# Patient Record
Sex: MALE | Race: WHITE | Employment: FULL TIME | URBAN - METROPOLITAN AREA
[De-identification: names, ages, dates, MRNs, and addresses within clinical notes are randomized per-mention and may not be internally consistent; named-entity substitution may affect disease eponyms.]

---

## 2021-12-20 ENCOUNTER — TELEPHONE (OUTPATIENT)
Dept: SURGERY | Age: 36
End: 2021-12-20

## 2021-12-20 NOTE — TELEPHONE ENCOUNTER
Patient was scheduled with Dr. Jorge Schilder 12/21/2021 at 10:45; however, the patient needs to be seen by Dr. Thea Caruso. I attempted to contact the patient to get them on Dr. Rene Calix schedule, but the patient did not answer so I left a voicemail explaining their appt was canceled for tomorrow and the pt needs to call the office to schedule an appointment with Dr. Thea Caruso.

## 2022-01-06 ENCOUNTER — OFFICE VISIT (OUTPATIENT)
Dept: SURGERY | Age: 37
End: 2022-01-06
Payer: COMMERCIAL

## 2022-01-06 VITALS
TEMPERATURE: 98.7 F | BODY MASS INDEX: 30.04 KG/M2 | HEIGHT: 71 IN | WEIGHT: 214.6 LBS | HEART RATE: 97 BPM | RESPIRATION RATE: 18 BRPM | SYSTOLIC BLOOD PRESSURE: 143 MMHG | OXYGEN SATURATION: 95 % | DIASTOLIC BLOOD PRESSURE: 87 MMHG

## 2022-01-06 DIAGNOSIS — R16.1 SPLENIC MASS: Primary | ICD-10-CM

## 2022-01-06 PROCEDURE — 99205 OFFICE O/P NEW HI 60 MIN: CPT | Performed by: SURGERY

## 2022-01-06 RX ORDER — ADALIMUMAB 40MG/0.4ML
KIT SUBCUTANEOUS
COMMUNITY
Start: 2021-12-16 | End: 2022-07-29

## 2022-01-06 RX ORDER — OMEPRAZOLE 20 MG/1
TABLET, DELAYED RELEASE ORAL
COMMUNITY
Start: 2021-10-20

## 2022-01-06 RX ORDER — BISMUTH SUBSALICYLATE 262 MG
1 TABLET,CHEWABLE ORAL DAILY
COMMUNITY

## 2022-01-06 RX ORDER — LORATADINE AND PSEUDOEPHEDRINE SULFATE 10; 240 MG/1; MG/1
TABLET, EXTENDED RELEASE ORAL
COMMUNITY
Start: 2005-12-20

## 2022-01-06 NOTE — PROGRESS NOTES
1. Have you been to the ER, urgent care clinic since your last visit? Hospitalized since your last visit? No    2. Have you seen or consulted any other health care providers outside of the [address] [place] [address] since your last visit? Include any pap smears or colon screening. No    Patient aware of BP.

## 2022-01-10 PROBLEM — R16.1 SPLENIC MASS: Status: ACTIVE | Noted: 2022-01-10

## 2022-01-11 NOTE — PROGRESS NOTES
[facility] History and Physical    Chief Complaint: Splenic mass    History of Present Illness:      Leodan Briones is a 40 y.o. male who was, referred by Dr. Jamin Diggs for evaluation of a splenic mass. The patient has a history of ulcerative proctitis for which she is followed by Dr. Whit Beatty. This is well controlled. The patient recently moved to [place] from [facility] and then establishing GI care had a CT scan and colonoscopy because of his history of ulcerative proctitis. The CT scan showed a splenic mass which was further evaluated with MRI. The MRI showed a 12.2 x 8.0 cm spleen with 8 x 6.6 cm splenic mass. 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 The patient is subsequently referred for further evaluation. The patient does have a history of recent diagnosis of testicular cancer. He had an orchiectomy in December and is being evaluated for consideration of retroperitoneal lymphadenectomy versus radiation therapy. He denies any abdominal pain complaints. No weight loss. Past Medical History:   Diagnosis Date    Acid indigestion     Autoimmune disease ([address]     Cancer (HCC)     Testicular     Stool color black     ulceratie proctitis        Past Surgical History:   Procedure Laterality Date    HX UROLOGICAL         Social History     Socioeconomic History    Marital status:      Spouse name: Not on file    Number of children: Not on file    Years of education: Not on file    Highest education level: Not on file   Occupational History    Not on file   Tobacco Use    Smoking status: Former Smoker    Smokeless tobacco: Never Used   Substance and Sexual Activity    Alcohol use:  Yes    Drug use: Never    Sexual activity: Not on file   Other Topics Concern    Not on file   Social History Narrative    Not on file     Social Determinants of Health     Financial Resource Strain:     Difficulty of Paying Living Expenses: Not on file   Food Insecurity:     Worried About Running Out of Food in the Last Year: Not on file    Paulette of Food in the Last Year: Not on file   Transportation Needs:     Lack of Transportation (Medical): Not on file    Lack of Transportation (Non-Medical): Not on file   Physical Activity:     Days of Exercise per Week: Not on file    Minutes of Exercise per Session: Not on file   Stress:     Feeling of Stress : Not on file   Social Connections:     Frequency of Communication with Friends and Family: Not on file    Frequency of Social Gatherings with Friends and Family: Not on file    Attends Orthodox Services: Not on file    Active Member of [address] [place] [address] [employer] or Organizations: Not on file    Attends Club or Organization Meetings: Not on file    Marital Status: Not on file   Intimate Partner Violence:     Fear of Current or Ex-Partner: Not on file    Emotionally Abused: Not on file    Physically Abused: Not on file    Sexually Abused: Not on file   Housing Stability:     Unable to Pay for Housing in the Last Year: Not on file    Number of [place] in the Last Year: Not on file    Unstable Housing in the Last Year: Not on file       Family History   Problem Relation Age of Onset    Hypertension Mother     Cancer Maternal Grandmother         Breast    Stroke Maternal Grandfather     Cancer Paternal Grandfather          Current Outpatient Medications:     Humira,CF, Pen 40 mg/0.4 mL injection pen, , Disp: , Rfl:     loratadine-pseudoephedrine (AllerClear D-24hr)  mg per tablet, , Disp: , Rfl:     omeprazole (PriLOSEC OTC) 20 mg tablet, , Disp: , Rfl:     multivitamin (ONE A DAY) tablet, Take 1 Tablet by mouth daily.  Indications: One a Day Mens, Disp: , Rfl:     Allergies   Allergen Reactions    Flynn Rash and Swelling       ROS   Constitutional: Negative  Ears, Nose, Mouth, Throat, and Face: Negative  Respiratory: Negative  Cardiovascular: Negative  Gastrointestinal: History of ulcerative proctitis  Genitourinary: Recent diagnosis of testicular cancer. Integument/Breast: Negative  Hematologic/Lymphatic: Negative  Behavioral/Psychiatric: Negative  Allergic/Immunologic: Negative      Physical Exam:     Visit Vitals  BP (!) 143/87 (BP 1 Location: Right arm)   Pulse 97   Temp 98.7 °F (37.1 °C)   Resp 18   Ht 5' 11\" (1.803 m)   Wt 214 lb 9.6 oz (97.3 kg)   SpO2 95%   BMI 29.93 kg/m²       General - alert and oriented, no apparent distress  HEENT - NC/AT. No scleral icterus  Pulm - CTAB, normal inspiratory effort  CV - RRR, no M/R/G  Abd - soft, NT, ND. No palpable masses or organomegaly. Ext - warm, well perfused, no edema  Lymphatics - no cervical, supraclavicular or inguinal adenopathy noted. Skin - supple, no rashes  Psychiatric - normal affect, good mood    Labs  None    Imaging  Reports from outside imaging reviewed. Images not available for my review at this time. Assessment:     Myrna Blevins is a 40 y.o. male with a splenic mass in the setting of recent diagnosis of testicular cancer and ulcerative proctitis. Recommendations:     1. I would like to get a copy of the patient's MRI and CT to review with our radiologist.  I will plan to review this case at our multidisciplinary tumor board once this is obtained. I discussed with the patient that his splenic lesion is most likely benign however malignancy was not felt possible to rule out based upon outside review. He may benefit from a PET scan prior to his urologic surgery. I discussed that if he does have an open retroperitoneal lymphadenectomy that the splenectomy could be performed at that time if deemed necessary. I will plan to call the patient after our multidisciplinary tumor board review.       60 mins of time was spent with the patient of which > 50% of the time involved face-to-face counseling of the patient regarding the proposed treatment plan.       Renay Aase, MD  1/6/2022    CC: Dr. Beltre Roscoe  Dr. Guicho Lew

## 2022-01-18 ENCOUNTER — TELEPHONE (OUTPATIENT)
Dept: SURGERY | Age: 37
End: 2022-01-18

## 2022-01-18 NOTE — TELEPHONE ENCOUNTER
Patient called stating he was checking on the status if Dr. Tobi Cesar received his medical records from [facility] and [facility].

## 2022-01-19 ENCOUNTER — TELEPHONE (OUTPATIENT)
Dept: SURGERY | Age: 37
End: 2022-01-19

## 2022-01-19 NOTE — TELEPHONE ENCOUNTER
Returned call to patient verified using 2 patient identifiers. Informed will follow up with medical records to see if records have been received and will follow up with Dr. Carson Curry nurse.   Pt voiced understandings

## 2022-01-19 NOTE — TELEPHONE ENCOUNTER
Cld pt to verify where to send Medical Record request. At this time it just says \"[facility]\". We need a dept and phone number or a fax number. Please send his response to me in a telephone encounter so I may complete processing.

## 2022-01-19 NOTE — TELEPHONE ENCOUNTER
Returned call to patient. Two patient identifiers used. Patient given update regarding records request.  Will give patient a call once records arrive.

## 2022-02-09 ENCOUNTER — TELEPHONE (OUTPATIENT)
Dept: SURGERY | Age: 37
End: 2022-02-09

## 2022-02-09 NOTE — TELEPHONE ENCOUNTER
Pt will come by to fill out Release of Medical records for [facility] and [facility].     CHECK THE  DRAWER PLEASE

## 2022-02-09 NOTE — TELEPHONE ENCOUNTER
Patient stated that he has not heard back since his records were requested from [facility] and [address] [place] [address] urology. Patient would like to know what his next steps are.

## 2022-02-09 NOTE — TELEPHONE ENCOUNTER
Returned call to patient. IVÁN cortes. Explained to patient that we haven't gotten records from [facility] or [facility], however I would check on the status.

## 2022-02-14 ENCOUNTER — DOCUMENTATION ONLY (OUTPATIENT)
Dept: SURGERY | Age: 37
End: 2022-02-14

## 2022-02-14 NOTE — PROGRESS NOTES
Disk received from [place] CT abd pelvis with contrast and Chest Xray 12/16/21 along with  [facility] CT abd/pelvis with contrast 7/1/21. Placed in provider inbox.

## 2022-02-24 ENCOUNTER — TELEPHONE (OUTPATIENT)
Dept: SURGERY | Age: 37
End: 2022-02-24

## 2022-02-24 NOTE — TELEPHONE ENCOUNTER
Pt received a voice mail from Dr. Marino Hallman to schedule a follow up appt to review imaging. Pt is scheduled for 3/10 at 1:00pm. He was concerned this may be too far out and wanted me to send a message to have a nurse confirm and call him back please.

## 2022-03-08 ENCOUNTER — TELEPHONE (OUTPATIENT)
Dept: SURGERY | Age: 37
End: 2022-03-08

## 2022-03-08 NOTE — TELEPHONE ENCOUNTER
Attempted to contact the patient to confirm their upcoming appointment set for Thursday, March 10th. LVM instructing the patient to call the office to confirm.

## 2022-03-10 ENCOUNTER — OFFICE VISIT (OUTPATIENT)
Dept: SURGERY | Age: 37
End: 2022-03-10
Payer: COMMERCIAL

## 2022-03-10 VITALS
SYSTOLIC BLOOD PRESSURE: 130 MMHG | HEART RATE: 106 BPM | OXYGEN SATURATION: 96 % | BODY MASS INDEX: 30.52 KG/M2 | HEIGHT: 71 IN | RESPIRATION RATE: 18 BRPM | WEIGHT: 218 LBS | TEMPERATURE: 98.6 F | DIASTOLIC BLOOD PRESSURE: 87 MMHG

## 2022-03-10 DIAGNOSIS — R16.1 SPLENIC MASS: Primary | ICD-10-CM

## 2022-03-10 PROBLEM — K51.90 ULCERATIVE COLITIS (HCC): Status: ACTIVE | Noted: 2022-03-10

## 2022-03-10 PROCEDURE — 99213 OFFICE O/P EST LOW 20 MIN: CPT | Performed by: SURGERY

## 2022-03-10 NOTE — PROGRESS NOTES
1. Have you been to the ER, urgent care clinic since your last visit? Hospitalized since your last visit? No    2. Have you seen or consulted any other health care providers outside of the [address] [place] [address] since your last visit? Include any pap smears or colon screening.  No

## 2022-03-10 NOTE — PROGRESS NOTES
[facility]      Clinic Note - Follow up    Osmar returns for scheduled follow up today. He is known to me for a splenic mass in the setting of recent diagnosis of testicular cancer and ulcerative proctitis. The patient returns today to discuss review of his imaging. He states that his Urology team decided to follow his lymph nodes rather than treat with surgery or radiation. He has follow-up lab work in 2 months and plans for follow-up CT imaging in 4 months. His ulcerative colitis remains well controlled. He has no new complaints today. No abdominal pain. The patient denies any changes to the [facility], PSHx, SHx or FHx since their last visit except as mentioned above. ROS is negative except as mentioned in the HPI. Objective     Visit Vitals  /87 (BP 1 Location: Right arm, BP Patient Position: Sitting, BP Cuff Size: Adult long)   Pulse (!) 106   Temp 98.6 °F (37 °C) (Oral)   Resp 18   Ht 5' 11\" (1.803 m)   Wt 218 lb (98.9 kg)   SpO2 96%   BMI 30.40 kg/m²         PE  GEN - Awake, alert, communicating appropriately. NAD  Remainder of exam not performed during this encounter. Labs  None      Imaging  None      Assessment     Tim Webb is a 40 y. o.yr old male with a splenic mass in the setting of recent diagnosis of testicular cancer and ulcerative proctitis. The splenic mass appears most consistent with an atypical hemangioma per review with our radiologist.      Plan     I reviewed the imaging extensively with radiology. 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 It has not changed in 6 months which is also reassuring. I recommended that we follow this with his already planned surveillance imaging for his testicular cancer. If a significant change occurs we could proceed with a biopsy or splenectomy at that time. I will plan to see him back after his next CT scan in 4 to 5 months.     20 mins of time was spent with the patient of which > 50% of the time involved face-to-face counseling of the patient regarding the proposed treatment plan.       Davina Farrell MD  3/10/2022    CC: Dr. Felisa Cooks Dr. Billee Kraft

## 2022-03-19 PROBLEM — K51.90 ULCERATIVE COLITIS (HCC): Status: ACTIVE | Noted: 2022-03-10

## 2022-03-20 PROBLEM — R16.1 SPLENIC MASS: Status: ACTIVE | Noted: 2022-01-10

## 2022-05-19 ENCOUNTER — TELEPHONE (OUTPATIENT)
Dept: SURGERY | Age: 37
End: 2022-05-19

## 2022-05-19 ENCOUNTER — VIRTUAL VISIT (OUTPATIENT)
Dept: SURGERY | Age: 37
End: 2022-05-19
Payer: COMMERCIAL

## 2022-05-19 DIAGNOSIS — R16.1 SPLENIC MASS: Primary | ICD-10-CM

## 2022-05-19 PROBLEM — C62.90 TESTICULAR CANCER (HCC): Status: ACTIVE | Noted: 2022-05-19

## 2022-05-19 PROCEDURE — 99213 OFFICE O/P EST LOW 20 MIN: CPT | Performed by: SURGERY

## 2022-05-19 RX ORDER — DEXTROAMPHETAMINE SULFATE, DEXTROAMPHETAMINE SACCHARATE, AMPHETAMINE SULFATE AND AMPHETAMINE ASPARTATE 6.25; 6.25; 6.25; 6.25 MG/1; MG/1; MG/1; MG/1
CAPSULE, EXTENDED RELEASE ORAL
COMMUNITY
Start: 2022-05-17

## 2022-05-19 NOTE — PROGRESS NOTES
I was in the office while conducting this encounter. Consent:  He and/or his healthcare decision maker is aware that this patient-initiated Telehealth encounter is a billable service, with coverage as determined by his insurance carrier. He is aware that he may receive a bill and has provided verbal consent to proceed: Yes    This virtual visit was conducted via D[employer]. me. Pursuant to the emergency declaration under the [facility]5 waiver authority and the [employer] Act, this Virtual  Visit was conducted to reduce the patient's risk of exposure to COVID-19 and provide continuity of care for an established patient. Services were provided through a video synchronous discussion virtually to substitute for in-person clinic visit. Due to this being a TeleHealth evaluation, many elements of the physical examination are unable to be assessed. Total Time: minutes: 11-20 minutes. [facility]      Clinic Note - Follow up    Osmar returns for scheduled follow up today. He is known to me for a splenic mass that is concerning for an atypical hemangioma or PAOLA. He also has testicular cancer and ulcerative colitis. The patient states that he is feeling well since his last appointment. His tumor marker (HCG) was up some per the patient and his urologist is rechecking this to consider whether he needs chemotherapy or not. He also recently had a CT scan which she states showed no signs of recurrent disease. The patient denies any abdominal pain, thrombocytopenia or symptoms concerning for splenomegaly. The patient denies any changes to the [facility], PSHx, SHx or FHx since their last visit except as mentioned above. ROS is negative except as mentioned in the HPI. Objective     There were no vitals taken for this visit.       PE  GEN - Awake, alert, communicating appropriately. NAD  Remainder of exam not performed for this virtual encounter. Labs  None available for me to review    Imaging  5/2/22 CT A/P w/ contrast: [facility]) 1. Unchanged splenic lesion. 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 Recommend follow-up CT in 1 year. 2.  No lymphadenopathy or other evidence of metastasis. 3.  Significant hepatic steatosis. Assessment     Luna Mercado is a 40 y. o.yr old male known to me for a splenic mass that is concerning for an atypical hemangioma or PAOLA. He also has testicular cancer and ulcerative colitis. Plan     The patient's splenic lesion appears stable suggesting further that this is a benign lesion. He will have ongoing imaging surveillance for his testicular cancer and this can be followed further with that imaging. I will plan to see him back in 1 year. Lisa Kaur MD  5/19/2022    CC:  MD Dr. Corbin Mantilla Dr.

## 2022-05-19 NOTE — PROGRESS NOTES
1. Have you been to the ER, urgent care clinic since your last visit? Hospitalized since your last visit? No    2. Have you seen or consulted any other health care providers outside of the [address] [place] [address] since your last visit? Include any pap smears or colon screening.  No

## 2022-05-19 NOTE — TELEPHONE ENCOUNTER
2nd call to [facility] to request CT report for patient. Per Aure Oglesby she will fax right over.

## 2022-05-24 ENCOUNTER — TELEPHONE (OUTPATIENT)
Dept: ONCOLOGY | Age: 37
End: 2022-05-24

## 2022-05-24 NOTE — TELEPHONE ENCOUNTER
Called the pt. And left a vm to move his appointment from 6/9 to 6/1 per Dr. Jimmy Chauhan. I have moved the appointment if the pt is okay with 6/1/22 at 2pm it is scheduled. Once verified please cancel the appointment from 6/9 with Dr. Jimmy Chauhan.

## 2022-05-25 NOTE — PROGRESS NOTES
[facility] at [address], [address] [place] [address] [address]  Chanel Mcguire: [phone]  F: [phone]      Reason for Visit:   Rossy Gonzales is a 40 y.o. male who is seen in consultation at the request of Dr. Talia Skinner for evaluation of testicular cancer. Hematology/Oncology Treatment History:   · Scrotal US 11/30/2021: 3.9cm solid and cystic mass replacing left testicle. Slightly atrophic right testicle with no mass. Incidental 8mm left epididymal head cyst/spermatocele  · Pre-op tumor markers 12/2/2021: .0, beta hCG 250,   · Left radical inguinal orchiectomy by Dr. 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 Negative LVI, negative margins. · CT A/P 12/16/2021: indeterminate splenic lesion. 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 No lymphadenopathy or other evidence of metastasis. Significant hepatic steatosis. · Tumor markers 5/17/2022: AFP 5.3, beta hCG 14  · Stage IS (pT1b cN0 M0 S1) Non-seminoma, LEFT testis    History of Present Illness:   Rossy Gonzales is a pleasant 40 y.o. male who was seen for evaluation of testicular cancer. In 11/2021 he noticed his left testicle was enlarged. He saw his PCP who referred him to urology. An US was concerning for malignancy. He had an orchiectomy 12/8/2021, and subsequent tumor markers normalized. He has been followed by [facility] with surveillance. On his last in early May, his hCG was rising from 0.1 to 9.   This was repeated two weeks later and was higher at 14, prompting his referral to see me today. He has a history of ulcerative proctitis. He also has a splenic mass for which he follows with Dr. [facility], surgical oncology, with imaging concerning for an atypical hemangioma or PAOLA. He reports feeling well. No pain. Proctitis issues did flare recently after missing his medication for a few days. He denies kidney disease, hearing loss, tinnitus, or respiratory disease. No asthma. Quit smoking in 2018, previously smoked about 1-1.5ppd for about 10 years. Denies marijuana. He is accompanied by his wife today. He lives in [place] with his wife and two young children. Past Medical History:   Diagnosis Date    Acid indigestion     Autoimmune disease ([facility] [address]     Cancer (HCC)     Testicular     Stool color black     ulceratie proctitis        Past Surgical History:   Procedure Laterality Date    HX UROLOGICAL         Social History     Socioeconomic History    Marital status:      Spouse name: Not on file    Number of children: Not on file    Years of education: Not on file    Highest education level: Not on file   Occupational History    Not on file   Tobacco Use    Smoking status: Former Smoker    Smokeless tobacco: Never Used   Substance and Sexual Activity    Alcohol use: Yes    Drug use: Never    Sexual activity: Not on file   Other Topics Concern    Not on file   Social History Narrative    Not on file     Social Determinants of Health     Financial Resource Strain:     Difficulty of Paying Living Expenses: Not on file   Food Insecurity:     Worried About Running Out of Food in the Last Year: Not on file    Paulette of Food in the Last Year: Not on file   Transportation Needs:     Lack of Transportation (Medical): Not on file    Lack of Transportation (Non-Medical):  Not on file   Physical Activity:     Days of Exercise per Week: Not on file    Minutes of Exercise per Session: Not on file   Stress:     Feeling of Stress : Not on file Social Connections:     Frequency of Communication with Friends and Family: Not on file    Frequency of Social Gatherings with Friends and Family: Not on file    Attends Tenri[employer] Services: Not on file    Active Member of Clubs or Organizations: Not on file    Attends Club or Organization Meetings: Not on file    Marital Status: Not on file   Intimate Partner Violence:     Fear of Current or Ex-Partner: Not on file    Emotionally Abused: Not on file    Physically Abused: Not on file    Sexually Abused: Not on file   Housing Stability:     Unable to Pay for Housing in the Last Year: Not on file    Number of [place] in the Last Year: Not on file    Unstable Housing in the Last Year: Not on file       Family History   Problem Relation Age of Onset    Hypertension Mother     Cancer Maternal Grandmother         Breast    Stroke Maternal Grandfather     Cancer Paternal Grandfather        Current Outpatient Medications   Medication Sig    Adderall XR 25 mg XR capsule     Humira,CF, Pen 40 mg/0.4 mL injection pen     loratadine-pseudoephedrine (AllerClear D-24hr)  mg per tablet     omeprazole (PriLOSEC OTC) 20 mg tablet     multivitamin (ONE A DAY) tablet Take 1 Tablet by mouth daily. Indications: One a Day Mens     No current facility-administered medications for this visit. Allergies   Allergen Reactions    Flynn Rash and Swelling          Review of Systems: A complete review of systems was obtained, reviewed. Pertinent findings reviewed above.     Physical Exam:     Visit Vitals  BP (!) 137/95 (BP 1 Location: Right arm, BP Patient Position: Sitting, BP Cuff Size: Large adult)   Pulse 100   Temp 98.1 °F (36.7 °C) (Temporal)   Resp 20   Ht 5' 11\" (1.803 m)   Wt 212 lb (96.2 kg)   SpO2 98%   BMI 29.57 kg/m²     ECOG PS: 0  General: no distress  Eyes: PERRLA, anicteric sclerae  HENT: atraumatic, oropharynx clear  Neck: supple  Lymphatic: no cervical, supraclavicular, or inguinal adenopathy  Respiratory: CTAB, normal respiratory effort  CV: normal rate, regular rhythm, no murmurs, no peripheral edema  GI: soft, nontender, nondistended, no masses, no hepatomegaly, no splenomegaly  MS: digits without clubbing or cyanosis. Skin: no rashes, no ecchymoses, no petechia. normal temperature, turgor, and texture. Psych: alert, oriented, appropriate affect, normal judgment/insight    Results:     2021  AFP: 168.0 (H)  beta hC (H)  LDH: 190    2022  AFP: 6.2  beta hCG: <1    2022  AFP:  4.6  beta hCG: <1    2022:  AFP:  5.6  beta hC    2022:  AFP:  5.3  beta hC    Records from [facility] reviewed and summarized above. Test results above have been reviewed. Assessment:   1) Non-seminoma, LEFT testis  Stage IS  He is s/p orchectomy 2021. His pre-op hCG was elevated at 250, but became undetectable after surgery. More recently it has been rising, up to 14 on most recent check, concerning for recurrence. CXR and CT A/P were negative. His beta hCG is still only minimally elevated, and we can see false positives in certain situations. He denies marijuana use. I will check his testosterone level, and repeat his hCG now that it has been another 2 weeks. If the level continues to rise, then he has recurrent disease and current guidelines recommend treatment with systemic therapy. Specifically, for good risk disease we would give BEP x3 cycles. He will need PFTs before treatment. If DLCO is not adequate for bleomycin, then we will consider treatment with EP x4 cycles. I recommend a CT Chest prior to initiating chemotherapy, to complete his staging evaluation. He will benefit from port placement for therapy. 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 Collette Cardona (surgical oncology). 3) Ulcerative proctitis  Following with Dr. Sergio Pruett.   He will need to hold his Humira while on chemotherapy. 4) Risk of infertility  He was informed that chemotherapy can potentially cause infertility. He has already had a vasectomy and is not interested in having more children.       Plan:     · Labs today: CBC, CMP, Total testosterone, AFP, LDH, beta-hCG  · CT Chest  · If beta-hCG continues to rise, we will obtain PFTs, port placement, and have him return for chemotherapy teaching  ·  to meet with patient today, provide resources and counseling    Signed By: Ju Uribe MD

## 2022-05-26 PROBLEM — C62.92 NON-SEMINOMATOUS CANCER OF LEFT TESTIS (HCC): Status: ACTIVE | Noted: 2022-05-26

## 2022-06-01 ENCOUNTER — DOCUMENTATION ONLY (OUTPATIENT)
Dept: ONCOLOGY | Age: 37
End: 2022-06-01

## 2022-06-01 ENCOUNTER — OFFICE VISIT (OUTPATIENT)
Dept: ONCOLOGY | Age: 37
End: 2022-06-01
Payer: COMMERCIAL

## 2022-06-01 VITALS
OXYGEN SATURATION: 98 % | BODY MASS INDEX: 29.68 KG/M2 | TEMPERATURE: 98.1 F | HEIGHT: 71 IN | HEART RATE: 100 BPM | SYSTOLIC BLOOD PRESSURE: 137 MMHG | DIASTOLIC BLOOD PRESSURE: 95 MMHG | RESPIRATION RATE: 20 BRPM | WEIGHT: 212 LBS

## 2022-06-01 DIAGNOSIS — C62.92 NON-SEMINOMATOUS CANCER OF LEFT TESTIS (HCC): Primary | ICD-10-CM

## 2022-06-01 PROCEDURE — 99205 OFFICE O/P NEW HI 60 MIN: CPT | Performed by: INTERNAL MEDICINE

## 2022-06-02 NOTE — PROGRESS NOTES
Oncology Social Work  Psychosocial Assessment    Reason for Assessment:      [x] Social Work Referral [x] Initial Assessment [x] New Diagnosis [] Other    Advance Care Planning:  No flowsheet data found. Sources of Information:    [x]Patient  [x]Family  [x]Staff  [x]Medical Record    Mental Status:    [x]Alert  []Lethargic  []Unresponsive   [] Unable to assess   Oriented to:  [x]Person  [x]Place  [x]Time  [x]Situation      Relationship Status:  []Single  [x]  []Significant Other/Life Partner  []  []  []    Living Circumstances:  []Lives Alone  [x]Family/Significant Other in Household  []Roommates  [x]Children in the Home  []Paid Caregivers  []Assisted Living Facility/Group Home  []Skilled [address]  []Homeless  []Incarcerated  []Environmental/Care Concerns  []Other:    Employment Status:  [x]Employed Full-time []Employed Part-time []Homemaker  [] Retired [] Short-Term Disability [] [facility]  [] Unemployed   [] SSI   [] SSDI  [] Self-Employment    Barriers to Learning:    []Language  []Developmental  []Cognitive  []Altered Mental Status  []Visual/Hearing Impairment  []Unable to Read/Write  []Motivational  [] Challenges Understanding Medical Jargon [x]No Barriers Identified      Financial/Legal Concerns:    []Uninsured  []Limited Income/Resources  []Non-Citizen  []Food Insecurity [x]No Concerns Identified   []Other:    Scientologist/Spiritual/Existential:  Does patient have any spiritual or Mandaen beliefs? [] Yes [] No  Is the patient involved in a spiritual, rishi or Mandaen community?  [] Yes [] No  Patient expressing spiritual/existential angst? [] Yes [x] No  Notes:    Support System:    Identified Support Person/Group: Pina (wife)  []Strong  [x]Fair  []Limited    Coping with Illness:   [x]  Coping Well  [] Challenges Coping with Serious Illness [] Situational Depression [x] Situational Anxiety [] Anticipatory Grief  [] Recent Loss [] Caregiver [place] Narrative: Patient here for consultation with Dr. Natalia Leal for the management of testicular cancer. Met with patient and his wife, Phil Carney, to introduce oncology social work role and support. Patient and wife live together in their private residence with their two children ages 3 and 3. 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 He has medical insurance. They recently moved to the [facility] and have no local family but tells me they have several neighbor who have become friends and have offered to help when needed. Family lives in [place]. Patient hopes to be able to work during treatment but has the ability to take leave as needed. He prefers treatment at [facility] as that is closer to his home and easier for his wife to drive him to/from treatment with their children. Patient reports ADD diagnosis managed with adderall 25 mg. She reports some feelings of worry and depressed mood since cancer diagnosis. Explored coping tools and patient tells me staying busy with work and commute to work where he can listen to music and pod casts are helpful. Explored ways to incorporate new coping tools should he go out on leave from work. Patient declined information on fertility preservation. He's has a vasectomy and does not plan on have any more children. Provided patient will new patient resource folder  including information on support their children. Encouraged patient/wife to contact me as needed for ongoing psychosocial support. Plan:  1. Explored coping tools to manage depression and anxiety. 2. Provided new patient resource folder and resources for children. 3. Ongoing psychosocial support as desired by patient.      Referral/Handouts:   Complementary therapies referral  Caregiver Support referral  Support Groups referral  Dependent Care (adult or child) referral      Thank you,  Marcelle Graves LCSW

## 2022-06-03 ENCOUNTER — TELEPHONE (OUTPATIENT)
Dept: ONCOLOGY | Age: 37
End: 2022-06-03

## 2022-06-03 DIAGNOSIS — C62.92 NON-SEMINOMATOUS CANCER OF LEFT TESTIS (HCC): Primary | ICD-10-CM

## 2022-06-03 NOTE — TELEPHONE ENCOUNTER
[address] at [facility]  [phone]    Labs reviewed. Beta hCG continues to rise, up to 19 now. Testosterone level is normal.    I recommend we move forward with chemotherapy. CT Chest is scheduled for 6/9. He will additionally need PFTs and port placement and follow up with me for chemotherapy teaching. We can hopefully get these done next week and start him on BEP the following week. I called the patient to discuss, no answer, left a message for him to call me back.

## 2022-06-06 ENCOUNTER — TELEPHONE (OUTPATIENT)
Dept: ONCOLOGY | Age: 37
End: 2022-06-06

## 2022-06-06 DIAGNOSIS — C62.92 NON-SEMINOMATOUS CANCER OF LEFT TESTIS (HCC): Primary | ICD-10-CM

## 2022-06-06 PROBLEM — Z51.11 ENCOUNTER FOR ANTINEOPLASTIC CHEMOTHERAPY: Status: ACTIVE | Noted: 2022-06-06

## 2022-06-06 RX ORDER — ONDANSETRON 2 MG/ML
8 INJECTION INTRAMUSCULAR; INTRAVENOUS AS NEEDED
Status: CANCELLED | OUTPATIENT
Start: 2022-06-24

## 2022-06-06 RX ORDER — ACETAMINOPHEN 325 MG/1
650 TABLET ORAL ONCE
Status: CANCELLED
Start: 2022-07-05 | End: 2022-07-05

## 2022-06-06 RX ORDER — ACETAMINOPHEN 325 MG/1
650 TABLET ORAL AS NEEDED
Status: CANCELLED
Start: 2022-06-23

## 2022-06-06 RX ORDER — ALBUTEROL SULFATE 0.83 MG/ML
2.5 SOLUTION RESPIRATORY (INHALATION) AS NEEDED
Status: CANCELLED
Start: 2022-06-21

## 2022-06-06 RX ORDER — DIPHENHYDRAMINE HYDROCHLORIDE 50 MG/ML
25 INJECTION, SOLUTION INTRAMUSCULAR; INTRAVENOUS AS NEEDED
Status: CANCELLED
Start: 2022-06-23

## 2022-06-06 RX ORDER — ONDANSETRON 2 MG/ML
8 INJECTION INTRAMUSCULAR; INTRAVENOUS AS NEEDED
Status: CANCELLED | OUTPATIENT
Start: 2022-06-27

## 2022-06-06 RX ORDER — HYDROCORTISONE SODIUM SUCCINATE 100 MG/2ML
100 INJECTION, POWDER, FOR SOLUTION INTRAMUSCULAR; INTRAVENOUS AS NEEDED
Status: CANCELLED | OUTPATIENT
Start: 2022-06-24

## 2022-06-06 RX ORDER — EPINEPHRINE 1 MG/ML
0.3 INJECTION, SOLUTION, CONCENTRATE INTRAVENOUS AS NEEDED
Status: CANCELLED | OUTPATIENT
Start: 2022-06-24

## 2022-06-06 RX ORDER — DIPHENHYDRAMINE HYDROCHLORIDE 50 MG/ML
25 INJECTION, SOLUTION INTRAMUSCULAR; INTRAVENOUS ONCE
Status: CANCELLED
Start: 2022-07-05 | End: 2022-07-05

## 2022-06-06 RX ORDER — DIPHENHYDRAMINE HYDROCHLORIDE 50 MG/ML
50 INJECTION, SOLUTION INTRAMUSCULAR; INTRAVENOUS AS NEEDED
Status: CANCELLED
Start: 2022-06-27

## 2022-06-06 RX ORDER — ACETAMINOPHEN 325 MG/1
650 TABLET ORAL ONCE
Status: CANCELLED
Start: 2022-06-27 | End: 2022-06-27

## 2022-06-06 RX ORDER — ACETAMINOPHEN 325 MG/1
650 TABLET ORAL AS NEEDED
Status: CANCELLED
Start: 2022-06-24

## 2022-06-06 RX ORDER — SODIUM CHLORIDE 9 MG/ML
10 INJECTION INTRAMUSCULAR; INTRAVENOUS; SUBCUTANEOUS AS NEEDED
Status: CANCELLED | OUTPATIENT
Start: 2022-06-21

## 2022-06-06 RX ORDER — DIPHENHYDRAMINE HYDROCHLORIDE 50 MG/ML
25 INJECTION, SOLUTION INTRAMUSCULAR; INTRAVENOUS AS NEEDED
Status: CANCELLED
Start: 2022-06-22

## 2022-06-06 RX ORDER — DIPHENHYDRAMINE HYDROCHLORIDE 50 MG/ML
25 INJECTION, SOLUTION INTRAMUSCULAR; INTRAVENOUS AS NEEDED
Status: CANCELLED
Start: 2022-07-05

## 2022-06-06 RX ORDER — SODIUM CHLORIDE 9 MG/ML
25 INJECTION, SOLUTION INTRAVENOUS CONTINUOUS
Status: CANCELLED
Start: 2022-06-27

## 2022-06-06 RX ORDER — EPINEPHRINE 1 MG/ML
0.3 INJECTION, SOLUTION, CONCENTRATE INTRAVENOUS AS NEEDED
Status: CANCELLED | OUTPATIENT
Start: 2022-06-22

## 2022-06-06 RX ORDER — ALBUTEROL SULFATE 0.83 MG/ML
2.5 SOLUTION RESPIRATORY (INHALATION) AS NEEDED
Status: CANCELLED
Start: 2022-06-27

## 2022-06-06 RX ORDER — SODIUM CHLORIDE 0.9 % (FLUSH) 0.9 %
10 SYRINGE (ML) INJECTION AS NEEDED
Status: CANCELLED | OUTPATIENT
Start: 2022-06-23

## 2022-06-06 RX ORDER — HYDROCORTISONE SODIUM SUCCINATE 100 MG/2ML
100 INJECTION, POWDER, FOR SOLUTION INTRAMUSCULAR; INTRAVENOUS AS NEEDED
Status: CANCELLED | OUTPATIENT
Start: 2022-06-23

## 2022-06-06 RX ORDER — SODIUM CHLORIDE 9 MG/ML
10 INJECTION INTRAMUSCULAR; INTRAVENOUS; SUBCUTANEOUS AS NEEDED
Status: CANCELLED | OUTPATIENT
Start: 2022-06-23

## 2022-06-06 RX ORDER — SODIUM CHLORIDE 0.9 % (FLUSH) 0.9 %
10 SYRINGE (ML) INJECTION AS NEEDED
Status: CANCELLED | OUTPATIENT
Start: 2022-06-20

## 2022-06-06 RX ORDER — SODIUM CHLORIDE 9 MG/ML
10 INJECTION INTRAMUSCULAR; INTRAVENOUS; SUBCUTANEOUS AS NEEDED
Status: CANCELLED | OUTPATIENT
Start: 2022-06-27

## 2022-06-06 RX ORDER — DIPHENHYDRAMINE HYDROCHLORIDE 50 MG/ML
25 INJECTION, SOLUTION INTRAMUSCULAR; INTRAVENOUS ONCE
Status: CANCELLED
Start: 2022-06-27 | End: 2022-06-27

## 2022-06-06 RX ORDER — EPINEPHRINE 1 MG/ML
0.3 INJECTION, SOLUTION, CONCENTRATE INTRAVENOUS AS NEEDED
Status: CANCELLED | OUTPATIENT
Start: 2022-06-21

## 2022-06-06 RX ORDER — DIPHENHYDRAMINE HYDROCHLORIDE 50 MG/ML
50 INJECTION, SOLUTION INTRAMUSCULAR; INTRAVENOUS AS NEEDED
Status: CANCELLED
Start: 2022-06-22

## 2022-06-06 RX ORDER — SODIUM CHLORIDE 9 MG/ML
25 INJECTION, SOLUTION INTRAVENOUS CONTINUOUS
Status: CANCELLED | OUTPATIENT
Start: 2022-06-23

## 2022-06-06 RX ORDER — SODIUM CHLORIDE 0.9 % (FLUSH) 0.9 %
10 SYRINGE (ML) INJECTION AS NEEDED
Status: CANCELLED | OUTPATIENT
Start: 2022-06-22

## 2022-06-06 RX ORDER — ONDANSETRON 2 MG/ML
8 INJECTION INTRAMUSCULAR; INTRAVENOUS ONCE
Status: CANCELLED | OUTPATIENT
Start: 2022-06-21 | End: 2022-06-21

## 2022-06-06 RX ORDER — SODIUM CHLORIDE 9 MG/ML
10 INJECTION INTRAMUSCULAR; INTRAVENOUS; SUBCUTANEOUS AS NEEDED
Status: CANCELLED | OUTPATIENT
Start: 2022-06-20

## 2022-06-06 RX ORDER — EPINEPHRINE 1 MG/ML
0.3 INJECTION, SOLUTION, CONCENTRATE INTRAVENOUS AS NEEDED
Status: CANCELLED | OUTPATIENT
Start: 2022-06-27

## 2022-06-06 RX ORDER — SODIUM CHLORIDE 9 MG/ML
25 INJECTION, SOLUTION INTRAVENOUS CONTINUOUS
Status: CANCELLED | OUTPATIENT
Start: 2022-06-21

## 2022-06-06 RX ORDER — ONDANSETRON 2 MG/ML
8 INJECTION INTRAMUSCULAR; INTRAVENOUS AS NEEDED
Status: CANCELLED | OUTPATIENT
Start: 2022-06-21

## 2022-06-06 RX ORDER — HEPARIN 100 UNIT/ML
300-500 SYRINGE INTRAVENOUS AS NEEDED
Status: CANCELLED
Start: 2022-06-24

## 2022-06-06 RX ORDER — ONDANSETRON 2 MG/ML
8 INJECTION INTRAMUSCULAR; INTRAVENOUS ONCE
Status: CANCELLED | OUTPATIENT
Start: 2022-06-24 | End: 2022-06-24

## 2022-06-06 RX ORDER — HEPARIN 100 UNIT/ML
300-500 SYRINGE INTRAVENOUS AS NEEDED
Status: CANCELLED
Start: 2022-06-27

## 2022-06-06 RX ORDER — DIPHENHYDRAMINE HYDROCHLORIDE 50 MG/ML
25 INJECTION, SOLUTION INTRAMUSCULAR; INTRAVENOUS ONCE
Status: CANCELLED
Start: 2022-06-20 | End: 2022-06-20

## 2022-06-06 RX ORDER — DIPHENHYDRAMINE HYDROCHLORIDE 50 MG/ML
50 INJECTION, SOLUTION INTRAMUSCULAR; INTRAVENOUS AS NEEDED
Status: CANCELLED
Start: 2022-06-23

## 2022-06-06 RX ORDER — ONDANSETRON 2 MG/ML
8 INJECTION INTRAMUSCULAR; INTRAVENOUS AS NEEDED
Status: CANCELLED | OUTPATIENT
Start: 2022-06-22

## 2022-06-06 RX ORDER — DIPHENHYDRAMINE HYDROCHLORIDE 50 MG/ML
25 INJECTION, SOLUTION INTRAMUSCULAR; INTRAVENOUS AS NEEDED
Status: CANCELLED
Start: 2022-06-24

## 2022-06-06 RX ORDER — ALBUTEROL SULFATE 0.83 MG/ML
2.5 SOLUTION RESPIRATORY (INHALATION) AS NEEDED
Status: CANCELLED
Start: 2022-06-22

## 2022-06-06 RX ORDER — ONDANSETRON 2 MG/ML
8 INJECTION INTRAMUSCULAR; INTRAVENOUS AS NEEDED
Status: CANCELLED | OUTPATIENT
Start: 2022-07-05

## 2022-06-06 RX ORDER — HEPARIN 100 UNIT/ML
300-500 SYRINGE INTRAVENOUS AS NEEDED
Status: CANCELLED
Start: 2022-06-21

## 2022-06-06 RX ORDER — SODIUM CHLORIDE 0.9 % (FLUSH) 0.9 %
10 SYRINGE (ML) INJECTION AS NEEDED
Status: CANCELLED | OUTPATIENT
Start: 2022-07-05

## 2022-06-06 RX ORDER — PALONOSETRON 0.05 MG/ML
0.25 INJECTION, SOLUTION INTRAVENOUS ONCE
Status: CANCELLED | OUTPATIENT
Start: 2022-06-20 | End: 2022-06-20

## 2022-06-06 RX ORDER — HEPARIN 100 UNIT/ML
300-500 SYRINGE INTRAVENOUS AS NEEDED
Status: CANCELLED
Start: 2022-06-23

## 2022-06-06 RX ORDER — ACETAMINOPHEN 325 MG/1
650 TABLET ORAL AS NEEDED
Status: CANCELLED
Start: 2022-07-05

## 2022-06-06 RX ORDER — ONDANSETRON 2 MG/ML
8 INJECTION INTRAMUSCULAR; INTRAVENOUS AS NEEDED
Status: CANCELLED | OUTPATIENT
Start: 2022-06-23

## 2022-06-06 RX ORDER — SODIUM CHLORIDE 9 MG/ML
25 INJECTION, SOLUTION INTRAVENOUS CONTINUOUS
Status: CANCELLED | OUTPATIENT
Start: 2022-06-22

## 2022-06-06 RX ORDER — SODIUM CHLORIDE 0.9 % (FLUSH) 0.9 %
10 SYRINGE (ML) INJECTION AS NEEDED
Status: CANCELLED | OUTPATIENT
Start: 2022-06-24

## 2022-06-06 RX ORDER — HYDROCORTISONE SODIUM SUCCINATE 100 MG/2ML
100 INJECTION, POWDER, FOR SOLUTION INTRAMUSCULAR; INTRAVENOUS AS NEEDED
Status: CANCELLED | OUTPATIENT
Start: 2022-06-22

## 2022-06-06 RX ORDER — SODIUM CHLORIDE 9 MG/ML
10 INJECTION INTRAMUSCULAR; INTRAVENOUS; SUBCUTANEOUS AS NEEDED
Status: CANCELLED | OUTPATIENT
Start: 2022-06-22

## 2022-06-06 RX ORDER — ALBUTEROL SULFATE 0.83 MG/ML
2.5 SOLUTION RESPIRATORY (INHALATION) AS NEEDED
Status: CANCELLED
Start: 2022-06-24

## 2022-06-06 RX ORDER — SODIUM CHLORIDE 0.9 % (FLUSH) 0.9 %
10 SYRINGE (ML) INJECTION AS NEEDED
Status: CANCELLED | OUTPATIENT
Start: 2022-06-27

## 2022-06-06 RX ORDER — HYDROCORTISONE SODIUM SUCCINATE 100 MG/2ML
100 INJECTION, POWDER, FOR SOLUTION INTRAMUSCULAR; INTRAVENOUS AS NEEDED
Status: CANCELLED | OUTPATIENT
Start: 2022-07-05

## 2022-06-06 RX ORDER — SODIUM CHLORIDE 9 MG/ML
10 INJECTION INTRAMUSCULAR; INTRAVENOUS; SUBCUTANEOUS AS NEEDED
Status: CANCELLED | OUTPATIENT
Start: 2022-07-05

## 2022-06-06 RX ORDER — ACETAMINOPHEN 325 MG/1
650 TABLET ORAL AS NEEDED
Status: CANCELLED
Start: 2022-06-21

## 2022-06-06 RX ORDER — DIPHENHYDRAMINE HYDROCHLORIDE 50 MG/ML
25 INJECTION, SOLUTION INTRAMUSCULAR; INTRAVENOUS AS NEEDED
Status: CANCELLED
Start: 2022-06-21

## 2022-06-06 RX ORDER — HEPARIN 100 UNIT/ML
300-500 SYRINGE INTRAVENOUS AS NEEDED
Status: CANCELLED
Start: 2022-07-05

## 2022-06-06 RX ORDER — ALBUTEROL SULFATE 0.83 MG/ML
2.5 SOLUTION RESPIRATORY (INHALATION) AS NEEDED
Status: CANCELLED
Start: 2022-07-05

## 2022-06-06 RX ORDER — ACETAMINOPHEN 325 MG/1
650 TABLET ORAL ONCE
Status: CANCELLED
Start: 2022-06-20 | End: 2022-06-20

## 2022-06-06 RX ORDER — ACETAMINOPHEN 325 MG/1
650 TABLET ORAL AS NEEDED
Status: CANCELLED
Start: 2022-06-22

## 2022-06-06 RX ORDER — HEPARIN 100 UNIT/ML
300-500 SYRINGE INTRAVENOUS AS NEEDED
Status: CANCELLED
Start: 2022-06-22

## 2022-06-06 RX ORDER — SODIUM CHLORIDE 0.9 % (FLUSH) 0.9 %
10 SYRINGE (ML) INJECTION AS NEEDED
Status: CANCELLED | OUTPATIENT
Start: 2022-06-21

## 2022-06-06 RX ORDER — ACETAMINOPHEN 325 MG/1
650 TABLET ORAL AS NEEDED
Status: CANCELLED
Start: 2022-06-27

## 2022-06-06 RX ORDER — HYDROCORTISONE SODIUM SUCCINATE 100 MG/2ML
100 INJECTION, POWDER, FOR SOLUTION INTRAMUSCULAR; INTRAVENOUS AS NEEDED
Status: CANCELLED | OUTPATIENT
Start: 2022-06-27

## 2022-06-06 RX ORDER — ONDANSETRON 2 MG/ML
8 INJECTION INTRAMUSCULAR; INTRAVENOUS ONCE
Status: CANCELLED | OUTPATIENT
Start: 2022-06-23 | End: 2022-06-23

## 2022-06-06 RX ORDER — ALBUTEROL SULFATE 0.83 MG/ML
2.5 SOLUTION RESPIRATORY (INHALATION) AS NEEDED
Status: CANCELLED
Start: 2022-06-20

## 2022-06-06 RX ORDER — EPINEPHRINE 1 MG/ML
0.3 INJECTION, SOLUTION, CONCENTRATE INTRAVENOUS AS NEEDED
Status: CANCELLED | OUTPATIENT
Start: 2022-06-20

## 2022-06-06 RX ORDER — ALBUTEROL SULFATE 0.83 MG/ML
2.5 SOLUTION RESPIRATORY (INHALATION) AS NEEDED
Status: CANCELLED
Start: 2022-06-23

## 2022-06-06 RX ORDER — DIPHENHYDRAMINE HYDROCHLORIDE 50 MG/ML
50 INJECTION, SOLUTION INTRAMUSCULAR; INTRAVENOUS AS NEEDED
Status: CANCELLED
Start: 2022-06-21

## 2022-06-06 RX ORDER — ONDANSETRON 2 MG/ML
8 INJECTION INTRAMUSCULAR; INTRAVENOUS AS NEEDED
Status: CANCELLED | OUTPATIENT
Start: 2022-06-20

## 2022-06-06 RX ORDER — DIPHENHYDRAMINE HYDROCHLORIDE 50 MG/ML
50 INJECTION, SOLUTION INTRAMUSCULAR; INTRAVENOUS AS NEEDED
Status: CANCELLED
Start: 2022-07-05

## 2022-06-06 RX ORDER — DIPHENHYDRAMINE HYDROCHLORIDE 50 MG/ML
25 INJECTION, SOLUTION INTRAMUSCULAR; INTRAVENOUS AS NEEDED
Status: CANCELLED
Start: 2022-06-27

## 2022-06-06 RX ORDER — SODIUM CHLORIDE 9 MG/ML
25 INJECTION, SOLUTION INTRAVENOUS CONTINUOUS
Status: CANCELLED | OUTPATIENT
Start: 2022-06-20

## 2022-06-06 RX ORDER — DIPHENHYDRAMINE HYDROCHLORIDE 50 MG/ML
50 INJECTION, SOLUTION INTRAMUSCULAR; INTRAVENOUS AS NEEDED
Status: CANCELLED
Start: 2022-06-20

## 2022-06-06 RX ORDER — EPINEPHRINE 1 MG/ML
0.3 INJECTION, SOLUTION, CONCENTRATE INTRAVENOUS AS NEEDED
Status: CANCELLED | OUTPATIENT
Start: 2022-06-23

## 2022-06-06 RX ORDER — SODIUM CHLORIDE 9 MG/ML
10 INJECTION INTRAMUSCULAR; INTRAVENOUS; SUBCUTANEOUS AS NEEDED
Status: CANCELLED | OUTPATIENT
Start: 2022-06-24

## 2022-06-06 RX ORDER — DIPHENHYDRAMINE HYDROCHLORIDE 50 MG/ML
25 INJECTION, SOLUTION INTRAMUSCULAR; INTRAVENOUS AS NEEDED
Status: CANCELLED
Start: 2022-06-20

## 2022-06-06 RX ORDER — HYDROCORTISONE SODIUM SUCCINATE 100 MG/2ML
100 INJECTION, POWDER, FOR SOLUTION INTRAMUSCULAR; INTRAVENOUS AS NEEDED
Status: CANCELLED | OUTPATIENT
Start: 2022-06-20

## 2022-06-06 RX ORDER — SODIUM CHLORIDE 9 MG/ML
25 INJECTION, SOLUTION INTRAVENOUS CONTINUOUS
Status: CANCELLED
Start: 2022-07-05

## 2022-06-06 RX ORDER — DIPHENHYDRAMINE HYDROCHLORIDE 50 MG/ML
50 INJECTION, SOLUTION INTRAMUSCULAR; INTRAVENOUS AS NEEDED
Status: CANCELLED
Start: 2022-06-24

## 2022-06-06 RX ORDER — ACETAMINOPHEN 325 MG/1
650 TABLET ORAL AS NEEDED
Status: CANCELLED
Start: 2022-06-20

## 2022-06-06 RX ORDER — HEPARIN 100 UNIT/ML
300-500 SYRINGE INTRAVENOUS AS NEEDED
Status: CANCELLED
Start: 2022-06-20

## 2022-06-06 RX ORDER — HYDROCORTISONE SODIUM SUCCINATE 100 MG/2ML
100 INJECTION, POWDER, FOR SOLUTION INTRAMUSCULAR; INTRAVENOUS AS NEEDED
Status: CANCELLED | OUTPATIENT
Start: 2022-06-21

## 2022-06-06 RX ORDER — ONDANSETRON 2 MG/ML
8 INJECTION INTRAMUSCULAR; INTRAVENOUS ONCE
Status: CANCELLED | OUTPATIENT
Start: 2022-06-22 | End: 2022-06-22

## 2022-06-06 RX ORDER — SODIUM CHLORIDE 9 MG/ML
25 INJECTION, SOLUTION INTRAVENOUS CONTINUOUS
Status: CANCELLED | OUTPATIENT
Start: 2022-06-24

## 2022-06-06 RX ORDER — EPINEPHRINE 1 MG/ML
0.3 INJECTION, SOLUTION, CONCENTRATE INTRAVENOUS AS NEEDED
Status: CANCELLED | OUTPATIENT
Start: 2022-07-05

## 2022-06-06 NOTE — TELEPHONE ENCOUNTER
Patient called and stated he needed to schedule his pulmonary function test as well as his port placement. Please advise and call him back with who he needs to contact for those appointments.     MAGO # [phone] work number    # [phone] cell number

## 2022-06-09 ENCOUNTER — HOSPITAL ENCOUNTER (OUTPATIENT)
Dept: CT IMAGING | Age: 37
Discharge: HOME | End: 2022-06-09
Attending: INTERNAL MEDICINE
Payer: COMMERCIAL

## 2022-06-09 ENCOUNTER — OFFICE VISIT (OUTPATIENT)
Dept: ONCOLOGY | Age: 37
End: 2022-06-09
Payer: COMMERCIAL

## 2022-06-09 ENCOUNTER — HOSPITAL ENCOUNTER (OUTPATIENT)
Dept: PULMONOLOGY | Age: 37
Discharge: HOME | End: 2022-06-09
Attending: INTERNAL MEDICINE
Payer: COMMERCIAL

## 2022-06-09 VITALS
TEMPERATURE: 98 F | WEIGHT: 208 LBS | DIASTOLIC BLOOD PRESSURE: 84 MMHG | HEART RATE: 89 BPM | SYSTOLIC BLOOD PRESSURE: 131 MMHG | HEIGHT: 71 IN | BODY MASS INDEX: 29.12 KG/M2 | RESPIRATION RATE: 20 BRPM | OXYGEN SATURATION: 94 %

## 2022-06-09 VITALS — OXYGEN SATURATION: 97 %

## 2022-06-09 DIAGNOSIS — C62.92 NON-SEMINOMATOUS CANCER OF LEFT TESTIS (HCC): ICD-10-CM

## 2022-06-09 DIAGNOSIS — C62.92 NON-SEMINOMATOUS CANCER OF LEFT TESTIS (HCC): Primary | ICD-10-CM

## 2022-06-09 PROCEDURE — 94375 RESPIRATORY FLOW VOLUME LOOP: CPT

## 2022-06-09 PROCEDURE — 94729 DIFFUSING CAPACITY: CPT

## 2022-06-09 PROCEDURE — 94760 N-INVAS EAR/PLS OXIMETRY 1: CPT

## 2022-06-09 PROCEDURE — 74011000636 HC RX REV CODE- 636: Performed by: RADIOLOGY

## 2022-06-09 PROCEDURE — 99215 OFFICE O/P EST HI 40 MIN: CPT | Performed by: INTERNAL MEDICINE

## 2022-06-09 PROCEDURE — 71260 CT THORAX DX C+: CPT

## 2022-06-09 RX ORDER — DIPHENHYDRAMINE HYDROCHLORIDE 50 MG/ML
25 INJECTION, SOLUTION INTRAMUSCULAR; INTRAVENOUS AS NEEDED
Status: CANCELLED
Start: 2022-07-11

## 2022-06-09 RX ORDER — ALBUTEROL SULFATE 0.83 MG/ML
2.5 SOLUTION RESPIRATORY (INHALATION) AS NEEDED
Status: CANCELLED
Start: 2022-07-18

## 2022-06-09 RX ORDER — ALBUTEROL SULFATE 0.83 MG/ML
2.5 SOLUTION RESPIRATORY (INHALATION) AS NEEDED
Status: CANCELLED
Start: 2022-07-15

## 2022-06-09 RX ORDER — ONDANSETRON 2 MG/ML
8 INJECTION INTRAMUSCULAR; INTRAVENOUS AS NEEDED
Status: CANCELLED | OUTPATIENT
Start: 2022-08-15

## 2022-06-09 RX ORDER — SODIUM CHLORIDE 0.9 % (FLUSH) 0.9 %
10 SYRINGE (ML) INJECTION AS NEEDED
Status: CANCELLED | OUTPATIENT
Start: 2022-07-18

## 2022-06-09 RX ORDER — ONDANSETRON HYDROCHLORIDE 8 MG/1
8 TABLET, FILM COATED ORAL
Qty: 45 TABLET | Refills: 5 | Status: SHIPPED | OUTPATIENT
Start: 2022-06-09

## 2022-06-09 RX ORDER — HEPARIN 100 UNIT/ML
300-500 SYRINGE INTRAVENOUS AS NEEDED
Status: CANCELLED
Start: 2022-08-15

## 2022-06-09 RX ORDER — ACETAMINOPHEN 325 MG/1
650 TABLET ORAL AS NEEDED
Status: CANCELLED
Start: 2022-07-11

## 2022-06-09 RX ORDER — ALBUTEROL SULFATE 0.83 MG/ML
2.5 SOLUTION RESPIRATORY (INHALATION) AS NEEDED
Status: CANCELLED
Start: 2022-07-14

## 2022-06-09 RX ORDER — DIPHENHYDRAMINE HYDROCHLORIDE 50 MG/ML
50 INJECTION, SOLUTION INTRAMUSCULAR; INTRAVENOUS AS NEEDED
Status: CANCELLED
Start: 2022-07-15

## 2022-06-09 RX ORDER — SODIUM CHLORIDE 9 MG/ML
25 INJECTION, SOLUTION INTRAVENOUS CONTINUOUS
Status: CANCELLED | OUTPATIENT
Start: 2022-08-02

## 2022-06-09 RX ORDER — DIPHENHYDRAMINE HYDROCHLORIDE 50 MG/ML
50 INJECTION, SOLUTION INTRAMUSCULAR; INTRAVENOUS AS NEEDED
Status: CANCELLED
Start: 2022-08-03

## 2022-06-09 RX ORDER — ACETAMINOPHEN 325 MG/1
650 TABLET ORAL AS NEEDED
Status: CANCELLED
Start: 2022-07-13

## 2022-06-09 RX ORDER — SODIUM CHLORIDE 0.9 % (FLUSH) 0.9 %
10 SYRINGE (ML) INJECTION AS NEEDED
Status: CANCELLED | OUTPATIENT
Start: 2022-08-15

## 2022-06-09 RX ORDER — EPINEPHRINE 1 MG/ML
0.3 INJECTION, SOLUTION, CONCENTRATE INTRAVENOUS AS NEEDED
Status: CANCELLED | OUTPATIENT
Start: 2022-08-04

## 2022-06-09 RX ORDER — HYDROCORTISONE SODIUM SUCCINATE 100 MG/2ML
100 INJECTION, POWDER, FOR SOLUTION INTRAMUSCULAR; INTRAVENOUS AS NEEDED
Status: CANCELLED | OUTPATIENT
Start: 2022-07-18

## 2022-06-09 RX ORDER — SODIUM CHLORIDE 0.9 % (FLUSH) 0.9 %
10 SYRINGE (ML) INJECTION AS NEEDED
Status: CANCELLED | OUTPATIENT
Start: 2022-07-15

## 2022-06-09 RX ORDER — ONDANSETRON 2 MG/ML
8 INJECTION INTRAMUSCULAR; INTRAVENOUS ONCE
Status: CANCELLED | OUTPATIENT
Start: 2022-08-03 | End: 2022-08-03

## 2022-06-09 RX ORDER — ACETAMINOPHEN 325 MG/1
650 TABLET ORAL AS NEEDED
Status: CANCELLED
Start: 2022-08-04

## 2022-06-09 RX ORDER — ACETAMINOPHEN 325 MG/1
650 TABLET ORAL ONCE
Status: CANCELLED
Start: 2022-07-25 | End: 2022-07-25

## 2022-06-09 RX ORDER — ONDANSETRON 2 MG/ML
8 INJECTION INTRAMUSCULAR; INTRAVENOUS AS NEEDED
Status: CANCELLED | OUTPATIENT
Start: 2022-08-03

## 2022-06-09 RX ORDER — SODIUM CHLORIDE 9 MG/ML
10 INJECTION INTRAMUSCULAR; INTRAVENOUS; SUBCUTANEOUS AS NEEDED
Status: CANCELLED | OUTPATIENT
Start: 2022-07-13

## 2022-06-09 RX ORDER — PROCHLORPERAZINE MALEATE 10 MG
10 TABLET ORAL
Qty: 50 TABLET | Refills: 5 | Status: SHIPPED | OUTPATIENT
Start: 2022-06-09

## 2022-06-09 RX ORDER — SODIUM CHLORIDE 0.9 % (FLUSH) 0.9 %
10 SYRINGE (ML) INJECTION AS NEEDED
Status: CANCELLED | OUTPATIENT
Start: 2022-07-13

## 2022-06-09 RX ORDER — LIDOCAINE AND PRILOCAINE 25; 25 MG/G; MG/G
CREAM TOPICAL AS NEEDED
Qty: 30 G | Refills: 0 | Status: SHIPPED | OUTPATIENT
Start: 2022-06-09

## 2022-06-09 RX ORDER — HEPARIN 100 UNIT/ML
300-500 SYRINGE INTRAVENOUS AS NEEDED
Status: CANCELLED
Start: 2022-08-01

## 2022-06-09 RX ORDER — SODIUM CHLORIDE 9 MG/ML
10 INJECTION INTRAMUSCULAR; INTRAVENOUS; SUBCUTANEOUS AS NEEDED
Status: CANCELLED | OUTPATIENT
Start: 2022-07-12

## 2022-06-09 RX ORDER — EPINEPHRINE 1 MG/ML
0.3 INJECTION, SOLUTION, CONCENTRATE INTRAVENOUS AS NEEDED
Status: CANCELLED | OUTPATIENT
Start: 2022-08-08

## 2022-06-09 RX ORDER — ALBUTEROL SULFATE 0.83 MG/ML
2.5 SOLUTION RESPIRATORY (INHALATION) AS NEEDED
Status: CANCELLED
Start: 2022-08-01

## 2022-06-09 RX ORDER — DIPHENHYDRAMINE HYDROCHLORIDE 50 MG/ML
25 INJECTION, SOLUTION INTRAMUSCULAR; INTRAVENOUS AS NEEDED
Status: CANCELLED
Start: 2022-08-03

## 2022-06-09 RX ORDER — DIPHENHYDRAMINE HYDROCHLORIDE 50 MG/ML
25 INJECTION, SOLUTION INTRAMUSCULAR; INTRAVENOUS ONCE
Status: CANCELLED
Start: 2022-07-11 | End: 2022-07-11

## 2022-06-09 RX ORDER — ACETAMINOPHEN 325 MG/1
650 TABLET ORAL ONCE
Status: CANCELLED
Start: 2022-08-01 | End: 2022-08-01

## 2022-06-09 RX ORDER — SODIUM CHLORIDE 0.9 % (FLUSH) 0.9 %
10 SYRINGE (ML) INJECTION AS NEEDED
Status: CANCELLED | OUTPATIENT
Start: 2022-07-25

## 2022-06-09 RX ORDER — ONDANSETRON 2 MG/ML
8 INJECTION INTRAMUSCULAR; INTRAVENOUS AS NEEDED
Status: CANCELLED | OUTPATIENT
Start: 2022-07-12

## 2022-06-09 RX ORDER — SODIUM CHLORIDE 9 MG/ML
25 INJECTION, SOLUTION INTRAVENOUS CONTINUOUS
Status: CANCELLED | OUTPATIENT
Start: 2022-07-13

## 2022-06-09 RX ORDER — DIPHENHYDRAMINE HYDROCHLORIDE 50 MG/ML
50 INJECTION, SOLUTION INTRAMUSCULAR; INTRAVENOUS AS NEEDED
Status: CANCELLED
Start: 2022-08-08

## 2022-06-09 RX ORDER — ALBUTEROL SULFATE 0.83 MG/ML
2.5 SOLUTION RESPIRATORY (INHALATION) AS NEEDED
Status: CANCELLED
Start: 2022-07-13

## 2022-06-09 RX ORDER — ONDANSETRON 2 MG/ML
8 INJECTION INTRAMUSCULAR; INTRAVENOUS AS NEEDED
Status: CANCELLED | OUTPATIENT
Start: 2022-07-15

## 2022-06-09 RX ORDER — EPINEPHRINE 1 MG/ML
0.3 INJECTION, SOLUTION, CONCENTRATE INTRAVENOUS AS NEEDED
Status: CANCELLED | OUTPATIENT
Start: 2022-07-12

## 2022-06-09 RX ORDER — SODIUM CHLORIDE 9 MG/ML
25 INJECTION, SOLUTION INTRAVENOUS CONTINUOUS
Status: CANCELLED | OUTPATIENT
Start: 2022-08-04

## 2022-06-09 RX ORDER — ACETAMINOPHEN 325 MG/1
650 TABLET ORAL AS NEEDED
Status: CANCELLED
Start: 2022-08-03

## 2022-06-09 RX ORDER — ACETAMINOPHEN 325 MG/1
650 TABLET ORAL AS NEEDED
Status: CANCELLED
Start: 2022-07-12

## 2022-06-09 RX ORDER — HEPARIN 100 UNIT/ML
300-500 SYRINGE INTRAVENOUS AS NEEDED
Status: CANCELLED
Start: 2022-08-03

## 2022-06-09 RX ORDER — SODIUM CHLORIDE 9 MG/ML
25 INJECTION, SOLUTION INTRAVENOUS CONTINUOUS
Status: CANCELLED
Start: 2022-07-18

## 2022-06-09 RX ORDER — ACETAMINOPHEN 325 MG/1
650 TABLET ORAL AS NEEDED
Status: CANCELLED
Start: 2022-08-05

## 2022-06-09 RX ORDER — DIPHENHYDRAMINE HYDROCHLORIDE 50 MG/ML
25 INJECTION, SOLUTION INTRAMUSCULAR; INTRAVENOUS AS NEEDED
Status: CANCELLED
Start: 2022-08-04

## 2022-06-09 RX ORDER — SODIUM CHLORIDE 9 MG/ML
25 INJECTION, SOLUTION INTRAVENOUS CONTINUOUS
Status: CANCELLED | OUTPATIENT
Start: 2022-08-01

## 2022-06-09 RX ORDER — DIPHENHYDRAMINE HYDROCHLORIDE 50 MG/ML
50 INJECTION, SOLUTION INTRAMUSCULAR; INTRAVENOUS AS NEEDED
Status: CANCELLED
Start: 2022-08-02

## 2022-06-09 RX ORDER — SODIUM CHLORIDE 9 MG/ML
10 INJECTION INTRAMUSCULAR; INTRAVENOUS; SUBCUTANEOUS AS NEEDED
Status: CANCELLED | OUTPATIENT
Start: 2022-08-05

## 2022-06-09 RX ORDER — ONDANSETRON 2 MG/ML
8 INJECTION INTRAMUSCULAR; INTRAVENOUS AS NEEDED
Status: CANCELLED | OUTPATIENT
Start: 2022-08-02

## 2022-06-09 RX ORDER — HYDROCORTISONE SODIUM SUCCINATE 100 MG/2ML
100 INJECTION, POWDER, FOR SOLUTION INTRAMUSCULAR; INTRAVENOUS AS NEEDED
Status: CANCELLED | OUTPATIENT
Start: 2022-08-05

## 2022-06-09 RX ORDER — ACETAMINOPHEN 325 MG/1
650 TABLET ORAL AS NEEDED
Status: CANCELLED
Start: 2022-08-08

## 2022-06-09 RX ORDER — SODIUM CHLORIDE 9 MG/ML
25 INJECTION, SOLUTION INTRAVENOUS CONTINUOUS
Status: CANCELLED | OUTPATIENT
Start: 2022-07-14

## 2022-06-09 RX ORDER — SODIUM CHLORIDE 9 MG/ML
10 INJECTION INTRAMUSCULAR; INTRAVENOUS; SUBCUTANEOUS AS NEEDED
Status: CANCELLED | OUTPATIENT
Start: 2022-07-11

## 2022-06-09 RX ORDER — PALONOSETRON 0.05 MG/ML
0.25 INJECTION, SOLUTION INTRAVENOUS ONCE
Status: CANCELLED | OUTPATIENT
Start: 2022-08-01 | End: 2022-08-01

## 2022-06-09 RX ORDER — ONDANSETRON 2 MG/ML
8 INJECTION INTRAMUSCULAR; INTRAVENOUS AS NEEDED
Status: CANCELLED | OUTPATIENT
Start: 2022-08-08

## 2022-06-09 RX ORDER — ONDANSETRON 2 MG/ML
8 INJECTION INTRAMUSCULAR; INTRAVENOUS AS NEEDED
Status: CANCELLED | OUTPATIENT
Start: 2022-07-11

## 2022-06-09 RX ORDER — ALBUTEROL SULFATE 0.83 MG/ML
2.5 SOLUTION RESPIRATORY (INHALATION) AS NEEDED
Status: CANCELLED
Start: 2022-08-03

## 2022-06-09 RX ORDER — HYDROCORTISONE SODIUM SUCCINATE 100 MG/2ML
100 INJECTION, POWDER, FOR SOLUTION INTRAMUSCULAR; INTRAVENOUS AS NEEDED
Status: CANCELLED | OUTPATIENT
Start: 2022-08-15

## 2022-06-09 RX ORDER — ONDANSETRON 2 MG/ML
8 INJECTION INTRAMUSCULAR; INTRAVENOUS AS NEEDED
Status: CANCELLED | OUTPATIENT
Start: 2022-07-13

## 2022-06-09 RX ORDER — EPINEPHRINE 1 MG/ML
0.3 INJECTION, SOLUTION, CONCENTRATE INTRAVENOUS AS NEEDED
Status: CANCELLED | OUTPATIENT
Start: 2022-08-01

## 2022-06-09 RX ORDER — HYDROCORTISONE SODIUM SUCCINATE 100 MG/2ML
100 INJECTION, POWDER, FOR SOLUTION INTRAMUSCULAR; INTRAVENOUS AS NEEDED
Status: CANCELLED | OUTPATIENT
Start: 2022-08-08

## 2022-06-09 RX ORDER — DIPHENHYDRAMINE HYDROCHLORIDE 50 MG/ML
50 INJECTION, SOLUTION INTRAMUSCULAR; INTRAVENOUS AS NEEDED
Status: CANCELLED
Start: 2022-07-25

## 2022-06-09 RX ORDER — SODIUM CHLORIDE 9 MG/ML
25 INJECTION, SOLUTION INTRAVENOUS CONTINUOUS
Status: CANCELLED | OUTPATIENT
Start: 2022-07-11

## 2022-06-09 RX ORDER — SODIUM CHLORIDE 0.9 % (FLUSH) 0.9 %
10 SYRINGE (ML) INJECTION AS NEEDED
Status: CANCELLED | OUTPATIENT
Start: 2022-08-03

## 2022-06-09 RX ORDER — DIPHENHYDRAMINE HYDROCHLORIDE 50 MG/ML
50 INJECTION, SOLUTION INTRAMUSCULAR; INTRAVENOUS AS NEEDED
Status: CANCELLED
Start: 2022-07-13

## 2022-06-09 RX ORDER — SODIUM CHLORIDE 9 MG/ML
25 INJECTION, SOLUTION INTRAVENOUS CONTINUOUS
Status: CANCELLED | OUTPATIENT
Start: 2022-08-05

## 2022-06-09 RX ORDER — ONDANSETRON 2 MG/ML
8 INJECTION INTRAMUSCULAR; INTRAVENOUS AS NEEDED
Status: CANCELLED | OUTPATIENT
Start: 2022-07-18

## 2022-06-09 RX ORDER — ALBUTEROL SULFATE 0.83 MG/ML
2.5 SOLUTION RESPIRATORY (INHALATION) AS NEEDED
Status: CANCELLED
Start: 2022-08-04

## 2022-06-09 RX ORDER — ACETAMINOPHEN 325 MG/1
650 TABLET ORAL ONCE
Status: CANCELLED
Start: 2022-08-15 | End: 2022-08-15

## 2022-06-09 RX ORDER — DIPHENHYDRAMINE HYDROCHLORIDE 50 MG/ML
25 INJECTION, SOLUTION INTRAMUSCULAR; INTRAVENOUS AS NEEDED
Status: CANCELLED
Start: 2022-08-08

## 2022-06-09 RX ORDER — SODIUM CHLORIDE 9 MG/ML
10 INJECTION INTRAMUSCULAR; INTRAVENOUS; SUBCUTANEOUS AS NEEDED
Status: CANCELLED | OUTPATIENT
Start: 2022-08-08

## 2022-06-09 RX ORDER — EPINEPHRINE 1 MG/ML
0.3 INJECTION, SOLUTION, CONCENTRATE INTRAVENOUS AS NEEDED
Status: CANCELLED | OUTPATIENT
Start: 2022-07-25

## 2022-06-09 RX ORDER — HEPARIN 100 UNIT/ML
300-500 SYRINGE INTRAVENOUS AS NEEDED
Status: CANCELLED
Start: 2022-07-13

## 2022-06-09 RX ORDER — EPINEPHRINE 1 MG/ML
0.3 INJECTION, SOLUTION, CONCENTRATE INTRAVENOUS AS NEEDED
Status: CANCELLED | OUTPATIENT
Start: 2022-08-05

## 2022-06-09 RX ORDER — SODIUM CHLORIDE 9 MG/ML
25 INJECTION, SOLUTION INTRAVENOUS CONTINUOUS
Status: CANCELLED | OUTPATIENT
Start: 2022-08-03

## 2022-06-09 RX ORDER — DIPHENHYDRAMINE HYDROCHLORIDE 50 MG/ML
50 INJECTION, SOLUTION INTRAMUSCULAR; INTRAVENOUS AS NEEDED
Status: CANCELLED
Start: 2022-08-04

## 2022-06-09 RX ORDER — DIPHENHYDRAMINE HYDROCHLORIDE 50 MG/ML
25 INJECTION, SOLUTION INTRAMUSCULAR; INTRAVENOUS AS NEEDED
Status: CANCELLED
Start: 2022-08-05

## 2022-06-09 RX ORDER — ALBUTEROL SULFATE 0.83 MG/ML
2.5 SOLUTION RESPIRATORY (INHALATION) AS NEEDED
Status: CANCELLED
Start: 2022-08-08

## 2022-06-09 RX ORDER — DIPHENHYDRAMINE HYDROCHLORIDE 50 MG/ML
50 INJECTION, SOLUTION INTRAMUSCULAR; INTRAVENOUS AS NEEDED
Status: CANCELLED
Start: 2022-07-11

## 2022-06-09 RX ORDER — ALBUTEROL SULFATE 0.83 MG/ML
2.5 SOLUTION RESPIRATORY (INHALATION) AS NEEDED
Status: CANCELLED
Start: 2022-07-12

## 2022-06-09 RX ORDER — SODIUM CHLORIDE 0.9 % (FLUSH) 0.9 %
10 SYRINGE (ML) INJECTION AS NEEDED
Status: CANCELLED | OUTPATIENT
Start: 2022-07-12

## 2022-06-09 RX ORDER — HYDROCORTISONE SODIUM SUCCINATE 100 MG/2ML
100 INJECTION, POWDER, FOR SOLUTION INTRAMUSCULAR; INTRAVENOUS AS NEEDED
Status: CANCELLED | OUTPATIENT
Start: 2022-08-02

## 2022-06-09 RX ORDER — HYDROCORTISONE SODIUM SUCCINATE 100 MG/2ML
100 INJECTION, POWDER, FOR SOLUTION INTRAMUSCULAR; INTRAVENOUS AS NEEDED
Status: CANCELLED | OUTPATIENT
Start: 2022-07-14

## 2022-06-09 RX ORDER — HEPARIN 100 UNIT/ML
300-500 SYRINGE INTRAVENOUS AS NEEDED
Status: CANCELLED
Start: 2022-08-05

## 2022-06-09 RX ORDER — ONDANSETRON 2 MG/ML
8 INJECTION INTRAMUSCULAR; INTRAVENOUS ONCE
Status: CANCELLED | OUTPATIENT
Start: 2022-07-12 | End: 2022-07-12

## 2022-06-09 RX ORDER — HYDROCORTISONE SODIUM SUCCINATE 100 MG/2ML
100 INJECTION, POWDER, FOR SOLUTION INTRAMUSCULAR; INTRAVENOUS AS NEEDED
Status: CANCELLED | OUTPATIENT
Start: 2022-07-13

## 2022-06-09 RX ORDER — DIPHENHYDRAMINE HYDROCHLORIDE 50 MG/ML
25 INJECTION, SOLUTION INTRAMUSCULAR; INTRAVENOUS AS NEEDED
Status: CANCELLED
Start: 2022-07-14

## 2022-06-09 RX ORDER — SODIUM CHLORIDE 0.9 % (FLUSH) 0.9 %
10 SYRINGE (ML) INJECTION AS NEEDED
Status: CANCELLED | OUTPATIENT
Start: 2022-08-08

## 2022-06-09 RX ORDER — DIPHENHYDRAMINE HYDROCHLORIDE 50 MG/ML
25 INJECTION, SOLUTION INTRAMUSCULAR; INTRAVENOUS AS NEEDED
Status: CANCELLED
Start: 2022-07-13

## 2022-06-09 RX ORDER — SODIUM CHLORIDE 9 MG/ML
10 INJECTION INTRAMUSCULAR; INTRAVENOUS; SUBCUTANEOUS AS NEEDED
Status: CANCELLED | OUTPATIENT
Start: 2022-07-14

## 2022-06-09 RX ORDER — DIPHENHYDRAMINE HYDROCHLORIDE 50 MG/ML
25 INJECTION, SOLUTION INTRAMUSCULAR; INTRAVENOUS AS NEEDED
Status: CANCELLED
Start: 2022-07-12

## 2022-06-09 RX ORDER — DIPHENHYDRAMINE HYDROCHLORIDE 50 MG/ML
50 INJECTION, SOLUTION INTRAMUSCULAR; INTRAVENOUS AS NEEDED
Status: CANCELLED
Start: 2022-07-12

## 2022-06-09 RX ORDER — EPINEPHRINE 1 MG/ML
0.3 INJECTION, SOLUTION, CONCENTRATE INTRAVENOUS AS NEEDED
Status: CANCELLED | OUTPATIENT
Start: 2022-07-13

## 2022-06-09 RX ORDER — SODIUM CHLORIDE 9 MG/ML
10 INJECTION INTRAMUSCULAR; INTRAVENOUS; SUBCUTANEOUS AS NEEDED
Status: CANCELLED | OUTPATIENT
Start: 2022-08-04

## 2022-06-09 RX ORDER — ACETAMINOPHEN 325 MG/1
650 TABLET ORAL ONCE
Status: CANCELLED
Start: 2022-07-18 | End: 2022-07-18

## 2022-06-09 RX ORDER — DIPHENHYDRAMINE HYDROCHLORIDE 50 MG/ML
50 INJECTION, SOLUTION INTRAMUSCULAR; INTRAVENOUS AS NEEDED
Status: CANCELLED
Start: 2022-08-15

## 2022-06-09 RX ORDER — DIPHENHYDRAMINE HYDROCHLORIDE 50 MG/ML
25 INJECTION, SOLUTION INTRAMUSCULAR; INTRAVENOUS ONCE
Status: CANCELLED
Start: 2022-08-08 | End: 2022-08-08

## 2022-06-09 RX ORDER — HEPARIN 100 UNIT/ML
300-500 SYRINGE INTRAVENOUS AS NEEDED
Status: CANCELLED
Start: 2022-08-08

## 2022-06-09 RX ORDER — ONDANSETRON 2 MG/ML
8 INJECTION INTRAMUSCULAR; INTRAVENOUS ONCE
Status: CANCELLED | OUTPATIENT
Start: 2022-08-05 | End: 2022-08-05

## 2022-06-09 RX ORDER — ONDANSETRON 2 MG/ML
8 INJECTION INTRAMUSCULAR; INTRAVENOUS AS NEEDED
Status: CANCELLED | OUTPATIENT
Start: 2022-08-01

## 2022-06-09 RX ORDER — DIPHENHYDRAMINE HYDROCHLORIDE 50 MG/ML
50 INJECTION, SOLUTION INTRAMUSCULAR; INTRAVENOUS AS NEEDED
Status: CANCELLED
Start: 2022-08-05

## 2022-06-09 RX ORDER — DIPHENHYDRAMINE HYDROCHLORIDE 50 MG/ML
50 INJECTION, SOLUTION INTRAMUSCULAR; INTRAVENOUS AS NEEDED
Status: CANCELLED
Start: 2022-08-01

## 2022-06-09 RX ORDER — HYDROCORTISONE SODIUM SUCCINATE 100 MG/2ML
100 INJECTION, POWDER, FOR SOLUTION INTRAMUSCULAR; INTRAVENOUS AS NEEDED
Status: CANCELLED | OUTPATIENT
Start: 2022-08-03

## 2022-06-09 RX ORDER — ONDANSETRON 2 MG/ML
8 INJECTION INTRAMUSCULAR; INTRAVENOUS ONCE
Status: CANCELLED | OUTPATIENT
Start: 2022-07-13 | End: 2022-07-13

## 2022-06-09 RX ORDER — ACETAMINOPHEN 325 MG/1
650 TABLET ORAL AS NEEDED
Status: CANCELLED
Start: 2022-08-01

## 2022-06-09 RX ORDER — HEPARIN 100 UNIT/ML
300-500 SYRINGE INTRAVENOUS AS NEEDED
Status: CANCELLED
Start: 2022-08-02

## 2022-06-09 RX ORDER — ONDANSETRON 2 MG/ML
8 INJECTION INTRAMUSCULAR; INTRAVENOUS ONCE
Status: CANCELLED | OUTPATIENT
Start: 2022-07-15 | End: 2022-07-15

## 2022-06-09 RX ORDER — HYDROCORTISONE SODIUM SUCCINATE 100 MG/2ML
100 INJECTION, POWDER, FOR SOLUTION INTRAMUSCULAR; INTRAVENOUS AS NEEDED
Status: CANCELLED | OUTPATIENT
Start: 2022-08-01

## 2022-06-09 RX ORDER — SODIUM CHLORIDE 9 MG/ML
10 INJECTION INTRAMUSCULAR; INTRAVENOUS; SUBCUTANEOUS AS NEEDED
Status: CANCELLED | OUTPATIENT
Start: 2022-08-15

## 2022-06-09 RX ORDER — EPINEPHRINE 1 MG/ML
0.3 INJECTION, SOLUTION, CONCENTRATE INTRAVENOUS AS NEEDED
Status: CANCELLED | OUTPATIENT
Start: 2022-07-14

## 2022-06-09 RX ORDER — SODIUM CHLORIDE 0.9 % (FLUSH) 0.9 %
10 SYRINGE (ML) INJECTION AS NEEDED
Status: CANCELLED | OUTPATIENT
Start: 2022-07-11

## 2022-06-09 RX ORDER — SODIUM CHLORIDE 9 MG/ML
25 INJECTION, SOLUTION INTRAVENOUS CONTINUOUS
Status: CANCELLED
Start: 2022-08-15

## 2022-06-09 RX ORDER — SODIUM CHLORIDE 9 MG/ML
25 INJECTION, SOLUTION INTRAVENOUS CONTINUOUS
Status: CANCELLED | OUTPATIENT
Start: 2022-07-15

## 2022-06-09 RX ORDER — SODIUM CHLORIDE 9 MG/ML
25 INJECTION, SOLUTION INTRAVENOUS CONTINUOUS
Status: CANCELLED
Start: 2022-07-25

## 2022-06-09 RX ORDER — HYDROCORTISONE SODIUM SUCCINATE 100 MG/2ML
100 INJECTION, POWDER, FOR SOLUTION INTRAMUSCULAR; INTRAVENOUS AS NEEDED
Status: CANCELLED | OUTPATIENT
Start: 2022-07-25

## 2022-06-09 RX ORDER — DIPHENHYDRAMINE HYDROCHLORIDE 50 MG/ML
25 INJECTION, SOLUTION INTRAMUSCULAR; INTRAVENOUS AS NEEDED
Status: CANCELLED
Start: 2022-07-25

## 2022-06-09 RX ORDER — ALBUTEROL SULFATE 0.83 MG/ML
2.5 SOLUTION RESPIRATORY (INHALATION) AS NEEDED
Status: CANCELLED
Start: 2022-08-02

## 2022-06-09 RX ORDER — HEPARIN 100 UNIT/ML
300-500 SYRINGE INTRAVENOUS AS NEEDED
Status: CANCELLED
Start: 2022-07-25

## 2022-06-09 RX ORDER — EPINEPHRINE 1 MG/ML
0.3 INJECTION, SOLUTION, CONCENTRATE INTRAVENOUS AS NEEDED
Status: CANCELLED | OUTPATIENT
Start: 2022-07-15

## 2022-06-09 RX ORDER — SODIUM CHLORIDE 0.9 % (FLUSH) 0.9 %
10 SYRINGE (ML) INJECTION AS NEEDED
Status: CANCELLED | OUTPATIENT
Start: 2022-08-02

## 2022-06-09 RX ORDER — SODIUM CHLORIDE 9 MG/ML
10 INJECTION INTRAMUSCULAR; INTRAVENOUS; SUBCUTANEOUS AS NEEDED
Status: CANCELLED | OUTPATIENT
Start: 2022-08-03

## 2022-06-09 RX ORDER — ACETAMINOPHEN 325 MG/1
650 TABLET ORAL AS NEEDED
Status: CANCELLED
Start: 2022-07-14

## 2022-06-09 RX ORDER — HYDROCORTISONE SODIUM SUCCINATE 100 MG/2ML
100 INJECTION, POWDER, FOR SOLUTION INTRAMUSCULAR; INTRAVENOUS AS NEEDED
Status: CANCELLED | OUTPATIENT
Start: 2022-07-15

## 2022-06-09 RX ORDER — DIPHENHYDRAMINE HYDROCHLORIDE 50 MG/ML
25 INJECTION, SOLUTION INTRAMUSCULAR; INTRAVENOUS ONCE
Status: CANCELLED
Start: 2022-07-18 | End: 2022-07-18

## 2022-06-09 RX ORDER — ACETAMINOPHEN 325 MG/1
650 TABLET ORAL ONCE
Status: CANCELLED
Start: 2022-08-08 | End: 2022-08-08

## 2022-06-09 RX ORDER — ALBUTEROL SULFATE 0.83 MG/ML
2.5 SOLUTION RESPIRATORY (INHALATION) AS NEEDED
Status: CANCELLED
Start: 2022-08-15

## 2022-06-09 RX ORDER — HEPARIN 100 UNIT/ML
300-500 SYRINGE INTRAVENOUS AS NEEDED
Status: CANCELLED
Start: 2022-07-18

## 2022-06-09 RX ORDER — ONDANSETRON 2 MG/ML
8 INJECTION INTRAMUSCULAR; INTRAVENOUS AS NEEDED
Status: CANCELLED | OUTPATIENT
Start: 2022-07-14

## 2022-06-09 RX ORDER — HEPARIN 100 UNIT/ML
300-500 SYRINGE INTRAVENOUS AS NEEDED
Status: CANCELLED
Start: 2022-08-04

## 2022-06-09 RX ORDER — ACETAMINOPHEN 325 MG/1
650 TABLET ORAL ONCE
Status: CANCELLED
Start: 2022-07-11 | End: 2022-07-11

## 2022-06-09 RX ORDER — EPINEPHRINE 1 MG/ML
0.3 INJECTION, SOLUTION, CONCENTRATE INTRAVENOUS AS NEEDED
Status: CANCELLED | OUTPATIENT
Start: 2022-08-02

## 2022-06-09 RX ORDER — DIPHENHYDRAMINE HYDROCHLORIDE 50 MG/ML
50 INJECTION, SOLUTION INTRAMUSCULAR; INTRAVENOUS AS NEEDED
Status: CANCELLED
Start: 2022-07-14

## 2022-06-09 RX ORDER — EPINEPHRINE 1 MG/ML
0.3 INJECTION, SOLUTION, CONCENTRATE INTRAVENOUS AS NEEDED
Status: CANCELLED | OUTPATIENT
Start: 2022-07-18

## 2022-06-09 RX ORDER — DIPHENHYDRAMINE HYDROCHLORIDE 50 MG/ML
25 INJECTION, SOLUTION INTRAMUSCULAR; INTRAVENOUS ONCE
Status: CANCELLED
Start: 2022-07-25 | End: 2022-07-25

## 2022-06-09 RX ORDER — SODIUM CHLORIDE 9 MG/ML
10 INJECTION INTRAMUSCULAR; INTRAVENOUS; SUBCUTANEOUS AS NEEDED
Status: CANCELLED | OUTPATIENT
Start: 2022-08-01

## 2022-06-09 RX ORDER — ALBUTEROL SULFATE 0.83 MG/ML
2.5 SOLUTION RESPIRATORY (INHALATION) AS NEEDED
Status: CANCELLED
Start: 2022-07-25

## 2022-06-09 RX ORDER — SODIUM CHLORIDE 9 MG/ML
25 INJECTION, SOLUTION INTRAVENOUS CONTINUOUS
Status: CANCELLED | OUTPATIENT
Start: 2022-07-12

## 2022-06-09 RX ORDER — ONDANSETRON 2 MG/ML
8 INJECTION INTRAMUSCULAR; INTRAVENOUS ONCE
Status: CANCELLED | OUTPATIENT
Start: 2022-08-02 | End: 2022-08-02

## 2022-06-09 RX ORDER — SODIUM CHLORIDE 9 MG/ML
10 INJECTION INTRAMUSCULAR; INTRAVENOUS; SUBCUTANEOUS AS NEEDED
Status: CANCELLED | OUTPATIENT
Start: 2022-07-25

## 2022-06-09 RX ORDER — DIPHENHYDRAMINE HYDROCHLORIDE 50 MG/ML
25 INJECTION, SOLUTION INTRAMUSCULAR; INTRAVENOUS AS NEEDED
Status: CANCELLED
Start: 2022-08-01

## 2022-06-09 RX ORDER — DIPHENHYDRAMINE HYDROCHLORIDE 50 MG/ML
25 INJECTION, SOLUTION INTRAMUSCULAR; INTRAVENOUS AS NEEDED
Status: CANCELLED
Start: 2022-07-15

## 2022-06-09 RX ORDER — ALBUTEROL SULFATE 0.83 MG/ML
2.5 SOLUTION RESPIRATORY (INHALATION) AS NEEDED
Status: CANCELLED
Start: 2022-08-05

## 2022-06-09 RX ORDER — EPINEPHRINE 1 MG/ML
0.3 INJECTION, SOLUTION, CONCENTRATE INTRAVENOUS AS NEEDED
Status: CANCELLED | OUTPATIENT
Start: 2022-08-03

## 2022-06-09 RX ORDER — ACETAMINOPHEN 325 MG/1
650 TABLET ORAL AS NEEDED
Status: CANCELLED
Start: 2022-08-02

## 2022-06-09 RX ORDER — EPINEPHRINE 1 MG/ML
0.3 INJECTION, SOLUTION, CONCENTRATE INTRAVENOUS AS NEEDED
Status: CANCELLED | OUTPATIENT
Start: 2022-08-15

## 2022-06-09 RX ORDER — SODIUM CHLORIDE 9 MG/ML
25 INJECTION, SOLUTION INTRAVENOUS CONTINUOUS
Status: CANCELLED
Start: 2022-08-08

## 2022-06-09 RX ORDER — HYDROCORTISONE SODIUM SUCCINATE 100 MG/2ML
100 INJECTION, POWDER, FOR SOLUTION INTRAMUSCULAR; INTRAVENOUS AS NEEDED
Status: CANCELLED | OUTPATIENT
Start: 2022-07-11

## 2022-06-09 RX ORDER — HEPARIN 100 UNIT/ML
300-500 SYRINGE INTRAVENOUS AS NEEDED
Status: CANCELLED
Start: 2022-07-14

## 2022-06-09 RX ORDER — ONDANSETRON 2 MG/ML
8 INJECTION INTRAMUSCULAR; INTRAVENOUS ONCE
Status: CANCELLED | OUTPATIENT
Start: 2022-08-04 | End: 2022-08-04

## 2022-06-09 RX ORDER — DIPHENHYDRAMINE HYDROCHLORIDE 50 MG/ML
25 INJECTION, SOLUTION INTRAMUSCULAR; INTRAVENOUS ONCE
Status: CANCELLED
Start: 2022-08-15 | End: 2022-08-15

## 2022-06-09 RX ORDER — HYDROCORTISONE SODIUM SUCCINATE 100 MG/2ML
100 INJECTION, POWDER, FOR SOLUTION INTRAMUSCULAR; INTRAVENOUS AS NEEDED
Status: CANCELLED | OUTPATIENT
Start: 2022-07-12

## 2022-06-09 RX ORDER — HYDROCORTISONE SODIUM SUCCINATE 100 MG/2ML
100 INJECTION, POWDER, FOR SOLUTION INTRAMUSCULAR; INTRAVENOUS AS NEEDED
Status: CANCELLED | OUTPATIENT
Start: 2022-08-04

## 2022-06-09 RX ORDER — HEPARIN 100 UNIT/ML
300-500 SYRINGE INTRAVENOUS AS NEEDED
Status: CANCELLED
Start: 2022-07-12

## 2022-06-09 RX ORDER — ONDANSETRON 2 MG/ML
8 INJECTION INTRAMUSCULAR; INTRAVENOUS ONCE
Status: CANCELLED | OUTPATIENT
Start: 2022-07-14 | End: 2022-07-14

## 2022-06-09 RX ORDER — SODIUM CHLORIDE 0.9 % (FLUSH) 0.9 %
10 SYRINGE (ML) INJECTION AS NEEDED
Status: CANCELLED | OUTPATIENT
Start: 2022-08-04

## 2022-06-09 RX ORDER — SODIUM CHLORIDE 9 MG/ML
10 INJECTION INTRAMUSCULAR; INTRAVENOUS; SUBCUTANEOUS AS NEEDED
Status: CANCELLED | OUTPATIENT
Start: 2022-07-18

## 2022-06-09 RX ORDER — ALBUTEROL SULFATE 0.83 MG/ML
2.5 SOLUTION RESPIRATORY (INHALATION) AS NEEDED
Status: CANCELLED
Start: 2022-07-11

## 2022-06-09 RX ORDER — DIPHENHYDRAMINE HYDROCHLORIDE 50 MG/ML
25 INJECTION, SOLUTION INTRAMUSCULAR; INTRAVENOUS AS NEEDED
Status: CANCELLED
Start: 2022-08-15

## 2022-06-09 RX ORDER — DIPHENHYDRAMINE HYDROCHLORIDE 50 MG/ML
25 INJECTION, SOLUTION INTRAMUSCULAR; INTRAVENOUS AS NEEDED
Status: CANCELLED
Start: 2022-07-18

## 2022-06-09 RX ORDER — PALONOSETRON 0.05 MG/ML
0.25 INJECTION, SOLUTION INTRAVENOUS ONCE
Status: CANCELLED | OUTPATIENT
Start: 2022-07-11 | End: 2022-07-11

## 2022-06-09 RX ORDER — ACETAMINOPHEN 325 MG/1
650 TABLET ORAL AS NEEDED
Status: CANCELLED
Start: 2022-07-18

## 2022-06-09 RX ORDER — ACETAMINOPHEN 325 MG/1
650 TABLET ORAL AS NEEDED
Status: CANCELLED
Start: 2022-07-25

## 2022-06-09 RX ORDER — ONDANSETRON 2 MG/ML
8 INJECTION INTRAMUSCULAR; INTRAVENOUS AS NEEDED
Status: CANCELLED | OUTPATIENT
Start: 2022-08-05

## 2022-06-09 RX ORDER — SODIUM CHLORIDE 0.9 % (FLUSH) 0.9 %
10 SYRINGE (ML) INJECTION AS NEEDED
Status: CANCELLED | OUTPATIENT
Start: 2022-08-05

## 2022-06-09 RX ORDER — SODIUM CHLORIDE 0.9 % (FLUSH) 0.9 %
10 SYRINGE (ML) INJECTION AS NEEDED
Status: CANCELLED | OUTPATIENT
Start: 2022-08-01

## 2022-06-09 RX ORDER — SODIUM CHLORIDE 9 MG/ML
10 INJECTION INTRAMUSCULAR; INTRAVENOUS; SUBCUTANEOUS AS NEEDED
Status: CANCELLED | OUTPATIENT
Start: 2022-08-02

## 2022-06-09 RX ORDER — HEPARIN 100 UNIT/ML
300-500 SYRINGE INTRAVENOUS AS NEEDED
Status: CANCELLED
Start: 2022-07-15

## 2022-06-09 RX ORDER — ONDANSETRON 2 MG/ML
8 INJECTION INTRAMUSCULAR; INTRAVENOUS AS NEEDED
Status: CANCELLED | OUTPATIENT
Start: 2022-07-25

## 2022-06-09 RX ORDER — DIPHENHYDRAMINE HYDROCHLORIDE 50 MG/ML
50 INJECTION, SOLUTION INTRAMUSCULAR; INTRAVENOUS AS NEEDED
Status: CANCELLED
Start: 2022-07-18

## 2022-06-09 RX ORDER — SODIUM CHLORIDE 9 MG/ML
10 INJECTION INTRAMUSCULAR; INTRAVENOUS; SUBCUTANEOUS AS NEEDED
Status: CANCELLED | OUTPATIENT
Start: 2022-07-15

## 2022-06-09 RX ORDER — ONDANSETRON 2 MG/ML
8 INJECTION INTRAMUSCULAR; INTRAVENOUS AS NEEDED
Status: CANCELLED | OUTPATIENT
Start: 2022-08-04

## 2022-06-09 RX ORDER — DIPHENHYDRAMINE HYDROCHLORIDE 50 MG/ML
25 INJECTION, SOLUTION INTRAMUSCULAR; INTRAVENOUS AS NEEDED
Status: CANCELLED
Start: 2022-08-02

## 2022-06-09 RX ORDER — ACETAMINOPHEN 325 MG/1
650 TABLET ORAL AS NEEDED
Status: CANCELLED
Start: 2022-07-15

## 2022-06-09 RX ORDER — HEPARIN 100 UNIT/ML
300-500 SYRINGE INTRAVENOUS AS NEEDED
Status: CANCELLED
Start: 2022-07-11

## 2022-06-09 RX ORDER — SODIUM CHLORIDE 0.9 % (FLUSH) 0.9 %
10 SYRINGE (ML) INJECTION AS NEEDED
Status: CANCELLED | OUTPATIENT
Start: 2022-07-14

## 2022-06-09 RX ORDER — DIPHENHYDRAMINE HYDROCHLORIDE 50 MG/ML
25 INJECTION, SOLUTION INTRAMUSCULAR; INTRAVENOUS ONCE
Status: CANCELLED
Start: 2022-08-01 | End: 2022-08-01

## 2022-06-09 RX ORDER — EPINEPHRINE 1 MG/ML
0.3 INJECTION, SOLUTION, CONCENTRATE INTRAVENOUS AS NEEDED
Status: CANCELLED | OUTPATIENT
Start: 2022-07-11

## 2022-06-09 RX ORDER — ACETAMINOPHEN 325 MG/1
650 TABLET ORAL AS NEEDED
Status: CANCELLED
Start: 2022-08-15

## 2022-06-09 RX ADMIN — IOPAMIDOL 80 ML: 755 INJECTION, SOLUTION INTRAVENOUS at 08:10

## 2022-06-09 NOTE — PROGRESS NOTES
[facility] [place] [facility] [place] [address] [address] [facility] [address] [facility]  Eran Cowan: [phone]  F: [phone]      Reason for Visit:   Guanako Macias is a 40 y.o. male who is seen in follow up for evaluation of testicular cancer. Hematology/Oncology Treatment History:   · Scrotal US 11/30/2021: 3.9cm solid and cystic mass replacing left testicle. Slightly atrophic right testicle with no mass. Incidental 8mm left epididymal head cyst/spermatocele  · Pre-op tumor markers 12/2/2021: .0, beta hCG 250,   · Left radical inguinal orchiectomy by Dr. 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 Negative LVI, negative margins. · CT A/P 12/16/2021: indeterminate splenic lesion. 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 No lymphadenopathy or other evidence of metastasis. Significant hepatic steatosis. · Tumor markers 5/17/2022: AFP 5.3, beta hCG 14  · Stage IS (pT1b cN0 M0 S1) Non-seminoma, LEFT testis    History of Present Illness:   Presents for follow up. Accompanied by wife today. Had CT chest done at [facility] today. Planning pulmonary function tests this afternoon. No current questions or concerns. He has a history of ulcerative proctitis and currently on therapy with Humira. He took last dose this week and plans to hold for the upcoming few months on therapy. States he will likely transition to mesalamine. He reports feeling well. No pain. Quit smoking in 2018, previously smoked about 1-1.5ppd for about 10 years. Denies marijuana. He is accompanied by his wife today. He lives in [place] with his wife and two young children (ages 3 and 3). Review of Systems: A complete review of systems was obtained, reviewed. Pertinent findings reviewed above. Physical Exam:     Visit Vitals  /84 (BP 1 Location: Right arm, BP Patient Position: Sitting, BP Cuff Size: Large adult)   Pulse 89   Temp 98 °F (36.7 °C) (Temporal)   Resp 20   Ht 5' 11\" (1.803 m)   Wt 208 lb (94.3 kg)   SpO2 94%   BMI 29.01 kg/m²     ECOG PS: 0  General: no distress  Eyes: PERRLA, anicteric sclerae  HENT: atraumatic, oropharynx clear  Neck: supple  Lymphatic: no cervical, supraclavicular, or inguinal adenopathy  Respiratory: CTAB, normal respiratory effort  CV: normal rate, regular rhythm, no murmurs, no peripheral edema  GI: soft, nontender, nondistended, no masses, no hepatomegaly, no splenomegaly  MS: digits without clubbing or cyanosis. Skin: no rashes, no ecchymoses, no petechia. normal temperature, turgor, and texture. Psych: alert, oriented, appropriate affect, normal judgment/insight    Results:     Lab Results   Component Value Date/Time    WBC 7.6 06/01/2022 03:43 PM    HGB 14.7 06/01/2022 03:43 PM    HCT 44.6 06/01/2022 03:43 PM    PLATELET 544 59/60/0125 03:43 PM    MCV 89.9 06/01/2022 03:43 PM     Lab Results   Component Value Date/Time    Sodium 140 06/01/2022 03:43 PM    Potassium 4.0 06/01/2022 03:43 PM    Chloride 104 06/01/2022 03:43 PM    CO2 29 06/01/2022 03:43 PM    Anion gap 7 06/01/2022 03:43 PM    Glucose 105 (H) 06/01/2022 03:43 PM    BUN 10 06/01/2022 03:43 PM    Creatinine 1.24 06/01/2022 03:43 PM    BUN/Creatinine ratio 8 (L) 06/01/2022 03:43 PM    GFR est AA >60 06/01/2022 03:43 PM    GFR est non-AA >60 06/01/2022 03:43 PM    Calcium 9.7 06/01/2022 03:43 PM    Bilirubin, total 0.4 06/01/2022 03:43 PM    Alk.  phosphatase 67 06/01/2022 03:43 PM    Protein, total 8.5 (H) 06/01/2022 03:43 PM    Albumin 4.1 06/01/2022 03:43 PM    Globulin 4.4 (H) 2022 03:43 PM    A-G Ratio 0.9 (L) 2022 03:43 PM    ALT (SGPT) 80 (H) 2022 03:43 PM    AST (SGOT) 37 2022 03:43 PM     LDH:  Recent Labs     22  1543        Beta-HCG:  Recent Labs     22  1543   HCGTLT 19*     AFP:  Recent Labs     22  1543   AFP 5.2     2021  AFP: 168.0 (H)  beta hC (H)  LDH: 190    2022  AFP: 6.2  beta hCG: <1    2022  AFP:  4.6  beta hCG: <1    2022:  AFP:  5.6  beta hC    2022:  AFP:  5.3  beta hC    Records from [facility] reviewed and summarized above. Test results above have been reviewed. CT Chest 2022:  No evidence of intrathoracic malignancy. Assessment:   1) Non-seminoma, LEFT testis  Stage IS  He is s/p orchectomy 2021. His pre-op hCG was elevated at 250, but became undetectable after surgery. More recently it has been rising, now up to 23 concerning for recurrent disease. CXR and CT A/P were negative. CT chest obtained today and negative/normal.    Current guidelines recommend treatment with systemic therapy. Specifically, for good risk disease my recommendation is for BEP x3 cycles. PFTs scheduled later today. If DLCO is not adequate for bleomycin, then we will consider treatment with EP x4 cycles. He will benefit from port placement for therapy, scheduled 2022. We discussed the risks and benefits of BEP chemotherapy. Potential side effects include, but are not limited to: nausea, vomiting, diarrhea, taste changes, myelosuppression, infection, fatigue, alopecia, neuropathy, hearing loss, renal failure, pulmonary toxicity, cardiac toxicity, neuropathy, allergic reactions, infertility, and rarely, death. The patient has consented to beginning chemotherapy. The patient will be seen prior to each cycle of therapy, and labs will be monitored to assess for toxicity from therapy.     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 Ashely Morley (surgical oncology). 3) Ulcerative proctitis  Following with Dr. Familia Carr. Plans to hold Humira while on chemotherapy. Will initiate therapy with mesalamine    4) Risk of infertility  He was informed that chemotherapy can potentially cause infertility. He has already had a vasectomy and is not interested in having more children. Plan:     Proceed 6/20/2022 with Cycle #1 of BEP (Bleomycin 30 units Days 2, 9, 16; Etoposide 100mg/m2 Days 1-5, Cisplatin 20mg/m2 Days 1-5) given every 21 days x 3 cycles. Labs with each cycle: CBC, CMP, AFP, Beta hCG, LDH  Prophylactic antiemetics: Ondansetron and Dexamethasone IV days 1-5  PRN antiemetics: Ondansetron and Prochlorperazine at home  PFTs today  Return to see me prior to C2 of therapy      He requests to receive his chemotherapy at the [facility], which is closer to his home.     Signed By: Lamonte Wray MD

## 2022-06-09 NOTE — PROGRESS NOTES
[facility] at [facility]  [phone]      Chemotherapy education folder given to patient. Consent form reviewed and signed by patient and provider.

## 2022-06-10 NOTE — PROCEDURES
[facility]  PULMONARY FUNCTION TEST    Name:  Fredy Ricardo  MR#:  [id number]  :  1985  ACCOUNT #:  [de-identified]  DATE OF SERVICE:  2022      Spirometry revealed normal FEV1 to FVC ratio of 74% predicted and is not consistent with an obstructive defect. Both FVC and FEV1 are within normal limits with an FVC of 5.21 liters and 97% predicted and FEV1 of 3.87 liters and 94% predicted. Lung volumes were not obtained. Diffusion capacity is normal at 91% predicted.       Brenda Haile MD      [id number]/[email or web address]/  D:  06/10/2022 9:04  T:  06/10/2022 12:19  JOB #:  [id number]

## 2022-06-17 ENCOUNTER — HOSPITAL ENCOUNTER (OUTPATIENT)
Dept: INTERVENTIONAL RADIOLOGY/VASCULAR | Age: 37
Discharge: HOME | End: 2022-06-17
Attending: INTERNAL MEDICINE | Admitting: INTERNAL MEDICINE
Payer: COMMERCIAL

## 2022-06-17 VITALS
HEIGHT: 71 IN | OXYGEN SATURATION: 95 % | RESPIRATION RATE: 19 BRPM | TEMPERATURE: 97.1 F | WEIGHT: 208 LBS | HEART RATE: 101 BPM | DIASTOLIC BLOOD PRESSURE: 98 MMHG | SYSTOLIC BLOOD PRESSURE: 129 MMHG | BODY MASS INDEX: 29.12 KG/M2

## 2022-06-17 DIAGNOSIS — C62.92 NON-SEMINOMATOUS CANCER OF LEFT TESTIS (HCC): ICD-10-CM

## 2022-06-17 PROCEDURE — 99152 MOD SED SAME PHYS/QHP 5/>YRS: CPT

## 2022-06-17 PROCEDURE — 36561 INSERT TUNNELED CV CATH: CPT

## 2022-06-17 PROCEDURE — 99153 MOD SED SAME PHYS/QHP EA: CPT

## 2022-06-17 PROCEDURE — C1788 PORT, INDWELLING, IMP: HCPCS

## 2022-06-17 PROCEDURE — C1894 INTRO/SHEATH, NON-LASER: HCPCS

## 2022-06-17 PROCEDURE — 77030010507 HC ADH SKN DERMBND J&J -B

## 2022-06-17 PROCEDURE — 76937 US GUIDE VASCULAR ACCESS: CPT

## 2022-06-17 PROCEDURE — 74011000250 HC RX REV CODE- 250: Performed by: STUDENT IN AN ORGANIZED HEALTH CARE EDUCATION/TRAINING PROGRAM

## 2022-06-17 PROCEDURE — 77030031139 HC SUT VCRL2 J&J -A

## 2022-06-17 PROCEDURE — 74011250636 HC RX REV CODE- 250/636: Performed by: STUDENT IN AN ORGANIZED HEALTH CARE EDUCATION/TRAINING PROGRAM

## 2022-06-17 PROCEDURE — 2709999900 HC NON-CHARGEABLE SUPPLY

## 2022-06-17 PROCEDURE — 77001 FLUOROGUIDE FOR VEIN DEVICE: CPT

## 2022-06-17 RX ORDER — LIDOCAINE HYDROCHLORIDE AND EPINEPHRINE 10; 10 MG/ML; UG/ML
1.5 INJECTION, SOLUTION INFILTRATION; PERINEURAL ONCE
Status: COMPLETED | OUTPATIENT
Start: 2022-06-17 | End: 2022-06-17

## 2022-06-17 RX ORDER — MIDAZOLAM HYDROCHLORIDE 1 MG/ML
.5-1 INJECTION, SOLUTION INTRAMUSCULAR; INTRAVENOUS
Status: DISCONTINUED | OUTPATIENT
Start: 2022-06-17 | End: 2022-06-17 | Stop reason: HOSPADM

## 2022-06-17 RX ORDER — HEPARIN 100 UNIT/ML
300-500 SYRINGE INTRAVENOUS ONCE
Status: COMPLETED | OUTPATIENT
Start: 2022-06-17 | End: 2022-06-17

## 2022-06-17 RX ORDER — FENTANYL CITRATE 50 UG/ML
25-200 INJECTION, SOLUTION INTRAMUSCULAR; INTRAVENOUS
Status: DISCONTINUED | OUTPATIENT
Start: 2022-06-17 | End: 2022-06-17 | Stop reason: HOSPADM

## 2022-06-17 RX ORDER — LIDOCAINE HYDROCHLORIDE 10 MG/ML
20 INJECTION INFILTRATION; PERINEURAL
Status: DISCONTINUED | OUTPATIENT
Start: 2022-06-17 | End: 2022-06-17 | Stop reason: HOSPADM

## 2022-06-17 RX ADMIN — CEFAZOLIN 2 G: 1 INJECTION, POWDER, FOR SOLUTION INTRAMUSCULAR; INTRAVENOUS at 11:19

## 2022-06-17 RX ADMIN — FENTANYL CITRATE 50 MCG: 50 INJECTION, SOLUTION INTRAMUSCULAR; INTRAVENOUS at 11:35

## 2022-06-17 RX ADMIN — MIDAZOLAM 1 MG: 1 INJECTION INTRAMUSCULAR; INTRAVENOUS at 11:41

## 2022-06-17 RX ADMIN — FENTANYL CITRATE 50 MCG: 50 INJECTION, SOLUTION INTRAMUSCULAR; INTRAVENOUS at 11:30

## 2022-06-17 RX ADMIN — MIDAZOLAM 1 MG: 1 INJECTION INTRAMUSCULAR; INTRAVENOUS at 11:39

## 2022-06-17 RX ADMIN — LIDOCAINE HYDROCHLORIDE,EPINEPHRINE BITARTRATE 15 MG: 10; .01 INJECTION, SOLUTION INFILTRATION; PERINEURAL at 11:42

## 2022-06-17 RX ADMIN — LIDOCAINE HYDROCHLORIDE 20 ML: 10 INJECTION, SOLUTION INFILTRATION; PERINEURAL at 11:36

## 2022-06-17 RX ADMIN — HEPARIN 500 UNITS: 100 SYRINGE at 11:53

## 2022-06-17 RX ADMIN — MIDAZOLAM 1 MG: 1 INJECTION INTRAMUSCULAR; INTRAVENOUS at 11:35

## 2022-06-17 RX ADMIN — MIDAZOLAM 1 MG: 1 INJECTION INTRAMUSCULAR; INTRAVENOUS at 11:49

## 2022-06-17 NOTE — PROGRESS NOTES
TRANSFER - OUT REPORT:    Verbal report given to ken(name) on [facility] being transferred to [facility](unit) for routine post - op       Report consisted of patient's Situation, Background, Assessment and   Recommendations(SBAR). Information from the following report(s) SBAR was reviewed with the receiving nurse. Opportunity for questions and clarification was provided.       Patient transported with:   Registered Nurse

## 2022-06-17 NOTE — H&P
INTERVENTIONAL RADIOLOGY  Preoperative History and Physical      Patient:  Srinath Griffith  :  1985  Age:  40 y.o. MRN:  [id number]  Today's Date:  2022      CC / HPI   Srinath Griffith is a 40 y.o. male with a history of non-seminomatous cancer of left testis who presents for US guided portacath placement. PAST MEDICAL HISTORY  Past Medical History:   Diagnosis Date    Acid indigestion     [facility]     Cancer ([facility]     Testicular     Stool color black     ulceratie proctitis        PAST SURGICAL HISTORY  Past Surgical History:   Procedure Laterality Date    HX UROLOGICAL         SOCIAL HISTORY  Social History     Socioeconomic History    Marital status:      Spouse name: Not on file    Number of children: Not on file    Years of education: Not on file    Highest education level: Not on file   Occupational History    Not on file   Tobacco Use    Smoking status: Former Smoker    Smokeless tobacco: Never Used   Substance and Sexual Activity    Alcohol use: Yes    Drug use: Never    Sexual activity: Not on file   Other Topics Concern    Not on file   Social History Narrative    Not on file     Social Determinants of Health     Financial Resource Strain:     Difficulty of Paying Living Expenses: Not on file   Food Insecurity:     Worried About Running Out of Food in the Last Year: Not on file    Paulette of Food in the Last Year: Not on file   Transportation Needs:     Lack of Transportation (Medical): Not on file    Lack of Transportation (Non-Medical):  Not on file   Physical Activity:     Days of Exercise per Week: Not on file    Minutes of Exercise per Session: Not on file   Stress:     Feeling of Stress : Not on file   Social Connections:     Frequency of Communication with Friends and Family: Not on file    Frequency of Social Gatherings with Friends and Family: Not on file    Attends Shinto Services: Not on file    Active Member of Clubs or Organizations: Not on file    Attends Club or Organization Meetings: Not on file    Marital Status: Not on file   Intimate Partner Violence:     Fear of Current or Ex-Partner: Not on file    Emotionally Abused: Not on file    Physically Abused: Not on file    Sexually Abused: Not on file   Housing Stability:     Unable to Pay for Housing in the Last Year: Not on file    Number of [place] in the Last Year: Not on file    Unstable Housing in the Last Year: Not on file       FAMILY HISTORY  Family History   Problem Relation Age of Onset    Hypertension Mother     Cancer Maternal Grandmother         Breast    Stroke Maternal Grandfather     Cancer Paternal Grandfather        CURRENT MEDICATIONS  Current Facility-Administered Medications   Medication Dose Route Frequency Provider Last Rate Last Admin    fentaNYL citrate (PF) injection  mcg   mcg IntraVENous Multiple Sierra Lopez MD        midazolam (VERSED) injection 0.5-10 mg  0.5-10 mg IntraVENous Multiple Sierra Lopez MD        lidocaine (XYLOCAINE) 10 mg/mL (1 %) injection 20 mL  20 mL SubCUTAneous Ariadne Lopez MD        lidocaine-EPINEPHrine (XYLOCAINE) 1 %-1:100,000 injection 15 mg  1.5 mL IntraDERMal ONCE Divina Natarajan MD        ceFAZolin (ANCEF) 2 g in sterile water (preservative free) 20 mL IV syringe  2 g IntraVENous ONCE Divina Natarajan MD        heparin (porcine) pf 300-500 Units  300-500 Units InterCATHeter ONCE Sierra Lopez MD           ALLERGIES  Allergies   Allergen Reactions    Flynn Rash and Swelling       DIAGNOSTIC STUDIES   IMAGING STUDIES  N/A    LABS  Lab Results   Component Value Date/Time    WBC 7.6 06/01/2022 03:43 PM    HGB 14.7 06/01/2022 03:43 PM    HCT 44.6 06/01/2022 03:43 PM    PLATELET 108 52/90/9594 03:43 PM    MCV 89.9 06/01/2022 03:43 PM     Lab Results   Component Value Date/Time    Sodium 140 06/01/2022 03:43 PM    Potassium 4.0 06/01/2022 03:43 PM    Chloride 104 06/01/2022 03:43 PM    CO2 29 06/01/2022 03:43 PM    Anion gap 7 06/01/2022 03:43 PM    Glucose 105 (H) 06/01/2022 03:43 PM    BUN 10 06/01/2022 03:43 PM    Creatinine 1.24 06/01/2022 03:43 PM    BUN/Creatinine ratio 8 (L) 06/01/2022 03:43 PM    GFR est AA >60 06/01/2022 03:43 PM    GFR est non-AA >60 06/01/2022 03:43 PM    Calcium 9.7 06/01/2022 03:43 PM     No results found for: INR, PTMR, PTP, PT1, PT2, INREXT    PHYSICAL EXAM   /89 (BP 1 Location: Left upper arm, BP Patient Position: At rest)   Pulse 96   Temp 97.1 °F (36.2 °C)   Resp 12   Ht 5' 11\" (1.803 m)   Wt 94.3 kg (208 lb)   SpO2 98%   BMI 29.01 kg/m²   General:  NAD  Heart:  RRR  Lungs:  NWOB  Neurological:  AAOX3    PLAN   Procedure to be performed:  US guided portacath placement  Plan for sedation:  moderate  Post procedure plan:  observation per protocol  Informed consent:  risks, benefits, and alternatives reviewed with the patient / family who agree to proceed  Code status:  No Order      Alexander Genao, [address] [facility]

## 2022-06-17 NOTE — PROGRESS NOTES
12:08 PM  TRANSFER - IN REPORT:    Verbal report received from Celina(name) on [facility]  being received from [facility](unit) for routine post - op      Report consisted of patients Situation, Background, Assessment and   Recommendations(SBAR). Information from the following report(s) SBAR, Kardex and Procedure Summary was reviewed with the receiving nurse. Opportunity for questions and clarification was provided. Assessment completed upon patients arrival to unit and care assumed.

## 2022-06-17 NOTE — ROUTINE PROCESS
10:00 AM  Patient arrived. ID and allergies verified verbally with patient. Pt voices understanding of procedure to be performed. Consent obtained. Pt prepped for procedure. 11:12 AM  TRANSFER - OUT REPORT:    Verbal report given to SAEID Patrick(name) on [facility]  being transferred to [facility](unit) for ordered procedure       Report consisted of patients Situation, Background, Assessment and   Recommendations(SBAR). Information from the following report(s) SBAR was reviewed with the receiving nurse. Lines:   Peripheral IV 06/17/22 Right Antecubital (Active)   Site Assessment Clean, dry, & intact 06/17/22 1018   Phlebitis Assessment 0 06/17/22 1018   Dressing Status Clean, dry, & intact 06/17/22 1018   Dressing Type Tape;Transparent 06/17/22 1018   Hub Color/Line Status Pink 06/17/22 1018        Opportunity for questions and clarification was provided. Patient transported with:   Registered Nurse    12:40 PM  Discharge instructions reviewed with patient and family. Voiced understanding. Patient given copy of discharge instructions to take home. 12:55 PM  Pt discharged via wheelchair with wife. Personal belongings with patient upon discharge.

## 2022-06-17 NOTE — DISCHARGE INSTRUCTIONS
Patient Education        Implanted Port: What to Expect at [facility]. had a procedure to implant a port. A port is a device placed, in most cases, under the skin of your chest below your collarbone. It is made of plastic, stainless steel, or titanium. It's about the size of a quarter, but thicker. It looks like a small bump under your skin. A thin, flexible tube called a catheter runs under the skin from the port into a large vein. With the port, you will be able to get medicines (such as chemotherapy) with more comfort. You also can get blood, nutrients, or other fluids. Blood can be taken through the port for tests. You will probably have some discomfort and bruising at the port site. This will go away in a few days. The port can be used right away. You may have the port for weeks, months, or longer. Your port will need to be flushed out regularly to keep it open. A nurse or other health professional will do this for you. This care sheet gives you a general idea about how long it will take for you to recover. But each person recovers at a different pace. Follow the steps below to feel better as quickly as possible. How can you care for yourself at home? Activity    · Avoid arm and upper body movements that may pull on the catheter for the first few days. These movements include heavy weight lifting and vigorous use of your arms.     · You will probably need to take 1 day off from work and will be able to return to normal activities shortly after. This depends on the type of work you do, why you have the catheter, and how you feel.     · You probably will be able to take baths and swim. But you may need to avoid some activities. Talk to your doctor about any limits on your activity.     · Ask your doctor when you can drive again. Be careful when you pull your seat belt across your chest so it doesn't pull out the catheter. It's okay if the seat belt lays over the catheter.    Medicines    · Your doctor will tell you if and when you can restart your medicines. He or she will also give you instructions about taking any new medicines.     · If you take aspirin or some other blood thinner, ask your doctor if and when to start taking it again. Make sure that you understand exactly what your doctor wants you to do.     · Be safe with medicines. Read and follow all instructions on the label. ? If the doctor gave you a prescription medicine for pain, take it as prescribed. ? If you are not taking a prescription pain medicine, ask your doctor if you can take an over-the-counter medicine. Incision care    · If you have a bandage, your doctor will tell you when you can remove it. After you remove the bandage, you may shower. Wash the area with soap and water and pat it dry. Don't use hydrogen peroxide or alcohol, which can slow healing. You may cover the area with a gauze bandage if it weeps or rubs against clothing. Change the bandage every day.     · If you have strips of tape on the cut (incision) the doctor made, leave the tape on for a week or until it falls off. Other instructions    · Always carry the medical alert card that your doctor gives you. It contains information about your port. It will tell health care workers that you have a port in case you need emergency care.     · When you get dressed, be careful not to rub the port. Do not wear a bra or suspenders that irritate your skin near the port. Follow-up care is a key part of your treatment and safety. Be sure to make and go to all appointments, and call your doctor if you are having problems. It's also a good idea to know your test results and keep a list of the medicines you take. When should you call for help? Call your doctor now or seek immediate medical care if:    · You have signs of infection, such as:  ? Increased pain, swelling, warmth, or redness. ? Red streaks leading from the area. ? Pus draining from the area. ?  A fever.     · You have pain or swelling in your neck or arm.     · You have trouble breathing. Watch closely for changes in your health, and be sure to contact your doctor if:    · You have any problems with your line or port. Where can you learn more? Go to http://www.gray.com/  Enter M256 in the search box to learn more about \"Implanted Port: What to Expect at Home. \"  Current as of: July 1, 2021               Content Version: 13.2  © 2006-2022 Healthwise, [employer]. Care instructions adapted under license by [employer] (which disclaims liability or warranty for this information). If you have questions about a medical condition or this instruction, always ask your healthcare professional. [address] any warranty or liability for your use of this information.

## 2022-06-20 ENCOUNTER — HOSPITAL ENCOUNTER (OUTPATIENT)
Dept: INFUSION THERAPY | Age: 37
Discharge: HOME | End: 2022-06-20
Payer: COMMERCIAL

## 2022-06-20 ENCOUNTER — DOCUMENTATION ONLY (OUTPATIENT)
Dept: ONCOLOGY | Age: 37
End: 2022-06-20

## 2022-06-20 VITALS
RESPIRATION RATE: 18 BRPM | OXYGEN SATURATION: 96 % | HEART RATE: 92 BPM | WEIGHT: 211.4 LBS | TEMPERATURE: 98 F | BODY MASS INDEX: 29.6 KG/M2 | SYSTOLIC BLOOD PRESSURE: 129 MMHG | DIASTOLIC BLOOD PRESSURE: 83 MMHG | HEIGHT: 71 IN

## 2022-06-20 DIAGNOSIS — C62.92 NON-SEMINOMATOUS CANCER OF LEFT TESTIS (HCC): ICD-10-CM

## 2022-06-20 DIAGNOSIS — Z51.11 ENCOUNTER FOR ANTINEOPLASTIC CHEMOTHERAPY: Primary | ICD-10-CM

## 2022-06-20 LAB
ALBUMIN SERPL-MCNC: 3.7 G/DL (ref 3.5–5)
ALBUMIN/GLOB SERPL: 0.8 {RATIO} (ref 1.1–2.2)
ALP SERPL-CCNC: 76 U/L (ref 45–117)
ALT SERPL-CCNC: 58 U/L (ref 12–78)
ANION GAP SERPL CALC-SCNC: 3 MMOL/L (ref 5–15)
AST SERPL-CCNC: 30 U/L (ref 15–37)
BASOPHILS # BLD: 0.1 K/UL (ref 0–0.1)
BASOPHILS NFR BLD: 2 % (ref 0–1)
BILIRUB SERPL-MCNC: 0.4 MG/DL (ref 0.2–1)
BUN SERPL-MCNC: 15 MG/DL (ref 6–20)
BUN/CREAT SERPL: 12 (ref 12–20)
CALCIUM SERPL-MCNC: 9.4 MG/DL (ref 8.5–10.1)
CHLORIDE SERPL-SCNC: 108 MMOL/L (ref 97–108)
CO2 SERPL-SCNC: 27 MMOL/L (ref 21–32)
CREAT SERPL-MCNC: 1.21 MG/DL (ref 0.7–1.3)
DIFFERENTIAL METHOD BLD: ABNORMAL
EOSINOPHIL # BLD: 0.4 K/UL (ref 0–0.4)
EOSINOPHIL NFR BLD: 5 % (ref 0–7)
ERYTHROCYTE [DISTWIDTH] IN BLOOD BY AUTOMATED COUNT: 14.7 % (ref 11.5–14.5)
GLOBULIN SER CALC-MCNC: 4.8 G/DL (ref 2–4)
GLUCOSE SERPL-MCNC: 100 MG/DL (ref 65–100)
HBV CORE IGM SER QL: NONREACTIVE
HBV SURFACE AB SER QL: REACTIVE
HBV SURFACE AB SER-ACNC: 101.88 MIU/ML
HCT VFR BLD AUTO: 43.2 % (ref 36.6–50.3)
HGB BLD-MCNC: 14.3 G/DL (ref 12.1–17)
IMM GRANULOCYTES # BLD AUTO: 0 K/UL (ref 0–0.04)
IMM GRANULOCYTES NFR BLD AUTO: 0 % (ref 0–0.5)
LDH SERPL L TO P-CCNC: 200 U/L (ref 85–241)
LYMPHOCYTES # BLD: 3.1 K/UL (ref 0.8–3.5)
LYMPHOCYTES NFR BLD: 41 % (ref 12–49)
MAGNESIUM SERPL-MCNC: 2.1 MG/DL (ref 1.6–2.4)
MCH RBC QN AUTO: 28.8 PG (ref 26–34)
MCHC RBC AUTO-ENTMCNC: 33.1 G/DL (ref 30–36.5)
MCV RBC AUTO: 87.1 FL (ref 80–99)
MONOCYTES # BLD: 0.7 K/UL (ref 0–1)
MONOCYTES NFR BLD: 9 % (ref 5–13)
NEUTS SEG # BLD: 3.4 K/UL (ref 1.8–8)
NEUTS SEG NFR BLD: 43 % (ref 32–75)
NRBC # BLD: 0 K/UL (ref 0–0.01)
NRBC BLD-RTO: 0 PER 100 WBC
PLATELET # BLD AUTO: 269 K/UL (ref 150–400)
PMV BLD AUTO: 10.6 FL (ref 8.9–12.9)
POTASSIUM SERPL-SCNC: 3.8 MMOL/L (ref 3.5–5.1)
PROT SERPL-MCNC: 8.5 G/DL (ref 6.4–8.2)
RBC # BLD AUTO: 4.96 M/UL (ref 4.1–5.7)
SODIUM SERPL-SCNC: 138 MMOL/L (ref 136–145)
WBC # BLD AUTO: 7.7 K/UL (ref 4.1–11.1)

## 2022-06-20 PROCEDURE — 96367 TX/PROPH/DG ADDL SEQ IV INF: CPT

## 2022-06-20 PROCEDURE — 84702 CHORIONIC GONADOTROPIN TEST: CPT

## 2022-06-20 PROCEDURE — 74011250636 HC RX REV CODE- 250/636: Performed by: INTERNAL MEDICINE

## 2022-06-20 PROCEDURE — 86704 HEP B CORE ANTIBODY TOTAL: CPT

## 2022-06-20 PROCEDURE — 74011000258 HC RX REV CODE- 258: Performed by: INTERNAL MEDICINE

## 2022-06-20 PROCEDURE — 83615 LACTATE (LD) (LDH) ENZYME: CPT

## 2022-06-20 PROCEDURE — 82105 ALPHA-FETOPROTEIN SERUM: CPT

## 2022-06-20 PROCEDURE — 96417 CHEMO IV INFUS EACH ADDL SEQ: CPT

## 2022-06-20 PROCEDURE — 74011250637 HC RX REV CODE- 250/637: Performed by: INTERNAL MEDICINE

## 2022-06-20 PROCEDURE — 96361 HYDRATE IV INFUSION ADD-ON: CPT

## 2022-06-20 PROCEDURE — 96413 CHEMO IV INFUSION 1 HR: CPT

## 2022-06-20 PROCEDURE — 74011000250 HC RX REV CODE- 250: Performed by: INTERNAL MEDICINE

## 2022-06-20 PROCEDURE — 96375 TX/PRO/DX INJ NEW DRUG ADDON: CPT

## 2022-06-20 PROCEDURE — 77030012965 HC NDL HUBR BBMI -A

## 2022-06-20 PROCEDURE — 96411 CHEMO IV PUSH ADDL DRUG: CPT

## 2022-06-20 PROCEDURE — 85025 COMPLETE CBC W/AUTO DIFF WBC: CPT

## 2022-06-20 PROCEDURE — 86705 HEP B CORE ANTIBODY IGM: CPT

## 2022-06-20 PROCEDURE — 36415 COLL VENOUS BLD VENIPUNCTURE: CPT

## 2022-06-20 PROCEDURE — 80053 COMPREHEN METABOLIC PANEL: CPT

## 2022-06-20 PROCEDURE — 86706 HEP B SURFACE ANTIBODY: CPT

## 2022-06-20 PROCEDURE — 83735 ASSAY OF MAGNESIUM: CPT

## 2022-06-20 RX ORDER — ACETAMINOPHEN 325 MG/1
650 TABLET ORAL AS NEEDED
Status: ACTIVE | OUTPATIENT
Start: 2022-06-20 | End: 2022-06-20

## 2022-06-20 RX ORDER — PALONOSETRON 0.05 MG/ML
0.25 INJECTION, SOLUTION INTRAVENOUS ONCE
Status: CANCELLED | OUTPATIENT
Start: 2022-08-04 | End: 2022-08-04

## 2022-06-20 RX ORDER — DIPHENHYDRAMINE HYDROCHLORIDE 50 MG/ML
25 INJECTION, SOLUTION INTRAMUSCULAR; INTRAVENOUS AS NEEDED
Status: ACTIVE | OUTPATIENT
Start: 2022-06-20 | End: 2022-06-20

## 2022-06-20 RX ORDER — DIPHENHYDRAMINE HYDROCHLORIDE 50 MG/ML
25 INJECTION, SOLUTION INTRAMUSCULAR; INTRAVENOUS ONCE
Status: COMPLETED | OUTPATIENT
Start: 2022-06-20 | End: 2022-06-20

## 2022-06-20 RX ORDER — PALONOSETRON 0.05 MG/ML
0.25 INJECTION, SOLUTION INTRAVENOUS ONCE
Status: COMPLETED | OUTPATIENT
Start: 2022-06-20 | End: 2022-06-20

## 2022-06-20 RX ORDER — SODIUM CHLORIDE 0.9 % (FLUSH) 0.9 %
10 SYRINGE (ML) INJECTION AS NEEDED
Status: DISPENSED | OUTPATIENT
Start: 2022-06-20 | End: 2022-06-20

## 2022-06-20 RX ORDER — ACETAMINOPHEN 325 MG/1
650 TABLET ORAL ONCE
Status: COMPLETED | OUTPATIENT
Start: 2022-06-20 | End: 2022-06-20

## 2022-06-20 RX ORDER — SODIUM CHLORIDE 9 MG/ML
25 INJECTION, SOLUTION INTRAVENOUS CONTINUOUS
Status: DISPENSED | OUTPATIENT
Start: 2022-06-20 | End: 2022-06-20

## 2022-06-20 RX ORDER — PALONOSETRON 0.05 MG/ML
0.25 INJECTION, SOLUTION INTRAVENOUS ONCE
Status: CANCELLED | OUTPATIENT
Start: 2022-06-23 | End: 2022-06-23

## 2022-06-20 RX ORDER — PALONOSETRON 0.05 MG/ML
0.25 INJECTION, SOLUTION INTRAVENOUS ONCE
Status: CANCELLED | OUTPATIENT
Start: 2022-07-14 | End: 2022-07-14

## 2022-06-20 RX ORDER — HEPARIN 100 UNIT/ML
300-500 SYRINGE INTRAVENOUS AS NEEDED
Status: ACTIVE | OUTPATIENT
Start: 2022-06-20 | End: 2022-06-20

## 2022-06-20 RX ORDER — ONDANSETRON 2 MG/ML
8 INJECTION INTRAMUSCULAR; INTRAVENOUS AS NEEDED
Status: ACTIVE | OUTPATIENT
Start: 2022-06-20 | End: 2022-06-20

## 2022-06-20 RX ADMIN — MAGNESIUM SULFATE HEPTAHYDRATE: 500 INJECTION, SOLUTION INTRAMUSCULAR; INTRAVENOUS at 12:36

## 2022-06-20 RX ADMIN — SODIUM CHLORIDE, PRESERVATIVE FREE 10 ML: 5 INJECTION INTRAVENOUS at 14:51

## 2022-06-20 RX ADMIN — DEXAMETHASONE SODIUM PHOSPHATE 12 MG: 4 INJECTION, SOLUTION INTRAMUSCULAR; INTRAVENOUS at 10:40

## 2022-06-20 RX ADMIN — DIPHENHYDRAMINE HYDROCHLORIDE 25 MG: 50 INJECTION, SOLUTION INTRAMUSCULAR; INTRAVENOUS at 11:04

## 2022-06-20 RX ADMIN — SODIUM CHLORIDE 150 MG: 900 INJECTION, SOLUTION INTRAVENOUS at 11:07

## 2022-06-20 RX ADMIN — PALONOSETRON 0.25 MG: 0.05 INJECTION, SOLUTION INTRAVENOUS at 11:52

## 2022-06-20 RX ADMIN — CISPLATIN 44 MG: 100 INJECTION, SOLUTION INTRAVENOUS at 12:36

## 2022-06-20 RX ADMIN — BLEOMYCIN SULFATE 30 UNITS: 15 INJECTION, POWDER, LYOPHILIZED, FOR SOLUTION INTRAMUSCULAR; INTRAPLEURAL; INTRAVENOUS; SUBCUTANEOUS at 12:15

## 2022-06-20 RX ADMIN — HEPARIN 500 UNITS: 100 SYRINGE at 14:51

## 2022-06-20 RX ADMIN — SODIUM CHLORIDE 25 ML/HR: 9 INJECTION, SOLUTION INTRAVENOUS at 10:38

## 2022-06-20 RX ADMIN — ACETAMINOPHEN 650 MG: 325 TABLET ORAL at 11:04

## 2022-06-20 RX ADMIN — ETOPOSIDE 220 MG: 20 INJECTION, SOLUTION INTRAVENOUS at 13:45

## 2022-06-20 NOTE — PROGRESS NOTES
[facility] - Chemotherapy Progress Note    0900 Pt admit to [facility] for BEP Cycle 1 Day 1 ambulatory in stable condition. Assessment completed. No new concerns voiced. Port accessed by access nurse Liliana Velasquez  with positive blood return. Labs reviewed and meds ordered.  IV attached to NS at [address] 6/20/2022   Cycle C1D1   Date 6/20/2022   Drug / Regimen BEP   Pre Hydration GIVEN   Pre Meds GIVEN   Notes GIVEN       Visit Vitals  /83   Pulse 92   Temp 98 °F (36.7 °C)   Resp 18   Ht 5' 11\" (1.803 m)   Wt 95.9 kg (211 lb 6.4 oz)   SpO2 96%   BMI 29.48 kg/m²       Medications:  Medications Administered     0.9% sodium chloride 1,000 mL with potassium chloride 10 mEq, magnesium sulfate 2 g infusion     Admin Date  06/20/2022 Action  New Bag Dose   Rate  1,000 mL/hr Route  IntraVENous Administered By  Alex Herman RN          0.9% sodium chloride infusion     Admin Date  06/20/2022 Action  New Bag Dose  25 mL/hr Rate  25 mL/hr Route  IntraVENous Administered By  Alex Herman RN          acetaminophen (TYLENOL) tablet 650 mg     Admin Date  06/20/2022 Action  Given Dose  650 mg Route  Oral Administered By  Alex Herman RN          bleomycin (BLEOCIN) 30 Units in 0.9% sodium chloride 50 mL, overfill volume 5 mL chemo infusion     Admin Date  06/20/2022 Action  New Bag Dose  30 Units Rate  390 mL/hr Route  IntraVENous Administered By  Alex Herman RN          CISplatin (PLATINOL) 44 mg in 0.9% sodium chloride 500 mL, overfill volume 50 mL chemo infusion     Admin Date  06/20/2022 Action  New Bag Dose  44 mg Rate  594 mL/hr Route  IntraVENous Administered By  Alex Herman RN          dexamethasone (DECADRON) 12 mg in 0.9% sodium chloride 50 mL, overfill volume 5 mL IVPB     Admin Date  06/20/2022 Action  New Bag Dose  12 mg Rate  232 mL/hr Route  IntraVENous Administered By  Alex Herman RN          diphenhydrAMINE (BENADRYL) injection 25 mg     Admin Date  06/20/2022 Action  Given Dose  25 mg Route  IntraVENous Administered By  Jamir Monsivais RN          etoposide (VEPESID) 220 mg in 0.9% sodium chloride 500 mL, overfill volume 50 mL chemo infusion     Admin Date  06/20/2022 Action  New Bag Dose  220 mg Rate  561 mL/hr Route  IntraVENous Administered By  Jamir Monsivais RN          fosaprepitant (EMEND) 150 mg in 0.9% sodium chloride 150 mL IVPB     Admin Date  06/20/2022 Action  New Bag Dose  150 mg Rate  450 mL/hr Route  IntraVENous Administered By  Jamir Monsivais RN          heparin (porcine) pf 300-500 Units     Admin Date  06/20/2022 Action  Given Dose  500 Units Route  InterCATHeter Administered By  Jamir Monsivais RN          palonosetron HCl (ALOXI) injection 0.25 mg     Admin Date  06/20/2022 Action  Given Dose  0.25 mg Route  IntraVENous Administered By  Jamir Monsivais RN          sodium chloride (NS) flush 10 mL     Admin Date  06/20/2022 Action  Given Dose  10 mL Route  IntraVENous Administered By  Jamir Monsivais RN                  Two nurses verified prior to administering:Drug name Drug dose, Infusion volume or drug volume when prepared in a syringe, Rate of administration, Route of administration, Expiration dates and/or times, Appearance and physical integrity of the drugs, Rate set on infusion pump when used, Sequencing of drug administration         1500 Pt tolerated treatment well. Port maintained positive blood return throughout treatment, flushed with positive blood return at conclusion. Pt requested for needle to be removed. D/c home ambulatory in no distress. Pt aware of next appointment scheduled for 6/21/22.     Recent Results (from the past 8 hour(s))   LD    Collection Time: 06/20/22  9:16 AM   Result Value Ref Range     85 - 241 U/L   CBC WITH AUTOMATED DIFF    Collection Time: 06/20/22  9:16 AM   Result Value Ref Range    WBC 7.7 4.1 - 11.1 K/uL    RBC 4.96 4.10 - 5.70 M/uL    HGB 14.3 12.1 - 17.0 g/dL    HCT 43.2 36.6 - 50.3 %    MCV 87.1 80.0 - 99.0 FL    MCH 28.8 26.0 - 34.0 PG    MCHC 33.1 30.0 - 36.5 g/dL    RDW 14.7 (H) 11.5 - 14.5 %    PLATELET 628 734 - 263 K/uL    MPV 10.6 8.9 - 12.9 FL    NRBC 0.0 0  WBC    ABSOLUTE NRBC 0.00 0.00 - 0.01 K/uL    NEUTROPHILS 43 32 - 75 %    LYMPHOCYTES 41 12 - 49 %    MONOCYTES 9 5 - 13 %    EOSINOPHILS 5 0 - 7 %    BASOPHILS 2 (H) 0 - 1 %    IMMATURE GRANULOCYTES 0 0.0 - 0.5 %    ABS. NEUTROPHILS 3.4 1.8 - 8.0 K/UL    ABS. LYMPHOCYTES 3.1 0.8 - 3.5 K/UL    ABS. MONOCYTES 0.7 0.0 - 1.0 K/UL    ABS. EOSINOPHILS 0.4 0.0 - 0.4 K/UL    ABS. BASOPHILS 0.1 0.0 - 0.1 K/UL    ABS. IMM. GRANS. 0.0 0.00 - 0.04 K/UL    DF AUTOMATED     METABOLIC PANEL, COMPREHENSIVE    Collection Time: 06/20/22  9:16 AM   Result Value Ref Range    Sodium 138 136 - 145 mmol/L    Potassium 3.8 3.5 - 5.1 mmol/L    Chloride 108 97 - 108 mmol/L    CO2 27 21 - 32 mmol/L    Anion gap 3 (L) 5 - 15 mmol/L    Glucose 100 65 - 100 mg/dL    BUN 15 6 - 20 MG/DL    Creatinine 1.21 0.70 - 1.30 MG/DL    BUN/Creatinine ratio 12 12 - 20      GFR est AA >60 >60 ml/min/1.73m2    GFR est non-AA >60 >60 ml/min/1.73m2    Calcium 9.4 8.5 - 10.1 MG/DL    Bilirubin, total 0.4 0.2 - 1.0 MG/DL    ALT (SGPT) 58 12 - 78 U/L    AST (SGOT) 30 15 - 37 U/L    Alk. phosphatase 76 45 - 117 U/L    Protein, total 8.5 (H) 6.4 - 8.2 g/dL    Albumin 3.7 3.5 - 5.0 g/dL    Globulin 4.8 (H) 2.0 - 4.0 g/dL    A-G Ratio 0.8 (L) 1.1 - 2.2     MAGNESIUM    Collection Time: 06/20/22  9:16 AM   Result Value Ref Range    Magnesium 2.1 1.6 - 2.4 mg/dL   HEP B SURFACE AB    Collection Time: 06/20/22  9:16 AM   Result Value Ref Range    Hepatitis B surface Ab 101.88 mIU/mL    Hep B surface Ab Interp.  REACTIVE (A) NR     HEPATITIS B CORE AB, IGM    Collection Time: 06/20/22  9:16 AM   Result Value Ref Range    Hepatitis B core, IgM NONREACTIVE NR

## 2022-06-20 NOTE — PROGRESS NOTES
Pharmacy Note- Chemotherapy Education    Kailee Madrid is a  40 y.o.male  diagnosed with testicular cancer here today for Cycle 1, Day 1 chemotherapy counseling. Mr. Pia Preciado is being treated with BEP. Provided education on bleomycin, etoposide and CISplatin and premedications - fosaprepitant, palonosetron, dexamethasone, diphenhydramine and acetaminophen. Provided handout and reviewed home supportive care regimen. Side effects of chemotherapy reviewed included s/s infection, anemia, appetite changes, thrombocytopenia, fatigue, hair loss/alopecia, bone pain, skin and nail changes, diarrhea/constipation, hearing changes, lung changes and kidney changes. Patient given ways to manage these side effects and when to contact office. Mr. Pia Preciado verbalized understanding of the information presented and all of the patient's questions were answered.     Eulice Ganser, PharmD, BCPS, BCOP    For Pharmacy Admin Tracking Only     CPA in place: No   Recommendation Provided To: Provider: 1 via Note to Provider  and Patient/Caregiver: 1 via In person   Intervention Detail: Discontinued Rx: 1, reason: Duplicate Therapy and New Rx: 1, reason: Needs Additional Therapy      Intervention Accepted By: Provider: 1 and Patient/Caregiver: 1   Time Spent (min): 20

## 2022-06-21 ENCOUNTER — HOSPITAL ENCOUNTER (OUTPATIENT)
Dept: INFUSION THERAPY | Age: 37
Discharge: HOME | End: 2022-06-21
Payer: COMMERCIAL

## 2022-06-21 VITALS
RESPIRATION RATE: 18 BRPM | DIASTOLIC BLOOD PRESSURE: 77 MMHG | HEART RATE: 89 BPM | TEMPERATURE: 97.5 F | SYSTOLIC BLOOD PRESSURE: 131 MMHG

## 2022-06-21 DIAGNOSIS — Z51.11 ENCOUNTER FOR ANTINEOPLASTIC CHEMOTHERAPY: Primary | ICD-10-CM

## 2022-06-21 DIAGNOSIS — C62.92 NON-SEMINOMATOUS CANCER OF LEFT TESTIS (HCC): ICD-10-CM

## 2022-06-21 LAB
AFP-TM SERPL-MCNC: 6.3 NG/ML (ref 0–6.9)
HBV CORE AB SERPL QL IA: NEGATIVE
HBV CORE IGM SERPL QL IA: NEGATIVE
HCG INTACT+B SERPL-ACNC: 24 MIU/ML (ref 0–3)

## 2022-06-21 PROCEDURE — 96368 THER/DIAG CONCURRENT INF: CPT

## 2022-06-21 PROCEDURE — 74011250636 HC RX REV CODE- 250/636: Performed by: INTERNAL MEDICINE

## 2022-06-21 PROCEDURE — 74011000250 HC RX REV CODE- 250: Performed by: INTERNAL MEDICINE

## 2022-06-21 PROCEDURE — 96417 CHEMO IV INFUS EACH ADDL SEQ: CPT

## 2022-06-21 PROCEDURE — 77030012965 HC NDL HUBR BBMI -A

## 2022-06-21 PROCEDURE — 74011000258 HC RX REV CODE- 258: Performed by: INTERNAL MEDICINE

## 2022-06-21 PROCEDURE — 96375 TX/PRO/DX INJ NEW DRUG ADDON: CPT

## 2022-06-21 PROCEDURE — 96413 CHEMO IV INFUSION 1 HR: CPT

## 2022-06-21 RX ORDER — HEPARIN 100 UNIT/ML
300-500 SYRINGE INTRAVENOUS AS NEEDED
Status: ACTIVE | OUTPATIENT
Start: 2022-06-21 | End: 2022-06-21

## 2022-06-21 RX ORDER — SODIUM CHLORIDE 0.9 % (FLUSH) 0.9 %
10 SYRINGE (ML) INJECTION AS NEEDED
Status: DISPENSED | OUTPATIENT
Start: 2022-06-21 | End: 2022-06-21

## 2022-06-21 RX ORDER — SODIUM CHLORIDE 9 MG/ML
25 INJECTION, SOLUTION INTRAVENOUS CONTINUOUS
Status: DISPENSED | OUTPATIENT
Start: 2022-06-21 | End: 2022-06-21

## 2022-06-21 RX ORDER — SODIUM CHLORIDE 9 MG/ML
10 INJECTION INTRAMUSCULAR; INTRAVENOUS; SUBCUTANEOUS AS NEEDED
Status: ACTIVE | OUTPATIENT
Start: 2022-06-21 | End: 2022-06-21

## 2022-06-21 RX ADMIN — SODIUM CHLORIDE 25 ML/HR: 900 INJECTION, SOLUTION INTRAVENOUS at 09:39

## 2022-06-21 RX ADMIN — CISPLATIN 44 MG: 100 INJECTION, SOLUTION INTRAVENOUS at 11:31

## 2022-06-21 RX ADMIN — SODIUM CHLORIDE, PRESERVATIVE FREE 10 ML: 5 INJECTION INTRAVENOUS at 09:00

## 2022-06-21 RX ADMIN — MAGNESIUM SULFATE HEPTAHYDRATE: 500 INJECTION, SOLUTION INTRAMUSCULAR; INTRAVENOUS at 11:15

## 2022-06-21 RX ADMIN — ETOPOSIDE 220 MG: 20 INJECTION, SOLUTION INTRAVENOUS at 10:16

## 2022-06-21 RX ADMIN — DEXAMETHASONE SODIUM PHOSPHATE 12 MG: 4 INJECTION, SOLUTION INTRAMUSCULAR; INTRAVENOUS at 09:45

## 2022-06-21 RX ADMIN — HEPARIN 500 UNITS: 100 SYRINGE at 12:45

## 2022-06-21 NOTE — PROGRESS NOTES
[facility] Chemo Progress Note    Date: 2022    Name: Bhavesh Lombardi    MRN: [id number]         : 1985    0900 Mr. Gilbert Arrived to [facility] for C1D2 Etoposide/Cisplatin ambulatory in stable condition. Assessment was completed, no acute issues at this time, no new complaints voiced. Port accessed with positive blood return. Chemotherapy Flowsheet 2022   Cycle C1D2   Date 2022   Drug / Regimen Etoposide/Cisplatin   Pre Hydration given   Pre Meds given   Notes given              Mr. Rosa Elena Gallardo vitals were reviewed. Patient Vitals for the past 12 hrs:   Temp Pulse Resp BP   22 1345 -- 89 18 131/77   22 0900 97.5 °F (36.4 °C) 94 18 (!) 140/79         Lab results were obtained and reviewed. No results found for this or any previous visit (from the past 12 hour(s)). Pre-medications  were administered as ordered and chemotherapy was initiated.   Medications Administered     0.9% sodium chloride 1,000 mL with potassium chloride 10 mEq, magnesium sulfate 2 g infusion     Admin Date  2022 Action  New Bag Dose   Rate  1,000 mL/hr Route  IntraVENous Administered By  Kathy Grant RN          0.9% sodium chloride infusion     Admin Date  2022 Action  New Bag Dose  25 mL/hr Rate  25 mL/hr Route  IntraVENous Administered By  Kathy Grant RN          0.9% sodium chloride injection 10 mL     Admin Date  2022 Action  Given Dose  10 mL Route  IntraVENous Administered By  Kathy Grant RN          CISplatin (PLATINOL) 44 mg in 0.9% sodium chloride 500 mL, overfill volume 50 mL chemo infusion     Admin Date  2022 Action  New Bag Dose  44 mg Rate  594 mL/hr Route  IntraVENous Administered By  Kathy Grant RN          dexamethasone (DECADRON) 12 mg in 0.9% sodium chloride 50 mL, overfill volume 5 mL IVPB     Admin Date  2022 Action  New Bag Dose  12 mg Rate  232 mL/hr Route  IntraVENous Administered By  Kathy Grant RN          etoposide (VEPESID) 220 mg in 0.9% sodium chloride 500 mL, overfill volume 50 mL chemo infusion     Admin Date  06/21/2022 Action  New Bag Dose  220 mg Rate  561 mL/hr Route  IntraVENous Administered By  Tanya Flores RN          heparin (porcine) pf 300-500 Units     Admin Date  06/21/2022 Action  Given Dose  500 Units Route  InterCATHeter Administered By  Tanya Flores RN          sodium chloride (NS) flush 10 mL     Admin Date  06/21/2022 Action  Given Dose  10 mL Route  IntraVENous Administered By  Tanya Flores RN                Two nurses verified prior to administering:  Drug name, Drug dose, Infusion volume or drug volume when prepared in a syringe, Rate of administration, Route of administration, Expiration dates and/or times, Appearance and physical integrity of the drugs, Rate set on infusion pump, when used, and Sequencing of drug administration. [id number] Patient tolerated treatment well. Port maintained positive blood return throughout treatment. Port flushed, heparinized and de accessed per protocol. Patient was discharged from [facility] in stable condition. Patient aware of next appointment.      Future Appointments   Date Time Provider Avila Terry   6/22/2022 10:00 AM A1 [facility] [address]   6/23/2022  9:00 AM A3 [facility] [address] H   6/24/2022 10:00 AM D4 [facility] [address]   6/27/2022 11:00 AM B3 [facility] [address] H   7/4/2022 11:00 AM B3 [facility] [place] [facility]   7/8/2022  9:45 AM Mamie FINNEY NP [facility] BS AMB   7/11/2022 11:00 AM C1 [facility]  [address] [facility]   7/12/2022 10:00 AM A3 [facility] [address]   7/13/2022 10:00 AM A3 [facility] [address]   7/14/2022 10:00 AM A1 [facility] [address]   7/15/2022 10:00 AM A1 [facility]  [address] [facility]   7/18/2022 11:00 AM B1 [facility] [address]   7/25/2022 11:00 AM B3 [facility] [address]   8/1/2022 11:00 AM C1 [facility]   8/2/2022 10:00 AM A3 [facility] 1370 [facility]   8/3/2022 10:00 AM A3 [facility] 1370 [facility] H   8/4/2022  9:00 AM F4 [facility] H   8/5/2022 10:00 AM A3 [facility] H   8/8/2022 11:00 AM C1 [facility]   8/15/2022 11:00 AM C1 [facility]   5/11/2023  9:00 AM Koffi Joseph MD [facility] BS AMB         Jamey River RN  June 21, 2022

## 2022-06-22 ENCOUNTER — HOSPITAL ENCOUNTER (OUTPATIENT)
Dept: INFUSION THERAPY | Age: 37
Discharge: HOME | End: 2022-06-22
Payer: COMMERCIAL

## 2022-06-22 VITALS
RESPIRATION RATE: 18 BRPM | TEMPERATURE: 97.8 F | SYSTOLIC BLOOD PRESSURE: 131 MMHG | HEART RATE: 84 BPM | DIASTOLIC BLOOD PRESSURE: 80 MMHG

## 2022-06-22 DIAGNOSIS — Z51.11 ENCOUNTER FOR ANTINEOPLASTIC CHEMOTHERAPY: Primary | ICD-10-CM

## 2022-06-22 DIAGNOSIS — C62.92 NON-SEMINOMATOUS CANCER OF LEFT TESTIS (HCC): ICD-10-CM

## 2022-06-22 PROCEDURE — 96417 CHEMO IV INFUS EACH ADDL SEQ: CPT

## 2022-06-22 PROCEDURE — 74011250636 HC RX REV CODE- 250/636: Performed by: INTERNAL MEDICINE

## 2022-06-22 PROCEDURE — 74011000250 HC RX REV CODE- 250: Performed by: INTERNAL MEDICINE

## 2022-06-22 PROCEDURE — 74011000258 HC RX REV CODE- 258: Performed by: INTERNAL MEDICINE

## 2022-06-22 PROCEDURE — 96413 CHEMO IV INFUSION 1 HR: CPT

## 2022-06-22 PROCEDURE — 77030012965 HC NDL HUBR BBMI -A

## 2022-06-22 PROCEDURE — 96375 TX/PRO/DX INJ NEW DRUG ADDON: CPT

## 2022-06-22 PROCEDURE — 96368 THER/DIAG CONCURRENT INF: CPT

## 2022-06-22 RX ORDER — SODIUM CHLORIDE 9 MG/ML
10 INJECTION INTRAMUSCULAR; INTRAVENOUS; SUBCUTANEOUS AS NEEDED
Status: ACTIVE | OUTPATIENT
Start: 2022-06-22 | End: 2022-06-22

## 2022-06-22 RX ORDER — HEPARIN 100 UNIT/ML
300-500 SYRINGE INTRAVENOUS AS NEEDED
Status: ACTIVE | OUTPATIENT
Start: 2022-06-22 | End: 2022-06-22

## 2022-06-22 RX ORDER — SODIUM CHLORIDE 9 MG/ML
25 INJECTION, SOLUTION INTRAVENOUS CONTINUOUS
Status: DISPENSED | OUTPATIENT
Start: 2022-06-22 | End: 2022-06-22

## 2022-06-22 RX ORDER — SODIUM CHLORIDE 0.9 % (FLUSH) 0.9 %
10 SYRINGE (ML) INJECTION AS NEEDED
Status: DISPENSED | OUTPATIENT
Start: 2022-06-22 | End: 2022-06-22

## 2022-06-22 RX ADMIN — CISPLATIN 44 MG: 100 INJECTION, SOLUTION INTRAVENOUS at 12:09

## 2022-06-22 RX ADMIN — ETOPOSIDE 220 MG: 20 INJECTION, SOLUTION INTRAVENOUS at 11:02

## 2022-06-22 RX ADMIN — DEXAMETHASONE SODIUM PHOSPHATE 12 MG: 4 INJECTION, SOLUTION INTRAMUSCULAR; INTRAVENOUS at 10:32

## 2022-06-22 RX ADMIN — SODIUM CHLORIDE 25 ML/HR: 900 INJECTION, SOLUTION INTRAVENOUS at 10:32

## 2022-06-22 RX ADMIN — HEPARIN 500 UNITS: 100 SYRINGE at 14:10

## 2022-06-22 RX ADMIN — POTASSIUM CHLORIDE: 2 INJECTION, SOLUTION, CONCENTRATE INTRAVENOUS at 12:05

## 2022-06-22 RX ADMIN — SODIUM CHLORIDE, PRESERVATIVE FREE 10 ML: 5 INJECTION INTRAVENOUS at 09:55

## 2022-06-22 NOTE — PROGRESS NOTES
[facility] Chemo Progress Note    Date: 2022    Name: Carli Patel    MRN: [id number]         : 1985    0955 Mr. Gilbert Arrived to [address] [place] [address] for C1D3 Etoposide/Cisplatin ambulatory in stable condition. Assessment was completed, no acute issues at this time, no new complaints voiced. Port accessed with positive blood return. Chemotherapy Flowsheet 2022   Cycle C1D3   Date 2022   Drug / Regimen Etoposide/Cisplatin   Pre Hydration given   Pre Meds given   Notes given         Mr. Gilbert's vitals were reviewed. Patient Vitals for the past 12 hrs:   Temp Pulse Resp BP   22 1410 -- 84 18 131/80   22 0958 97.8 °F (36.6 °C) 91 18 (!) 140/88         Lab results were obtained and reviewed. No results found for this or any previous visit (from the past 12 hour(s)). Pre-medications  were administered as ordered and chemotherapy was initiated.   Medications Administered     0.9% sodium chloride 1,000 mL with potassium chloride 10 mEq, magnesium sulfate 2 g infusion     Admin Date  2022 Action  New Bag Dose   Rate  1,000 mL/hr Route  IntraVENous Administered By  Chidi De Paz RN          0.9% sodium chloride infusion     Admin Date  2022 Action  New Bag Dose  25 mL/hr Rate  25 mL/hr Route  IntraVENous Administered By  Chidi De Paz RN          0.9% sodium chloride injection 10 mL     Admin Date  2022 Action  Given Dose  10 mL Route  IntraVENous Administered By  Chidi De Paz RN          CISplatin (PLATINOL) 44 mg in 0.9% sodium chloride 500 mL, overfill volume 50 mL chemo infusion     Admin Date  2022 Action  New Bag Dose  44 mg Rate  594 mL/hr Route  IntraVENous Administered By  Chidi De Paz RN          dexamethasone (DECADRON) 12 mg in 0.9% sodium chloride 50 mL, overfill volume 5 mL IVPB     Admin Date  2022 Action  New Bag Dose  12 mg Rate  232 mL/hr Route  IntraVENous Administered By  Chidi De Paz RN          etoposide (VEPESID) 220 mg in 0.9% sodium chloride 500 mL, overfill volume 50 mL chemo infusion     Admin Date  06/22/2022 Action  New Bag Dose  220 mg Rate  561 mL/hr Route  IntraVENous Administered By  Steven Franklin RN          heparin (porcine) pf 300-500 Units     Admin Date  06/22/2022 Action  Given Dose  500 Units Route  InterCATHeter Administered By  Steven Franklin RN          sodium chloride (NS) flush 10 mL     Admin Date  06/22/2022 Action  Given Dose  10 mL Route  IntraVENous Administered By  Steven Franklin RN                Two nurses verified prior to administering:  Drug name, Drug dose, Infusion volume or drug volume when prepared in a syringe, Rate of administration, Route of administration, Expiration dates and/or times, Appearance and physical integrity of the drugs, Rate set on infusion pump, when used, and Sequencing of drug administration. 1410 Patient tolerated treatment well. Port maintained positive blood return throughout treatment. Port flushed, heparinized and de accessed per protocol. Patient was [facility] from [facility] in stable condition. Patient aware of next appointment.      Future Appointments   Date Time Provider Avila Terry   6/23/2022  9:00  [facility]   6/24/2022 10:00 AM D4 [facility] [address] [place] [address]   6/27/2022 11:00 AM B3 [facility] [address] [place] [address]   7/4/2022 11:00 AM B3 [facility]   7/8/2022  9:45 AM Adore FINNEY NP [facility] BS AMB   7/11/2022 11:00 AM C1 [facility]  [address] [facility]   7/12/2022 10:00 AM A3 [facility] [address] [facility]   7/13/2022 10:00 AM A3 [facility] [address] [facility]   7/14/2022 10:00 AM A1 [facility] [address] [facility]   7/15/2022 10:00 AM A1 [facility]  [address] [facility]   7/18/2022 11:00 AM B1 [facility] [address] [facility]   7/25/2022 11:00 AM B3 [facility] [address] [facility]   8/1/2022 11:00 AM C1 [facility] [address] [facility]   8/2/2022 10:00 AM A3 [facility] [address] [facility]   8/3/2022 10:00 AM A3 [facility] [address] [place] [address]   8/4/2022  9:00 AM F4 [facility] [address] [place] [address]   8/5/2022 10:00 AM A3 [facility] [address] [place] [address]   8/8/2022 11:00 AM C1 [facility] [address] [place] [address]   8/15/2022 11:00 AM C1 [facility] [address] [place] [address]   5/11/2023  9:00 AM Sowmya Valdovinos MD [facility] BS AMB         Jose Parra RN  June 22, 2022

## 2022-06-23 ENCOUNTER — HOSPITAL ENCOUNTER (OUTPATIENT)
Dept: INFUSION THERAPY | Age: 37
Discharge: HOME | End: 2022-06-23
Payer: COMMERCIAL

## 2022-06-23 VITALS
TEMPERATURE: 98.2 F | SYSTOLIC BLOOD PRESSURE: 129 MMHG | RESPIRATION RATE: 18 BRPM | HEART RATE: 91 BPM | DIASTOLIC BLOOD PRESSURE: 69 MMHG

## 2022-06-23 DIAGNOSIS — C62.92 NON-SEMINOMATOUS CANCER OF LEFT TESTIS (HCC): ICD-10-CM

## 2022-06-23 DIAGNOSIS — Z51.11 ENCOUNTER FOR ANTINEOPLASTIC CHEMOTHERAPY: Primary | ICD-10-CM

## 2022-06-23 PROCEDURE — 74011250636 HC RX REV CODE- 250/636: Performed by: NURSE PRACTITIONER

## 2022-06-23 PROCEDURE — 74011250636 HC RX REV CODE- 250/636: Performed by: INTERNAL MEDICINE

## 2022-06-23 PROCEDURE — 74011000250 HC RX REV CODE- 250: Performed by: INTERNAL MEDICINE

## 2022-06-23 PROCEDURE — 96413 CHEMO IV INFUSION 1 HR: CPT

## 2022-06-23 PROCEDURE — 74011000258 HC RX REV CODE- 258: Performed by: INTERNAL MEDICINE

## 2022-06-23 PROCEDURE — 77030012965 HC NDL HUBR BBMI -A

## 2022-06-23 PROCEDURE — 96417 CHEMO IV INFUS EACH ADDL SEQ: CPT

## 2022-06-23 PROCEDURE — 96375 TX/PRO/DX INJ NEW DRUG ADDON: CPT

## 2022-06-23 PROCEDURE — 96361 HYDRATE IV INFUSION ADD-ON: CPT

## 2022-06-23 RX ORDER — ALBUTEROL SULFATE 90 UG/1
1-2 AEROSOL, METERED RESPIRATORY (INHALATION) AS NEEDED
Status: ACTIVE | OUTPATIENT
Start: 2022-06-23 | End: 2022-06-23

## 2022-06-23 RX ORDER — HYDROCORTISONE SODIUM SUCCINATE 100 MG/2ML
100 INJECTION, POWDER, FOR SOLUTION INTRAMUSCULAR; INTRAVENOUS AS NEEDED
Status: ACTIVE | OUTPATIENT
Start: 2022-06-23 | End: 2022-06-23

## 2022-06-23 RX ORDER — ACETAMINOPHEN 325 MG/1
650 TABLET ORAL AS NEEDED
Status: ACTIVE | OUTPATIENT
Start: 2022-06-23 | End: 2022-06-23

## 2022-06-23 RX ORDER — DIPHENHYDRAMINE HYDROCHLORIDE 50 MG/ML
50 INJECTION, SOLUTION INTRAMUSCULAR; INTRAVENOUS AS NEEDED
Status: ACTIVE | OUTPATIENT
Start: 2022-06-23 | End: 2022-06-23

## 2022-06-23 RX ORDER — HEPARIN 100 UNIT/ML
300-500 SYRINGE INTRAVENOUS AS NEEDED
Status: ACTIVE | OUTPATIENT
Start: 2022-06-23 | End: 2022-06-23

## 2022-06-23 RX ORDER — PALONOSETRON 0.05 MG/ML
0.25 INJECTION, SOLUTION INTRAVENOUS ONCE
Status: COMPLETED | OUTPATIENT
Start: 2022-06-23 | End: 2022-06-23

## 2022-06-23 RX ORDER — EPINEPHRINE 1 MG/ML
0.3 INJECTION, SOLUTION, CONCENTRATE INTRAVENOUS AS NEEDED
Status: ACTIVE | OUTPATIENT
Start: 2022-06-23 | End: 2022-06-23

## 2022-06-23 RX ORDER — SODIUM CHLORIDE 9 MG/ML
25 INJECTION, SOLUTION INTRAVENOUS CONTINUOUS
Status: DISPENSED | OUTPATIENT
Start: 2022-06-23 | End: 2022-06-23

## 2022-06-23 RX ORDER — SODIUM CHLORIDE 0.9 % (FLUSH) 0.9 %
10 SYRINGE (ML) INJECTION AS NEEDED
Status: DISPENSED | OUTPATIENT
Start: 2022-06-23 | End: 2022-06-23

## 2022-06-23 RX ORDER — SODIUM CHLORIDE 9 MG/ML
10 INJECTION INTRAMUSCULAR; INTRAVENOUS; SUBCUTANEOUS AS NEEDED
Status: ACTIVE | OUTPATIENT
Start: 2022-06-23 | End: 2022-06-23

## 2022-06-23 RX ORDER — ONDANSETRON 2 MG/ML
8 INJECTION INTRAMUSCULAR; INTRAVENOUS AS NEEDED
Status: ACTIVE | OUTPATIENT
Start: 2022-06-23 | End: 2022-06-23

## 2022-06-23 RX ORDER — DIPHENHYDRAMINE HYDROCHLORIDE 50 MG/ML
25 INJECTION, SOLUTION INTRAMUSCULAR; INTRAVENOUS AS NEEDED
Status: ACTIVE | OUTPATIENT
Start: 2022-06-23 | End: 2022-06-23

## 2022-06-23 RX ADMIN — DEXAMETHASONE SODIUM PHOSPHATE 12 MG: 4 INJECTION, SOLUTION INTRAMUSCULAR; INTRAVENOUS at 09:58

## 2022-06-23 RX ADMIN — PALONOSETRON 0.25 MG: 0.05 INJECTION, SOLUTION INTRAVENOUS at 09:54

## 2022-06-23 RX ADMIN — ETOPOSIDE 220 MG: 20 INJECTION, SOLUTION INTRAVENOUS at 10:18

## 2022-06-23 RX ADMIN — MAGNESIUM SULFATE HEPTAHYDRATE: 500 INJECTION, SOLUTION INTRAMUSCULAR; INTRAVENOUS at 09:24

## 2022-06-23 RX ADMIN — CISPLATIN 44 MG: 1 INJECTION, SOLUTION INTRAVENOUS at 11:42

## 2022-06-23 RX ADMIN — SODIUM CHLORIDE, PRESERVATIVE FREE 10 ML: 5 INJECTION INTRAVENOUS at 12:56

## 2022-06-23 RX ADMIN — HEPARIN 500 UNITS: 100 SYRINGE at 12:56

## 2022-06-23 NOTE — PROGRESS NOTES
\[facility]\"" Chemo Progress Note    Date: 2022    Name: Carli Patel    MRN: [id number]         : 1985    0900 Mr. Gilbert Arrived to [facility] for Etoposide/Cisplatin (Day4) ambulatory in stable condition. Assessment was completed, no acute issues at this time, no new complaints voiced. port accessed with positive blood return. Chemotherapy Flowsheet 2022   Cycle C1D4   Date 2022   Drug / Regimen Etoposide/cisplatin   Pre Hydration given   Pre Meds given   Notes given       Mr. Gilbert's vitals were reviewed. Patient Vitals for the past 12 hrs:   Temp Pulse Resp BP   22 1256 -- 91 -- 129/69   22 0853 98.2 °F (36.8 °C) 76 18 (!) 135/90         Pre-medications  were administered as ordered and chemotherapy was initiated.   Medications Administered     0.9% sodium chloride 1,000 mL with potassium chloride 10 mEq, magnesium sulfate 2 g infusion     Admin Date  2022 Action  New Bag Dose   Rate  1,000 mL/hr Route  IntraVENous Administered By  Jaydon Cooley RN          0.9% sodium chloride injection 10 mL     Admin Date  2022 Action  Given Dose  10 mL Route  IntraVENous Administered By  Jaydon Cooley RN          CISplatin (PLATINOL) 44 mg in 0.9% sodium chloride 500 mL, overfill volume 50 mL chemo infusion     Admin Date  2022 Action  New Bag Dose  44 mg Rate  594 mL/hr Route  IntraVENous Administered By  Jaydon Cooley RN          dexamethasone (DECADRON) 12 mg in 0.9% sodium chloride 50 mL, overfill volume 5 mL IVPB     Admin Date  2022 Action  New Bag Dose  12 mg Rate  232 mL/hr Route  IntraVENous Administered By  Jaydon Cooley RN          etoposide (VEPESID) 220 mg in 0.9% sodium chloride 500 mL, overfill volume 50 mL chemo infusion     Admin Date  2022 Action  New Bag Dose  220 mg Rate  561 mL/hr Route  IntraVENous Administered By  Jaydon Cooley RN          heparin (porcine) pf 300-500 Units     Admin Date  2022 Action  Given Dose  500 Units Route  InterCATHeter Administered By  Olaf Coles RN          palonosetron HCl (ALOXI) injection 0.25 mg     Admin Date  06/23/2022 Action  Given Dose  0.25 mg Route  IntraVENous Administered By  Olaf Coles RN                Two nurses verified prior to administering:  Drug name, Drug dose, Infusion volume or drug volume when prepared in a syringe, Rate of administration, Route of administration, Expiration dates and/or times, Appearance and physical integrity of the drugs, Rate set on infusion pump, when used, and Sequencing of drug administration. 1300 Patient tolerated treatment well. Port maintained positive blood return throughout treatment. Port flushed, heparinized and de accessed per protocol. Patient was discharged from [facility] in stable condition. Patient aware of next appointment.      Future Appointments   Date Time Provider Avila Terry   6/24/2022  9:00 AM [address]   6/27/2022 10:00 AM A1 [facility] [address]   7/5/2022 10:00 AM A3 [facility] [address]   7/8/2022  9:45 AM Lisa FINNEY NP [facility] BS AMB   7/11/2022 10:00 AM C1 MELVIN  [address] [facility]   7/12/2022  9:00 AM A3 [facility] [address]   7/13/2022  9:00 AM A3 MELVIN  [address] [facility]   7/14/2022  9:00 AM A1 [facility] [address]   7/15/2022  9:00 AM A1 [facility] [address]   7/18/2022 10:00 AM B1 [facility] [address]   7/25/2022 10:00 AM B3 [facility] [address]   8/1/2022 10:00 AM C1 [facility] [address]   8/2/2022  9:00 AM A3 [facility] [address]   8/3/2022  9:00 AM A3 [facility] [address]   8/4/2022  9:00 AM F4 [facility] [address]   8/5/2022 10:00 AM A3 [facility] [address]   8/8/2022 10:00 AM C1 [facility]   8/15/2022 10:00 AM A1 [facility] [address] [facility]   5/11/2023  9:00 AM Prateek Woody MD [facility] BS JULIANNA Burk RN  June 23, 2022

## 2022-06-24 ENCOUNTER — HOSPITAL ENCOUNTER (OUTPATIENT)
Dept: INFUSION THERAPY | Age: 37
Discharge: HOME | End: 2022-06-24
Payer: COMMERCIAL

## 2022-06-24 VITALS
TEMPERATURE: 97.9 F | HEART RATE: 96 BPM | RESPIRATION RATE: 18 BRPM | DIASTOLIC BLOOD PRESSURE: 86 MMHG | SYSTOLIC BLOOD PRESSURE: 142 MMHG

## 2022-06-24 DIAGNOSIS — C62.92 NON-SEMINOMATOUS CANCER OF LEFT TESTIS (HCC): ICD-10-CM

## 2022-06-24 DIAGNOSIS — Z51.11 ENCOUNTER FOR ANTINEOPLASTIC CHEMOTHERAPY: ICD-10-CM

## 2022-06-24 DIAGNOSIS — Z51.11 ENCOUNTER FOR ANTINEOPLASTIC CHEMOTHERAPY: Primary | ICD-10-CM

## 2022-06-24 PROCEDURE — 74011000250 HC RX REV CODE- 250: Performed by: INTERNAL MEDICINE

## 2022-06-24 PROCEDURE — 77030012965 HC NDL HUBR BBMI -A

## 2022-06-24 PROCEDURE — 96417 CHEMO IV INFUS EACH ADDL SEQ: CPT

## 2022-06-24 PROCEDURE — 74011250636 HC RX REV CODE- 250/636: Performed by: INTERNAL MEDICINE

## 2022-06-24 PROCEDURE — 74011000258 HC RX REV CODE- 258: Performed by: INTERNAL MEDICINE

## 2022-06-24 PROCEDURE — 96375 TX/PRO/DX INJ NEW DRUG ADDON: CPT

## 2022-06-24 PROCEDURE — 96361 HYDRATE IV INFUSION ADD-ON: CPT

## 2022-06-24 PROCEDURE — 96413 CHEMO IV INFUSION 1 HR: CPT

## 2022-06-24 RX ORDER — DIPHENHYDRAMINE HYDROCHLORIDE 50 MG/ML
50 INJECTION, SOLUTION INTRAMUSCULAR; INTRAVENOUS AS NEEDED
Status: ACTIVE | OUTPATIENT
Start: 2022-06-24 | End: 2022-06-24

## 2022-06-24 RX ORDER — HEPARIN 100 UNIT/ML
300-500 SYRINGE INTRAVENOUS AS NEEDED
Status: ACTIVE | OUTPATIENT
Start: 2022-06-24 | End: 2022-06-24

## 2022-06-24 RX ORDER — HYDROCORTISONE SODIUM SUCCINATE 100 MG/2ML
100 INJECTION, POWDER, FOR SOLUTION INTRAMUSCULAR; INTRAVENOUS AS NEEDED
Status: ACTIVE | OUTPATIENT
Start: 2022-06-24 | End: 2022-06-24

## 2022-06-24 RX ORDER — ACETAMINOPHEN 325 MG/1
650 TABLET ORAL AS NEEDED
Status: ACTIVE | OUTPATIENT
Start: 2022-06-24 | End: 2022-06-24

## 2022-06-24 RX ORDER — ONDANSETRON 2 MG/ML
8 INJECTION INTRAMUSCULAR; INTRAVENOUS AS NEEDED
Status: ACTIVE | OUTPATIENT
Start: 2022-06-24 | End: 2022-06-24

## 2022-06-24 RX ORDER — SODIUM CHLORIDE 9 MG/ML
10 INJECTION INTRAMUSCULAR; INTRAVENOUS; SUBCUTANEOUS AS NEEDED
Status: ACTIVE | OUTPATIENT
Start: 2022-06-24 | End: 2022-06-24

## 2022-06-24 RX ORDER — EPINEPHRINE 1 MG/ML
0.3 INJECTION, SOLUTION, CONCENTRATE INTRAVENOUS AS NEEDED
Status: ACTIVE | OUTPATIENT
Start: 2022-06-24 | End: 2022-06-24

## 2022-06-24 RX ORDER — ALBUTEROL SULFATE 0.83 MG/ML
2.5 SOLUTION RESPIRATORY (INHALATION) AS NEEDED
Status: ACTIVE | OUTPATIENT
Start: 2022-06-24 | End: 2022-06-24

## 2022-06-24 RX ORDER — SODIUM CHLORIDE 0.9 % (FLUSH) 0.9 %
10 SYRINGE (ML) INJECTION AS NEEDED
Status: DISPENSED | OUTPATIENT
Start: 2022-06-24 | End: 2022-06-24

## 2022-06-24 RX ORDER — DIPHENHYDRAMINE HYDROCHLORIDE 50 MG/ML
25 INJECTION, SOLUTION INTRAMUSCULAR; INTRAVENOUS AS NEEDED
Status: ACTIVE | OUTPATIENT
Start: 2022-06-24 | End: 2022-06-24

## 2022-06-24 RX ORDER — SODIUM CHLORIDE 9 MG/ML
25 INJECTION, SOLUTION INTRAVENOUS CONTINUOUS
Status: DISPENSED | OUTPATIENT
Start: 2022-06-24 | End: 2022-06-24

## 2022-06-24 RX ADMIN — DEXAMETHASONE SODIUM PHOSPHATE 12 MG: 4 INJECTION, SOLUTION INTRAMUSCULAR; INTRAVENOUS at 09:10

## 2022-06-24 RX ADMIN — MAGNESIUM SULFATE HEPTAHYDRATE: 500 INJECTION, SOLUTION INTRAMUSCULAR; INTRAVENOUS at 10:00

## 2022-06-24 RX ADMIN — CISPLATIN 44 MG: 100 INJECTION, SOLUTION INTRAVENOUS at 11:10

## 2022-06-24 RX ADMIN — SODIUM CHLORIDE, PRESERVATIVE FREE 10 ML: 5 INJECTION INTRAVENOUS at 12:28

## 2022-06-24 RX ADMIN — ETOPOSIDE 220 MG: 20 INJECTION, SOLUTION INTRAVENOUS at 10:04

## 2022-06-24 RX ADMIN — HEPARIN 500 UNITS: 100 SYRINGE at 12:28

## 2022-06-24 RX ADMIN — SODIUM CHLORIDE 25 ML/HR: 900 INJECTION, SOLUTION INTRAVENOUS at 09:09

## 2022-06-24 NOTE — PROGRESS NOTES
[facility]patient [facility]fusion [facility] - Chemotherapy Progress Note    0845 Pt admit to [facility] for Etoposide/Cisplatin ambulatory in stable condition. Assessment completed. No new concerns voiced. Port accessed with positive blood return.  IV attached to NS at [address] 6/24/2022   Cycle C1 D5   Date 6/24/2022   Drug / Regimen Etoposide/Cisplatin   Pre Hydration given   Pre Meds given   Notes given       Visit Vitals  BP (!) 142/86   Pulse 96   Temp 97.9 °F (36.6 °C)   Resp 18       Medications:  Medications Administered     0.9% sodium chloride 1,000 mL with potassium chloride 10 mEq, magnesium sulfate 2 g infusion     Admin Date  06/24/2022 Action  New Bag Dose   Rate  1,000 mL/hr Route  IntraVENous Administered By  Mayelin Grant RN          0.9% sodium chloride infusion     Admin Date  06/24/2022 Action  New Bag Dose  25 mL/hr Rate  25 mL/hr Route  IntraVENous Administered By  Mayelin Grant RN          CISplatin (PLATINOL) 44 mg in 0.9% sodium chloride 500 mL, overfill volume 50 mL chemo infusion     Admin Date  06/24/2022 Action  New Bag Dose  44 mg Rate  594 mL/hr Route  IntraVENous Administered By  Mayelin Grant RN          dexamethasone (DECADRON) 12 mg in 0.9% sodium chloride 50 mL, overfill volume 5 mL IVPB     Admin Date  06/24/2022 Action  New Bag Dose  12 mg Rate  232 mL/hr Route  IntraVENous Administered By  Mayelin Grant RN          etoposide (VEPESID) 220 mg in 0.9% sodium chloride 500 mL, overfill volume 50 mL chemo infusion     Admin Date  06/24/2022 Action  New Bag Dose  220 mg Rate  561 mL/hr Route  IntraVENous Administered By  Mayelin Grant RN          heparin (porcine) pf 300-500 Units     Admin Date  06/24/2022 Action  Given Dose  500 Units Route  InterCATHeter Administered By  Mayelin Grant RN          sodium chloride (NS) flush 10 mL     Admin Date  06/24/2022 Action  Given Dose  10 mL Route  IntraVENous Administered By  Mayelin Grant RN                  Two nurses verified prior to administering:Drug name Drug dose, Infusion volume or drug volume when prepared in a syringe, Rate of administration, Route of administration, Expiration dates and/or times, Appearance and physical integrity of the drugs, Rate set on infusion pump when used, Sequencing of drug administration         1230 Pt tolerated treatment well. Port maintained positive blood return throughout treatment, flushed with positive blood return at conclusion. D/c home ambulatory in no distress. Pt aware of next appointment scheduled for 6/27/22.

## 2022-06-27 ENCOUNTER — HOSPITAL ENCOUNTER (OUTPATIENT)
Dept: INFUSION THERAPY | Age: 37
Discharge: HOME | End: 2022-06-27
Payer: COMMERCIAL

## 2022-06-27 VITALS
DIASTOLIC BLOOD PRESSURE: 82 MMHG | SYSTOLIC BLOOD PRESSURE: 122 MMHG | TEMPERATURE: 97.5 F | WEIGHT: 207.6 LBS | RESPIRATION RATE: 18 BRPM | HEIGHT: 71 IN | OXYGEN SATURATION: 97 % | BODY MASS INDEX: 29.06 KG/M2 | HEART RATE: 107 BPM

## 2022-06-27 DIAGNOSIS — C62.92 NON-SEMINOMATOUS CANCER OF LEFT TESTIS (HCC): ICD-10-CM

## 2022-06-27 DIAGNOSIS — Z51.11 ENCOUNTER FOR ANTINEOPLASTIC CHEMOTHERAPY: Primary | ICD-10-CM

## 2022-06-27 PROBLEM — R79.89 ELEVATED SERUM CREATININE: Status: ACTIVE | Noted: 2022-06-27

## 2022-06-27 LAB
ALBUMIN SERPL-MCNC: 3.7 G/DL (ref 3.5–5)
ALBUMIN/GLOB SERPL: 0.8 {RATIO} (ref 1.1–2.2)
ALP SERPL-CCNC: 68 U/L (ref 45–117)
ALT SERPL-CCNC: 85 U/L (ref 12–78)
ANION GAP SERPL CALC-SCNC: 6 MMOL/L (ref 5–15)
AST SERPL-CCNC: 25 U/L (ref 15–37)
BASOPHILS # BLD: 0 K/UL (ref 0–0.1)
BASOPHILS NFR BLD: 0 % (ref 0–1)
BILIRUB SERPL-MCNC: 0.4 MG/DL (ref 0.2–1)
BUN SERPL-MCNC: 23 MG/DL (ref 6–20)
BUN/CREAT SERPL: 16 (ref 12–20)
CALCIUM SERPL-MCNC: 9.7 MG/DL (ref 8.5–10.1)
CHLORIDE SERPL-SCNC: 102 MMOL/L (ref 97–108)
CO2 SERPL-SCNC: 28 MMOL/L (ref 21–32)
CREAT SERPL-MCNC: 1.41 MG/DL (ref 0.7–1.3)
DIFFERENTIAL METHOD BLD: ABNORMAL
EOSINOPHIL # BLD: 0.1 K/UL (ref 0–0.4)
EOSINOPHIL NFR BLD: 1 % (ref 0–7)
ERYTHROCYTE [DISTWIDTH] IN BLOOD BY AUTOMATED COUNT: 14.3 % (ref 11.5–14.5)
GLOBULIN SER CALC-MCNC: 4.9 G/DL (ref 2–4)
GLUCOSE SERPL-MCNC: 102 MG/DL (ref 65–100)
HBV SURFACE AG SER QL: <0.1 INDEX
HBV SURFACE AG SER QL: NEGATIVE
HCT VFR BLD AUTO: 43 % (ref 36.6–50.3)
HGB BLD-MCNC: 14.7 G/DL (ref 12.1–17)
IMM GRANULOCYTES # BLD AUTO: 0 K/UL
IMM GRANULOCYTES NFR BLD AUTO: 0 %
LYMPHOCYTES # BLD: 2.1 K/UL (ref 0.8–3.5)
LYMPHOCYTES NFR BLD: 34 % (ref 12–49)
MCH RBC QN AUTO: 29.3 PG (ref 26–34)
MCHC RBC AUTO-ENTMCNC: 34.2 G/DL (ref 30–36.5)
MCV RBC AUTO: 85.7 FL (ref 80–99)
MONOCYTES # BLD: 0.1 K/UL (ref 0–1)
MONOCYTES NFR BLD: 1 % (ref 5–13)
NEUTS SEG # BLD: 3.9 K/UL (ref 1.8–8)
NEUTS SEG NFR BLD: 64 % (ref 32–75)
NRBC # BLD: 0 K/UL (ref 0–0.01)
NRBC BLD-RTO: 0 PER 100 WBC
PLATELET # BLD AUTO: 292 K/UL (ref 150–400)
PMV BLD AUTO: 10.3 FL (ref 8.9–12.9)
POTASSIUM SERPL-SCNC: 4.1 MMOL/L (ref 3.5–5.1)
PROT SERPL-MCNC: 8.6 G/DL (ref 6.4–8.2)
RBC # BLD AUTO: 5.02 M/UL (ref 4.1–5.7)
RBC MORPH BLD: ABNORMAL
SODIUM SERPL-SCNC: 136 MMOL/L (ref 136–145)
WBC # BLD AUTO: 6.2 K/UL (ref 4.1–11.1)
WBC MORPH BLD: ABNORMAL

## 2022-06-27 PROCEDURE — 74011000250 HC RX REV CODE- 250: Performed by: INTERNAL MEDICINE

## 2022-06-27 PROCEDURE — 85025 COMPLETE CBC W/AUTO DIFF WBC: CPT

## 2022-06-27 PROCEDURE — 96409 CHEMO IV PUSH SNGL DRUG: CPT

## 2022-06-27 PROCEDURE — 87340 HEPATITIS B SURFACE AG IA: CPT

## 2022-06-27 PROCEDURE — 74011250636 HC RX REV CODE- 250/636: Performed by: INTERNAL MEDICINE

## 2022-06-27 PROCEDURE — 96375 TX/PRO/DX INJ NEW DRUG ADDON: CPT

## 2022-06-27 PROCEDURE — 74011250637 HC RX REV CODE- 250/637: Performed by: INTERNAL MEDICINE

## 2022-06-27 PROCEDURE — 74011000258 HC RX REV CODE- 258: Performed by: INTERNAL MEDICINE

## 2022-06-27 PROCEDURE — 80053 COMPREHEN METABOLIC PANEL: CPT

## 2022-06-27 PROCEDURE — 36415 COLL VENOUS BLD VENIPUNCTURE: CPT

## 2022-06-27 PROCEDURE — 77030012965 HC NDL HUBR BBMI -A

## 2022-06-27 RX ORDER — SODIUM CHLORIDE 9 MG/ML
5-40 INJECTION INTRAMUSCULAR; INTRAVENOUS; SUBCUTANEOUS AS NEEDED
Status: CANCELLED | OUTPATIENT
Start: 2022-07-08

## 2022-06-27 RX ORDER — EPINEPHRINE 1 MG/ML
0.3 INJECTION, SOLUTION, CONCENTRATE INTRAVENOUS AS NEEDED
Status: ACTIVE | OUTPATIENT
Start: 2022-06-27 | End: 2022-06-27

## 2022-06-27 RX ORDER — HYDROCORTISONE SODIUM SUCCINATE 100 MG/2ML
100 INJECTION, POWDER, FOR SOLUTION INTRAMUSCULAR; INTRAVENOUS AS NEEDED
Status: ACTIVE | OUTPATIENT
Start: 2022-06-27 | End: 2022-06-27

## 2022-06-27 RX ORDER — ACETAMINOPHEN 325 MG/1
650 TABLET ORAL ONCE
Status: COMPLETED | OUTPATIENT
Start: 2022-06-27 | End: 2022-06-27

## 2022-06-27 RX ORDER — HEPARIN 100 UNIT/ML
300-500 SYRINGE INTRAVENOUS AS NEEDED
Status: ACTIVE | OUTPATIENT
Start: 2022-06-27 | End: 2022-06-27

## 2022-06-27 RX ORDER — SODIUM CHLORIDE 0.9 % (FLUSH) 0.9 %
10 SYRINGE (ML) INJECTION AS NEEDED
Status: DISPENSED | OUTPATIENT
Start: 2022-06-27 | End: 2022-06-27

## 2022-06-27 RX ORDER — ACETAMINOPHEN 325 MG/1
650 TABLET ORAL AS NEEDED
Status: ACTIVE | OUTPATIENT
Start: 2022-06-27 | End: 2022-06-27

## 2022-06-27 RX ORDER — ONDANSETRON 2 MG/ML
8 INJECTION INTRAMUSCULAR; INTRAVENOUS AS NEEDED
Status: ACTIVE | OUTPATIENT
Start: 2022-06-27 | End: 2022-06-27

## 2022-06-27 RX ORDER — DIPHENHYDRAMINE HYDROCHLORIDE 50 MG/ML
50 INJECTION, SOLUTION INTRAMUSCULAR; INTRAVENOUS AS NEEDED
Status: ACTIVE | OUTPATIENT
Start: 2022-06-27 | End: 2022-06-27

## 2022-06-27 RX ORDER — SODIUM CHLORIDE 0.9 % (FLUSH) 0.9 %
5-40 SYRINGE (ML) INJECTION AS NEEDED
Status: CANCELLED | OUTPATIENT
Start: 2022-07-08

## 2022-06-27 RX ORDER — SODIUM CHLORIDE 9 MG/ML
25 INJECTION, SOLUTION INTRAVENOUS CONTINUOUS
Status: DISCONTINUED | OUTPATIENT
Start: 2022-06-27 | End: 2022-06-29 | Stop reason: HOSPADM

## 2022-06-27 RX ORDER — DIPHENHYDRAMINE HYDROCHLORIDE 50 MG/ML
25 INJECTION, SOLUTION INTRAMUSCULAR; INTRAVENOUS ONCE
Status: COMPLETED | OUTPATIENT
Start: 2022-06-27 | End: 2022-06-27

## 2022-06-27 RX ORDER — DIPHENHYDRAMINE HYDROCHLORIDE 50 MG/ML
25 INJECTION, SOLUTION INTRAMUSCULAR; INTRAVENOUS AS NEEDED
Status: ACTIVE | OUTPATIENT
Start: 2022-06-27 | End: 2022-06-27

## 2022-06-27 RX ORDER — SODIUM CHLORIDE 9 MG/ML
10 INJECTION INTRAMUSCULAR; INTRAVENOUS; SUBCUTANEOUS AS NEEDED
Status: ACTIVE | OUTPATIENT
Start: 2022-06-27 | End: 2022-06-27

## 2022-06-27 RX ORDER — HEPARIN 100 UNIT/ML
500 SYRINGE INTRAVENOUS AS NEEDED
Status: CANCELLED
Start: 2022-07-08

## 2022-06-27 RX ORDER — ALBUTEROL SULFATE 0.83 MG/ML
2.5 SOLUTION RESPIRATORY (INHALATION) AS NEEDED
Status: ACTIVE | OUTPATIENT
Start: 2022-06-27 | End: 2022-06-27

## 2022-06-27 RX ORDER — SODIUM CHLORIDE 9 MG/ML
5-250 INJECTION, SOLUTION INTRAVENOUS AS NEEDED
Status: CANCELLED | OUTPATIENT
Start: 2022-07-08

## 2022-06-27 RX ADMIN — HEPARIN 500 UNITS: 100 SYRINGE at 12:27

## 2022-06-27 RX ADMIN — SODIUM CHLORIDE, PRESERVATIVE FREE 10 ML: 5 INJECTION INTRAVENOUS at 12:27

## 2022-06-27 RX ADMIN — SODIUM CHLORIDE 25 ML/HR: 9 INJECTION, SOLUTION INTRAVENOUS at 11:17

## 2022-06-27 RX ADMIN — ACETAMINOPHEN 650 MG: 325 TABLET ORAL at 11:17

## 2022-06-27 RX ADMIN — SODIUM CHLORIDE, PRESERVATIVE FREE 10 ML: 5 INJECTION INTRAVENOUS at 11:17

## 2022-06-27 RX ADMIN — BLEOMYCIN SULFATE 30 UNITS: 15 INJECTION, POWDER, LYOPHILIZED, FOR SOLUTION INTRAMUSCULAR; INTRAPLEURAL; INTRAVENOUS; SUBCUTANEOUS at 12:08

## 2022-06-27 RX ADMIN — SODIUM CHLORIDE, PRESERVATIVE FREE 10 ML: 5 INJECTION INTRAVENOUS at 09:55

## 2022-06-27 RX ADMIN — DIPHENHYDRAMINE HYDROCHLORIDE 25 MG: 50 INJECTION, SOLUTION INTRAMUSCULAR; INTRAVENOUS at 11:18

## 2022-06-27 NOTE — PROGRESS NOTES
[facility] Chemo Progress Note    Date: 2022    Name: Vazquez Banuelos    MRN: [id number]         : 1985    0945 Mr. Gilbert Arrived to [facility] for C1D8 Bleomycin ambulatory in stable condition. Assessment was completed, no acute issues at this time, no new complaints voiced. Port accessed with positive blood return. Labs drawn and sent for processing. Chemotherapy Flowsheet 2022   Cycle C1D8   Date 2022   Drug / Regimen Bleomycin   Pre Hydration -   Pre Meds given   Notes given           Mr. Gilbert's vitals were reviewed. Patient Vitals for the past 12 hrs:   Temp Pulse Resp BP SpO2   22 1229 -- (!) 107 -- 122/82 --   22 0948 97.5 °F (36.4 °C) (!) 120 18 126/88 97 %         Lab results were obtained and reviewed. Recent Results (from the past 12 hour(s))   METABOLIC PANEL, COMPREHENSIVE    Collection Time: 22  9:54 AM   Result Value Ref Range    Sodium 136 136 - 145 mmol/L    Potassium 4.1 3.5 - 5.1 mmol/L    Chloride 102 97 - 108 mmol/L    CO2 28 21 - 32 mmol/L    Anion gap 6 5 - 15 mmol/L    Glucose 102 (H) 65 - 100 mg/dL    BUN 23 (H) 6 - 20 MG/DL    Creatinine 1.41 (H) 0.70 - 1.30 MG/DL    BUN/Creatinine ratio 16 12 - 20      GFR est AA >60 >60 ml/min/1.73m2    GFR est non-AA 57 (L) >60 ml/min/1.73m2    Calcium 9.7 8.5 - 10.1 MG/DL    Bilirubin, total 0.4 0.2 - 1.0 MG/DL    ALT (SGPT) 85 (H) 12 - 78 U/L    AST (SGOT) 25 15 - 37 U/L    Alk. phosphatase 68 45 - 117 U/L    Protein, total 8.6 (H) 6.4 - 8.2 g/dL    Albumin 3.7 3.5 - 5.0 g/dL    Globulin 4.9 (H) 2.0 - 4.0 g/dL    A-G Ratio 0.8 (L) 1.1 - 2.2     HEP B SURFACE AG    Collection Time: 22  9:54 AM   Result Value Ref Range    Hepatitis B surface Ag <0.10 Index    Hep B surface Ag Interp.  Negative NEG     CBC WITH AUTOMATED DIFF    Collection Time: 22  9:54 AM   Result Value Ref Range    WBC 6.2 4.1 - 11.1 K/uL    RBC 5.02 4.10 - 5.70 M/uL    HGB 14.7 12.1 - 17.0 g/dL    HCT 43.0 36.6 - 50.3 %    MCV 85.7 80.0 - 99.0 FL    MCH 29.3 26.0 - 34.0 PG    MCHC 34.2 30.0 - 36.5 g/dL    RDW 14.3 11.5 - 14.5 %    PLATELET 779 756 - 028 K/uL    MPV 10.3 8.9 - 12.9 FL    NRBC 0.0 0  WBC    ABSOLUTE NRBC 0.00 0.00 - 0.01 K/uL    NEUTROPHILS 64 32 - 75 %    LYMPHOCYTES 34 12 - 49 %    MONOCYTES 1 (L) 5 - 13 %    EOSINOPHILS 1 0 - 7 %    BASOPHILS 0 0 - 1 %    IMMATURE GRANULOCYTES 0 %    ABS. NEUTROPHILS 3.9 1.8 - 8.0 K/UL    ABS. LYMPHOCYTES 2.1 0.8 - 3.5 K/UL    ABS. MONOCYTES 0.1 0.0 - 1.0 K/UL    ABS. EOSINOPHILS 0.1 0.0 - 0.4 K/UL    ABS. BASOPHILS 0.0 0.0 - 0.1 K/UL    ABS. IMM. GRANS. 0.0 K/UL    DF MANUAL      RBC COMMENTS ANISOCYTOSIS  1+        WBC COMMENTS REACTIVE LYMPHS         Pre-medications  were administered as ordered and chemotherapy was initiated.   Medications Administered     0.9% sodium chloride infusion     Admin Date  06/27/2022 Action  New Bag Dose  25 mL/hr Rate  25 mL/hr Route  IntraVENous Administered By  Ga Hyatt RN          acetaminophen (TYLENOL) tablet 650 mg     Admin Date  06/27/2022 Action  Given Dose  650 mg Route  Oral Administered By  Ga Hyatt RN          bleomycin (BLEOCIN) 30 Units in 0.9% sodium chloride 50 mL, overfill volume 5 mL chemo infusion     Admin Date  06/27/2022 Action  New Bag Dose  30 Units Rate  390 mL/hr Route  IntraVENous Administered By  Ga Hyatt RN          diphenhydrAMINE (BENADRYL) injection 25 mg     Admin Date  06/27/2022 Action  Given Dose  25 mg Route  IntraVENous Administered By  Ga Hyatt RN          heparin (porcine) pf 300-500 Units     Admin Date  06/27/2022 Action  Given Dose  500 Units Route  InterCATHeter Administered By  Ga Hyatt RN          sodium chloride (NS) flush 10 mL     Admin Date  06/27/2022 Action  Given Dose  10 mL Route  IntraVENous Administered By  Ga Hyatt RN           Admin Date  06/27/2022 Action  Given Dose  10 mL Route  IntraVENous Administered By  Ga Hyatt RN           Admin Date  06/27/2022 Action  Given Dose  10 mL Route  IntraVENous Administered By  Donn Mayberry RN                Two nurses verified prior to administering:  Drug name, Drug dose, Infusion volume or drug volume when prepared in a syringe, Rate of administration, Route of administration, Expiration dates and/or times, Appearance and physical integrity of the drugs, Rate set on infusion pump, when used, and Sequencing of drug administration. 1230 Patient tolerated treatment well. Port maintained positive blood return throughout treatment; flushed, heparinized and de accessed per protocol. Patient was discharged from [facility] in stable condition. Patient aware of next appointment.      Future Appointments   Date Time Provider Avila Terry   7/5/2022 10:00 AM A3 [facility] [address]   7/8/2022  9:45 AM Clare FINNEY NP [facility] BS AMB   7/8/2022 10:00 AM [facility]   7/11/2022 10:00 AM C[facility]   7/12/2022  9:00 AM A3 [facility] [address]   7/13/2022  9:00 AM A3 [facility] [address]   7/14/2022  9:00 AM A1 MELVIN  [address] [facility]   7/15/2022  9:00 AM A1 MELVIN  [address] [facility]   7/18/2022 10:00 AM B1 [facility] [address]   7/25/2022 10:00 AM B3 [facility] [address]   8/1/2022 10:00 AM C1 [facility] [address]   8/2/2022  9:00 AM A3 [facility] [address]   8/3/2022  9:00 AM A3 [facility] [address]   8/4/2022  9:00 AM F4 [facility] [address]   8/5/2022 10:00 AM A3 [facility] [address]   8/8/2022 10:00 AM C1 [facility] [address]   8/15/2022 10:00 AM A1 [facility] [address]   5/11/2023  9:00 AM Natty Schneider MD [facility] BS AMB         Abdoulaye Crowley RN  June 27, 2022

## 2022-06-30 ENCOUNTER — TELEPHONE (OUTPATIENT)
Dept: ONCOLOGY | Age: 37
End: 2022-06-30

## 2022-06-30 NOTE — TELEPHONE ENCOUNTER
Patient called and stated he is having mouth sores and would like to know if this is a normal symptom of chemotherapy and if so, is there anything to help. Please advise and call patient back.     Cb # [phone]

## 2022-06-30 NOTE — TELEPHONE ENCOUNTER
[address] at [facility]  [phone]    06/30/22- Patient reports sores on the back of his tongue starting 2-3 days ago getting progressively worse. Having difficulty eating and drinking, pain with swallowing. Will send in a prescription for magic mouthwash. Reviewed directions with patient, and encouraged him to use it 20-30 minutes prior to meals. Recommended that he avoid foods that might irritate sores. Also, reviewed [employer] message sent by Radha Louise informing him of elevated Cr and that IV hydration will be added to his upcoming treatment. Encouraged him to push fluids at home as well. He verbalized understanding, no further questions or concerns at this time. Requested Prescriptions     Signed Prescriptions Disp Refills    magic mouthwash solution 480 mL 0     Sig: Swish and spit 15 mL every 4 hours as needed for mouth pain. May swallow for throat pain. Magic mouth wash   Maalox  Lidocaine 2% viscous   Diphenhydramine oral solution     Pharmacy to mix equal portions of ingredients to a total volume as indicated in the dispense amount.      Authorizing Provider: Akhil Fang     Ordering User: Kavya TREJO

## 2022-07-01 ENCOUNTER — TELEPHONE (OUTPATIENT)
Dept: ONCOLOGY | Age: 37
End: 2022-07-01

## 2022-07-01 NOTE — TELEPHONE ENCOUNTER
[address] at [facility]  [phone]    07/01/22- Magic mouthwash prescription called into [facility]. Patient notified and verbalized understanding.

## 2022-07-01 NOTE — TELEPHONE ENCOUNTER
[facility] will make the magic mouthwash none of the [facility] pharmacies will do this. Can we send the script to this pharmacy below is the information for the pharmacy:    Phone number [phone]  Address [address] [facility]    Please advise patient once this is done.    TO:[phone]

## 2022-07-04 ENCOUNTER — APPOINTMENT (OUTPATIENT)
Dept: INFUSION THERAPY | Age: 37
End: 2022-07-04

## 2022-07-05 ENCOUNTER — TELEPHONE (OUTPATIENT)
Dept: ONCOLOGY | Age: 37
End: 2022-07-05

## 2022-07-05 ENCOUNTER — HOSPITAL ENCOUNTER (OUTPATIENT)
Dept: INFUSION THERAPY | Age: 37
Discharge: HOME | End: 2022-07-05
Payer: COMMERCIAL

## 2022-07-05 VITALS
DIASTOLIC BLOOD PRESSURE: 79 MMHG | SYSTOLIC BLOOD PRESSURE: 142 MMHG | TEMPERATURE: 96.6 F | WEIGHT: 200.4 LBS | RESPIRATION RATE: 16 BRPM | HEART RATE: 84 BPM | HEIGHT: 71 IN | BODY MASS INDEX: 28.06 KG/M2

## 2022-07-05 DIAGNOSIS — R79.89 ELEVATED SERUM CREATININE: Primary | ICD-10-CM

## 2022-07-05 DIAGNOSIS — K12.30 MUCOSITIS: Primary | ICD-10-CM

## 2022-07-05 DIAGNOSIS — Z51.11 ENCOUNTER FOR ANTINEOPLASTIC CHEMOTHERAPY: ICD-10-CM

## 2022-07-05 DIAGNOSIS — C62.92 NON-SEMINOMATOUS CANCER OF LEFT TESTIS (HCC): ICD-10-CM

## 2022-07-05 LAB
BASOPHILS # BLD: 0.1 K/UL (ref 0–0.1)
BASOPHILS NFR BLD: 3 % (ref 0–1)
DIFFERENTIAL METHOD BLD: ABNORMAL
EOSINOPHIL # BLD: 0.1 K/UL (ref 0–0.4)
EOSINOPHIL NFR BLD: 5 % (ref 0–7)
ERYTHROCYTE [DISTWIDTH] IN BLOOD BY AUTOMATED COUNT: 14.4 % (ref 11.5–14.5)
HCT VFR BLD AUTO: 38 % (ref 36.6–50.3)
HGB BLD-MCNC: 13 G/DL (ref 12.1–17)
IMM GRANULOCYTES # BLD AUTO: 0 K/UL
IMM GRANULOCYTES NFR BLD AUTO: 0 %
LYMPHOCYTES # BLD: 1.5 K/UL (ref 0.8–3.5)
LYMPHOCYTES NFR BLD: 51 % (ref 12–49)
MCH RBC QN AUTO: 29 PG (ref 26–34)
MCHC RBC AUTO-ENTMCNC: 34.2 G/DL (ref 30–36.5)
MCV RBC AUTO: 84.8 FL (ref 80–99)
MONOCYTES # BLD: 0.5 K/UL (ref 0–1)
MONOCYTES NFR BLD: 19 % (ref 5–13)
NEUTS SEG # BLD: 0.6 K/UL (ref 1.8–8)
NEUTS SEG NFR BLD: 22 % (ref 32–75)
NRBC # BLD: 0 K/UL (ref 0–0.01)
NRBC BLD-RTO: 0 PER 100 WBC
PLATELET # BLD AUTO: 256 K/UL (ref 150–400)
PMV BLD AUTO: 10 FL (ref 8.9–12.9)
RBC # BLD AUTO: 4.48 M/UL (ref 4.1–5.7)
RBC MORPH BLD: ABNORMAL
WBC # BLD AUTO: 2.8 K/UL (ref 4.1–11.1)
WBC MORPH BLD: ABNORMAL

## 2022-07-05 PROCEDURE — 74011250636 HC RX REV CODE- 250/636: Performed by: NURSE PRACTITIONER

## 2022-07-05 PROCEDURE — 96367 TX/PROPH/DG ADDL SEQ IV INF: CPT

## 2022-07-05 PROCEDURE — 77030012965 HC NDL HUBR BBMI -A

## 2022-07-05 PROCEDURE — 74011000250 HC RX REV CODE- 250: Performed by: INTERNAL MEDICINE

## 2022-07-05 PROCEDURE — 36415 COLL VENOUS BLD VENIPUNCTURE: CPT

## 2022-07-05 PROCEDURE — 74011250636 HC RX REV CODE- 250/636: Performed by: INTERNAL MEDICINE

## 2022-07-05 PROCEDURE — 74011000258 HC RX REV CODE- 258: Performed by: INTERNAL MEDICINE

## 2022-07-05 PROCEDURE — 96375 TX/PRO/DX INJ NEW DRUG ADDON: CPT

## 2022-07-05 PROCEDURE — 74011250637 HC RX REV CODE- 250/637: Performed by: INTERNAL MEDICINE

## 2022-07-05 PROCEDURE — 85025 COMPLETE CBC W/AUTO DIFF WBC: CPT

## 2022-07-05 PROCEDURE — 96409 CHEMO IV PUSH SNGL DRUG: CPT

## 2022-07-05 RX ORDER — SODIUM CHLORIDE 9 MG/ML
10 INJECTION INTRAMUSCULAR; INTRAVENOUS; SUBCUTANEOUS AS NEEDED
Status: ACTIVE | OUTPATIENT
Start: 2022-07-05 | End: 2022-07-05

## 2022-07-05 RX ORDER — DIPHENHYDRAMINE HYDROCHLORIDE 50 MG/ML
25 INJECTION, SOLUTION INTRAMUSCULAR; INTRAVENOUS AS NEEDED
Status: DISCONTINUED | OUTPATIENT
Start: 2022-07-05 | End: 2022-07-06 | Stop reason: HOSPADM

## 2022-07-05 RX ORDER — HYDROCODONE BITARTRATE AND ACETAMINOPHEN 5; 325 MG/1; MG/1
1-2 TABLET ORAL
Qty: 30 TABLET | Refills: 0 | Status: SHIPPED | OUTPATIENT
Start: 2022-07-05 | End: 2022-07-10

## 2022-07-05 RX ORDER — SODIUM CHLORIDE 0.9 % (FLUSH) 0.9 %
10 SYRINGE (ML) INJECTION AS NEEDED
Status: DISPENSED | OUTPATIENT
Start: 2022-07-05 | End: 2022-07-05

## 2022-07-05 RX ORDER — ACETAMINOPHEN 325 MG/1
650 TABLET ORAL AS NEEDED
Status: DISCONTINUED | OUTPATIENT
Start: 2022-07-05 | End: 2022-07-06 | Stop reason: HOSPADM

## 2022-07-05 RX ORDER — ONDANSETRON 2 MG/ML
8 INJECTION INTRAMUSCULAR; INTRAVENOUS AS NEEDED
Status: DISCONTINUED | OUTPATIENT
Start: 2022-07-05 | End: 2022-07-06 | Stop reason: HOSPADM

## 2022-07-05 RX ORDER — HEPARIN 100 UNIT/ML
300-500 SYRINGE INTRAVENOUS AS NEEDED
Status: ACTIVE | OUTPATIENT
Start: 2022-07-05 | End: 2022-07-05

## 2022-07-05 RX ORDER — DIPHENHYDRAMINE HYDROCHLORIDE 50 MG/ML
25 INJECTION, SOLUTION INTRAMUSCULAR; INTRAVENOUS ONCE
Status: COMPLETED | OUTPATIENT
Start: 2022-07-05 | End: 2022-07-05

## 2022-07-05 RX ORDER — NYSTATIN 100000 [USP'U]/ML
1 SUSPENSION ORAL 4 TIMES DAILY
Qty: 480 ML | Refills: 0 | Status: SHIPPED | OUTPATIENT
Start: 2022-07-05 | End: 2022-07-25 | Stop reason: SDUPTHER

## 2022-07-05 RX ORDER — ACETAMINOPHEN 325 MG/1
650 TABLET ORAL ONCE
Status: COMPLETED | OUTPATIENT
Start: 2022-07-05 | End: 2022-07-05

## 2022-07-05 RX ORDER — SODIUM CHLORIDE 9 MG/ML
25 INJECTION, SOLUTION INTRAVENOUS CONTINUOUS
Status: DISCONTINUED | OUTPATIENT
Start: 2022-07-05 | End: 2022-07-06 | Stop reason: HOSPADM

## 2022-07-05 RX ADMIN — SODIUM CHLORIDE, PRESERVATIVE FREE 10 ML: 5 INJECTION INTRAVENOUS at 10:20

## 2022-07-05 RX ADMIN — SODIUM CHLORIDE 1000 ML: 9 INJECTION, SOLUTION INTRAVENOUS at 13:00

## 2022-07-05 RX ADMIN — ACETAMINOPHEN 650 MG: 325 TABLET ORAL at 13:33

## 2022-07-05 RX ADMIN — SODIUM CHLORIDE 25 ML/HR: 9 INJECTION, SOLUTION INTRAVENOUS at 14:03

## 2022-07-05 RX ADMIN — DIPHENHYDRAMINE HYDROCHLORIDE 25 MG: 50 INJECTION, SOLUTION INTRAMUSCULAR; INTRAVENOUS at 13:33

## 2022-07-05 RX ADMIN — BLEOMYCIN 30 UNITS: 15 INJECTION, POWDER, LYOPHILIZED, FOR SOLUTION INTRAMUSCULAR; INTRAPLEURAL; INTRAVENOUS; SUBCUTANEOUS at 14:05

## 2022-07-05 RX ADMIN — Medication 500 UNITS: at 14:45

## 2022-07-05 NOTE — TELEPHONE ENCOUNTER
[address] at [place]  [phone]    07/05/22- Patient reports worsening mouth sores over the weekend. Using magic mouthwash and tylenol with minimal relief. Informed him that Allie Vargas sent a prescription for hydrocodone to his pharmacy for pain relief. Advised him not to take any additional acetaminophen with this. Recommended using Anbesol OTC which can also provide some local pain relief. Patient stated it actually looks like \"tissue\" is sloughing off his gums, and has white all over his mouth. Sounds like this may be thrush. Will update NP and call patient back. 1:06 PM- Patient's [facility] nurse was able to assess and confirm thrush. Allie Vargas will send nystatin to pharmacy. Patient notified and verbalized understanding.

## 2022-07-05 NOTE — PROGRESS NOTES
Outpatient Infusion Center - Chemotherapy Progress Note    8533 Pt admit to [facility] for Bleomycin/C1D15 ambulatory in stable condition accompanied by spouse. Assessment completed. Pt reports mouth sores, 10/10. No new concerns voiced. Port accessed with positive blood return. Labs drawn per order and sent. Line flushed, clamped, Curos Cap applied to end clave. Awaiting lab results. Patient denies SOB, fever, cough, general not feeling well. Patient denies recent exposure to someone who has tested positive for COVID-19.  Patient  denies having contact with anyone who has a pending COVID test.     Visit Vitals  BP (!) 142/79   Pulse 84   Temp (!) 96.6 °F (35.9 °C)   Resp 16   Ht 5' 11\" (1.803 m)   Wt 90.9 kg (200 lb 6.4 oz)   BMI 27.95 kg/m²       Medications Administered     0.9% sodium chloride infusion     Admin Date  07/05/2022 Action  New Bag Dose  25 mL/hr Rate  25 mL/hr Route  IntraVENous Administered By  Jackie Martel RN          0.9% sodium chloride injection 10 mL     Admin Date  07/05/2022 Action  Given Dose  10 mL Route  IntraVENous Administered By  Jackie Martel RN          acetaminophen (TYLENOL) tablet 650 mg     Admin Date  07/05/2022 Action  Given Dose  650 mg Route  Oral Administered By  Jackie Martel RN          bleomycin (BLEOCIN) 30 Units in 0.9% sodium chloride 50 mL, overfill volume 5 mL chemo infusion     Admin Date  07/05/2022 Action  New Bag Dose  30 Units Rate  390 mL/hr Route  IntraVENous Administered By  Jackie Martel RN          diphenhydrAMINE (BENADRYL) injection 25 mg     Admin Date  07/05/2022 Action  Given Dose  25 mg Route  IntraVENous Administered By  Jackie Martel RN          heparin (porcine) pf 300-500 Units     Admin Date  07/05/2022 Action  Given Dose  500 Units Route  InterCATHeter Administered By  Jackie Martel RN          sodium chloride (NS) flush 10 mL     Admin Date  07/05/2022 Action  Given Dose  10 mL Route  IntraVENous Administered By  Mary Molina RN          sodium chloride 0.9 % bolus infusion 1,000 mL     Admin Date  07/05/2022 Action  New Bag Dose  1,000 mL Rate  1,000 mL/hr Route  IntraVENous Administered By  Mary Molina RN                  Two nurses verified prior to administering:, Drug name, Drug dose, Infusion volume or drug volume when prepared in a syringe, Rate of administration, Route of administration, Expiration dates and/or times, Appearance and physical integrity of the drugs, Rate set on infusion pump, when used, Sequencing of drug administration         1500 Pt tolerated treatment well. Port maintained positive blood return throughout treatment, flushed with positive blood return at conclusion, heparinized and de-accessed. D/c home ambulatory in no distress. Pt aware of next [facility] appointment scheduled for 07/08/2022 at the [facility] location. Recent Results (from the past 12 hour(s))   CBC WITH AUTOMATED DIFF    Collection Time: 07/05/22 10:20 AM   Result Value Ref Range    WBC 2.8 (L) 4.1 - 11.1 K/uL    RBC 4.48 4.10 - 5.70 M/uL    HGB 13.0 12.1 - 17.0 g/dL    HCT 38.0 36.6 - 50.3 %    MCV 84.8 80.0 - 99.0 FL    MCH 29.0 26.0 - 34.0 PG    MCHC 34.2 30.0 - 36.5 g/dL    RDW 14.4 11.5 - 14.5 %    PLATELET 144 068 - 215 K/uL    MPV 10.0 8.9 - 12.9 FL    NRBC 0.0 0  WBC    ABSOLUTE NRBC 0.00 0.00 - 0.01 K/uL    NEUTROPHILS 22 (L) 32 - 75 %    LYMPHOCYTES 51 (H) 12 - 49 %    MONOCYTES 19 (H) 5 - 13 %    EOSINOPHILS 5 0 - 7 %    BASOPHILS 3 (H) 0 - 1 %    IMMATURE GRANULOCYTES 0 %    ABS. NEUTROPHILS 0.6 (L) 1.8 - 8.0 K/UL    ABS. LYMPHOCYTES 1.5 0.8 - 3.5 K/UL    ABS. MONOCYTES 0.5 0.0 - 1.0 K/UL    ABS. EOSINOPHILS 0.1 0.0 - 0.4 K/UL    ABS. BASOPHILS 0.1 0.0 - 0.1 K/UL    ABS. IMM.  GRANS. 0.0 K/UL    DF MANUAL      RBC COMMENTS NORMOCYTIC, NORMOCHROMIC      WBC COMMENTS DOHLE BODIES

## 2022-07-05 NOTE — TELEPHONE ENCOUNTER
Patient called and stated that he has very bad mouth sores,and he has the magic mouthwash and it only lasts about 20 minutes and he has been taking  Excessive amounts of tylenol extra strength. Patient is requesting something else for pain and he states it is a 10/10 unbearable. Please advise.    #[phone]

## 2022-07-06 ENCOUNTER — TELEPHONE (OUTPATIENT)
Dept: ONCOLOGY | Age: 37
End: 2022-07-06

## 2022-07-06 PROBLEM — Z51.11 ENCOUNTER FOR ANTINEOPLASTIC CHEMOTHERAPY: Status: RESOLVED | Noted: 2022-06-06 | Resolved: 2022-07-06

## 2022-07-06 NOTE — TELEPHONE ENCOUNTER
Patient would like his magic mouthwash refilled and sent to the [address]. Please advise.    #[phone]

## 2022-07-06 NOTE — TELEPHONE ENCOUNTER
[address] at [facility]  [phone]    07/06/22- Phone call returned to pt he reported he needs refill on magic mouthwash. He continues to take nystatin and seeing mild improvement. Per Bob Milton. NP okay to refill. Requested Prescriptions     Signed Prescriptions Disp Refills    magic mouthwash solution 480 mL 0     Sig: Swish and spit 15 mL every 4 hours as needed for mouth pain. May swallow for throat pain. Magic mouth wash Maalox Lidocaine 2% viscous Diphenhydramine oral solution Pharmacy to mix equal portions of ingredients to a total volume as indicated in the dispense amount.      Authorizing Provider: Akin Pierre     Ordering User: Dylan Rai

## 2022-07-08 ENCOUNTER — TELEPHONE (OUTPATIENT)
Dept: ONCOLOGY | Age: 37
End: 2022-07-08

## 2022-07-08 ENCOUNTER — HOSPITAL ENCOUNTER (OUTPATIENT)
Dept: INFUSION THERAPY | Age: 37
Discharge: HOME | End: 2022-07-08
Payer: COMMERCIAL

## 2022-07-08 ENCOUNTER — OFFICE VISIT (OUTPATIENT)
Dept: ONCOLOGY | Age: 37
End: 2022-07-08

## 2022-07-08 VITALS
OXYGEN SATURATION: 99 % | DIASTOLIC BLOOD PRESSURE: 84 MMHG | HEART RATE: 89 BPM | SYSTOLIC BLOOD PRESSURE: 129 MMHG | WEIGHT: 201 LBS | BODY MASS INDEX: 28.14 KG/M2 | RESPIRATION RATE: 18 BRPM | HEIGHT: 71 IN | TEMPERATURE: 97.9 F

## 2022-07-08 VITALS
BODY MASS INDEX: 28.11 KG/M2 | WEIGHT: 200.8 LBS | SYSTOLIC BLOOD PRESSURE: 146 MMHG | OXYGEN SATURATION: 98 % | HEART RATE: 81 BPM | DIASTOLIC BLOOD PRESSURE: 93 MMHG | HEIGHT: 71 IN | RESPIRATION RATE: 20 BRPM | TEMPERATURE: 97.4 F

## 2022-07-08 DIAGNOSIS — Z51.11 ENCOUNTER FOR ANTINEOPLASTIC CHEMOTHERAPY: ICD-10-CM

## 2022-07-08 DIAGNOSIS — R79.89 ELEVATED SERUM CREATININE: Primary | ICD-10-CM

## 2022-07-08 DIAGNOSIS — K12.30 MUCOSITIS: ICD-10-CM

## 2022-07-08 DIAGNOSIS — C62.92 NON-SEMINOMATOUS CANCER OF LEFT TESTIS (HCC): ICD-10-CM

## 2022-07-08 DIAGNOSIS — B37.0 THRUSH: ICD-10-CM

## 2022-07-08 DIAGNOSIS — N28.9 RENAL INSUFFICIENCY: ICD-10-CM

## 2022-07-08 DIAGNOSIS — C62.92 NON-SEMINOMATOUS CANCER OF LEFT TESTIS (HCC): Primary | ICD-10-CM

## 2022-07-08 LAB
ANION GAP SERPL CALC-SCNC: 7 MMOL/L (ref 5–15)
BASOPHILS # BLD: 0.1 K/UL (ref 0–0.1)
BASOPHILS NFR BLD: 1 % (ref 0–1)
BUN SERPL-MCNC: 18 MG/DL (ref 6–20)
BUN/CREAT SERPL: 18 (ref 12–20)
CALCIUM SERPL-MCNC: 9.2 MG/DL (ref 8.5–10.1)
CHLORIDE SERPL-SCNC: 107 MMOL/L (ref 97–108)
CO2 SERPL-SCNC: 25 MMOL/L (ref 21–32)
CREAT SERPL-MCNC: 1.02 MG/DL (ref 0.7–1.3)
DIFFERENTIAL METHOD BLD: ABNORMAL
EOSINOPHIL # BLD: 0.2 K/UL (ref 0–0.4)
EOSINOPHIL NFR BLD: 3 % (ref 0–7)
ERYTHROCYTE [DISTWIDTH] IN BLOOD BY AUTOMATED COUNT: 14.3 % (ref 11.5–14.5)
GLUCOSE SERPL-MCNC: 85 MG/DL (ref 65–100)
HCT VFR BLD AUTO: 36.6 % (ref 36.6–50.3)
HGB BLD-MCNC: 12.5 G/DL (ref 12.1–17)
IMM GRANULOCYTES # BLD AUTO: 0 K/UL
IMM GRANULOCYTES NFR BLD AUTO: 0 %
LYMPHOCYTES # BLD: 2.9 K/UL (ref 0.8–3.5)
LYMPHOCYTES NFR BLD: 39 % (ref 12–49)
MAGNESIUM SERPL-MCNC: 2 MG/DL (ref 1.6–2.4)
MCH RBC QN AUTO: 28.7 PG (ref 26–34)
MCHC RBC AUTO-ENTMCNC: 34.2 G/DL (ref 30–36.5)
MCV RBC AUTO: 84.1 FL (ref 80–99)
METAMYELOCYTES NFR BLD MANUAL: 1 %
MONOCYTES # BLD: 1 K/UL (ref 0–1)
MONOCYTES NFR BLD: 13 % (ref 5–13)
MYELOCYTES NFR BLD MANUAL: 2 %
NEUTS BAND NFR BLD MANUAL: 4 % (ref 0–6)
NEUTS SEG # BLD: 3.1 K/UL (ref 1.8–8)
NEUTS SEG NFR BLD: 37 % (ref 32–75)
NRBC # BLD: 0 K/UL (ref 0–0.01)
NRBC BLD-RTO: 0 PER 100 WBC
PLATELET # BLD AUTO: 316 K/UL (ref 150–400)
PMV BLD AUTO: 9.7 FL (ref 8.9–12.9)
POTASSIUM SERPL-SCNC: 3.9 MMOL/L (ref 3.5–5.1)
RBC # BLD AUTO: 4.35 M/UL (ref 4.1–5.7)
RBC MORPH BLD: ABNORMAL
SODIUM SERPL-SCNC: 139 MMOL/L (ref 136–145)
WBC # BLD AUTO: 7.5 K/UL (ref 4.1–11.1)

## 2022-07-08 PROCEDURE — 96360 HYDRATION IV INFUSION INIT: CPT

## 2022-07-08 PROCEDURE — 99214 OFFICE O/P EST MOD 30 MIN: CPT | Performed by: INTERNAL MEDICINE

## 2022-07-08 PROCEDURE — 80048 BASIC METABOLIC PNL TOTAL CA: CPT

## 2022-07-08 PROCEDURE — 74011000250 HC RX REV CODE- 250: Performed by: NURSE PRACTITIONER

## 2022-07-08 PROCEDURE — 74011250636 HC RX REV CODE- 250/636: Performed by: NURSE PRACTITIONER

## 2022-07-08 PROCEDURE — 36415 COLL VENOUS BLD VENIPUNCTURE: CPT

## 2022-07-08 PROCEDURE — 77030012965 HC NDL HUBR BBMI -A

## 2022-07-08 PROCEDURE — 85025 COMPLETE CBC W/AUTO DIFF WBC: CPT

## 2022-07-08 PROCEDURE — 83735 ASSAY OF MAGNESIUM: CPT

## 2022-07-08 RX ORDER — SODIUM CHLORIDE 9 MG/ML
5-40 INJECTION INTRAMUSCULAR; INTRAVENOUS; SUBCUTANEOUS AS NEEDED
Status: DISCONTINUED | OUTPATIENT
Start: 2022-07-08 | End: 2022-07-09 | Stop reason: HOSPADM

## 2022-07-08 RX ORDER — SODIUM CHLORIDE 0.9 % (FLUSH) 0.9 %
5-40 SYRINGE (ML) INJECTION AS NEEDED
Status: DISCONTINUED | OUTPATIENT
Start: 2022-07-08 | End: 2022-07-09 | Stop reason: HOSPADM

## 2022-07-08 RX ORDER — HEPARIN 100 UNIT/ML
500 SYRINGE INTRAVENOUS AS NEEDED
Status: DISCONTINUED | OUTPATIENT
Start: 2022-07-08 | End: 2022-07-09 | Stop reason: HOSPADM

## 2022-07-08 RX ADMIN — SODIUM CHLORIDE 1000 ML: 9 INJECTION, SOLUTION INTRAVENOUS at 12:57

## 2022-07-08 RX ADMIN — Medication 500 UNITS: at 14:07

## 2022-07-08 RX ADMIN — SODIUM CHLORIDE, PRESERVATIVE FREE 10 ML: 5 INJECTION INTRAVENOUS at 14:07

## 2022-07-08 NOTE — TELEPHONE ENCOUNTER
Call to patient to check in regarding appointment in office for [address] and [facility] appointment at 10am. He has not checked in to either appointment. Left message asking for return call. I would like to see patient today to assess prior to treatment next week, give IV hydration and check labs due to neutropenia and elevated Cr. May be able to push OPIC to later this morning/afternoon if he is available.

## 2022-07-08 NOTE — PROGRESS NOTES
[facility] [place] [facility] [place] [address] [address] [facility] [address] Ricardo Gals: [phone]  F: [phone]      Reason for Visit:   Tommie Sanchez is a 40 y.o. male who is seen in follow up for evaluation of testicular cancer. Hematology/Oncology Treatment History:   · Scrotal US 11/30/2021: 3.9cm solid and cystic mass replacing left testicle. Slightly atrophic right testicle with no mass. Incidental 8mm left epididymal head cyst/spermatocele  · Pre-op tumor markers 12/2/2021: .0, beta hCG 250,   · Left radical inguinal orchiectomy by Dr. 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 Negative LVI, negative margins. · CT A/P 12/16/2021: indeterminate splenic lesion. 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 No lymphadenopathy or other evidence of metastasis. Significant hepatic steatosis. · Tumor markers 5/17/2022: AFP 5.3, beta hCG 14  · Stage IS (pT1b cN0 M0 S1) Non-seminoma, LEFT testis    History of Present Illness:   Presents for follow up after C1 of therapy. Has been struggling with mouth sores. Started on left side of face and spread to right side. Started Nystatin 4 times daily, swish and swallowing. Has Magic Mouthwash on hand as well, not using this as much anymore. Has Hydrocodone on hand now, started this on 7/5. Using 1 tab every 3-4 hours. Mild constipation. This was present prior to use of Hydrocodone. He is using stool softener but doesn't feel like this is helping. Used Mg Citrate PRN. No vomiting.  Had mild nausea during first week of treatment. He is unaccompanied today. He [place] in [place] with his wife and two young children (ages 3 and 3). Review of Systems: A complete review of systems was obtained, reviewed. Pertinent findings reviewed above. Physical Exam:     Visit Vitals  BP (!) 146/93 (BP 1 Location: Right arm, BP Patient Position: Sitting, BP Cuff Size: Large adult) Comment: . Pulse 81   Temp 97.4 °F (36.3 °C) (Temporal)   Resp 20   Ht 5' 11\" (1.803 m)   Wt 200 lb 12.8 oz (91.1 kg)   SpO2 98%   BMI 28.01 kg/m²     ECOG PS: 0  General: no distress  Eyes: PERRLA, anicteric sclerae  HENT: atraumatic, oropharynx clear  Neck: supple  Lymphatic: no cervical, supraclavicular, or inguinal adenopathy  Respiratory: CTAB, normal respiratory effort  CV: normal rate, regular rhythm, no murmurs, no peripheral edema  GI: soft, nontender, nondistended, no masses, no hepatomegaly, no splenomegaly  MS: digits without clubbing or cyanosis. Skin: no rashes, no ecchymoses, no petechia. normal temperature, turgor, and texture. Psych: alert, oriented, appropriate affect, normal judgment/insight    Results:     Lab Results   Component Value Date/Time    WBC 2.8 (L) 07/05/2022 10:20 AM    HGB 13.0 07/05/2022 10:20 AM    HCT 38.0 07/05/2022 10:20 AM    PLATELET 263 75/03/9915 10:20 AM    MCV 84.8 07/05/2022 10:20 AM     Lab Results   Component Value Date/Time    Sodium 139 07/08/2022 12:51 PM    Potassium 3.9 07/08/2022 12:51 PM    Chloride 107 07/08/2022 12:51 PM    CO2 25 07/08/2022 12:51 PM    Anion gap 7 07/08/2022 12:51 PM    Glucose 85 07/08/2022 12:51 PM    BUN 18 07/08/2022 12:51 PM    Creatinine 1.02 07/08/2022 12:51 PM    BUN/Creatinine ratio 18 07/08/2022 12:51 PM    GFR est AA >60 07/08/2022 12:51 PM    GFR est non-AA >60 07/08/2022 12:51 PM    Calcium 9.2 07/08/2022 12:51 PM    Bilirubin, total 0.4 06/27/2022 09:54 AM    Alk.  phosphatase 68 06/27/2022 09:54 AM    Protein, total 8.6 (H) 06/27/2022 09:54 AM Albumin 3.7 2022 09:54 AM    Globulin 4.9 (H) 2022 09:54 AM    A-G Ratio 0.8 (L) 2022 09:54 AM    ALT (SGPT) 85 (H) 2022 09:54 AM    AST (SGOT) 25 2022 09:54 AM     LDH:  Recent Labs     22  0916 22  1543    186     Beta-HCG:  Recent Labs     22  0916 22  1543   HCGTLT 24* 19*     AFP:  Recent Labs     22  0916 22  1543   AFP 6.3 5.2     2021  AFP: 168.0 (H)  beta hC (H)  LDH: 190    2022  AFP: 6.2  beta hCG: <1    2022  AFP:  4.6  beta hCG: <1    2022:  AFP:  5.6  beta hC    2022:  AFP:  5.3  beta hC    Records from [facility] reviewed and summarized above. Test results above have been reviewed. CT Chest 2022:  No evidence of intrathoracic malignancy. Assessment:   1) Non-seminoma, LEFT testis  Stage IS  He is s/p orchectomy 2021. His pre-op hCG was elevated at 250, but became undetectable after surgery. More recently it has been rising, now up to 23 concerning for recurrent disease. CXR and CT A/P were negative. CT chest obtained today and negative/normal.    Current guidelines recommend treatment with systemic therapy. Specifically, for good risk disease my recommendation is for BEP x3 cycles. PFTs scheduled later today. If DLCO is not adequate for bleomycin, then we will consider treatment with EP x4 cycles. He will benefit from port placement for therapy, scheduled 2022. We discussed the risks and benefits of BEP chemotherapy. Potential side effects include, but are not limited to: nausea, vomiting, diarrhea, taste changes, myelosuppression, infection, fatigue, alopecia, neuropathy, hearing loss, renal failure, pulmonary toxicity, cardiac toxicity, neuropathy, allergic reactions, infertility, and rarely, death. The patient has consented to beginning chemotherapy.      The patient will be seen prior to each cycle of therapy, and labs will be monitored to assess for toxicity from therapy. 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 Andrea Galarza (surgical oncology). 3) Ulcerative proctitis  Following with Dr. David Aiken. Plans to hold Humira while on chemotherapy. Will initiate therapy with mesalamine. 4) Risk of infertility  He was informed that chemotherapy can potentially cause infertility. He has already had a vasectomy and is not interested in having more children. 5) Mucositis, thrush  Taking Hydrocodone PRN. Nystatin 4 times daily. Improving. 6) Neutropenia, chemotherapy induced  Seen with C1D16 of therapy. Repeat CBC today. Plan:     Proceed 7/11/2022 with C2 of BEP (Bleomycin 30 units Days 2, 9, 16; Etoposide 100mg/m2 Days 1-5, Cisplatin 20mg/m2 Days 1-5) given every 21 days x 3 cycles. Labs with each cycle: CBC, CMP, AFP, Beta hCG, LDH  Prophylactic antiemetics: Ondansetron and Dexamethasone IV days 1-5  PRN antiemetics: Ondansetron and Prochlorperazine at home  IV hydration and labs today  Return to see me prior to C3 of therapy    I personally saw and evaluated the patient and performed the key components of medical decision making. The history, physical exam, and documentation were performed by Doreen Saucedo NP. I reviewed and verified the above documentation and modified it as needed. Proceed with C2 of BEP next week. Renal insufficiency improving with IVF and oral hydration. Mucositis and thrush improving with Nystatin.     Signed By: Christy Wilkins MD

## 2022-07-08 NOTE — PROGRESS NOTES
Mercedes Crigler is a 40 y.o. male follow up for testicular cancer. 1. Have you been to the ER, urgent care clinic since your last visit? Hospitalized since your last visit?no     2. Have you seen or consulted any other health care providers outside of the [address] [place] [address] since your last visit? Include any pap smears or colon screening.  no

## 2022-07-08 NOTE — PROGRESS NOTES
[facility] Progress Note    Date: 2022    Name: Wynema Bamberger    MRN: [id number]         : 1985    Mr. Gilbert Arrived ambulatory and in no distress for Hydration. Assessment was completed, no acute issues at this time, no new complaints voiced. Right chest wall port accessed without difficulty, labs drawn & sent for processing. Mr. Miguel Jordan vitals were reviewed. Visit Vitals  /84   Pulse 89   Temp 97.9 °F (36.6 °C)   Resp 18   Ht 5' 11\" (1.803 m)   Wt 91.2 kg (201 lb)   SpO2 99%   BMI 28.03 kg/m²       Lab results were obtained and reviewed. Recent Results (from the past 12 hour(s))   CBC WITH AUTOMATED DIFF    Collection Time: 22 12:51 PM   Result Value Ref Range    WBC 7.5 4.1 - 11.1 K/uL    RBC 4.35 4.10 - 5.70 M/uL    HGB 12.5 12.1 - 17.0 g/dL    HCT 36.6 36.6 - 50.3 %    MCV 84.1 80.0 - 99.0 FL    MCH 28.7 26.0 - 34.0 PG    MCHC 34.2 30.0 - 36.5 g/dL    RDW 14.3 11.5 - 14.5 %    PLATELET 692 177 - 441 K/uL    MPV 9.7 8.9 - 12.9 FL    NRBC 0.0 0  WBC    ABSOLUTE NRBC 0.00 0.00 - 0.01 K/uL    NEUTROPHILS 37 32 - 75 %    BAND NEUTROPHILS 4 0 - 6 %    LYMPHOCYTES 39 12 - 49 %    MONOCYTES 13 5 - 13 %    EOSINOPHILS 3 0 - 7 %    BASOPHILS 1 0 - 1 %    METAMYELOCYTES 1 (H) 0 %    MYELOCYTES 2 (H) 0 %    IMMATURE GRANULOCYTES 0 %    ABS. NEUTROPHILS 3.1 1.8 - 8.0 K/UL    ABS. LYMPHOCYTES 2.9 0.8 - 3.5 K/UL    ABS. MONOCYTES 1.0 0.0 - 1.0 K/UL    ABS. EOSINOPHILS 0.2 0.0 - 0.4 K/UL    ABS. BASOPHILS 0.1 0.0 - 0.1 K/UL    ABS. IMM.  GRANS. 0.0 K/UL    DF MANUAL      RBC COMMENTS NORMOCYTIC, NORMOCHROMIC     METABOLIC PANEL, BASIC    Collection Time: 22 12:51 PM   Result Value Ref Range    Sodium 139 136 - 145 mmol/L    Potassium 3.9 3.5 - 5.1 mmol/L    Chloride 107 97 - 108 mmol/L    CO2 25 21 - 32 mmol/L    Anion gap 7 5 - 15 mmol/L    Glucose 85 65 - 100 mg/dL    BUN 18 6 - 20 MG/DL    Creatinine 1.02 0.70 - 1.30 MG/DL    BUN/Creatinine ratio 18 12 - 20      GFR est AA >60 >60 ml/min/1.73m2    GFR est non-AA >60 >60 ml/min/1.73m2    Calcium 9.2 8.5 - 10.1 MG/DL   MAGNESIUM    Collection Time: 07/08/22 12:51 PM   Result Value Ref Range    Magnesium 2.0 1.6 - 2.4 mg/dL       Medications:  Medications Administered     0.9% sodium chloride injection 5-40 mL     Admin Date  07/08/2022 Action  Given Dose  10 mL Route  IntraVENous Administered By  Karley Oppenheim          heparin (porcine) pf 500 Units     Admin Date  07/08/2022 Action  Given Dose  500 Units Route  InterCATHeter Administered By  Karley Oppenheim          sodium chloride 0.9 % bolus infusion 1,000 mL     Admin Date  07/08/2022 Action  New Bag Dose  1,000 mL Rate  1,000 mL/hr Route  IntraVENous Administered By  Karley Oppenheim                  Mr. Gilbert tolerated treatment well and was discharged from [place] [address] in stable condition at 1410. Port de-accessed, flushed & heparinized per protocol. He is to return on July 11, 2022 at 1000 for his next appointment.     Caroline Cates  July 8, 2022

## 2022-07-11 ENCOUNTER — APPOINTMENT (OUTPATIENT)
Dept: INFUSION THERAPY | Age: 37
End: 2022-07-11

## 2022-07-11 ENCOUNTER — HOSPITAL ENCOUNTER (OUTPATIENT)
Dept: INFUSION THERAPY | Age: 37
Discharge: HOME | End: 2022-07-11
Payer: COMMERCIAL

## 2022-07-11 VITALS
HEART RATE: 82 BPM | WEIGHT: 202.2 LBS | DIASTOLIC BLOOD PRESSURE: 83 MMHG | BODY MASS INDEX: 28.31 KG/M2 | SYSTOLIC BLOOD PRESSURE: 145 MMHG | HEIGHT: 71 IN | TEMPERATURE: 97.3 F | RESPIRATION RATE: 16 BRPM

## 2022-07-11 DIAGNOSIS — C62.92 NON-SEMINOMATOUS CANCER OF LEFT TESTIS (HCC): ICD-10-CM

## 2022-07-11 DIAGNOSIS — Z51.11 ENCOUNTER FOR ANTINEOPLASTIC CHEMOTHERAPY: Primary | ICD-10-CM

## 2022-07-11 LAB
ALBUMIN SERPL-MCNC: 3.3 G/DL (ref 3.5–5)
ALBUMIN/GLOB SERPL: 0.7 {RATIO} (ref 1.1–2.2)
ALP SERPL-CCNC: 75 U/L (ref 45–117)
ALT SERPL-CCNC: 30 U/L (ref 12–78)
ANION GAP SERPL CALC-SCNC: 5 MMOL/L (ref 5–15)
AST SERPL-CCNC: 17 U/L (ref 15–37)
BASOPHILS # BLD: 0.1 K/UL (ref 0–0.1)
BASOPHILS NFR BLD: 1 % (ref 0–1)
BILIRUB SERPL-MCNC: 0.2 MG/DL (ref 0.2–1)
BUN SERPL-MCNC: 13 MG/DL (ref 6–20)
BUN/CREAT SERPL: 11 (ref 12–20)
CALCIUM SERPL-MCNC: 9.3 MG/DL (ref 8.5–10.1)
CHLORIDE SERPL-SCNC: 107 MMOL/L (ref 97–108)
CO2 SERPL-SCNC: 27 MMOL/L (ref 21–32)
CREAT SERPL-MCNC: 1.2 MG/DL (ref 0.7–1.3)
DIFFERENTIAL METHOD BLD: ABNORMAL
EOSINOPHIL # BLD: 0.2 K/UL (ref 0–0.4)
EOSINOPHIL NFR BLD: 2 % (ref 0–7)
ERYTHROCYTE [DISTWIDTH] IN BLOOD BY AUTOMATED COUNT: 14.5 % (ref 11.5–14.5)
GLOBULIN SER CALC-MCNC: 4.5 G/DL (ref 2–4)
GLUCOSE SERPL-MCNC: 107 MG/DL (ref 65–100)
HCT VFR BLD AUTO: 36.6 % (ref 36.6–50.3)
HGB BLD-MCNC: 12.5 G/DL (ref 12.1–17)
IMM GRANULOCYTES # BLD AUTO: 0 K/UL
IMM GRANULOCYTES NFR BLD AUTO: 0 %
LDH SERPL L TO P-CCNC: 222 U/L (ref 85–241)
LYMPHOCYTES # BLD: 3.1 K/UL (ref 0.8–3.5)
LYMPHOCYTES NFR BLD: 38 % (ref 12–49)
MAGNESIUM SERPL-MCNC: 2 MG/DL (ref 1.6–2.4)
MCH RBC QN AUTO: 28.5 PG (ref 26–34)
MCHC RBC AUTO-ENTMCNC: 34.2 G/DL (ref 30–36.5)
MCV RBC AUTO: 83.4 FL (ref 80–99)
METAMYELOCYTES NFR BLD MANUAL: 3 %
MONOCYTES # BLD: 0.9 K/UL (ref 0–1)
MONOCYTES NFR BLD: 11 % (ref 5–13)
MYELOCYTES NFR BLD MANUAL: 1 %
NEUTS SEG # BLD: 3.6 K/UL (ref 1.8–8)
NEUTS SEG NFR BLD: 44 % (ref 32–75)
NRBC # BLD: 0 K/UL (ref 0–0.01)
NRBC BLD-RTO: 0 PER 100 WBC
PLATELET # BLD AUTO: 346 K/UL (ref 150–400)
PMV BLD AUTO: 9.4 FL (ref 8.9–12.9)
POTASSIUM SERPL-SCNC: 3.8 MMOL/L (ref 3.5–5.1)
PROT SERPL-MCNC: 7.8 G/DL (ref 6.4–8.2)
RBC # BLD AUTO: 4.39 M/UL (ref 4.1–5.7)
RBC MORPH BLD: ABNORMAL
SODIUM SERPL-SCNC: 139 MMOL/L (ref 136–145)
WBC # BLD AUTO: 8.1 K/UL (ref 4.1–11.1)

## 2022-07-11 PROCEDURE — 96413 CHEMO IV INFUSION 1 HR: CPT

## 2022-07-11 PROCEDURE — 74011000250 HC RX REV CODE- 250: Performed by: NURSE PRACTITIONER

## 2022-07-11 PROCEDURE — 80053 COMPREHEN METABOLIC PANEL: CPT

## 2022-07-11 PROCEDURE — 96417 CHEMO IV INFUS EACH ADDL SEQ: CPT

## 2022-07-11 PROCEDURE — 74011250637 HC RX REV CODE- 250/637: Performed by: NURSE PRACTITIONER

## 2022-07-11 PROCEDURE — 96367 TX/PROPH/DG ADDL SEQ IV INF: CPT

## 2022-07-11 PROCEDURE — 36415 COLL VENOUS BLD VENIPUNCTURE: CPT

## 2022-07-11 PROCEDURE — 84702 CHORIONIC GONADOTROPIN TEST: CPT

## 2022-07-11 PROCEDURE — 96375 TX/PRO/DX INJ NEW DRUG ADDON: CPT

## 2022-07-11 PROCEDURE — 83615 LACTATE (LD) (LDH) ENZYME: CPT

## 2022-07-11 PROCEDURE — 85025 COMPLETE CBC W/AUTO DIFF WBC: CPT

## 2022-07-11 PROCEDURE — 77030012965 HC NDL HUBR BBMI -A

## 2022-07-11 PROCEDURE — 96411 CHEMO IV PUSH ADDL DRUG: CPT

## 2022-07-11 PROCEDURE — 82105 ALPHA-FETOPROTEIN SERUM: CPT

## 2022-07-11 PROCEDURE — 74011000258 HC RX REV CODE- 258: Performed by: NURSE PRACTITIONER

## 2022-07-11 PROCEDURE — 74011250636 HC RX REV CODE- 250/636: Performed by: NURSE PRACTITIONER

## 2022-07-11 PROCEDURE — 83735 ASSAY OF MAGNESIUM: CPT

## 2022-07-11 RX ORDER — SODIUM CHLORIDE 9 MG/ML
25 INJECTION, SOLUTION INTRAVENOUS CONTINUOUS
Status: DISPENSED | OUTPATIENT
Start: 2022-07-11 | End: 2022-07-11

## 2022-07-11 RX ORDER — ACETAMINOPHEN 325 MG/1
650 TABLET ORAL ONCE
Status: COMPLETED | OUTPATIENT
Start: 2022-07-11 | End: 2022-07-11

## 2022-07-11 RX ORDER — SODIUM CHLORIDE 9 MG/ML
10 INJECTION INTRAMUSCULAR; INTRAVENOUS; SUBCUTANEOUS AS NEEDED
Status: ACTIVE | OUTPATIENT
Start: 2022-07-11 | End: 2022-07-11

## 2022-07-11 RX ORDER — HEPARIN 100 UNIT/ML
300-500 SYRINGE INTRAVENOUS AS NEEDED
Status: ACTIVE | OUTPATIENT
Start: 2022-07-11 | End: 2022-07-11

## 2022-07-11 RX ORDER — PALONOSETRON 0.05 MG/ML
0.25 INJECTION, SOLUTION INTRAVENOUS ONCE
Status: COMPLETED | OUTPATIENT
Start: 2022-07-11 | End: 2022-07-11

## 2022-07-11 RX ORDER — ACETAMINOPHEN 325 MG/1
650 TABLET ORAL AS NEEDED
Status: ACTIVE | OUTPATIENT
Start: 2022-07-11 | End: 2022-07-11

## 2022-07-11 RX ORDER — SODIUM CHLORIDE 0.9 % (FLUSH) 0.9 %
10 SYRINGE (ML) INJECTION AS NEEDED
Status: DISPENSED | OUTPATIENT
Start: 2022-07-11 | End: 2022-07-11

## 2022-07-11 RX ORDER — DIPHENHYDRAMINE HYDROCHLORIDE 50 MG/ML
25 INJECTION, SOLUTION INTRAMUSCULAR; INTRAVENOUS AS NEEDED
Status: ACTIVE | OUTPATIENT
Start: 2022-07-11 | End: 2022-07-11

## 2022-07-11 RX ORDER — ONDANSETRON 2 MG/ML
8 INJECTION INTRAMUSCULAR; INTRAVENOUS AS NEEDED
Status: ACTIVE | OUTPATIENT
Start: 2022-07-11 | End: 2022-07-11

## 2022-07-11 RX ORDER — DIPHENHYDRAMINE HYDROCHLORIDE 50 MG/ML
25 INJECTION, SOLUTION INTRAMUSCULAR; INTRAVENOUS ONCE
Status: COMPLETED | OUTPATIENT
Start: 2022-07-11 | End: 2022-07-11

## 2022-07-11 RX ADMIN — BLEOMYCIN 30 UNITS: 15 INJECTION, POWDER, LYOPHILIZED, FOR SOLUTION INTRAMUSCULAR; INTRAPLEURAL; INTRAVENOUS; SUBCUTANEOUS at 14:40

## 2022-07-11 RX ADMIN — PALONOSETRON 0.25 MG: 0.05 INJECTION, SOLUTION INTRAVENOUS at 13:39

## 2022-07-11 RX ADMIN — SODIUM CHLORIDE, PRESERVATIVE FREE 10 ML: 5 INJECTION INTRAVENOUS at 10:00

## 2022-07-11 RX ADMIN — ACETAMINOPHEN 650 MG: 325 TABLET ORAL at 13:39

## 2022-07-11 RX ADMIN — CISPLATIN 44 MG: 100 INJECTION, SOLUTION INTRAVENOUS at 16:05

## 2022-07-11 RX ADMIN — DIPHENHYDRAMINE HYDROCHLORIDE 25 MG: 50 INJECTION, SOLUTION INTRAMUSCULAR; INTRAVENOUS at 14:29

## 2022-07-11 RX ADMIN — HEPARIN 500 UNITS: 100 SYRINGE at 17:15

## 2022-07-11 RX ADMIN — SODIUM CHLORIDE 25 ML/HR: 9 INJECTION, SOLUTION INTRAVENOUS at 13:39

## 2022-07-11 RX ADMIN — POTASSIUM CHLORIDE: 2 INJECTION, SOLUTION, CONCENTRATE INTRAVENOUS at 12:35

## 2022-07-11 RX ADMIN — DEXAMETHASONE SODIUM PHOSPHATE 12 MG: 4 INJECTION, SOLUTION INTRAMUSCULAR; INTRAVENOUS at 13:43

## 2022-07-11 RX ADMIN — ETOPOSIDE 220 MG: 20 INJECTION, SOLUTION INTRAVENOUS at 15:00

## 2022-07-11 RX ADMIN — SODIUM CHLORIDE 150 MG: 900 INJECTION, SOLUTION INTRAVENOUS at 14:05

## 2022-07-11 NOTE — PROGRESS NOTES
[facility] - Chemotherapy Progress Note    8081 Pt admit to [facility] for BEP/C2D1 ambulatory in stable condition accompanied by spouse. Assessment completed by access RN. No new concerns voiced. Port accessed by access RN with positive blood return. Labs drawn per order and sent. Line flushed, clamped, Curos Cap applied to end clave. Awaiting lab results. Patient denies SOB, fever, cough, general not feeling well. Patient denies recent exposure to someone who has tested positive for COVID-19.  Patient  denies having contact with anyone who has a pending COVID test.     Visit Vitals  BP (!) 144/88   Pulse 96   Temp 97.3 °F (36.3 °C)   Resp 18   Ht 5' 11\" (1.803 m)   Wt 91.7 kg (202 lb 3.2 oz)   BMI 28.20 kg/m²         Medications Administered     0.9% sodium chloride 1,000 mL with potassium chloride 10 mEq, magnesium sulfate 2 g infusion     Admin Date  07/11/2022 Action  New Bag Dose   Rate  1,000 mL/hr Route  IntraVENous Administered By  Mary Molina RN          0.9% sodium chloride infusion     Admin Date  07/11/2022 Action  New Bag Dose  25 mL/hr Rate  25 mL/hr Route  IntraVENous Administered By  Mary Molina RN          0.9% sodium chloride injection 10 mL     Admin Date  07/11/2022 Action  Given Dose  10 mL Route  IntraVENous Administered By  Mary Molina RN          acetaminophen (TYLENOL) tablet 650 mg     Admin Date  07/11/2022 Action  Given Dose  650 mg Route  Oral Administered By  Mary Molina RN          bleomycin (BLEOCIN) 30 Units in 0.9% sodium chloride 50 mL, overfill volume 5 mL chemo infusion     Admin Date  07/11/2022 Action  New Bag Dose  30 Units Rate  390 mL/hr Route  IntraVENous Administered By  Mary Molina RN          CISplatin (PLATINOL) 44 mg in 0.9% sodium chloride 500 mL, overfill volume 50 mL chemo infusion     Admin Date  07/11/2022 Action  New Bag Dose  44 mg Rate  594 mL/hr Route  IntraVENous Administered By  Mary Molina RN dexamethasone (DECADRON) 12 mg in 0.9% sodium chloride 50 mL, overfill volume 5 mL IVPB     Admin Date  07/11/2022 Action  New Bag Dose  12 mg Rate  232 mL/hr Route  IntraVENous Administered By  Kenney Javier RN          diphenhydrAMINE (BENADRYL) injection 25 mg     Admin Date  07/11/2022 Action  Given Dose  25 mg Route  IntraVENous Administered By  Kenney Javier RN          etoposide (VEPESID) 220 mg in 0.9% sodium chloride 500 mL, overfill volume 50 mL chemo infusion     Admin Date  07/11/2022 Action  New Bag Dose  220 mg Rate  561 mL/hr Route  IntraVENous Administered By  Kenney Javier RN          fosaprepitant (EMEND) 150 mg in 0.9% sodium chloride 150 mL IVPB     Admin Date  07/11/2022 Action  New Bag Dose  150 mg Rate  450 mL/hr Route  IntraVENous Administered By  Kenney Javier RN          heparin (porcine) pf 300-500 Units     Admin Date  07/11/2022 Action  Given Dose  500 Units Route  InterCATHeter Administered By  Kenney Javier RN          palonosetron HCl (ALOXI) injection 0.25 mg     Admin Date  07/11/2022 Action  Given Dose  0.25 mg Route  IntraVENous Administered By  Kenney Javier RN          sodium chloride (NS) flush 10 mL     Admin Date  07/11/2022 Action  Given Dose  10 mL Route  IntraVENous Administered By  Kenney Javier RN                  Two nurses verified prior to administering:, Drug name, Drug dose, Infusion volume or drug volume when prepared in a syringe, Rate of administration, Route of administration, Expiration dates and/or times, Appearance and physical integrity of the drugs, Rate set on infusion pump, when used, Sequencing of drug administration         1720 Pt tolerated treatment well. Port maintained positive blood return throughout treatment, flushed with positive blood return at conclusion, heparinized and de-accessed per pt preference. D/c home ambulatory in no distress. Pt aware of next [facility] appointment scheduled for 07/12/22.     Recent Results (from the past 12 hour(s))   LD    Collection Time: 07/11/22 10:01 AM   Result Value Ref Range     85 - 241 U/L   CBC WITH AUTOMATED DIFF    Collection Time: 07/11/22 10:01 AM   Result Value Ref Range    WBC 8.1 4.1 - 11.1 K/uL    RBC 4.39 4. 10 - 5.70 M/uL    HGB 12.5 12.1 - 17.0 g/dL    HCT 36.6 36.6 - 50.3 %    MCV 83.4 80.0 - 99.0 FL    MCH 28.5 26.0 - 34.0 PG    MCHC 34.2 30.0 - 36.5 g/dL    RDW 14.5 11.5 - 14.5 %    PLATELET 492 882 - 395 K/uL    MPV 9.4 8.9 - 12.9 FL    NRBC 0.0 0  WBC    ABSOLUTE NRBC 0.00 0.00 - 0.01 K/uL    NEUTROPHILS 44 32 - 75 %    LYMPHOCYTES 38 12 - 49 %    MONOCYTES 11 5 - 13 %    EOSINOPHILS 2 0 - 7 %    BASOPHILS 1 0 - 1 %    METAMYELOCYTES 3 (H) 0 %    MYELOCYTES 1 (H) 0 %    IMMATURE GRANULOCYTES 0 %    ABS. NEUTROPHILS 3.6 1.8 - 8.0 K/UL    ABS. LYMPHOCYTES 3.1 0.8 - 3.5 K/UL    ABS. MONOCYTES 0.9 0.0 - 1.0 K/UL    ABS. EOSINOPHILS 0.2 0.0 - 0.4 K/UL    ABS. BASOPHILS 0.1 0.0 - 0.1 K/UL    ABS. IMM. GRANS. 0.0 K/UL    DF MANUAL      RBC COMMENTS NORMOCYTIC, NORMOCHROMIC     METABOLIC PANEL, COMPREHENSIVE    Collection Time: 07/11/22 10:01 AM   Result Value Ref Range    Sodium 139 136 - 145 mmol/L    Potassium 3.8 3.5 - 5.1 mmol/L    Chloride 107 97 - 108 mmol/L    CO2 27 21 - 32 mmol/L    Anion gap 5 5 - 15 mmol/L    Glucose 107 (H) 65 - 100 mg/dL    BUN 13 6 - 20 MG/DL    Creatinine 1.20 0.70 - 1.30 MG/DL    BUN/Creatinine ratio 11 (L) 12 - 20      GFR est AA >60 >60 ml/min/1.73m2    GFR est non-AA >60 >60 ml/min/1.73m2    Calcium 9.3 8.5 - 10.1 MG/DL    Bilirubin, total 0.2 0.2 - 1.0 MG/DL    ALT (SGPT) 30 12 - 78 U/L    AST (SGOT) 17 15 - 37 U/L    Alk.  phosphatase 75 45 - 117 U/L    Protein, total 7.8 6.4 - 8.2 g/dL    Albumin 3.3 (L) 3.5 - 5.0 g/dL    Globulin 4.5 (H) 2.0 - 4.0 g/dL    A-G Ratio 0.7 (L) 1.1 - 2.2     MAGNESIUM    Collection Time: 07/11/22 10:01 AM   Result Value Ref Range    Magnesium 2.0 1.6 - 2.4 mg/dL

## 2022-07-12 ENCOUNTER — APPOINTMENT (OUTPATIENT)
Dept: INFUSION THERAPY | Age: 37
End: 2022-07-12

## 2022-07-12 ENCOUNTER — HOSPITAL ENCOUNTER (OUTPATIENT)
Dept: INFUSION THERAPY | Age: 37
Discharge: HOME | End: 2022-07-12
Payer: COMMERCIAL

## 2022-07-12 VITALS
BODY MASS INDEX: 27.64 KG/M2 | OXYGEN SATURATION: 98 % | SYSTOLIC BLOOD PRESSURE: 153 MMHG | WEIGHT: 198.2 LBS | HEART RATE: 102 BPM | DIASTOLIC BLOOD PRESSURE: 86 MMHG | TEMPERATURE: 98 F | RESPIRATION RATE: 16 BRPM

## 2022-07-12 DIAGNOSIS — Z51.11 ENCOUNTER FOR ANTINEOPLASTIC CHEMOTHERAPY: Primary | ICD-10-CM

## 2022-07-12 DIAGNOSIS — C62.92 NON-SEMINOMATOUS CANCER OF LEFT TESTIS (HCC): ICD-10-CM

## 2022-07-12 LAB
AFP-TM SERPL-MCNC: 9.8 NG/ML (ref 0–6.9)
HCG INTACT+B SERPL-ACNC: 1 MIU/ML (ref 0–3)

## 2022-07-12 PROCEDURE — 96413 CHEMO IV INFUSION 1 HR: CPT

## 2022-07-12 PROCEDURE — 74011250636 HC RX REV CODE- 250/636: Performed by: NURSE PRACTITIONER

## 2022-07-12 PROCEDURE — 96375 TX/PRO/DX INJ NEW DRUG ADDON: CPT

## 2022-07-12 PROCEDURE — 96361 HYDRATE IV INFUSION ADD-ON: CPT

## 2022-07-12 PROCEDURE — 77030012965 HC NDL HUBR BBMI -A

## 2022-07-12 PROCEDURE — 74011000258 HC RX REV CODE- 258: Performed by: NURSE PRACTITIONER

## 2022-07-12 PROCEDURE — 74011000250 HC RX REV CODE- 250: Performed by: NURSE PRACTITIONER

## 2022-07-12 PROCEDURE — 96417 CHEMO IV INFUS EACH ADDL SEQ: CPT

## 2022-07-12 RX ORDER — DIPHENHYDRAMINE HYDROCHLORIDE 50 MG/ML
50 INJECTION, SOLUTION INTRAMUSCULAR; INTRAVENOUS AS NEEDED
Status: ACTIVE | OUTPATIENT
Start: 2022-07-12 | End: 2022-07-12

## 2022-07-12 RX ORDER — SODIUM CHLORIDE 9 MG/ML
10 INJECTION INTRAMUSCULAR; INTRAVENOUS; SUBCUTANEOUS AS NEEDED
Status: ACTIVE | OUTPATIENT
Start: 2022-07-12 | End: 2022-07-12

## 2022-07-12 RX ORDER — ALBUTEROL SULFATE 0.83 MG/ML
2.5 SOLUTION RESPIRATORY (INHALATION) AS NEEDED
Status: ACTIVE | OUTPATIENT
Start: 2022-07-12 | End: 2022-07-12

## 2022-07-12 RX ORDER — HEPARIN 100 UNIT/ML
300-500 SYRINGE INTRAVENOUS AS NEEDED
Status: ACTIVE | OUTPATIENT
Start: 2022-07-12 | End: 2022-07-12

## 2022-07-12 RX ORDER — EPINEPHRINE 1 MG/ML
0.3 INJECTION, SOLUTION, CONCENTRATE INTRAVENOUS AS NEEDED
Status: ACTIVE | OUTPATIENT
Start: 2022-07-12 | End: 2022-07-12

## 2022-07-12 RX ORDER — ACETAMINOPHEN 325 MG/1
650 TABLET ORAL AS NEEDED
Status: ACTIVE | OUTPATIENT
Start: 2022-07-12 | End: 2022-07-12

## 2022-07-12 RX ORDER — SODIUM CHLORIDE 0.9 % (FLUSH) 0.9 %
10 SYRINGE (ML) INJECTION AS NEEDED
Status: DISPENSED | OUTPATIENT
Start: 2022-07-12 | End: 2022-07-12

## 2022-07-12 RX ORDER — SODIUM CHLORIDE 9 MG/ML
25 INJECTION, SOLUTION INTRAVENOUS CONTINUOUS
Status: DISPENSED | OUTPATIENT
Start: 2022-07-12 | End: 2022-07-12

## 2022-07-12 RX ORDER — DIPHENHYDRAMINE HYDROCHLORIDE 50 MG/ML
25 INJECTION, SOLUTION INTRAMUSCULAR; INTRAVENOUS AS NEEDED
Status: ACTIVE | OUTPATIENT
Start: 2022-07-12 | End: 2022-07-12

## 2022-07-12 RX ORDER — HYDROCORTISONE SODIUM SUCCINATE 100 MG/2ML
100 INJECTION, POWDER, FOR SOLUTION INTRAMUSCULAR; INTRAVENOUS AS NEEDED
Status: ACTIVE | OUTPATIENT
Start: 2022-07-12 | End: 2022-07-12

## 2022-07-12 RX ORDER — ONDANSETRON 2 MG/ML
8 INJECTION INTRAMUSCULAR; INTRAVENOUS AS NEEDED
Status: ACTIVE | OUTPATIENT
Start: 2022-07-12 | End: 2022-07-12

## 2022-07-12 RX ADMIN — SODIUM CHLORIDE, PRESERVATIVE FREE 10 ML: 5 INJECTION INTRAVENOUS at 13:34

## 2022-07-12 RX ADMIN — ETOPOSIDE 220 MG: 20 INJECTION, SOLUTION INTRAVENOUS at 11:22

## 2022-07-12 RX ADMIN — MAGNESIUM SULFATE HEPTAHYDRATE: 500 INJECTION, SOLUTION INTRAMUSCULAR; INTRAVENOUS at 10:08

## 2022-07-12 RX ADMIN — DEXAMETHASONE SODIUM PHOSPHATE 12 MG: 4 INJECTION, SOLUTION INTRAMUSCULAR; INTRAVENOUS at 09:42

## 2022-07-12 RX ADMIN — HEPARIN 500 UNITS: 100 SYRINGE at 13:34

## 2022-07-12 RX ADMIN — CISPLATIN 44 MG: 100 INJECTION, SOLUTION INTRAVENOUS at 12:31

## 2022-07-12 RX ADMIN — SODIUM CHLORIDE 25 ML/HR: 9 INJECTION, SOLUTION INTRAVENOUS at 11:20

## 2022-07-12 NOTE — PROGRESS NOTES
[facility] Progress Note    Date: 2022    Name: Tyler Viera    MRN: [id number]         : 1985    Mr. Gilbert Arrived ambulatory and in no distress for cycle 2 day 2 of BEP regimen. Follow Up: Proceed with treatment- Etoposide & Cisplatin      Assessment was completed and documented in flowsheets. No acute concerns at this time. Port accessed without difficulty, labs drawn and processed. Chemotherapy Flowsheet 2022   Cycle C2D2   Date 2022   Drug / Regimen BEP   Pre Hydration given   Pre Meds given   Notes Etoposide and Cisplatin given         Mr. Gilbert's vitals were reviewed. Patient Vitals for the past 12 hrs:   Temp Pulse Resp BP SpO2   22 1339 -- (!) 102 -- (!) 153/86 --   22 0927 -- (!) 112 -- -- --   22 0914 98 °F (36.7 °C) (!) 121 16 (!) 167/85 98 %       Lines:   Venous Access Device Port 22 Upper chest (subclavicular area, right (Active)   Central Line Being Utilized Yes 22 0927   Criteria for Appropriate Use Irritant/vesicant medication 22 0955   Site Assessment Clean, dry, & intact 22 0927   Date of Last Dressing Change 22 0955   Dressing Status Clean, dry, & intact 22 0927   Dressing Type Transparent 22 0927   Action Taken Blood drawn 22 0955   Date Accessed (Medial Site) 22 0927   Access Time (Medial Site) 0915 22 0927   Access Needle Size (Site #1) 20 G 22 0927   Access Needle Length (Medial Site) 1 inch 22 0927   Positive Blood Return (Medial Site) Yes 22 3308   Action Taken (Medial Site) Flushed; Infusing 22 0927   Positive Blood Return (Lateral Site) Yes 22 1339   Action Taken (Lateral Site) Flushed; De-accessed 22 1339   Alcohol Cap Used Yes 22 1248        Lab results were obtained and reviewed. Labs within parameter for treatment. No results found for this or any previous visit (from the past 12 hour(s)).     Pre-medications  were administered as ordered and chemotherapy was initiated. Medications Administered     0.9% sodium chloride 1,000 mL with potassium chloride 10 mEq, magnesium sulfate 2 g infusion     Admin Date  07/12/2022 Action  New Bag Dose   Rate  1,000 mL/hr Route  IntraVENous Administered By  Madai Yarbrough RN          0.9% sodium chloride infusion     Admin Date  07/12/2022 Action  New Bag Dose  25 mL/hr Rate  25 mL/hr Route  IntraVENous Administered By  Madai Yarbrough RN          0.9% sodium chloride injection 10 mL     Admin Date  07/12/2022 Action  Given Dose  10 mL Route  IntraVENous Administered By  Madai Yarbrough RN          CISplatin (PLATINOL) 44 mg in 0.9% sodium chloride 500 mL, overfill volume 50 mL chemo infusion     Admin Date  07/12/2022 Action  New Bag Dose  44 mg Rate  594 mL/hr Route  IntraVENous Administered By  Madai Yarbrough RN          dexamethasone (DECADRON) 12 mg in 0.9% sodium chloride 50 mL, overfill volume 5 mL IVPB     Admin Date  07/12/2022 Action  New Bag Dose  12 mg Rate  232 mL/hr Route  IntraVENous Administered By  Madai Yarbrough RN          etoposide (VEPESID) 220 mg in 0.9% sodium chloride 500 mL, overfill volume 50 mL chemo infusion     Admin Date  07/12/2022 Action  New Bag Dose  220 mg Rate  561 mL/hr Route  IntraVENous Administered By  Madai Yarbrough RN          heparin (porcine) pf 300-500 Units     Admin Date  07/12/2022 Action  Given Dose  500 Units Route  InterCATHeter Administered By  Madai Yarbrough RN                Medication education and side effect management reinforced with patient. They verbalized understanding. Mr. Ángela Peterson tolerated treatment well, port flushed and de accessed, patient was discharged from [place] [address] in stable condition. Patient is aware of upcoming appointments.       Future Appointments   Date Time Provider Avila Terry   7/13/2022  8:30 AM A3 [address]   7/14/2022  9:00 AM A1 [address] 7/15/2022  9:00 AM A1 [facility] [address]   7/18/2022 10:00 AM B1 [facility] [address] [place] [address]   7/25/2022 10:00 AM B3 [facility] [address] [place]   7/29/2022  9:45 AM Bj FINNEY, NP [facility] AMB   8/1/2022 10:00 [facility]    8/2/2022  9:00 AM A3 [facility] [address] [facility]   8/3/2022  9:00 AM A3 [facility] [address] [facility]   8/4/2022  9:00 AM F4 [facility] [address] [facility]   8/5/2022 10:00 AM A3 [facility] [address] [place]   8/8/2022 10:00 AM C1 [facility] [address] [facility]   8/15/2022 10:00 AM A1 [facility] [address] [facility]   5/11/2023  9:00 AM Marcelo Levin MD [facility] BS AMB         Jocelin Woodward RN  July 12, 2022

## 2022-07-13 ENCOUNTER — HOSPITAL ENCOUNTER (OUTPATIENT)
Dept: INFUSION THERAPY | Age: 37
Discharge: HOME | End: 2022-07-13
Payer: COMMERCIAL

## 2022-07-13 ENCOUNTER — APPOINTMENT (OUTPATIENT)
Dept: INFUSION THERAPY | Age: 37
End: 2022-07-13

## 2022-07-13 VITALS
RESPIRATION RATE: 17 BRPM | HEART RATE: 81 BPM | TEMPERATURE: 97.6 F | DIASTOLIC BLOOD PRESSURE: 91 MMHG | SYSTOLIC BLOOD PRESSURE: 154 MMHG

## 2022-07-13 DIAGNOSIS — C62.92 NON-SEMINOMATOUS CANCER OF LEFT TESTIS (HCC): ICD-10-CM

## 2022-07-13 DIAGNOSIS — Z51.11 ENCOUNTER FOR ANTINEOPLASTIC CHEMOTHERAPY: Primary | ICD-10-CM

## 2022-07-13 PROCEDURE — 77030012965 HC NDL HUBR BBMI -A

## 2022-07-13 PROCEDURE — 96361 HYDRATE IV INFUSION ADD-ON: CPT

## 2022-07-13 PROCEDURE — 74011250636 HC RX REV CODE- 250/636: Performed by: NURSE PRACTITIONER

## 2022-07-13 PROCEDURE — 96417 CHEMO IV INFUS EACH ADDL SEQ: CPT

## 2022-07-13 PROCEDURE — 74011000250 HC RX REV CODE- 250: Performed by: NURSE PRACTITIONER

## 2022-07-13 PROCEDURE — 96413 CHEMO IV INFUSION 1 HR: CPT

## 2022-07-13 PROCEDURE — 74011000258 HC RX REV CODE- 258: Performed by: NURSE PRACTITIONER

## 2022-07-13 PROCEDURE — 96375 TX/PRO/DX INJ NEW DRUG ADDON: CPT

## 2022-07-13 RX ORDER — SODIUM CHLORIDE 9 MG/ML
10 INJECTION INTRAMUSCULAR; INTRAVENOUS; SUBCUTANEOUS AS NEEDED
Status: ACTIVE | OUTPATIENT
Start: 2022-07-13 | End: 2022-07-13

## 2022-07-13 RX ORDER — ONDANSETRON 2 MG/ML
8 INJECTION INTRAMUSCULAR; INTRAVENOUS AS NEEDED
Status: ACTIVE | OUTPATIENT
Start: 2022-07-13 | End: 2022-07-13

## 2022-07-13 RX ORDER — HEPARIN 100 UNIT/ML
300-500 SYRINGE INTRAVENOUS AS NEEDED
Status: ACTIVE | OUTPATIENT
Start: 2022-07-13 | End: 2022-07-13

## 2022-07-13 RX ORDER — DIPHENHYDRAMINE HYDROCHLORIDE 50 MG/ML
25 INJECTION, SOLUTION INTRAMUSCULAR; INTRAVENOUS AS NEEDED
Status: ACTIVE | OUTPATIENT
Start: 2022-07-13 | End: 2022-07-13

## 2022-07-13 RX ORDER — SODIUM CHLORIDE 0.9 % (FLUSH) 0.9 %
10 SYRINGE (ML) INJECTION AS NEEDED
Status: DISPENSED | OUTPATIENT
Start: 2022-07-13 | End: 2022-07-13

## 2022-07-13 RX ORDER — SODIUM CHLORIDE 9 MG/ML
25 INJECTION, SOLUTION INTRAVENOUS CONTINUOUS
Status: DISPENSED | OUTPATIENT
Start: 2022-07-13 | End: 2022-07-13

## 2022-07-13 RX ORDER — ACETAMINOPHEN 325 MG/1
650 TABLET ORAL AS NEEDED
Status: ACTIVE | OUTPATIENT
Start: 2022-07-13 | End: 2022-07-13

## 2022-07-13 RX ADMIN — CISPLATIN 44 MG: 100 INJECTION, SOLUTION INTRAVENOUS at 10:41

## 2022-07-13 RX ADMIN — ETOPOSIDE 220 MG: 20 INJECTION, SOLUTION INTRAVENOUS at 09:34

## 2022-07-13 RX ADMIN — HEPARIN 500 UNITS: 100 SYRINGE at 11:54

## 2022-07-13 RX ADMIN — POTASSIUM CHLORIDE: 2 INJECTION, SOLUTION, CONCENTRATE INTRAVENOUS at 09:26

## 2022-07-13 RX ADMIN — DEXAMETHASONE SODIUM PHOSPHATE 12 MG: 4 INJECTION, SOLUTION INTRAMUSCULAR; INTRAVENOUS at 09:00

## 2022-07-13 RX ADMIN — SODIUM CHLORIDE, PRESERVATIVE FREE 10 ML: 5 INJECTION INTRAVENOUS at 11:53

## 2022-07-13 RX ADMIN — SODIUM CHLORIDE 25 ML/HR: 9 INJECTION, SOLUTION INTRAVENOUS at 08:58

## 2022-07-13 NOTE — PROGRESS NOTES
[facility] Chemo Progress Note    Date: 2022    Name: Cleatis Hashimoto    MRN: [id number]         : 1985    0837 Mr. Gilbert Arrived to [facility] for C2 D 3 BEP (etoposide /Cisplatin) ambulatory in stable condition. Assessment was completed, no acute issues at this time, no new complaints voiced. Port accessed with positive blood return. Chemotherapy Flowsheet 2022   Cycle C2 D3   Date 2022   Drug / Regimen BEP   Pre Hydration given   Pre Meds given   Notes given         Patient denies SOB, fever, cough, general not feeling well. Patient denies recent exposure to someone who has tested positive for COVID-19. Patient denies having contact with anyone who has a pending COVID test.      Mr. Rosalie Santos vitals were reviewed. Patient Vitals for the past 12 hrs:   Temp Pulse Resp BP   22 1144 -- 81 -- (!) 154/91   22 0839 97.6 °F (36.4 °C) (!) 104 17 (!) 158/88         Lab results were obtained and reviewed. No results found for this or any previous visit (from the past 12 hour(s)). Pre-medications  were administered as ordered and chemotherapy was initiated.   Medications Administered     0.9% sodium chloride 1,000 mL with potassium chloride 10 mEq, magnesium sulfate 2 g infusion     Admin Date  2022 Action  New Bag Dose   Rate  1,000 mL/hr Route  IntraVENous Administered By  Kevin Dong RN           Admin Date  2022 Action  Rate Change Dose   Rate  439 mL/hr Route  IntraVENous Administered By  Kevin Dong RN          0.9% sodium chloride infusion     Admin Date  2022 Action  New Bag Dose  25 mL/hr Rate  25 mL/hr Route  IntraVENous Administered By  Kevin Dong RN          0.9% sodium chloride injection 10 mL     Admin Date  2022 Action  Given Dose  10 mL Route  IntraVENous Administered By  Kevin Dong RN          CISplatin (PLATINOL) 44 mg in 0.9% sodium chloride 500 mL, overfill volume 50 mL chemo infusion     Admin Date  2022 Action  New Bag Dose  44 mg Rate  594 mL/hr Route  IntraVENous Administered By  Deandre Funez RN          dexamethasone (DECADRON) 12 mg in 0.9% sodium chloride 50 mL, overfill volume 5 mL IVPB     Admin Date  07/13/2022 Action  New Bag Dose  12 mg Rate  232 mL/hr Route  IntraVENous Administered By  Deandre Funez RN          etoposide (VEPESID) 220 mg in 0.9% sodium chloride 500 mL, overfill volume 50 mL chemo infusion     Admin Date  07/13/2022 Action  New Bag Dose  220 mg Rate  561 mL/hr Route  IntraVENous Administered By  Deandre Funez RN          heparin (porcine) pf 300-500 Units     Admin Date  07/13/2022 Action  Given Dose  500 Units Route  InterCATHeter Administered By  Deandre Funez RN                Two nurses verified prior to administering:  Drug name, Drug dose, Infusion volume or drug volume when prepared in a syringe, Rate of administration, Route of administration, Expiration dates and/or times, Appearance and physical integrity of the drugs, Rate set on infusion pump, when used, and Sequencing of drug administration. 1155 Patient tolerated treatment well. Port maintained positive blood return throughout treatment. Port flushed, heparinized and de accessed per protocol. Patient was discharged from [facility] in stable condition. Patient aware of next appointment.      Future Appointments   Date Time Provider Avila Terry   7/14/2022  9:00 AM A1 [facility] [address]   7/15/2022  9:00 AM A1 [facility] [address]   7/18/2022 10:00 AM B1 [facility] [address]   7/25/2022 10:00 AM B3 [facility] [address]   7/29/2022  9:45 AM Ivett FINNEY, ALEXIS [facility] BS AMB   8/1/2022 10:00 AM C1 MELVIN  [address] [facility]   8/2/2022  9:00 AM A3 [facility] [address]   8/3/2022  9:00 AM A3 [facility] [address]   8/4/2022  9:00 AM F4 [facility] [address]   8/5/2022 10:00 AM A3 [facility] [address]   8/8/2022 10:00 AM  [facility]IN MED [place] [facility] [address] [facility]   8/15/2022 10:00 AM [address] [place] [address]   5/11/2023  9:00 AM Ethel Downing MD [facility] BS AMB         Shelly Ross RN  July 13, 2022

## 2022-07-14 ENCOUNTER — APPOINTMENT (OUTPATIENT)
Dept: INFUSION THERAPY | Age: 37
End: 2022-07-14

## 2022-07-14 ENCOUNTER — HOSPITAL ENCOUNTER (OUTPATIENT)
Dept: INFUSION THERAPY | Age: 37
Discharge: HOME | End: 2022-07-14
Payer: COMMERCIAL

## 2022-07-14 VITALS
HEART RATE: 71 BPM | BODY MASS INDEX: 29.29 KG/M2 | TEMPERATURE: 97.8 F | RESPIRATION RATE: 18 BRPM | HEIGHT: 71 IN | DIASTOLIC BLOOD PRESSURE: 93 MMHG | SYSTOLIC BLOOD PRESSURE: 161 MMHG | WEIGHT: 209.2 LBS

## 2022-07-14 DIAGNOSIS — Z51.11 ENCOUNTER FOR ANTINEOPLASTIC CHEMOTHERAPY: Primary | ICD-10-CM

## 2022-07-14 DIAGNOSIS — C62.92 NON-SEMINOMATOUS CANCER OF LEFT TESTIS (HCC): ICD-10-CM

## 2022-07-14 PROCEDURE — 74011000250 HC RX REV CODE- 250: Performed by: NURSE PRACTITIONER

## 2022-07-14 PROCEDURE — 74011000258 HC RX REV CODE- 258: Performed by: NURSE PRACTITIONER

## 2022-07-14 PROCEDURE — 74011250636 HC RX REV CODE- 250/636: Performed by: NURSE PRACTITIONER

## 2022-07-14 PROCEDURE — 96361 HYDRATE IV INFUSION ADD-ON: CPT

## 2022-07-14 PROCEDURE — 96417 CHEMO IV INFUS EACH ADDL SEQ: CPT

## 2022-07-14 PROCEDURE — 96375 TX/PRO/DX INJ NEW DRUG ADDON: CPT

## 2022-07-14 PROCEDURE — 77030012965 HC NDL HUBR BBMI -A

## 2022-07-14 PROCEDURE — 96413 CHEMO IV INFUSION 1 HR: CPT

## 2022-07-14 RX ORDER — ACETAMINOPHEN 325 MG/1
650 TABLET ORAL AS NEEDED
Status: ACTIVE | OUTPATIENT
Start: 2022-07-14 | End: 2022-07-14

## 2022-07-14 RX ORDER — SODIUM CHLORIDE 9 MG/ML
25 INJECTION, SOLUTION INTRAVENOUS CONTINUOUS
Status: DISPENSED | OUTPATIENT
Start: 2022-07-14 | End: 2022-07-14

## 2022-07-14 RX ORDER — ONDANSETRON 2 MG/ML
8 INJECTION INTRAMUSCULAR; INTRAVENOUS AS NEEDED
Status: ACTIVE | OUTPATIENT
Start: 2022-07-14 | End: 2022-07-14

## 2022-07-14 RX ORDER — HEPARIN 100 UNIT/ML
300-500 SYRINGE INTRAVENOUS AS NEEDED
Status: ACTIVE | OUTPATIENT
Start: 2022-07-14 | End: 2022-07-14

## 2022-07-14 RX ORDER — DIPHENHYDRAMINE HYDROCHLORIDE 50 MG/ML
25 INJECTION, SOLUTION INTRAMUSCULAR; INTRAVENOUS AS NEEDED
Status: ACTIVE | OUTPATIENT
Start: 2022-07-14 | End: 2022-07-14

## 2022-07-14 RX ORDER — SODIUM CHLORIDE 0.9 % (FLUSH) 0.9 %
10 SYRINGE (ML) INJECTION AS NEEDED
Status: DISPENSED | OUTPATIENT
Start: 2022-07-14 | End: 2022-07-14

## 2022-07-14 RX ORDER — SODIUM CHLORIDE 9 MG/ML
10 INJECTION INTRAMUSCULAR; INTRAVENOUS; SUBCUTANEOUS AS NEEDED
Status: ACTIVE | OUTPATIENT
Start: 2022-07-14 | End: 2022-07-14

## 2022-07-14 RX ORDER — PALONOSETRON 0.05 MG/ML
0.25 INJECTION, SOLUTION INTRAVENOUS ONCE
Status: COMPLETED | OUTPATIENT
Start: 2022-07-14 | End: 2022-07-14

## 2022-07-14 RX ADMIN — SODIUM CHLORIDE, PRESERVATIVE FREE 10 ML: 5 INJECTION INTRAVENOUS at 11:58

## 2022-07-14 RX ADMIN — SODIUM CHLORIDE, PRESERVATIVE FREE 10 ML: 5 INJECTION INTRAVENOUS at 09:10

## 2022-07-14 RX ADMIN — ETOPOSIDE 220 MG: 20 INJECTION, SOLUTION INTRAVENOUS at 09:49

## 2022-07-14 RX ADMIN — DEXAMETHASONE SODIUM PHOSPHATE 12 MG: 4 INJECTION, SOLUTION INTRAMUSCULAR; INTRAVENOUS at 09:17

## 2022-07-14 RX ADMIN — PALONOSETRON 0.25 MG: 0.05 INJECTION, SOLUTION INTRAVENOUS at 09:15

## 2022-07-14 RX ADMIN — CISPLATIN 44 MG: 100 INJECTION, SOLUTION INTRAVENOUS at 10:54

## 2022-07-14 RX ADMIN — SODIUM CHLORIDE 25 ML/HR: 9 INJECTION, SOLUTION INTRAVENOUS at 09:10

## 2022-07-14 RX ADMIN — POTASSIUM CHLORIDE: 2 INJECTION, SOLUTION, CONCENTRATE INTRAVENOUS at 09:36

## 2022-07-14 RX ADMIN — HEPARIN 500 UNITS: 100 SYRINGE at 11:58

## 2022-07-14 NOTE — PROGRESS NOTES
[facility] Chemo Progress Note    Date: July 14, 2022        0845: Mr. Regina Finn Arrived to [facility] for  C2 D4 BEP ambulatory in stable condition. Assessment was completed and port accessed by eKyon Santa RN. Criteria for treatment was met. Chemotherapy Flowsheet 7/14/2022   Cycle C2D4   Date 7/14/2022   Drug / Regimen BEP   Pre Hydration Given   Pre Meds Given   Notes Given       Mr. Gilbert's vitals were reviewed. Patient Vitals for the past 12 hrs:   Temp Pulse Resp BP   07/14/22 1159 -- 71 18 (!) 161/93   07/14/22 0845 97.8 °F (36.6 °C) 77 18 (!) 144/84       Pre-medications  were administered as ordered and chemotherapy was initiated.   Medications Administered     0.9% sodium chloride 1,000 mL with potassium chloride 10 mEq, magnesium sulfate 2 g infusion     Admin Date  07/14/2022 Action  New Bag Dose   Rate  1,000 mL/hr Route  IntraVENous Administered By  Kim Dux          0.9% sodium chloride infusion     Admin Date  07/14/2022 Action  New Bag Dose  25 mL/hr Rate  25 mL/hr Route  IntraVENous Administered By  Kim Dux          0.9% sodium chloride injection 10 mL     Admin Date  07/14/2022 Action  Given Dose  10 mL Route  IntraVENous Administered By  Amelia Prather Date  07/14/2022 Action  Given Dose  10 mL Route  IntraVENous Administered By  Kim Dux          CISplatin (PLATINOL) 44 mg in 0.9% sodium chloride 500 mL, overfill volume 50 mL chemo infusion     Admin Date  07/14/2022 Action  New Bag Dose  44 mg Rate  594 mL/hr Route  IntraVENous Administered By  Kim Dux          dexamethasone (DECADRON) 12 mg in 0.9% sodium chloride 50 mL, overfill volume 5 mL IVPB     Admin Date  07/14/2022 Action  New Bag Dose  12 mg Rate  232 mL/hr Route  IntraVENous Administered By  Kim Dux          etoposide (VEPESID) 220 mg in 0.9% sodium chloride 500 mL, overfill volume 50 mL chemo infusion     Admin Date  07/14/2022 Action  New Bag Dose  220 mg Rate  561 mL/hr Route  IntraVENous Administered By  Russell Shirley          heparin (porcine) pf 300-500 Units     Admin Date  07/14/2022 Action  Given Dose  500 Units Route  InterCATHeter Administered By  Russell Shirley          palonosetron HCl (ALOXI) injection 0.25 mg     Admin Date  07/14/2022 Action  Given Dose  0.25 mg Route  IntraVENous Administered By  Russell Shirley              Two nurses verified prior to administering: Drug name, Drug dose, Infusion volume or drug volume when prepared in a syringe, Rate of administration, Route of administration, Expiration dates and/or times, Appearance and physical integrity of the drugs, Rate set on infusion pump, when used, and Sequencing of drug administration. 1200: Patient tolerated treatment well. Port maintained positive blood return throughout treatment. Port flushed, heparinized and de accessed per protocol. Patient was discharged in stable condition. Patient is aware of next scheduled [facility] appointment on 7/15/22.     Future Appointments   Date Time Provider Avila Mara   7/15/2022  9:00 AM A1 [facility] [address] [place] [address]   7/18/2022 10:00 AM B1 [facility] [address] [place] [address]   7/25/2022 10:00 AM B3 [facility] [address] [place] [address]   7/29/2022  9:45 AM Mabel Degroot T, NP [facility] BS AMB   8/1/2022 10:00 AM C1 [facility] MED [place] [facility]   8/2/2022  9:00 AM A3 [facility] [address] [place] [address]   8/3/2022  9:00 AM A3 [facility] [address] [place] [address]   8/4/2022  9:00 AM F4 [facility] [address] [place] [address]   8/5/2022 10:00 AM A3 [facility] [address] [facility]   8/8/2022 10:00 AM C1 [facility] [address] [facility]   8/15/2022 10:00 AM A1 [facility] [address] [place] [address]   5/11/2023  9:00 AM MD SHIRA Ferrer BS AMB         Frida Ross RN  July 14, 2022

## 2022-07-15 ENCOUNTER — HOSPITAL ENCOUNTER (OUTPATIENT)
Dept: INFUSION THERAPY | Age: 37
Discharge: HOME | End: 2022-07-15
Payer: COMMERCIAL

## 2022-07-15 ENCOUNTER — APPOINTMENT (OUTPATIENT)
Dept: INFUSION THERAPY | Age: 37
End: 2022-07-15

## 2022-07-15 VITALS
DIASTOLIC BLOOD PRESSURE: 80 MMHG | RESPIRATION RATE: 16 BRPM | SYSTOLIC BLOOD PRESSURE: 138 MMHG | HEART RATE: 92 BPM | TEMPERATURE: 97.3 F

## 2022-07-15 DIAGNOSIS — C62.92 NON-SEMINOMATOUS CANCER OF LEFT TESTIS (HCC): ICD-10-CM

## 2022-07-15 DIAGNOSIS — Z51.11 ENCOUNTER FOR ANTINEOPLASTIC CHEMOTHERAPY: Primary | ICD-10-CM

## 2022-07-15 PROCEDURE — 96375 TX/PRO/DX INJ NEW DRUG ADDON: CPT

## 2022-07-15 PROCEDURE — 74011250636 HC RX REV CODE- 250/636: Performed by: NURSE PRACTITIONER

## 2022-07-15 PROCEDURE — 96367 TX/PROPH/DG ADDL SEQ IV INF: CPT

## 2022-07-15 PROCEDURE — 74011000258 HC RX REV CODE- 258: Performed by: NURSE PRACTITIONER

## 2022-07-15 PROCEDURE — 74011000250 HC RX REV CODE- 250: Performed by: NURSE PRACTITIONER

## 2022-07-15 PROCEDURE — 77030012965 HC NDL HUBR BBMI -A

## 2022-07-15 PROCEDURE — 96417 CHEMO IV INFUS EACH ADDL SEQ: CPT

## 2022-07-15 PROCEDURE — 96413 CHEMO IV INFUSION 1 HR: CPT

## 2022-07-15 RX ORDER — SODIUM CHLORIDE 9 MG/ML
10 INJECTION INTRAMUSCULAR; INTRAVENOUS; SUBCUTANEOUS AS NEEDED
Status: ACTIVE | OUTPATIENT
Start: 2022-07-15 | End: 2022-07-15

## 2022-07-15 RX ORDER — ONDANSETRON 2 MG/ML
8 INJECTION INTRAMUSCULAR; INTRAVENOUS AS NEEDED
Status: ACTIVE | OUTPATIENT
Start: 2022-07-15 | End: 2022-07-15

## 2022-07-15 RX ORDER — DIPHENHYDRAMINE HYDROCHLORIDE 50 MG/ML
25 INJECTION, SOLUTION INTRAMUSCULAR; INTRAVENOUS AS NEEDED
Status: ACTIVE | OUTPATIENT
Start: 2022-07-15 | End: 2022-07-15

## 2022-07-15 RX ORDER — ACETAMINOPHEN 325 MG/1
650 TABLET ORAL AS NEEDED
Status: ACTIVE | OUTPATIENT
Start: 2022-07-15 | End: 2022-07-15

## 2022-07-15 RX ORDER — SODIUM CHLORIDE 9 MG/ML
25 INJECTION, SOLUTION INTRAVENOUS CONTINUOUS
Status: DISPENSED | OUTPATIENT
Start: 2022-07-15 | End: 2022-07-15

## 2022-07-15 RX ORDER — HEPARIN 100 UNIT/ML
300-500 SYRINGE INTRAVENOUS AS NEEDED
Status: ACTIVE | OUTPATIENT
Start: 2022-07-15 | End: 2022-07-15

## 2022-07-15 RX ORDER — SODIUM CHLORIDE 0.9 % (FLUSH) 0.9 %
10 SYRINGE (ML) INJECTION AS NEEDED
Status: DISPENSED | OUTPATIENT
Start: 2022-07-15 | End: 2022-07-15

## 2022-07-15 RX ADMIN — SODIUM CHLORIDE, PRESERVATIVE FREE 10 ML: 5 INJECTION INTRAVENOUS at 09:08

## 2022-07-15 RX ADMIN — CISPLATIN 44 MG: 100 INJECTION, SOLUTION INTRAVENOUS at 12:04

## 2022-07-15 RX ADMIN — ETOPOSIDE 220 MG: 20 INJECTION, SOLUTION INTRAVENOUS at 11:00

## 2022-07-15 RX ADMIN — DEXAMETHASONE SODIUM PHOSPHATE 12 MG: 4 INJECTION, SOLUTION INTRAMUSCULAR; INTRAVENOUS at 10:35

## 2022-07-15 RX ADMIN — SODIUM CHLORIDE 25 ML/HR: 9 INJECTION, SOLUTION INTRAVENOUS at 10:34

## 2022-07-15 RX ADMIN — MAGNESIUM SULFATE HEPTAHYDRATE: 500 INJECTION, SOLUTION INTRAMUSCULAR; INTRAVENOUS at 09:30

## 2022-07-15 NOTE — PROGRESS NOTES
[facility] Chemotherapy Progress Note    0900 Pt admit to [facility] for BEP/C2D5 ambulatory in stable condition accompanied by spouse. Assessment completed. No new concerns voiced. Port accessed with positive blood return; line flushed and capped. Patient denies SOB, fever, cough, general not feeling well. Patient denies recent exposure to someone who has tested positive for COVID-19.  Patient  denies having contact with anyone who has a pending COVID test.     Visit Vitals  BP (!) 160/98   Pulse 72   Temp 97.3 °F (36.3 °C)   Resp 16     Medications Administered     0.9% sodium chloride 1,000 mL with potassium chloride 10 mEq, magnesium sulfate 2 g infusion     Admin Date  07/15/2022 Action  New Bag Dose   Rate  1,000 mL/hr Route  IntraVENous Administered By  Jazmin Vora RN          0.9% sodium chloride infusion     Admin Date  07/15/2022 Action  New Bag Dose  25 mL/hr Rate  25 mL/hr Route  IntraVENous Administered By  Jazmin Vora RN          0.9% sodium chloride injection 10 mL     Admin Date  07/15/2022 Action  Given Dose  10 mL Route  IntraVENous Administered By  Jazmin Vora RN          CISplatin (PLATINOL) 44 mg in 0.9% sodium chloride 500 mL, overfill volume 50 mL chemo infusion     Admin Date  07/15/2022 Action  New Bag Dose  44 mg Rate  594 mL/hr Route  IntraVENous Administered By  Jazmin Vora RN          dexamethasone (DECADRON) 12 mg in 0.9% sodium chloride 50 mL, overfill volume 5 mL IVPB     Admin Date  07/15/2022 Action  New Bag Dose  12 mg Rate  232 mL/hr Route  IntraVENous Administered By  Jazmin Vora RN          etoposide (VEPESID) 220 mg in 0.9% sodium chloride 500 mL, overfill volume 50 mL chemo infusion     Admin Date  07/15/2022 Action  New Bag Dose  220 mg Rate  561 mL/hr Route  IntraVENous Administered By  Jazmin Vora RN          sodium chloride (NS) flush 10 mL     Admin Date  07/15/2022 Action  Given Dose  10 mL Route  IntraVENous Administered By  Cecelia Duron RN                    Two nurses verified prior to administering:, Drug name, Drug dose, Infusion volume or drug volume when prepared in a syringe, Rate of administration, Route of administration, Expiration dates and/or times, Appearance and physical integrity of the drugs, Rate set on infusion pump, when used, Sequencing of drug administration         1330 Pt tolerated treatment well. Port maintained positive blood return throughout treatment, flushed with positive blood return at conclusion, heparinized and de-accessed. D/c home ambulatory in no distress. Pt aware of next [facility] appointment scheduled for 07/18/2022.

## 2022-07-18 ENCOUNTER — HOSPITAL ENCOUNTER (OUTPATIENT)
Dept: INFUSION THERAPY | Age: 37
Discharge: HOME | End: 2022-07-18
Payer: COMMERCIAL

## 2022-07-18 ENCOUNTER — APPOINTMENT (OUTPATIENT)
Dept: INFUSION THERAPY | Age: 37
End: 2022-07-18

## 2022-07-18 VITALS
HEIGHT: 71 IN | BODY MASS INDEX: 28.45 KG/M2 | DIASTOLIC BLOOD PRESSURE: 79 MMHG | RESPIRATION RATE: 16 BRPM | HEART RATE: 103 BPM | SYSTOLIC BLOOD PRESSURE: 117 MMHG | WEIGHT: 203.2 LBS | TEMPERATURE: 97.8 F

## 2022-07-18 DIAGNOSIS — C62.92 NON-SEMINOMATOUS CANCER OF LEFT TESTIS (HCC): ICD-10-CM

## 2022-07-18 DIAGNOSIS — Z51.11 ENCOUNTER FOR ANTINEOPLASTIC CHEMOTHERAPY: Primary | ICD-10-CM

## 2022-07-18 LAB
BASOPHILS # BLD: 0 K/UL (ref 0–0.1)
BASOPHILS NFR BLD: 0 % (ref 0–1)
DIFFERENTIAL METHOD BLD: ABNORMAL
EOSINOPHIL # BLD: 0.1 K/UL (ref 0–0.4)
EOSINOPHIL NFR BLD: 1 % (ref 0–7)
ERYTHROCYTE [DISTWIDTH] IN BLOOD BY AUTOMATED COUNT: 14.5 % (ref 11.5–14.5)
HCT VFR BLD AUTO: 37 % (ref 36.6–50.3)
HGB BLD-MCNC: 12.8 G/DL (ref 12.1–17)
IMM GRANULOCYTES # BLD AUTO: 0 K/UL
IMM GRANULOCYTES NFR BLD AUTO: 0 %
LYMPHOCYTES # BLD: 1.9 K/UL (ref 0.8–3.5)
LYMPHOCYTES NFR BLD: 21 % (ref 12–49)
MCH RBC QN AUTO: 28.8 PG (ref 26–34)
MCHC RBC AUTO-ENTMCNC: 34.6 G/DL (ref 30–36.5)
MCV RBC AUTO: 83.3 FL (ref 80–99)
MONOCYTES # BLD: 0 K/UL (ref 0–1)
MONOCYTES NFR BLD: 0 % (ref 5–13)
NEUTS SEG # BLD: 6.9 K/UL (ref 1.8–8)
NEUTS SEG NFR BLD: 78 % (ref 32–75)
NRBC # BLD: 0 K/UL (ref 0–0.01)
NRBC BLD-RTO: 0 PER 100 WBC
PLATELET # BLD AUTO: 406 K/UL (ref 150–400)
PMV BLD AUTO: 10.6 FL (ref 8.9–12.9)
RBC # BLD AUTO: 4.44 M/UL (ref 4.1–5.7)
RBC MORPH BLD: ABNORMAL
WBC # BLD AUTO: 8.9 K/UL (ref 4.1–11.1)

## 2022-07-18 PROCEDURE — 74011250636 HC RX REV CODE- 250/636: Performed by: NURSE PRACTITIONER

## 2022-07-18 PROCEDURE — 96361 HYDRATE IV INFUSION ADD-ON: CPT

## 2022-07-18 PROCEDURE — 74011000258 HC RX REV CODE- 258: Performed by: NURSE PRACTITIONER

## 2022-07-18 PROCEDURE — 77030012965 HC NDL HUBR BBMI -A

## 2022-07-18 PROCEDURE — 96409 CHEMO IV PUSH SNGL DRUG: CPT

## 2022-07-18 PROCEDURE — 85025 COMPLETE CBC W/AUTO DIFF WBC: CPT

## 2022-07-18 PROCEDURE — 96375 TX/PRO/DX INJ NEW DRUG ADDON: CPT

## 2022-07-18 PROCEDURE — 36415 COLL VENOUS BLD VENIPUNCTURE: CPT

## 2022-07-18 PROCEDURE — 74011250637 HC RX REV CODE- 250/637: Performed by: NURSE PRACTITIONER

## 2022-07-18 PROCEDURE — 74011000250 HC RX REV CODE- 250: Performed by: NURSE PRACTITIONER

## 2022-07-18 RX ORDER — SODIUM CHLORIDE 0.9 % (FLUSH) 0.9 %
10 SYRINGE (ML) INJECTION AS NEEDED
Status: DISPENSED | OUTPATIENT
Start: 2022-07-18 | End: 2022-07-18

## 2022-07-18 RX ORDER — DIPHENHYDRAMINE HYDROCHLORIDE 50 MG/ML
25 INJECTION, SOLUTION INTRAMUSCULAR; INTRAVENOUS ONCE
Status: COMPLETED | OUTPATIENT
Start: 2022-07-18 | End: 2022-07-18

## 2022-07-18 RX ORDER — SODIUM CHLORIDE 9 MG/ML
10 INJECTION INTRAMUSCULAR; INTRAVENOUS; SUBCUTANEOUS AS NEEDED
Status: DISPENSED | OUTPATIENT
Start: 2022-07-18 | End: 2022-07-18

## 2022-07-18 RX ORDER — ACETAMINOPHEN 325 MG/1
650 TABLET ORAL ONCE
Status: COMPLETED | OUTPATIENT
Start: 2022-07-18 | End: 2022-07-18

## 2022-07-18 RX ORDER — HEPARIN 100 UNIT/ML
300-500 SYRINGE INTRAVENOUS AS NEEDED
Status: ACTIVE | OUTPATIENT
Start: 2022-07-18 | End: 2022-07-18

## 2022-07-18 RX ORDER — SODIUM CHLORIDE 9 MG/ML
25 INJECTION, SOLUTION INTRAVENOUS CONTINUOUS
Status: DISCONTINUED | OUTPATIENT
Start: 2022-07-18 | End: 2022-07-20 | Stop reason: HOSPADM

## 2022-07-18 RX ADMIN — SODIUM CHLORIDE, PRESERVATIVE FREE 10 ML: 5 INJECTION INTRAVENOUS at 10:00

## 2022-07-18 RX ADMIN — SODIUM CHLORIDE 1000 ML: 9 INJECTION, SOLUTION INTRAVENOUS at 10:15

## 2022-07-18 RX ADMIN — ACETAMINOPHEN 650 MG: 325 TABLET ORAL at 11:37

## 2022-07-18 RX ADMIN — SODIUM CHLORIDE, PRESERVATIVE FREE 10 ML: 5 INJECTION INTRAVENOUS at 12:45

## 2022-07-18 RX ADMIN — HEPARIN 500 UNITS: 100 SYRINGE at 12:45

## 2022-07-18 RX ADMIN — SODIUM CHLORIDE 25 ML/HR: 9 INJECTION, SOLUTION INTRAVENOUS at 11:15

## 2022-07-18 RX ADMIN — DIPHENHYDRAMINE HYDROCHLORIDE 25 MG: 50 INJECTION, SOLUTION INTRAMUSCULAR; INTRAVENOUS at 11:38

## 2022-07-18 RX ADMIN — BLEOMYCIN 30 UNITS: 15 INJECTION, POWDER, LYOPHILIZED, FOR SOLUTION INTRAMUSCULAR; INTRAPLEURAL; INTRAVENOUS; SUBCUTANEOUS at 12:27

## 2022-07-18 NOTE — PROGRESS NOTES
[facility] Chemo Progress Note    Date: 2022    Name: Tommie Sanchez    MRN: [id number]         : 1985    1000 Mr. Gilbert Arrived to [facility] for C2D8 Bleomycin ambulatory in stable condition. Assessment was completed, no acute issues at this time, no new complaints voiced. Port accessed with positive blood return. Labs drawn and sent for processing. Chemotherapy Flowsheet 2022   Cycle C2D8   Date 2022   Drug / Regimen Bleomycin   Pre Hydration given   Pre Meds given   Notes given             Mr. Ayaz Velazquez vitals were reviewed. Patient Vitals for the past 12 hrs:   Temp Pulse Resp BP   22 1246 -- (!) 103 16 117/79   22 1005 97.8 °F (36.6 °C) (!) 124 16 (!) 140/88         Lab results were obtained and reviewed. Recent Results (from the past 12 hour(s))   CBC WITH AUTOMATED DIFF    Collection Time: 22 10:10 AM   Result Value Ref Range    WBC 8.9 4.1 - 11.1 K/uL    RBC 4.44 4.10 - 5.70 M/uL    HGB 12.8 12.1 - 17.0 g/dL    HCT 37.0 36.6 - 50.3 %    MCV 83.3 80.0 - 99.0 FL    MCH 28.8 26.0 - 34.0 PG    MCHC 34.6 30.0 - 36.5 g/dL    RDW 14.5 11.5 - 14.5 %    PLATELET 889 (H) 961 - 400 K/uL    MPV 10.6 8.9 - 12.9 FL    NRBC 0.0 0  WBC    ABSOLUTE NRBC 0.00 0.00 - 0.01 K/uL    NEUTROPHILS 78 (H) 32 - 75 %    LYMPHOCYTES 21 12 - 49 %    MONOCYTES 0 (L) 5 - 13 %    EOSINOPHILS 1 0 - 7 %    BASOPHILS 0 0 - 1 %    IMMATURE GRANULOCYTES 0 %    ABS. NEUTROPHILS 6.9 1.8 - 8.0 K/UL    ABS. LYMPHOCYTES 1.9 0.8 - 3.5 K/UL    ABS. MONOCYTES 0.0 0.0 - 1.0 K/UL    ABS. EOSINOPHILS 0.1 0.0 - 0.4 K/UL    ABS. BASOPHILS 0.0 0.0 - 0.1 K/UL    ABS. IMM. GRANS. 0.0 K/UL    DF MANUAL      RBC COMMENTS NORMOCYTIC, NORMOCHROMIC         Pre-medications  were administered as ordered and chemotherapy was initiated.   Medications Administered     0.9% sodium chloride infusion     Admin Date  2022 Action  New Bag Dose  25 mL/hr Rate  25 mL/hr Route  IntraVENous Administered By  Ilia Ortiz RN 0.9% sodium chloride injection 10 mL     Admin Date  07/18/2022 Action  Given Dose  10 mL Route  IntraVENous Administered By  Bhakti Sethi, SAEID          acetaminophen (TYLENOL) tablet 650 mg     Admin Date  07/18/2022 Action  Given Dose  650 mg Route  Oral Administered By  Bhakti Sethi, SAEID          bleomycin (BLEOCIN) 30 Units in 0.9% sodium chloride 50 mL, overfill volume 5 mL chemo infusion     Admin Date  07/18/2022 Action  New Bag Dose  30 Units Rate  390 mL/hr Route  IntraVENous Administered By  Bhakti Sethi, SAEID          diphenhydrAMINE (BENADRYL) injection 25 mg     Admin Date  07/18/2022 Action  Given Dose  25 mg Route  IntraVENous Administered By  Bhakti Sethi, SAEID          heparin (porcine) pf 300-500 Units     Admin Date  07/18/2022 Action  Given Dose  500 Units Route  InterCATHeter Administered By  Bhakti Sethi, SAEID          sodium chloride (NS) flush 10 mL     Admin Date  07/18/2022 Action  Given Dose  10 mL Route  IntraVENous Administered By  Bhakti Sethi, SAEID           Admin Date  07/18/2022 Action  Given Dose  10 mL Route  IntraVENous Administered By  Bhakti Sethi, SAEID          sodium chloride 0.9 % bolus infusion 1,000 mL     Admin Date  07/18/2022 Action  New Bag Dose  1,000 mL Rate  1,000 mL/hr Route  IntraVENous Administered By  Bhakti Sethi, SAEID                Two nurses verified prior to administering:  Drug name, Drug dose, Infusion volume or drug volume when prepared in a syringe, Rate of administration, Route of administration, Expiration dates and/or times, Appearance and physical integrity of the drugs, Rate set on infusion pump, when used, and Sequencing of drug administration. [id number] Patient tolerated treatment well. Port maintained positive blood return throughout treatment. Port flushed, heparinized and de accessed per protocol. Patient was discharged from [facility] in stable condition. Patient aware of next appointment.      Future Appointments   Date Time Provider Avila Mckenzieisti   7/25/2022 10:00 AM B3 [facility] [address]   7/29/2022  9:45 AM Carmenza Munguia T, NP [facility] BS AMB   8/1/2022 10:00 AM C[facility]    8/2/2022  9:00 AM A3 [facility] [address] [facility]   8/3/2022  9:00 AM A3 [facility] [address] [facility]   8/4/2022  9:00 AM F4 [facility] [address] [facility]   8/5/2022 10:00 AM A3 [facility] [address] [facility]   8/8/2022 10:00 AM C1 [facility] [address] [facility]   8/15/2022 10:00 AM A1 [facility] [address] [facility]   5/11/2023  9:00 AM Elle Blackwell MD [facility] BS AMB         Tramaine Liz RN  July 18, 2022

## 2022-07-25 ENCOUNTER — APPOINTMENT (OUTPATIENT)
Dept: INFUSION THERAPY | Age: 37
End: 2022-07-25

## 2022-07-25 ENCOUNTER — HOSPITAL ENCOUNTER (OUTPATIENT)
Dept: INFUSION THERAPY | Age: 37
Discharge: HOME | End: 2022-07-25
Payer: COMMERCIAL

## 2022-07-25 VITALS
HEART RATE: 89 BPM | TEMPERATURE: 98.7 F | WEIGHT: 202.8 LBS | BODY MASS INDEX: 28.39 KG/M2 | DIASTOLIC BLOOD PRESSURE: 85 MMHG | HEIGHT: 71 IN | OXYGEN SATURATION: 100 % | SYSTOLIC BLOOD PRESSURE: 137 MMHG | RESPIRATION RATE: 18 BRPM

## 2022-07-25 DIAGNOSIS — R79.89 ELEVATED SERUM CREATININE: ICD-10-CM

## 2022-07-25 DIAGNOSIS — C62.92 NON-SEMINOMATOUS CANCER OF LEFT TESTIS (HCC): Primary | ICD-10-CM

## 2022-07-25 DIAGNOSIS — Z51.11 ENCOUNTER FOR ANTINEOPLASTIC CHEMOTHERAPY: ICD-10-CM

## 2022-07-25 LAB
BASOPHILS # BLD: 0.1 K/UL (ref 0–0.1)
BASOPHILS NFR BLD: 3 % (ref 0–1)
DIFFERENTIAL METHOD BLD: ABNORMAL
EOSINOPHIL # BLD: 0.1 K/UL (ref 0–0.4)
EOSINOPHIL NFR BLD: 3 % (ref 0–7)
ERYTHROCYTE [DISTWIDTH] IN BLOOD BY AUTOMATED COUNT: 15 % (ref 11.5–14.5)
HCT VFR BLD AUTO: 33.3 % (ref 36.6–50.3)
HGB BLD-MCNC: 11.4 G/DL (ref 12.1–17)
IMM GRANULOCYTES # BLD AUTO: 0 K/UL
IMM GRANULOCYTES NFR BLD AUTO: 0 %
LYMPHOCYTES # BLD: 1.5 K/UL (ref 0.8–3.5)
LYMPHOCYTES NFR BLD: 51 % (ref 12–49)
MCH RBC QN AUTO: 29.2 PG (ref 26–34)
MCHC RBC AUTO-ENTMCNC: 34.2 G/DL (ref 30–36.5)
MCV RBC AUTO: 85.2 FL (ref 80–99)
MONOCYTES # BLD: 0.3 K/UL (ref 0–1)
MONOCYTES NFR BLD: 11 % (ref 5–13)
NEUTS SEG # BLD: 1 K/UL (ref 1.8–8)
NEUTS SEG NFR BLD: 32 % (ref 32–75)
NRBC # BLD: 0 K/UL (ref 0–0.01)
NRBC BLD-RTO: 0 PER 100 WBC
PLATELET # BLD AUTO: 219 K/UL (ref 150–400)
PMV BLD AUTO: 10.2 FL (ref 8.9–12.9)
RBC # BLD AUTO: 3.91 M/UL (ref 4.1–5.7)
RBC MORPH BLD: ABNORMAL
WBC # BLD AUTO: 3 K/UL (ref 4.1–11.1)

## 2022-07-25 PROCEDURE — 85025 COMPLETE CBC W/AUTO DIFF WBC: CPT

## 2022-07-25 PROCEDURE — 74011250637 HC RX REV CODE- 250/637: Performed by: NURSE PRACTITIONER

## 2022-07-25 PROCEDURE — 77030012965 HC NDL HUBR BBMI -A

## 2022-07-25 PROCEDURE — 96375 TX/PRO/DX INJ NEW DRUG ADDON: CPT

## 2022-07-25 PROCEDURE — 96361 HYDRATE IV INFUSION ADD-ON: CPT

## 2022-07-25 PROCEDURE — 74011000250 HC RX REV CODE- 250: Performed by: NURSE PRACTITIONER

## 2022-07-25 PROCEDURE — 96413 CHEMO IV INFUSION 1 HR: CPT

## 2022-07-25 PROCEDURE — 74011000258 HC RX REV CODE- 258: Performed by: NURSE PRACTITIONER

## 2022-07-25 PROCEDURE — 74011250636 HC RX REV CODE- 250/636: Performed by: NURSE PRACTITIONER

## 2022-07-25 PROCEDURE — 36415 COLL VENOUS BLD VENIPUNCTURE: CPT

## 2022-07-25 RX ORDER — SODIUM CHLORIDE 0.9 % (FLUSH) 0.9 %
10 SYRINGE (ML) INJECTION AS NEEDED
Status: DISPENSED | OUTPATIENT
Start: 2022-07-25 | End: 2022-07-25

## 2022-07-25 RX ORDER — EPINEPHRINE 1 MG/ML
0.3 INJECTION, SOLUTION, CONCENTRATE INTRAVENOUS AS NEEDED
Status: ACTIVE | OUTPATIENT
Start: 2022-07-25 | End: 2022-07-25

## 2022-07-25 RX ORDER — NYSTATIN 100000 [USP'U]/ML
1 SUSPENSION ORAL 4 TIMES DAILY
Qty: 480 ML | Refills: 0 | Status: SHIPPED | OUTPATIENT
Start: 2022-07-25

## 2022-07-25 RX ORDER — HYDROCORTISONE SODIUM SUCCINATE 100 MG/2ML
100 INJECTION, POWDER, FOR SOLUTION INTRAMUSCULAR; INTRAVENOUS AS NEEDED
Status: ACTIVE | OUTPATIENT
Start: 2022-07-25 | End: 2022-07-25

## 2022-07-25 RX ORDER — SODIUM CHLORIDE 9 MG/ML
25 INJECTION, SOLUTION INTRAVENOUS CONTINUOUS
Status: DISCONTINUED | OUTPATIENT
Start: 2022-07-25 | End: 2022-07-27 | Stop reason: HOSPADM

## 2022-07-25 RX ORDER — DIPHENHYDRAMINE HYDROCHLORIDE 50 MG/ML
25 INJECTION, SOLUTION INTRAMUSCULAR; INTRAVENOUS ONCE
Status: COMPLETED | OUTPATIENT
Start: 2022-07-25 | End: 2022-07-25

## 2022-07-25 RX ORDER — DIPHENHYDRAMINE HYDROCHLORIDE 50 MG/ML
50 INJECTION, SOLUTION INTRAMUSCULAR; INTRAVENOUS AS NEEDED
Status: ACTIVE | OUTPATIENT
Start: 2022-07-25 | End: 2022-07-25

## 2022-07-25 RX ORDER — SODIUM CHLORIDE 9 MG/ML
10 INJECTION INTRAMUSCULAR; INTRAVENOUS; SUBCUTANEOUS AS NEEDED
Status: ACTIVE | OUTPATIENT
Start: 2022-07-25 | End: 2022-07-25

## 2022-07-25 RX ORDER — ACETAMINOPHEN 325 MG/1
650 TABLET ORAL AS NEEDED
Status: ACTIVE | OUTPATIENT
Start: 2022-07-25 | End: 2022-07-25

## 2022-07-25 RX ORDER — ONDANSETRON 2 MG/ML
8 INJECTION INTRAMUSCULAR; INTRAVENOUS AS NEEDED
Status: ACTIVE | OUTPATIENT
Start: 2022-07-25 | End: 2022-07-25

## 2022-07-25 RX ORDER — ALBUTEROL SULFATE 0.83 MG/ML
2.5 SOLUTION RESPIRATORY (INHALATION) AS NEEDED
Status: ACTIVE | OUTPATIENT
Start: 2022-07-25 | End: 2022-07-25

## 2022-07-25 RX ORDER — HEPARIN 100 UNIT/ML
300-500 SYRINGE INTRAVENOUS AS NEEDED
Status: ACTIVE | OUTPATIENT
Start: 2022-07-25 | End: 2022-07-25

## 2022-07-25 RX ORDER — ACETAMINOPHEN 325 MG/1
650 TABLET ORAL ONCE
Status: COMPLETED | OUTPATIENT
Start: 2022-07-25 | End: 2022-07-25

## 2022-07-25 RX ORDER — DIPHENHYDRAMINE HYDROCHLORIDE 50 MG/ML
25 INJECTION, SOLUTION INTRAMUSCULAR; INTRAVENOUS AS NEEDED
Status: ACTIVE | OUTPATIENT
Start: 2022-07-25 | End: 2022-07-25

## 2022-07-25 RX ADMIN — HEPARIN 500 UNITS: 100 SYRINGE at 13:28

## 2022-07-25 RX ADMIN — DIPHENHYDRAMINE HYDROCHLORIDE 25 MG: 50 INJECTION, SOLUTION INTRAMUSCULAR; INTRAVENOUS at 11:56

## 2022-07-25 RX ADMIN — SODIUM CHLORIDE 25 ML/HR: 9 INJECTION, SOLUTION INTRAVENOUS at 12:49

## 2022-07-25 RX ADMIN — SODIUM CHLORIDE 1000 ML: 9 INJECTION, SOLUTION INTRAVENOUS at 11:49

## 2022-07-25 RX ADMIN — BLEOMYCIN 30 UNITS: 15 INJECTION, POWDER, LYOPHILIZED, FOR SOLUTION INTRAMUSCULAR; INTRAPLEURAL; INTRAVENOUS; SUBCUTANEOUS at 13:07

## 2022-07-25 RX ADMIN — ACETAMINOPHEN 650 MG: 325 TABLET ORAL at 11:56

## 2022-07-25 RX ADMIN — SODIUM CHLORIDE, PRESERVATIVE FREE 10 ML: 5 INJECTION INTRAVENOUS at 10:18

## 2022-07-25 RX ADMIN — SODIUM CHLORIDE, PRESERVATIVE FREE 10 ML: 5 INJECTION INTRAVENOUS at 10:19

## 2022-07-25 RX ADMIN — SODIUM CHLORIDE, PRESERVATIVE FREE 10 ML: 5 INJECTION INTRAVENOUS at 13:27

## 2022-07-25 NOTE — PROGRESS NOTES
[facility] Progress Note    Date: 2022    Name: Tommie Sanchez    MRN: [id number]         : 1985    Mr. Gilbert Arrived ambulatory and in no distress for cycle 2 day 15 of OP BEP regimen. Follow Up: Proceed with treatment- HYDRATION AND BLEOMYCIN    Assessment was completed and documented in flowsheets. No acute concerns at this time. Port accessed without difficulty, labs drawn and processed. Chemotherapy Flowsheet 2022   Cycle C2D15   Date 2022   Drug / Regimen Bleomycin   Pre Hydration given   Pre Meds given   Notes given         Mr. Gilbert's vitals were reviewed. Patient Vitals for the past 12 hrs:   Temp Pulse Resp BP SpO2   22 1328 -- 89 -- 137/85 --   22 0952 98.7 °F (37.1 °C) 96 18 123/79 100 %       Lines:   Venous Access Device Port 22 Upper chest (subclavicular area, right (Active)   Central Line Being Utilized Yes 22 1328   Criteria for Appropriate Use Irritant/vesicant medication 22 1000   Site Assessment Clean, dry, & intact 22 1328   Date of Last Dressing Change 22 1000   Dressing Status Clean, dry, & intact 22 1328   Dressing Type Transparent;Bandaid 22 1328   Action Taken Blood drawn 22 1000   Date Accessed (Medial Site) 22 1328   Access Time (Medial Site) 1000 22 1000   Access Needle Size (Site #1) 20 G 22 1000   Access Needle Length (Medial Site) 1 inch 22 1000   Positive Blood Return (Medial Site) Yes 22 1245   Action Taken (Medial Site) Flushed; De-accessed 22 1245   Positive Blood Return (Lateral Site) Yes 22 1328   Action Taken (Lateral Site) Flushed; Infusing; De-accessed 22 1328   Alcohol Cap Used Yes 22 0845        Lab results were obtained and reviewed. Labs within parameter for treatment.    Recent Results (from the past 12 hour(s))   CBC WITH AUTOMATED DIFF    Collection Time: 22 10:00 AM   Result Value Ref Range    WBC 3.0 (L) 4.1 - 11.1 K/uL    RBC 3.91 (L) 4.10 - 5.70 M/uL    HGB 11.4 (L) 12.1 - 17.0 g/dL    HCT 33.3 (L) 36.6 - 50.3 %    MCV 85.2 80.0 - 99.0 FL    MCH 29.2 26.0 - 34.0 PG    MCHC 34.2 30.0 - 36.5 g/dL    RDW 15.0 (H) 11.5 - 14.5 %    PLATELET 908 964 - 781 K/uL    MPV 10.2 8.9 - 12.9 FL    NRBC 0.0 0  WBC    ABSOLUTE NRBC 0.00 0.00 - 0.01 K/uL    NEUTROPHILS 32 32 - 75 %    LYMPHOCYTES 51 (H) 12 - 49 %    MONOCYTES 11 5 - 13 %    EOSINOPHILS 3 0 - 7 %    BASOPHILS 3 (H) 0 - 1 %    IMMATURE GRANULOCYTES 0 %    ABS. NEUTROPHILS 1.0 (L) 1.8 - 8.0 K/UL    ABS. LYMPHOCYTES 1.5 0.8 - 3.5 K/UL    ABS. MONOCYTES 0.3 0.0 - 1.0 K/UL    ABS. EOSINOPHILS 0.1 0.0 - 0.4 K/UL    ABS. BASOPHILS 0.1 0.0 - 0.1 K/UL    ABS. IMM. GRANS. 0.0 K/UL    DF MANUAL      RBC COMMENTS ANISOCYTOSIS  1+           Pre-medications  were administered as ordered and chemotherapy was initiated.   Medications Administered       0.9% sodium chloride infusion       Admin Date  07/25/2022 Action  New Bag Dose  25 mL/hr Rate  25 mL/hr Route  IntraVENous Administered By  Maya Hallman RN              0.9% sodium chloride injection 10 mL       Admin Date  07/25/2022 Action  Given Dose  10 mL Route  IntraVENous Administered By  Oswaldo Breen RN               Admin Date  07/25/2022 Action  Given Dose  10 mL Route  IntraVENous Administered By  Maya Hallman RN              acetaminophen (TYLENOL) tablet 650 mg       Admin Date  07/25/2022 Action  Given Dose  650 mg Route  Oral Administered By  Maya Hallman RN              bleomycin (BLEOCIN) 30 Units in 0.9% sodium chloride 50 mL, overfill volume 5 mL chemo infusion       Admin Date  07/25/2022 Action  New Bag Dose  30 Units Rate  390 mL/hr Route  IntraVENous Administered By  Maya Hallman RN              diphenhydrAMINE (BENADRYL) injection 25 mg       Admin Date  07/25/2022 Action  Given Dose  25 mg Route  IntraVENous Administered By  Maya Hallman RN              heparin (porcine) pf 300-500 Units Admin Date  07/25/2022 Action  Given Dose  500 Units Route  InterCATHeter Administered By  Thedore Sever, RN              sodium chloride (NS) flush 10 mL       Admin Date  07/25/2022 Action  Given Dose  10 mL Route  IntraVENous Administered By  Gladys Singh RN              sodium chloride 0.9 % bolus infusion 1,000 mL       Admin Date  07/25/2022 Action  New Bag Dose  1,000 mL Rate  1,000 mL/hr Route  IntraVENous Administered By  Thedore Sever, RN                    Medication education and side effect management reinforced with patient. They verbalized understanding. Mr. Garcia Fairly tolerated treatment well, port flushed and de accessed, patient was discharged from [place] [address] in stable condition. Patient is aware of upcoming appointments.       Future Appointments   Date Time Provider Avila Terry   7/29/2022  9:45 AM Alon Galeas NP [facility] BS AMB   8/1/2022 10:00 AM C1 [facility] [address]   8/2/2022  9:00 AM A3 [facility] MED [address]   8/3/2022  9:00 AM A3 [facility] MED [address]   8/4/2022  9:00 AM F4 [facility] MED [address]   8/5/2022 10:00 AM A3 [facility] MED [address]   8/8/2022 10:00 AM C1 [facility] MED [address]   8/15/2022 10:00 AM A1 [facility] MED [address]   5/11/2023  9:00 AM Bobby Beth MD [facility] BS AMB         Annia Woodward RN  July 25, 2022

## 2022-07-29 ENCOUNTER — OFFICE VISIT (OUTPATIENT)
Dept: ONCOLOGY | Age: 37
End: 2022-07-29
Payer: COMMERCIAL

## 2022-07-29 VITALS
HEIGHT: 71 IN | OXYGEN SATURATION: 98 % | WEIGHT: 204.6 LBS | DIASTOLIC BLOOD PRESSURE: 95 MMHG | RESPIRATION RATE: 16 BRPM | HEART RATE: 101 BPM | TEMPERATURE: 96.7 F | SYSTOLIC BLOOD PRESSURE: 144 MMHG | BODY MASS INDEX: 28.64 KG/M2

## 2022-07-29 DIAGNOSIS — B37.0 THRUSH: ICD-10-CM

## 2022-07-29 DIAGNOSIS — K12.30 MUCOSITIS: ICD-10-CM

## 2022-07-29 DIAGNOSIS — D70.1 CHEMOTHERAPY INDUCED NEUTROPENIA (HCC): ICD-10-CM

## 2022-07-29 DIAGNOSIS — C62.92 NON-SEMINOMATOUS CANCER OF LEFT TESTIS (HCC): Primary | ICD-10-CM

## 2022-07-29 DIAGNOSIS — T45.1X5A CHEMOTHERAPY INDUCED NEUTROPENIA (HCC): ICD-10-CM

## 2022-07-29 PROCEDURE — 99214 OFFICE O/P EST MOD 30 MIN: CPT | Performed by: INTERNAL MEDICINE

## 2022-07-29 RX ORDER — MESALAMINE 0.38 G/1
CAPSULE, EXTENDED RELEASE ORAL
COMMUNITY
Start: 2022-07-08

## 2022-07-29 NOTE — PROGRESS NOTES
Katia Herrera is a 40 y.o. male here for follow up of testicular cancer. 1. Have you been to the ER, urgent care clinic since your last visit? Hospitalized since your last visit? No    2. Have you seen or consulted any other health care providers outside of the [address] [place] [address] since your last visit? Include any pap smears or colon screening.  No

## 2022-07-29 NOTE — PROGRESS NOTES
[facility] at [address] [facility]  Lisandro Dowide: [phone]  F: [phone]      Reason for Visit:   Jeniffer Ortiz is a 40 y.o. male who is seen in follow up for evaluation of testicular cancer. Hematology/Oncology Treatment History:   Scrotal US 11/30/2021: 3.9cm solid and cystic mass replacing left testicle. Slightly atrophic right testicle with no mass. Incidental 8mm left epididymal head cyst/spermatocele  Pre-op tumor markers 12/2/2021: .0, beta hCG 250,   Left radical inguinal orchiectomy by Dr. 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 Negative LVI, negative margins. CT A/P 12/16/2021: indeterminate splenic lesion. 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 No lymphadenopathy or other evidence of metastasis. Significant hepatic steatosis. Tumor markers 5/17/2022: AFP 5.3, beta hCG 14  Stage IS (pT1b cN0 M0 S1) Non-seminoma, LEFT testis    History of Present Illness:   Presents for follow up after C2 of therapy. Mouth sores seem to be improving. [place] [facility] is improving. Taste changes have improved. Appetite has been good. No nausea or vomiting. Seems to have some nausea mid week during first week of therapy. Moderate fatigue after treatment this past week. Slept most of the day on Tuesday. He is unaccompanied today. He lives in [facility] with his wife and two young children (ages 3 and 3). Review of Systems: A complete review of systems was obtained, reviewed.   Pertinent findings reviewed above.    Physical Exam:     Visit Vitals  BP (!) 144/95 (BP 1 Location: Right arm, BP Patient Position: Sitting)   Pulse (!) 101   Temp (!) 96.7 °F (35.9 °C) (Oral)   Resp 16   Ht 5' 11\" (1.803 m)   Wt 204 lb 9.6 oz (92.8 kg)   SpO2 98%   BMI 28.54 kg/m²       ECOG PS: 0  General: no distress  Eyes: PERRLA, anicteric sclerae  HENT: atraumatic, oropharynx clear  Neck: supple  Lymphatic: no cervical, supraclavicular, or inguinal adenopathy  Respiratory: CTAB, normal respiratory effort  CV: normal rate, regular rhythm, no murmurs, no peripheral edema  GI: soft, nontender, nondistended, no masses, no hepatomegaly, no splenomegaly  MS: digits without clubbing or cyanosis. Skin: no rashes, no ecchymoses, no petechia. normal temperature, turgor, and texture. Psych: alert, oriented, appropriate affect, normal judgment/insight    Results:     Lab Results   Component Value Date/Time    WBC 3.0 (L) 07/25/2022 10:00 AM    HGB 11.4 (L) 07/25/2022 10:00 AM    HCT 33.3 (L) 07/25/2022 10:00 AM    PLATELET 809 26/30/9548 10:00 AM    MCV 85.2 07/25/2022 10:00 AM     Lab Results   Component Value Date/Time    Sodium 139 07/11/2022 10:01 AM    Potassium 3.8 07/11/2022 10:01 AM    Chloride 107 07/11/2022 10:01 AM    CO2 27 07/11/2022 10:01 AM    Anion gap 5 07/11/2022 10:01 AM    Glucose 107 (H) 07/11/2022 10:01 AM    BUN 13 07/11/2022 10:01 AM    Creatinine 1.20 07/11/2022 10:01 AM    BUN/Creatinine ratio 11 (L) 07/11/2022 10:01 AM    GFR est AA >60 07/11/2022 10:01 AM    GFR est non-AA >60 07/11/2022 10:01 AM    Calcium 9.3 07/11/2022 10:01 AM    Bilirubin, total 0.2 07/11/2022 10:01 AM    Alk.  phosphatase 75 07/11/2022 10:01 AM    Protein, total 7.8 07/11/2022 10:01 AM    Albumin 3.3 (L) 07/11/2022 10:01 AM    Globulin 4.5 (H) 07/11/2022 10:01 AM    A-G Ratio 0.7 (L) 07/11/2022 10:01 AM    ALT (SGPT) 30 07/11/2022 10:01 AM    AST (SGOT) 17 07/11/2022 10:01 AM     LDH:  Recent Labs     07/11/22  1001 06/20/22  0916 06/01/22  1543  200 186     Beta-HCG:  Recent Labs     22  1001 22  0916 22  1543   HCGTLT 1 24* 19*     AFP:  Recent Labs     22  1001 22  0916 22  1543   AFP 9.8* 6.3 5.2     2021  AFP: 168.0 (H)  beta hC (H)  LDH: 190    2022  AFP: 6.2  beta hCG: <1    2022  AFP:  4.6  beta hCG: <1    2022:  AFP:  5.6  beta hC    2022:  AFP:  5.3  beta hC    Records from [facility] reviewed and summarized above. Test results above have been reviewed. CT Chest 2022:  No evidence of intrathoracic malignancy. Assessment:   1) Non-seminoma, LEFT testis  Stage IS  He is s/p orchiectomy 2021. His pre-op hCG was elevated at 250, but became undetectable after surgery. Up to 19 post surgery concerning for recurrent disease. CXR and CT A/P were negative. Baseline CT chest negative/normal.    He has undergone therapy with BEP x 3 cycles. Will obtain last cycle on 2022. He will move to surveillance after upcoming cycle of therapy. Will obtain labs in 4-6 weeks with PF. 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 Jazz Cordoba (surgical oncology). 3) Ulcerative proctitis  Following with Dr. Jared Mitchell. Plans to hold Humira while on chemotherapy. On therapy with mesalamine. No scheduled fu with GI at this point. 4) Risk of infertility  He was informed that chemotherapy can potentially cause infertility.   He has already had a vasectomy and is not interested in having more children. 5) Mucositis, thrush  Now off Hydrocodone. Nystatin 4 times daily PRN. Improving. 6) Neutropenia, chemotherapy induced  Seen with C1D16 of therapy. Recovered on 7/8/2022. 7) Port   Will remain in place for now, consider removal on surveillance    Plan:     Proceed 8/1/2022 with C3 of BEP (Bleomycin 30 units Days 2, 9, 16; Etoposide 100mg/m2 Days 1-5, Cisplatin 20mg/m2 Days 1-5) given every 21 days x 3 cycles. Labs with each cycle: CBC, CMP, AFP, Beta hCG, LDH  Prophylactic antiemetics: Ondansetron and Dexamethasone IV days 1-5  PRN antiemetics: Ondansetron and Prochlorperazine at home  Set up PF/labs in 6 weeks  Return to see me in 6 weeks    I personally saw and evaluated the patient and performed the key components of medical decision making. The history, physical exam, and documentation were performed by Steven Canela NP. I reviewed and verified the above documentation and modified it as needed. Plan to proceed with C3 of BEP on 8/1/22. Pt is recovering well from prior cycle and oral thrush. Reviewed difference between thrush and mucositis. Pt has moderate neutropenia, but counts adequate to proceed.      Signed By: Clair Jessica MD

## 2022-08-01 ENCOUNTER — APPOINTMENT (OUTPATIENT)
Dept: INFUSION THERAPY | Age: 37
End: 2022-08-01

## 2022-08-01 ENCOUNTER — HOSPITAL ENCOUNTER (OUTPATIENT)
Dept: INFUSION THERAPY | Age: 37
Discharge: HOME | End: 2022-08-01
Payer: COMMERCIAL

## 2022-08-01 VITALS
HEIGHT: 71 IN | RESPIRATION RATE: 18 BRPM | HEART RATE: 102 BPM | BODY MASS INDEX: 28.5 KG/M2 | SYSTOLIC BLOOD PRESSURE: 129 MMHG | DIASTOLIC BLOOD PRESSURE: 80 MMHG | WEIGHT: 203.6 LBS | TEMPERATURE: 98.5 F

## 2022-08-01 DIAGNOSIS — Z51.11 ENCOUNTER FOR ANTINEOPLASTIC CHEMOTHERAPY: Primary | ICD-10-CM

## 2022-08-01 DIAGNOSIS — C62.92 NON-SEMINOMATOUS CANCER OF LEFT TESTIS (HCC): ICD-10-CM

## 2022-08-01 LAB
ALBUMIN SERPL-MCNC: 3.5 G/DL (ref 3.5–5)
ALBUMIN/GLOB SERPL: 0.7 {RATIO} (ref 1.1–2.2)
ALP SERPL-CCNC: 91 U/L (ref 45–117)
ALT SERPL-CCNC: 31 U/L (ref 12–78)
ANION GAP SERPL CALC-SCNC: 6 MMOL/L (ref 5–15)
AST SERPL-CCNC: 13 U/L (ref 15–37)
BASOPHILS # BLD: 0 K/UL (ref 0–0.1)
BASOPHILS NFR BLD: 0 % (ref 0–1)
BILIRUB SERPL-MCNC: 0.3 MG/DL (ref 0.2–1)
BUN SERPL-MCNC: 17 MG/DL (ref 6–20)
BUN/CREAT SERPL: 14 (ref 12–20)
CALCIUM SERPL-MCNC: 9.5 MG/DL (ref 8.5–10.1)
CHLORIDE SERPL-SCNC: 105 MMOL/L (ref 97–108)
CO2 SERPL-SCNC: 28 MMOL/L (ref 21–32)
CREAT SERPL-MCNC: 1.23 MG/DL (ref 0.7–1.3)
DIFFERENTIAL METHOD BLD: ABNORMAL
EOSINOPHIL # BLD: 0.2 K/UL (ref 0–0.4)
EOSINOPHIL NFR BLD: 3 % (ref 0–7)
ERYTHROCYTE [DISTWIDTH] IN BLOOD BY AUTOMATED COUNT: 16.2 % (ref 11.5–14.5)
GLOBULIN SER CALC-MCNC: 4.8 G/DL (ref 2–4)
GLUCOSE SERPL-MCNC: 139 MG/DL (ref 65–100)
HCT VFR BLD AUTO: 36.8 % (ref 36.6–50.3)
HGB BLD-MCNC: 12.4 G/DL (ref 12.1–17)
IMM GRANULOCYTES # BLD AUTO: 0 K/UL
IMM GRANULOCYTES NFR BLD AUTO: 0 %
LDH SERPL L TO P-CCNC: 190 U/L (ref 85–241)
LYMPHOCYTES # BLD: 2.4 K/UL (ref 0.8–3.5)
LYMPHOCYTES NFR BLD: 35 % (ref 12–49)
MAGNESIUM SERPL-MCNC: 2 MG/DL (ref 1.6–2.4)
MCH RBC QN AUTO: 28.9 PG (ref 26–34)
MCHC RBC AUTO-ENTMCNC: 33.7 G/DL (ref 30–36.5)
MCV RBC AUTO: 85.8 FL (ref 80–99)
METAMYELOCYTES NFR BLD MANUAL: 2 %
MONOCYTES # BLD: 0.8 K/UL (ref 0–1)
MONOCYTES NFR BLD: 12 % (ref 5–13)
NEUTS SEG # BLD: 3.3 K/UL (ref 1.8–8)
NEUTS SEG NFR BLD: 48 % (ref 32–75)
NRBC # BLD: 0 K/UL (ref 0–0.01)
NRBC BLD-RTO: 0 PER 100 WBC
PLATELET # BLD AUTO: 317 K/UL (ref 150–400)
PMV BLD AUTO: 9.7 FL (ref 8.9–12.9)
POTASSIUM SERPL-SCNC: 3.9 MMOL/L (ref 3.5–5.1)
PROT SERPL-MCNC: 8.3 G/DL (ref 6.4–8.2)
RBC # BLD AUTO: 4.29 M/UL (ref 4.1–5.7)
RBC MORPH BLD: ABNORMAL
SODIUM SERPL-SCNC: 139 MMOL/L (ref 136–145)
WBC # BLD AUTO: 6.9 K/UL (ref 4.1–11.1)

## 2022-08-01 PROCEDURE — 74011000250 HC RX REV CODE- 250: Performed by: NURSE PRACTITIONER

## 2022-08-01 PROCEDURE — 85025 COMPLETE CBC W/AUTO DIFF WBC: CPT

## 2022-08-01 PROCEDURE — 74011250636 HC RX REV CODE- 250/636: Performed by: NURSE PRACTITIONER

## 2022-08-01 PROCEDURE — 96417 CHEMO IV INFUS EACH ADDL SEQ: CPT

## 2022-08-01 PROCEDURE — 83615 LACTATE (LD) (LDH) ENZYME: CPT

## 2022-08-01 PROCEDURE — 83735 ASSAY OF MAGNESIUM: CPT

## 2022-08-01 PROCEDURE — 74011250637 HC RX REV CODE- 250/637: Performed by: NURSE PRACTITIONER

## 2022-08-01 PROCEDURE — 96366 THER/PROPH/DIAG IV INF ADDON: CPT

## 2022-08-01 PROCEDURE — 80053 COMPREHEN METABOLIC PANEL: CPT

## 2022-08-01 PROCEDURE — 82105 ALPHA-FETOPROTEIN SERUM: CPT

## 2022-08-01 PROCEDURE — 84702 CHORIONIC GONADOTROPIN TEST: CPT

## 2022-08-01 PROCEDURE — 96413 CHEMO IV INFUSION 1 HR: CPT

## 2022-08-01 PROCEDURE — 36415 COLL VENOUS BLD VENIPUNCTURE: CPT

## 2022-08-01 PROCEDURE — 96375 TX/PRO/DX INJ NEW DRUG ADDON: CPT

## 2022-08-01 PROCEDURE — 96367 TX/PROPH/DG ADDL SEQ IV INF: CPT

## 2022-08-01 PROCEDURE — 74011000258 HC RX REV CODE- 258: Performed by: NURSE PRACTITIONER

## 2022-08-01 PROCEDURE — 96411 CHEMO IV PUSH ADDL DRUG: CPT

## 2022-08-01 PROCEDURE — 77030012965 HC NDL HUBR BBMI -A

## 2022-08-01 RX ORDER — ACETAMINOPHEN 325 MG/1
650 TABLET ORAL ONCE
Status: COMPLETED | OUTPATIENT
Start: 2022-08-01 | End: 2022-08-01

## 2022-08-01 RX ORDER — ACETAMINOPHEN 325 MG/1
650 TABLET ORAL AS NEEDED
Status: ACTIVE | OUTPATIENT
Start: 2022-08-01 | End: 2022-08-01

## 2022-08-01 RX ORDER — HEPARIN 100 UNIT/ML
300-500 SYRINGE INTRAVENOUS AS NEEDED
Status: ACTIVE | OUTPATIENT
Start: 2022-08-01 | End: 2022-08-01

## 2022-08-01 RX ORDER — SODIUM CHLORIDE 0.9 % (FLUSH) 0.9 %
10 SYRINGE (ML) INJECTION AS NEEDED
Status: DISPENSED | OUTPATIENT
Start: 2022-08-01 | End: 2022-08-01

## 2022-08-01 RX ORDER — SODIUM CHLORIDE 9 MG/ML
10 INJECTION INTRAMUSCULAR; INTRAVENOUS; SUBCUTANEOUS AS NEEDED
Status: ACTIVE | OUTPATIENT
Start: 2022-08-01 | End: 2022-08-01

## 2022-08-01 RX ORDER — PALONOSETRON 0.05 MG/ML
0.25 INJECTION, SOLUTION INTRAVENOUS ONCE
Status: COMPLETED | OUTPATIENT
Start: 2022-08-01 | End: 2022-08-01

## 2022-08-01 RX ORDER — DIPHENHYDRAMINE HYDROCHLORIDE 50 MG/ML
25 INJECTION, SOLUTION INTRAMUSCULAR; INTRAVENOUS ONCE
Status: COMPLETED | OUTPATIENT
Start: 2022-08-01 | End: 2022-08-01

## 2022-08-01 RX ORDER — DIPHENHYDRAMINE HYDROCHLORIDE 50 MG/ML
25 INJECTION, SOLUTION INTRAMUSCULAR; INTRAVENOUS AS NEEDED
Status: ACTIVE | OUTPATIENT
Start: 2022-08-01 | End: 2022-08-01

## 2022-08-01 RX ORDER — ONDANSETRON 2 MG/ML
8 INJECTION INTRAMUSCULAR; INTRAVENOUS AS NEEDED
Status: ACTIVE | OUTPATIENT
Start: 2022-08-01 | End: 2022-08-01

## 2022-08-01 RX ORDER — SODIUM CHLORIDE 9 MG/ML
25 INJECTION, SOLUTION INTRAVENOUS CONTINUOUS
Status: DISPENSED | OUTPATIENT
Start: 2022-08-01 | End: 2022-08-01

## 2022-08-01 RX ADMIN — PALONOSETRON 0.25 MG: 0.05 INJECTION, SOLUTION INTRAVENOUS at 11:27

## 2022-08-01 RX ADMIN — ETOPOSIDE 220 MG: 20 INJECTION, SOLUTION INTRAVENOUS at 12:51

## 2022-08-01 RX ADMIN — Medication 500 UNITS: at 15:05

## 2022-08-01 RX ADMIN — SODIUM CHLORIDE, PRESERVATIVE FREE 10 ML: 5 INJECTION INTRAVENOUS at 10:15

## 2022-08-01 RX ADMIN — CISPLATIN 44 MG: 100 INJECTION, SOLUTION INTRAVENOUS at 14:00

## 2022-08-01 RX ADMIN — SODIUM CHLORIDE 25 ML/HR: 9 INJECTION, SOLUTION INTRAVENOUS at 11:00

## 2022-08-01 RX ADMIN — DIPHENHYDRAMINE HYDROCHLORIDE 25 MG: 50 INJECTION, SOLUTION INTRAMUSCULAR; INTRAVENOUS at 11:25

## 2022-08-01 RX ADMIN — SODIUM CHLORIDE, PRESERVATIVE FREE 10 ML: 5 INJECTION INTRAVENOUS at 15:05

## 2022-08-01 RX ADMIN — BLEOMYCIN 30 UNITS: 15 INJECTION, POWDER, LYOPHILIZED, FOR SOLUTION INTRAMUSCULAR; INTRAPLEURAL; INTRAVENOUS; SUBCUTANEOUS at 12:34

## 2022-08-01 RX ADMIN — POTASSIUM CHLORIDE: 2 INJECTION, SOLUTION, CONCENTRATE INTRAVENOUS at 12:12

## 2022-08-01 RX ADMIN — DEXAMETHASONE SODIUM PHOSPHATE 12 MG: 4 INJECTION, SOLUTION INTRAMUSCULAR; INTRAVENOUS at 11:30

## 2022-08-01 RX ADMIN — ACETAMINOPHEN 650 MG: 325 TABLET ORAL at 11:25

## 2022-08-01 RX ADMIN — SODIUM CHLORIDE 150 MG: 9 INJECTION, SOLUTION INTRAVENOUS at 11:50

## 2022-08-01 NOTE — PROGRESS NOTES
[facility] Chemo Progress Note    Date: 2022    Name: Bj Hamilton    MRN: [id number]         : 1985    1000 Mr. Gilbert Arrived to [facility] for C3 D1 BEP ambulatory in stable condition. Assessment was completed, no acute issues at this time, no new complaints voiced. Port accessed with positive blood return. (By Radha Lee RN) Labs drawn and sent for processing. Chemotherapy Flowsheet 2022   Cycle C3 D1   Date 2022   Drug / Regimen BEP   Pre Hydration given   Pre Meds given   Notes given         Patient denies SOB, fever, cough, general not feeling well. Patient denies recent exposure to someone who has tested positive for COVID-19. Patient denies having contact with anyone who has a pending COVID test.      Mr. Lionel Harman vitals were reviewed. Patient Vitals for the past 12 hrs:   Temp Pulse Resp BP   22 1500 -- (!) 102 -- 129/80   22 0959 98.5 °F (36.9 °C) (!) 123 18 135/80         Lab results were obtained and reviewed. Recent Results (from the past 12 hour(s))   CBC WITH AUTOMATED DIFF    Collection Time: 22 10:03 AM   Result Value Ref Range    WBC 6.9 4.1 - 11.1 K/uL    RBC 4.29 4. 10 - 5.70 M/uL    HGB 12.4 12.1 - 17.0 g/dL    HCT 36.8 36.6 - 50.3 %    MCV 85.8 80.0 - 99.0 FL    MCH 28.9 26.0 - 34.0 PG    MCHC 33.7 30.0 - 36.5 g/dL    RDW 16.2 (H) 11.5 - 14.5 %    PLATELET 931 417 - 249 K/uL    MPV 9.7 8.9 - 12.9 FL    NRBC 0.0 0  WBC    ABSOLUTE NRBC 0.00 0.00 - 0.01 K/uL    NEUTROPHILS 48 32 - 75 %    LYMPHOCYTES 35 12 - 49 %    MONOCYTES 12 5 - 13 %    EOSINOPHILS 3 0 - 7 %    BASOPHILS 0 0 - 1 %    METAMYELOCYTES 2 (H) 0 %    IMMATURE GRANULOCYTES 0 %    ABS. NEUTROPHILS 3.3 1.8 - 8.0 K/UL    ABS. LYMPHOCYTES 2.4 0.8 - 3.5 K/UL    ABS. MONOCYTES 0.8 0.0 - 1.0 K/UL    ABS. EOSINOPHILS 0.2 0.0 - 0.4 K/UL    ABS. BASOPHILS 0.0 0.0 - 0.1 K/UL    ABS. IMM.  GRANS. 0.0 K/UL    DF SMEAR SCANNED      RBC COMMENTS ANISOCYTOSIS  1+       METABOLIC PANEL, COMPREHENSIVE Collection Time: 08/01/22 10:03 AM   Result Value Ref Range    Sodium 139 136 - 145 mmol/L    Potassium 3.9 3.5 - 5.1 mmol/L    Chloride 105 97 - 108 mmol/L    CO2 28 21 - 32 mmol/L    Anion gap 6 5 - 15 mmol/L    Glucose 139 (H) 65 - 100 mg/dL    BUN 17 6 - 20 MG/DL    Creatinine 1.23 0.70 - 1.30 MG/DL    BUN/Creatinine ratio 14 12 - 20      GFR est AA >60 >60 ml/min/1.73m2    GFR est non-AA >60 >60 ml/min/1.73m2    Calcium 9.5 8.5 - 10.1 MG/DL    Bilirubin, total 0.3 0.2 - 1.0 MG/DL    ALT (SGPT) 31 12 - 78 U/L    AST (SGOT) 13 (L) 15 - 37 U/L    Alk. phosphatase 91 45 - 117 U/L    Protein, total 8.3 (H) 6.4 - 8.2 g/dL    Albumin 3.5 3.5 - 5.0 g/dL    Globulin 4.8 (H) 2.0 - 4.0 g/dL    A-G Ratio 0.7 (L) 1.1 - 2.2     MAGNESIUM    Collection Time: 08/01/22 10:03 AM   Result Value Ref Range    Magnesium 2.0 1.6 - 2.4 mg/dL   LD    Collection Time: 08/01/22 10:03 AM   Result Value Ref Range     85 - 241 U/L       Pre-medications  were administered as ordered and chemotherapy was initiated.   Medications Administered       0.9% sodium chloride 1,000 mL with potassium chloride 10 mEq, magnesium sulfate 2 g infusion       Admin Date  08/01/2022 Action  New Bag Dose   Rate  1,000 mL/hr Route  IntraVENous Administered By  Amber Wray RN               Admin Date  08/01/2022 Action  Restarted Dose   Rate  439 mL/hr Route  IntraVENous Administered By  Amber Wray RN               Admin Date  08/01/2022 Action  Rate Change Dose   Rate  406 mL/hr Route  IntraVENous Administered By  Amber Wray RN              0.9% sodium chloride infusion       Admin Date  08/01/2022 Action  New Bag Dose  25 mL/hr Rate  25 mL/hr Route  IntraVENous Administered By  Amber Wray RN              0.9% sodium chloride injection 10 mL       Admin Date  08/01/2022 Action  Given Dose  10 mL Route  IntraVENous Administered By  Nika Pillai Date  08/01/2022 Action  Given Dose  10 mL Route  IntraVENous Administered By  Andres Zamudio RN              acetaminophen (TYLENOL) tablet 650 mg       Admin Date  08/01/2022 Action  Given Dose  650 mg Route  Oral Administered By  Andres Zamudio RN              bleomycin (BLEOCIN) 30 Units in 0.9% sodium chloride 50 mL, overfill volume 5 mL chemo infusion       Admin Date  08/01/2022 Action  New Bag Dose  30 Units Rate  390 mL/hr Route  IntraVENous Administered By  Andres Zamudio RN              CISplatin (PLATINOL) 44 mg in 0.9% sodium chloride 500 mL, overfill volume 50 mL chemo infusion       Admin Date  08/01/2022 Action  New Bag Dose  44 mg Rate  594 mL/hr Route  IntraVENous Administered By  Andres Zamudio RN              dexamethasone (DECADRON) 12 mg in 0.9% sodium chloride 50 mL, overfill volume 5 mL IVPB       Admin Date  08/01/2022 Action  New Bag Dose  12 mg Rate  232 mL/hr Route  IntraVENous Administered By  Andres Zamudio RN              diphenhydrAMINE (BENADRYL) injection 25 mg       Admin Date  08/01/2022 Action  Given Dose  25 mg Route  IntraVENous Administered By  Andres Zamudio RN              etoposide (VEPESID) 220 mg in 0.9% sodium chloride 500 mL, overfill volume 50 mL chemo infusion       Admin Date  08/01/2022 Action  New Bag Dose  220 mg Rate  561 mL/hr Route  IntraVENous Administered By  Andres Zamudio RN              fosaprepitant (EMEND) 150 mg in 0.9% sodium chloride 150 mL IVPB       Admin Date  08/01/2022 Action  New Bag Dose  150 mg Rate  450 mL/hr Route  IntraVENous Administered By  Andres Zamudio RN              heparin (porcine) pf 300-500 Units       Admin Date  08/01/2022 Action  Given Dose  500 Units Route  InterCATHeter Administered By  Andres Zamudio RN              palonosetron HCl (ALOXI) injection 0.25 mg       Admin Date  08/01/2022 Action  Given Dose  0.25 mg Route  IntraVENous Administered By  Andres Zamudio RN                    Two nurses verified prior to administering:  Drug name, Drug dose, Infusion volume or drug volume when prepared in a syringe, Rate of administration, Route of administration, Expiration dates and/or times, Appearance and physical integrity of the drugs, Rate set on infusion pump, when used, and Sequencing of drug administration. 1506 Patient tolerated treatment well. Port maintained positive blood return throughout treatment. Port flushed, heparinized and de accessed per protocol. Patient was discharged from [facility] in stable condition. Patient aware of next appointment.      Future Appointments   Date Time Provider Avila Terry   8/2/2022  9:00  [facility]   8/3/2022  9:00 AM A3 [address]   8/4/2022  9:00 AM F4 [address]   8/5/2022 10:00 AM A3 [address]   8/8/2022 10:00 AM C1 [address]   8/15/2022 10:00 AM A1 [address]   9/9/2022 10:00 [facility]   9/9/2022 10:15 AM Sofia Gomez MD [facility] BS AMB   5/11/2023  9:00 AM Tye Gardner MD [facility] BS AMB         Aleks Payan RN  August 1, 2022

## 2022-08-02 ENCOUNTER — APPOINTMENT (OUTPATIENT)
Dept: INFUSION THERAPY | Age: 37
End: 2022-08-02

## 2022-08-02 ENCOUNTER — HOSPITAL ENCOUNTER (OUTPATIENT)
Dept: INFUSION THERAPY | Age: 37
Discharge: HOME | End: 2022-08-02
Payer: COMMERCIAL

## 2022-08-02 VITALS
RESPIRATION RATE: 18 BRPM | HEART RATE: 101 BPM | DIASTOLIC BLOOD PRESSURE: 72 MMHG | OXYGEN SATURATION: 96 % | SYSTOLIC BLOOD PRESSURE: 134 MMHG | TEMPERATURE: 98.8 F

## 2022-08-02 DIAGNOSIS — C62.92 NON-SEMINOMATOUS CANCER OF LEFT TESTIS (HCC): ICD-10-CM

## 2022-08-02 DIAGNOSIS — Z51.11 ENCOUNTER FOR ANTINEOPLASTIC CHEMOTHERAPY: Primary | ICD-10-CM

## 2022-08-02 LAB
AFP-TM SERPL-MCNC: 9.7 NG/ML (ref 0–6.9)
HCG INTACT+B SERPL-ACNC: <1 MIU/ML (ref 0–3)

## 2022-08-02 PROCEDURE — 96361 HYDRATE IV INFUSION ADD-ON: CPT

## 2022-08-02 PROCEDURE — 96413 CHEMO IV INFUSION 1 HR: CPT

## 2022-08-02 PROCEDURE — 74011250636 HC RX REV CODE- 250/636: Performed by: NURSE PRACTITIONER

## 2022-08-02 PROCEDURE — 96417 CHEMO IV INFUS EACH ADDL SEQ: CPT

## 2022-08-02 PROCEDURE — 96375 TX/PRO/DX INJ NEW DRUG ADDON: CPT

## 2022-08-02 PROCEDURE — 74011000250 HC RX REV CODE- 250: Performed by: NURSE PRACTITIONER

## 2022-08-02 PROCEDURE — 77030012965 HC NDL HUBR BBMI -A

## 2022-08-02 PROCEDURE — 74011000258 HC RX REV CODE- 258: Performed by: NURSE PRACTITIONER

## 2022-08-02 RX ORDER — DIPHENHYDRAMINE HYDROCHLORIDE 50 MG/ML
25 INJECTION, SOLUTION INTRAMUSCULAR; INTRAVENOUS AS NEEDED
Status: ACTIVE | OUTPATIENT
Start: 2022-08-02 | End: 2022-08-02

## 2022-08-02 RX ORDER — SODIUM CHLORIDE 9 MG/ML
10 INJECTION INTRAMUSCULAR; INTRAVENOUS; SUBCUTANEOUS AS NEEDED
Status: ACTIVE | OUTPATIENT
Start: 2022-08-02 | End: 2022-08-02

## 2022-08-02 RX ORDER — ACETAMINOPHEN 325 MG/1
650 TABLET ORAL AS NEEDED
Status: ACTIVE | OUTPATIENT
Start: 2022-08-02 | End: 2022-08-02

## 2022-08-02 RX ORDER — SODIUM CHLORIDE 0.9 % (FLUSH) 0.9 %
10 SYRINGE (ML) INJECTION AS NEEDED
Status: DISPENSED | OUTPATIENT
Start: 2022-08-02 | End: 2022-08-02

## 2022-08-02 RX ORDER — HEPARIN 100 UNIT/ML
300-500 SYRINGE INTRAVENOUS AS NEEDED
Status: ACTIVE | OUTPATIENT
Start: 2022-08-02 | End: 2022-08-02

## 2022-08-02 RX ORDER — ONDANSETRON 2 MG/ML
8 INJECTION INTRAMUSCULAR; INTRAVENOUS AS NEEDED
Status: ACTIVE | OUTPATIENT
Start: 2022-08-02 | End: 2022-08-02

## 2022-08-02 RX ORDER — SODIUM CHLORIDE 9 MG/ML
25 INJECTION, SOLUTION INTRAVENOUS CONTINUOUS
Status: DISPENSED | OUTPATIENT
Start: 2022-08-02 | End: 2022-08-02

## 2022-08-02 RX ADMIN — HEPARIN 500 UNITS: 100 SYRINGE at 12:12

## 2022-08-02 RX ADMIN — MAGNESIUM SULFATE HEPTAHYDRATE: 500 INJECTION, SOLUTION INTRAMUSCULAR; INTRAVENOUS at 09:27

## 2022-08-02 RX ADMIN — SODIUM CHLORIDE 25 ML/HR: 9 INJECTION, SOLUTION INTRAVENOUS at 09:03

## 2022-08-02 RX ADMIN — CISPLATIN 44 MG: 100 INJECTION, SOLUTION INTRAVENOUS at 10:57

## 2022-08-02 RX ADMIN — ETOPOSIDE 220 MG: 20 INJECTION, SOLUTION INTRAVENOUS at 09:31

## 2022-08-02 RX ADMIN — SODIUM CHLORIDE, PRESERVATIVE FREE 10 ML: 5 INJECTION INTRAVENOUS at 12:12

## 2022-08-02 RX ADMIN — SODIUM CHLORIDE, PRESERVATIVE FREE 10 ML: 5 INJECTION INTRAVENOUS at 09:03

## 2022-08-02 RX ADMIN — DEXAMETHASONE SODIUM PHOSPHATE 12 MG: 4 INJECTION, SOLUTION INTRAMUSCULAR; INTRAVENOUS at 09:05

## 2022-08-02 NOTE — PROGRESS NOTES
[facility]    Date: 2022    Name: Karis Sadler    MRN: [id number]         : 1985    0850 Mr. Gilbert Arrived to [place] for C3D2 BEP ambulatory in stable condition. Assessment was completed, no acute issues at this time, no new complaints voiced. Port accessed with positive blood return. NS started at [facility]. Chemotherapy Flowsheet 2022   Cycle c3d1   Date 2022   Drug / Regimen BEP   Pre Hydration given   Pre Meds given   Notes given           Mr. Gilbert's vitals were reviewed. Patient Vitals for the past 12 hrs:   Temp Pulse Resp BP SpO2   22 1206 -- (!) 101 -- 134/72 --   22 0852 98.8 °F (37.1 °C) (!) 110 18 (!) 146/82 96 %             Pre-medications  were administered as ordered and chemotherapy was initiated.   Medications Administered       0.9% sodium chloride 1,000 mL with potassium chloride 10 mEq, magnesium sulfate 2 g infusion       Admin Date  2022 Action  New Bag Dose   Rate  1,000 mL/hr Route  IntraVENous Administered By  Abby Vasquez RN              0.9% sodium chloride infusion       Admin Date  2022 Action  New Bag Dose  25 mL/hr Rate  25 mL/hr Route  IntraVENous Administered By  Abby Vasquez RN              0.9% sodium chloride injection 10 mL       Admin Date  2022 Action  Given Dose  10 mL Route  IntraVENous Administered By  Abby Vasquez RN              CISplatin (PLATINOL) 44 mg in 0.9% sodium chloride 500 mL, overfill volume 50 mL chemo infusion       Admin Date  2022 Action  New Bag Dose  44 mg Rate  594 mL/hr Route  IntraVENous Administered By  Abby Vasquez RN              dexamethasone (DECADRON) 12 mg in 0.9% sodium chloride 50 mL, overfill volume 5 mL IVPB       Admin Date  2022 Action  New Bag Dose  12 mg Rate  232 mL/hr Route  IntraVENous Administered By  Abby Vasquez RN              etoposide (VEPESID) 220 mg in 0.9% sodium chloride 500 mL, overfill volume 50 mL chemo infusion       Admin Date  08/02/2022 Action  New Bag Dose  220 mg Rate  561 mL/hr Route  IntraVENous Administered By  Donn Mayberry RN              heparin (porcine) pf 300-500 Units       Admin Date  08/02/2022 Action  Given Dose  500 Units Route  InterCATHeter Administered By  Donn Mayberry RN              sodium chloride (NS) flush 10 mL       Admin Date  08/02/2022 Action  Given Dose  10 mL Route  IntraVENous Administered By  Donn Mayberry RN                    Two nurses verified prior to administering:  Drug name, Drug dose, Infusion volume or drug volume when prepared in a syringe, Rate of administration, Route of administration, Expiration dates and/or times, Appearance and physical integrity of the drugs, Rate set on infusion pump, when used, and Sequencing of drug administration. 1215 Patient tolerated treatment well. Port maintained positive blood return throughout treatment; flushed, heparinized and de accessed per protocol. Patient was discharged from [facility] in stable condition. Patient aware of next appointment.      Future Appointments   Date Time Provider Avila Terry   8/3/2022  9:00 AM A3 [facility] [address] [place] [address]   8/4/2022  9:00 AM F4 [facility] [address] [place] [address]   8/5/2022 10:00 AM A3 [facility] [address] [place] [address]   8/8/2022 10:00 AM C1 [facility] [address] [place] [address]   8/15/2022 10:00 AM A1 [facility] [address] [place] [address]   9/9/2022 10:00 [facility]   9/9/2022 10:15 AM Aminta Gomez MD [facility] BS AMB   5/11/2023  9:00 AM Natty Schneider MD [facility] BS AMB         Abdoulaye Crowley RN  August 2, 2022

## 2022-08-03 ENCOUNTER — APPOINTMENT (OUTPATIENT)
Dept: INFUSION THERAPY | Age: 37
End: 2022-08-03

## 2022-08-03 ENCOUNTER — HOSPITAL ENCOUNTER (OUTPATIENT)
Dept: INFUSION THERAPY | Age: 37
Discharge: HOME | End: 2022-08-03
Payer: COMMERCIAL

## 2022-08-03 VITALS
HEART RATE: 99 BPM | RESPIRATION RATE: 18 BRPM | DIASTOLIC BLOOD PRESSURE: 93 MMHG | TEMPERATURE: 98 F | SYSTOLIC BLOOD PRESSURE: 144 MMHG

## 2022-08-03 DIAGNOSIS — C62.92 NON-SEMINOMATOUS CANCER OF LEFT TESTIS (HCC): ICD-10-CM

## 2022-08-03 DIAGNOSIS — Z51.11 ENCOUNTER FOR ANTINEOPLASTIC CHEMOTHERAPY: Primary | ICD-10-CM

## 2022-08-03 PROCEDURE — 74011250636 HC RX REV CODE- 250/636: Performed by: NURSE PRACTITIONER

## 2022-08-03 PROCEDURE — 74011000258 HC RX REV CODE- 258: Performed by: NURSE PRACTITIONER

## 2022-08-03 PROCEDURE — 96361 HYDRATE IV INFUSION ADD-ON: CPT

## 2022-08-03 PROCEDURE — 96413 CHEMO IV INFUSION 1 HR: CPT

## 2022-08-03 PROCEDURE — 77030012965 HC NDL HUBR BBMI -A

## 2022-08-03 PROCEDURE — 96417 CHEMO IV INFUS EACH ADDL SEQ: CPT

## 2022-08-03 PROCEDURE — 74011000250 HC RX REV CODE- 250: Performed by: NURSE PRACTITIONER

## 2022-08-03 PROCEDURE — 96375 TX/PRO/DX INJ NEW DRUG ADDON: CPT

## 2022-08-03 RX ORDER — HEPARIN 100 UNIT/ML
300-500 SYRINGE INTRAVENOUS AS NEEDED
Status: ACTIVE | OUTPATIENT
Start: 2022-08-03 | End: 2022-08-03

## 2022-08-03 RX ORDER — SODIUM CHLORIDE 0.9 % (FLUSH) 0.9 %
10 SYRINGE (ML) INJECTION AS NEEDED
Status: DISPENSED | OUTPATIENT
Start: 2022-08-03 | End: 2022-08-03

## 2022-08-03 RX ORDER — SODIUM CHLORIDE 9 MG/ML
10 INJECTION INTRAMUSCULAR; INTRAVENOUS; SUBCUTANEOUS AS NEEDED
Status: ACTIVE | OUTPATIENT
Start: 2022-08-03 | End: 2022-08-03

## 2022-08-03 RX ORDER — SODIUM CHLORIDE 9 MG/ML
25 INJECTION, SOLUTION INTRAVENOUS CONTINUOUS
Status: DISPENSED | OUTPATIENT
Start: 2022-08-03 | End: 2022-08-03

## 2022-08-03 RX ADMIN — DEXAMETHASONE SODIUM PHOSPHATE 12 MG: 4 INJECTION, SOLUTION INTRAMUSCULAR; INTRAVENOUS at 09:15

## 2022-08-03 RX ADMIN — ETOPOSIDE 220 MG: 20 INJECTION, SOLUTION INTRAVENOUS at 09:57

## 2022-08-03 RX ADMIN — MAGNESIUM SULFATE HEPTAHYDRATE: 500 INJECTION, SOLUTION INTRAMUSCULAR; INTRAVENOUS at 09:44

## 2022-08-03 RX ADMIN — SODIUM CHLORIDE, PRESERVATIVE FREE 10 ML: 5 INJECTION INTRAVENOUS at 09:05

## 2022-08-03 RX ADMIN — SODIUM CHLORIDE, PRESERVATIVE FREE 10 ML: 5 INJECTION INTRAVENOUS at 12:19

## 2022-08-03 RX ADMIN — SODIUM CHLORIDE 25 ML/HR: 9 INJECTION, SOLUTION INTRAVENOUS at 09:10

## 2022-08-03 RX ADMIN — HEPARIN 500 UNITS: 100 SYRINGE at 12:19

## 2022-08-03 RX ADMIN — CISPLATIN 44 MG: 100 INJECTION, SOLUTION INTRAVENOUS at 11:05

## 2022-08-03 NOTE — PROGRESS NOTES
[facility] Chemo Progress Note    Date: August 3, 2022    Name: Wynema Bamberger    MRN: [id number]         : 1985    0850 Mr. Gilbert Arrived to [facility] for C3D3 BEP ambulatory in stable condition. Assessment was completed, no acute issues at this time, no new complaints voiced. Port accessed with positive blood return. NS started at [facility]. Pre-medications  were administered as ordered and chemotherapy was initiated.   Medications Administered       0.9% sodium chloride 1,000 mL with potassium chloride 10 mEq, magnesium sulfate 2 g infusion       Admin Date  2022 Action  New Bag Dose   Rate  1,000 mL/hr Route  IntraVENous Administered By  Natasha Peña RN              0.9% sodium chloride infusion       Admin Date  2022 Action  New Bag Dose  25 mL/hr Rate  25 mL/hr Route  IntraVENous Administered By  Natasha Peña RN              0.9% sodium chloride injection 10 mL       Admin Date  2022 Action  Given Dose  10 mL Route  IntraVENous Administered By  Natasha Peña RN              CISplatin (PLATINOL) 44 mg in 0.9% sodium chloride 500 mL, overfill volume 50 mL chemo infusion       Admin Date  2022 Action  New Bag Dose  44 mg Rate  594 mL/hr Route  IntraVENous Administered By  Natasha Peña RN              dexamethasone (DECADRON) 12 mg in 0.9% sodium chloride 50 mL, overfill volume 5 mL IVPB       Admin Date  2022 Action  New Bag Dose  12 mg Rate  232 mL/hr Route  IntraVENous Administered By  Natasha Peña RN              etoposide (VEPESID) 220 mg in 0.9% sodium chloride 500 mL, overfill volume 50 mL chemo infusion       Admin Date  2022 Action  New Bag Dose  220 mg Rate  561 mL/hr Route  IntraVENous Administered By  Natasha Peña RN              heparin (porcine) pf 300-500 Units       Admin Date  2022 Action  Given Dose  500 Units Route  InterCATHeter Administered By  Natasha Peña RN              sodium chloride (NS) flush 10 mL       Admin Date  2022 Action  Given Dose  10 mL Route  IntraVENous Administered By  Lucie Hairston RN               Admin Date  08/03/2022 Action  Given Dose  10 mL Route  IntraVENous Administered By  Lucie Hairston RN                  Two nurses verified prior to administering:  Drug name, Drug dose, Infusion volume or drug volume when prepared in a syringe, Rate of administration, Route of administration, Expiration dates and/or times, Appearance and physical integrity of the drugs, Rate set on infusion pump, when used, and Sequencing of drug administration. 1220 Patient tolerated treatment well. Port maintained positive blood return throughout treatment; flushed, heparinized and de accessed per protocol. Patient was [facility] from [facility] in stable condition. Patient aware of next appointment.      Future Appointments   Date Time Provider Avila Terry   8/3/2022  9:00 AM A3 [facility] [address]   8/4/2022  9:00 AM F4 [facility] [address]   8/5/2022 10:00 AM A3 [facility] [address]   8/8/2022 10:00 AM C1 [facility] [address] [place]   8/15/2022 10:00 AM A1 [facility] [address]   9/9/2022 10:00 AM [facility]   9/9/2022 10:15 AM Fauzia Gomez MD [facility] BS AMB   5/11/2023  9:00 AM Neri Laura MD [facility] BS AMB         Sonya Rowley, RN  August 3, 2022

## 2022-08-04 ENCOUNTER — HOSPITAL ENCOUNTER (OUTPATIENT)
Dept: INFUSION THERAPY | Age: 37
Discharge: HOME | End: 2022-08-04
Payer: COMMERCIAL

## 2022-08-04 VITALS
DIASTOLIC BLOOD PRESSURE: 87 MMHG | SYSTOLIC BLOOD PRESSURE: 142 MMHG | RESPIRATION RATE: 18 BRPM | TEMPERATURE: 97.7 F | OXYGEN SATURATION: 98 % | HEART RATE: 93 BPM

## 2022-08-04 DIAGNOSIS — Z51.11 ENCOUNTER FOR ANTINEOPLASTIC CHEMOTHERAPY: Primary | ICD-10-CM

## 2022-08-04 DIAGNOSIS — C62.92 NON-SEMINOMATOUS CANCER OF LEFT TESTIS (HCC): ICD-10-CM

## 2022-08-04 PROCEDURE — 74011250636 HC RX REV CODE- 250/636: Performed by: NURSE PRACTITIONER

## 2022-08-04 PROCEDURE — 96417 CHEMO IV INFUS EACH ADDL SEQ: CPT

## 2022-08-04 PROCEDURE — 84702 CHORIONIC GONADOTROPIN TEST: CPT

## 2022-08-04 PROCEDURE — 36415 COLL VENOUS BLD VENIPUNCTURE: CPT

## 2022-08-04 PROCEDURE — 77030012965 HC NDL HUBR BBMI -A

## 2022-08-04 PROCEDURE — 74011000250 HC RX REV CODE- 250: Performed by: NURSE PRACTITIONER

## 2022-08-04 PROCEDURE — 74011000258 HC RX REV CODE- 258: Performed by: NURSE PRACTITIONER

## 2022-08-04 PROCEDURE — 96413 CHEMO IV INFUSION 1 HR: CPT

## 2022-08-04 PROCEDURE — 96375 TX/PRO/DX INJ NEW DRUG ADDON: CPT

## 2022-08-04 PROCEDURE — 96361 HYDRATE IV INFUSION ADD-ON: CPT

## 2022-08-04 PROCEDURE — 82105 ALPHA-FETOPROTEIN SERUM: CPT

## 2022-08-04 RX ORDER — SODIUM CHLORIDE 9 MG/ML
10 INJECTION INTRAMUSCULAR; INTRAVENOUS; SUBCUTANEOUS AS NEEDED
Status: ACTIVE | OUTPATIENT
Start: 2022-08-04 | End: 2022-08-04

## 2022-08-04 RX ORDER — PALONOSETRON 0.05 MG/ML
0.25 INJECTION, SOLUTION INTRAVENOUS ONCE
Status: COMPLETED | OUTPATIENT
Start: 2022-08-04 | End: 2022-08-04

## 2022-08-04 RX ORDER — DIPHENHYDRAMINE HYDROCHLORIDE 50 MG/ML
25 INJECTION, SOLUTION INTRAMUSCULAR; INTRAVENOUS AS NEEDED
Status: ACTIVE | OUTPATIENT
Start: 2022-08-04 | End: 2022-08-04

## 2022-08-04 RX ORDER — ONDANSETRON 2 MG/ML
8 INJECTION INTRAMUSCULAR; INTRAVENOUS AS NEEDED
Status: ACTIVE | OUTPATIENT
Start: 2022-08-04 | End: 2022-08-04

## 2022-08-04 RX ORDER — HEPARIN 100 UNIT/ML
300-500 SYRINGE INTRAVENOUS AS NEEDED
Status: ACTIVE | OUTPATIENT
Start: 2022-08-04 | End: 2022-08-04

## 2022-08-04 RX ORDER — SODIUM CHLORIDE 0.9 % (FLUSH) 0.9 %
10 SYRINGE (ML) INJECTION AS NEEDED
Status: DISPENSED | OUTPATIENT
Start: 2022-08-04 | End: 2022-08-04

## 2022-08-04 RX ORDER — ACETAMINOPHEN 325 MG/1
650 TABLET ORAL AS NEEDED
Status: ACTIVE | OUTPATIENT
Start: 2022-08-04 | End: 2022-08-04

## 2022-08-04 RX ORDER — SODIUM CHLORIDE 9 MG/ML
25 INJECTION, SOLUTION INTRAVENOUS CONTINUOUS
Status: DISPENSED | OUTPATIENT
Start: 2022-08-04 | End: 2022-08-04

## 2022-08-04 RX ADMIN — DEXAMETHASONE SODIUM PHOSPHATE 12 MG: 4 INJECTION, SOLUTION INTRAMUSCULAR; INTRAVENOUS at 10:00

## 2022-08-04 RX ADMIN — SODIUM CHLORIDE 25 ML/HR: 9 INJECTION, SOLUTION INTRAVENOUS at 09:57

## 2022-08-04 RX ADMIN — CISPLATIN 44 MG: 1 INJECTION, SOLUTION INTRAVENOUS at 12:05

## 2022-08-04 RX ADMIN — MAGNESIUM SULFATE HEPTAHYDRATE: 500 INJECTION, SOLUTION INTRAMUSCULAR; INTRAVENOUS at 10:25

## 2022-08-04 RX ADMIN — ETOPOSIDE 220 MG: 20 INJECTION, SOLUTION INTRAVENOUS at 11:00

## 2022-08-04 RX ADMIN — HEPARIN 500 UNITS: 100 SYRINGE at 13:15

## 2022-08-04 RX ADMIN — PALONOSETRON 0.25 MG: 0.05 INJECTION, SOLUTION INTRAVENOUS at 09:58

## 2022-08-04 RX ADMIN — SODIUM CHLORIDE, PRESERVATIVE FREE 10 ML: 5 INJECTION INTRAVENOUS at 13:15

## 2022-08-04 NOTE — PROGRESS NOTES
[facility] Progress Note    Date: 2022    Name: Christina Downing    MRN: [id number]         : 1985    Mr. Gilbert Arrived ambulatory and in no distress for cycle 3 day 4 of BEP regimen. Follow Up: Proceed with treatment    Assessment was completed and documented in flowsheets. No acute concerns at this time. Port accessed without difficulty, labs drawn and processed. Chemotherapy Flowsheet 2022   Cycle C3D4   Date 2022   Drug / Regimen BEP   Pre Hydration given   Pre Meds given   Notes givem         Mr. Gilbert's vitals were reviewed. Patient Vitals for the past 12 hrs:   Temp Pulse Resp BP SpO2   22 0939 97.7 °F (36.5 °C) (!) 115 18 (!) 158/89 98 %       Lines:   Venous Access Device Port 22 Upper chest (subclavicular area, right (Active)   Central Line Being Utilized Yes 22 09   Criteria for Appropriate Use Irritant/vesicant medication 22 0900   Site Assessment Clean, dry, & intact 22 0900   Date of Last Dressing Change 22 0857   Dressing Status New;Clean, dry, & intact 22 0900   Dressing Type Transparent 22 09   Action Taken Blood drawn 22 0900   Date Accessed (Medial Site) 22 0900   Access Time (Medial Site) 1010 22 1010   Access Needle Size (Site #1) 20 G 22 0900   Access Needle Length (Medial Site) 1 inch 22 0900   Positive Blood Return (Medial Site) Yes 22 0900   Action Taken (Medial Site) Blood drawn;Flushed; Infusing 22 0900   Positive Blood Return (Lateral Site) Yes 22 1328   Action Taken (Lateral Site) Flushed; De-accessed 22 1500   Alcohol Cap Used Yes 22 1010        Lab results were obtained and reviewed. Labs within parameter for treatment. No results found for this or any previous visit (from the past 12 hour(s)). Pre-medications  were administered as ordered and chemotherapy was initiated.   Medications Administered       0.9% sodium chloride 1,000 mL with potassium chloride 10 mEq, magnesium sulfate 2 g infusion       Admin Date  08/04/2022 Action  New Bag Dose   Rate  1,000 mL/hr Route  IntraVENous Administered By  Ester Oppenheim, RN              0.9% sodium chloride infusion       Admin Date  08/04/2022 Action  New Bag Dose  25 mL/hr Rate  25 mL/hr Route  IntraVENous Administered By  Ester Oppenheim, RN              0.9% sodium chloride injection 10 mL       Admin Date  08/04/2022 Action  Given Dose  10 mL Route  IntraVENous Administered By  Ester Oppenheim, RN              CISplatin (PLATINOL) 44 mg in 0.9% sodium chloride 500 mL, overfill volume 50 mL chemo infusion       Admin Date  08/04/2022 Action  New Bag Dose  44 mg Rate  594 mL/hr Route  IntraVENous Administered By  Ester Oppenheim, RN              dexamethasone (DECADRON) 12 mg in 0.9% sodium chloride 50 mL, overfill volume 5 mL IVPB       Admin Date  08/04/2022 Action  New Bag Dose  12 mg Rate  232 mL/hr Route  IntraVENous Administered By  Ester Oppenheim, RN              etoposide (VEPESID) 220 mg in 0.9% sodium chloride 500 mL, overfill volume 50 mL chemo infusion       Admin Date  08/04/2022 Action  New Bag Dose  220 mg Rate  561 mL/hr Route  IntraVENous Administered By  Ester Oppenheim, RN              heparin (porcine) pf 300-500 Units       Admin Date  08/04/2022 Action  Given Dose  500 Units Route  InterCATHeter Administered By  Ester Oppenheim, RN              palonosetron HCl (ALOXI) injection 0.25 mg       Admin Date  08/04/2022 Action  Given Dose  0.25 mg Route  IntraVENous Administered By  Ester Oppenheim, RN                  Medication education and side effect management reinforced with patient. They verbalized understanding. Mr. Cat Garza tolerated treatment well, port flushed and de accessed, patient was discharged from [place] [address] in stable condition. Patient is aware of upcoming appointments.       Future Appointments   Date Time Provider Avila Terry   8/5/2022 10:00 AM A3 [facility] [address]   8/8/2022 10:00 AM C1 [facility] [address]   8/15/2022 10:00 AM A1 [facility] [address] H   9/9/2022 10:00 [facility]   9/9/2022 10:15 AM Sofia Gomez MD [facility] BS AMB   5/11/2023  9:00 AM Tye Gardner MD [facility] BS AMB         Luci Nj RN  August 4, 2022

## 2022-08-05 ENCOUNTER — HOSPITAL ENCOUNTER (OUTPATIENT)
Dept: INFUSION THERAPY | Age: 37
Discharge: HOME | End: 2022-08-05
Payer: COMMERCIAL

## 2022-08-05 VITALS
DIASTOLIC BLOOD PRESSURE: 94 MMHG | SYSTOLIC BLOOD PRESSURE: 153 MMHG | HEART RATE: 85 BPM | TEMPERATURE: 98.7 F | RESPIRATION RATE: 18 BRPM

## 2022-08-05 DIAGNOSIS — C62.92 NON-SEMINOMATOUS CANCER OF LEFT TESTIS (HCC): ICD-10-CM

## 2022-08-05 DIAGNOSIS — Z51.11 ENCOUNTER FOR ANTINEOPLASTIC CHEMOTHERAPY: Primary | ICD-10-CM

## 2022-08-05 LAB
AFP-TM SERPL-MCNC: 9.1 NG/ML (ref 0–6.9)
HCG INTACT+B SERPL-ACNC: <1 MIU/ML (ref 0–3)

## 2022-08-05 PROCEDURE — 74011000258 HC RX REV CODE- 258: Performed by: NURSE PRACTITIONER

## 2022-08-05 PROCEDURE — 96417 CHEMO IV INFUS EACH ADDL SEQ: CPT

## 2022-08-05 PROCEDURE — 74011250636 HC RX REV CODE- 250/636: Performed by: NURSE PRACTITIONER

## 2022-08-05 PROCEDURE — 96413 CHEMO IV INFUSION 1 HR: CPT

## 2022-08-05 PROCEDURE — 77030012965 HC NDL HUBR BBMI -A

## 2022-08-05 PROCEDURE — 96367 TX/PROPH/DG ADDL SEQ IV INF: CPT

## 2022-08-05 PROCEDURE — 96375 TX/PRO/DX INJ NEW DRUG ADDON: CPT

## 2022-08-05 PROCEDURE — 74011000250 HC RX REV CODE- 250: Performed by: NURSE PRACTITIONER

## 2022-08-05 RX ORDER — HEPARIN 100 UNIT/ML
300-500 SYRINGE INTRAVENOUS AS NEEDED
Status: ACTIVE | OUTPATIENT
Start: 2022-08-05 | End: 2022-08-05

## 2022-08-05 RX ORDER — SODIUM CHLORIDE 9 MG/ML
25 INJECTION, SOLUTION INTRAVENOUS CONTINUOUS
Status: DISPENSED | OUTPATIENT
Start: 2022-08-05 | End: 2022-08-05

## 2022-08-05 RX ORDER — SODIUM CHLORIDE 0.9 % (FLUSH) 0.9 %
10 SYRINGE (ML) INJECTION AS NEEDED
Status: DISPENSED | OUTPATIENT
Start: 2022-08-05 | End: 2022-08-05

## 2022-08-05 RX ORDER — SODIUM CHLORIDE 9 MG/ML
10 INJECTION INTRAMUSCULAR; INTRAVENOUS; SUBCUTANEOUS AS NEEDED
Status: ACTIVE | OUTPATIENT
Start: 2022-08-05 | End: 2022-08-05

## 2022-08-05 RX ADMIN — SODIUM CHLORIDE, PRESERVATIVE FREE 10 ML: 5 INJECTION INTRAVENOUS at 12:40

## 2022-08-05 RX ADMIN — POTASSIUM CHLORIDE: 2 INJECTION, SOLUTION, CONCENTRATE INTRAVENOUS at 10:23

## 2022-08-05 RX ADMIN — SODIUM CHLORIDE 25 ML/HR: 9 INJECTION, SOLUTION INTRAVENOUS at 09:00

## 2022-08-05 RX ADMIN — HEPARIN 500 UNITS: 100 SYRINGE at 12:40

## 2022-08-05 RX ADMIN — DEXAMETHASONE SODIUM PHOSPHATE 12 MG: 4 INJECTION, SOLUTION INTRAMUSCULAR; INTRAVENOUS at 09:09

## 2022-08-05 RX ADMIN — ETOPOSIDE 220 MG: 20 INJECTION, SOLUTION INTRAVENOUS at 10:16

## 2022-08-05 RX ADMIN — SODIUM CHLORIDE, PRESERVATIVE FREE 10 ML: 5 INJECTION INTRAVENOUS at 09:38

## 2022-08-05 RX ADMIN — SODIUM CHLORIDE 44 MG: 9 INJECTION, SOLUTION INTRAVENOUS at 11:22

## 2022-08-05 NOTE — PROGRESS NOTES
[facility] Chemo Progress Note    Date: August 5, 2022        0925: Mr. Beatrice Leyva Arrived to [facility] for  C3 D5 BEP ambulatory in stable condition. Assessment was completed. Port accessed with positive blood return. Chemotherapy Flowsheet 8/5/2022   Cycle C3 D5   Date 8/5/2022   Drug / Regimen BEP   Pre Hydration Given   Pre Meds Given   Notes Given       Mr. Gilbert's vitals were reviewed. Patient Vitals for the past 12 hrs:   Temp Pulse Resp BP   08/05/22 1239 -- 85 18 (!) 153/94   08/05/22 0928 98.7 °F (37.1 °C) (!) 120 18 (!) 148/90         Pre-medications  were administered as ordered and chemotherapy was initiated.   Medications Administered       0.9% sodium chloride 1,000 mL with potassium chloride 10 mEq, magnesium sulfate 2 g infusion       Admin Date  08/05/2022 Action  New Bag Dose   Rate  453 mL/hr Route  IntraVENous Administered By  Cynthia Sage Date  08/05/2022 Action  Rate Change Dose   Rate  400 mL/hr Route  IntraVENous Administered By  Roxi Omer              0.9% sodium chloride infusion       Admin Date  08/05/2022 Action  New Bag Dose  25 mL/hr Rate  25 mL/hr Route  IntraVENous Administered By  Roxi Omer              0.9% sodium chloride injection 10 mL       Admin Date  08/05/2022 Action  Given Dose  10 mL Route  IntraVENous Administered By  Cynthia Saeg Date  08/05/2022 Action  Given Dose  10 mL Route  IntraVENous Administered By  Roxi Omer              CISplatin (PLATINOL) 44 mg in 0.9% sodium chloride 500 mL, overfill volume 50 mL chemo infusion       Admin Date  08/05/2022 Action  New Bag Dose  44 mg Rate  594 mL/hr Route  IntraVENous Administered By  Roxi Omer              dexamethasone (DECADRON) 12 mg in 0.9% sodium chloride 50 mL, overfill volume 5 mL IVPB       Admin Date  08/05/2022 Action  New Bag Dose  12 mg Rate  232 mL/hr Route  IntraVENous Administered By  Roxi Omer              etoposide (VEPESID) 220 mg in 0.9% sodium chloride 500 mL, overfill volume 50 mL chemo infusion       Admin Date  08/05/2022 Action  New Bag Dose  220 mg Rate  561 mL/hr Route  IntraVENous Administered By  Marsha Hendricks              heparin (porcine) pf 300-500 Units       Admin Date  08/05/2022 Action  Given Dose  500 Units Route  InterCATHeter Administered By  Marsha Hendricks                    Two nurses verified prior to administering: Drug name, Drug dose, Infusion volume or drug volume when prepared in a syringe, Rate of administration, Route of administration, Expiration dates and/or times, Appearance and physical integrity of the drugs, Rate set on infusion pump, when used, and Sequencing of drug administration. 1240: Patient tolerated treatment well. Port maintained positive blood return throughout treatment. Port flushed, heparinized and de accessed per protocol. Patient was discharged in stable condition. Patient is aware of next scheduled [facility] appointment on 8/8/22.     Future Appointments   Date Time Provider Avila Mara   8/8/2022 10:00 AM C1 [address]   8/15/2022 10:00 AM A1 [facility] MED [address] [place] [address]   9/9/2022 10:00 AM [facility]   9/9/2022 10:15 AM Marizol Gomez MD [facility] BS AMB   5/11/2023  9:00 AM Preston Orozco MD [facility] BS AMB         Hannah Santana RN  August 5, 2022

## 2022-08-08 ENCOUNTER — APPOINTMENT (OUTPATIENT)
Dept: INFUSION THERAPY | Age: 37
End: 2022-08-08

## 2022-08-08 ENCOUNTER — HOSPITAL ENCOUNTER (OUTPATIENT)
Dept: INFUSION THERAPY | Age: 37
Discharge: HOME | End: 2022-08-08
Payer: COMMERCIAL

## 2022-08-08 VITALS
WEIGHT: 203 LBS | RESPIRATION RATE: 18 BRPM | HEIGHT: 71 IN | TEMPERATURE: 97.3 F | HEART RATE: 97 BPM | SYSTOLIC BLOOD PRESSURE: 136 MMHG | BODY MASS INDEX: 28.42 KG/M2 | DIASTOLIC BLOOD PRESSURE: 86 MMHG

## 2022-08-08 DIAGNOSIS — C62.92 NON-SEMINOMATOUS CANCER OF LEFT TESTIS (HCC): ICD-10-CM

## 2022-08-08 DIAGNOSIS — Z51.11 ENCOUNTER FOR ANTINEOPLASTIC CHEMOTHERAPY: Primary | ICD-10-CM

## 2022-08-08 LAB
BASOPHILS # BLD: 0.1 K/UL (ref 0–0.1)
BASOPHILS NFR BLD: 1 % (ref 0–1)
DIFFERENTIAL METHOD BLD: ABNORMAL
EOSINOPHIL # BLD: 0.1 K/UL (ref 0–0.4)
EOSINOPHIL NFR BLD: 2 % (ref 0–7)
ERYTHROCYTE [DISTWIDTH] IN BLOOD BY AUTOMATED COUNT: 16.4 % (ref 11.5–14.5)
HCT VFR BLD AUTO: 33.6 % (ref 36.6–50.3)
HGB BLD-MCNC: 11.3 G/DL (ref 12.1–17)
IMM GRANULOCYTES # BLD AUTO: 0 K/UL
IMM GRANULOCYTES NFR BLD AUTO: 0 %
LYMPHOCYTES # BLD: 1.2 K/UL (ref 0.8–3.5)
LYMPHOCYTES NFR BLD: 19 % (ref 12–49)
MCH RBC QN AUTO: 28.9 PG (ref 26–34)
MCHC RBC AUTO-ENTMCNC: 33.6 G/DL (ref 30–36.5)
MCV RBC AUTO: 85.9 FL (ref 80–99)
MONOCYTES # BLD: 0.1 K/UL (ref 0–1)
MONOCYTES NFR BLD: 1 % (ref 5–13)
NEUTS SEG # BLD: 4.6 K/UL (ref 1.8–8)
NEUTS SEG NFR BLD: 77 % (ref 32–75)
NRBC # BLD: 0 K/UL (ref 0–0.01)
NRBC BLD-RTO: 0 PER 100 WBC
PLATELET # BLD AUTO: 346 K/UL (ref 150–400)
PMV BLD AUTO: 9.8 FL (ref 8.9–12.9)
RBC # BLD AUTO: 3.91 M/UL (ref 4.1–5.7)
RBC MORPH BLD: ABNORMAL
WBC # BLD AUTO: 6.1 K/UL (ref 4.1–11.1)
WBC MORPH BLD: ABNORMAL

## 2022-08-08 PROCEDURE — 96361 HYDRATE IV INFUSION ADD-ON: CPT

## 2022-08-08 PROCEDURE — 96409 CHEMO IV PUSH SNGL DRUG: CPT

## 2022-08-08 PROCEDURE — 96375 TX/PRO/DX INJ NEW DRUG ADDON: CPT

## 2022-08-08 PROCEDURE — 74011000258 HC RX REV CODE- 258: Performed by: NURSE PRACTITIONER

## 2022-08-08 PROCEDURE — 74011000250 HC RX REV CODE- 250: Performed by: NURSE PRACTITIONER

## 2022-08-08 PROCEDURE — 74011250637 HC RX REV CODE- 250/637: Performed by: NURSE PRACTITIONER

## 2022-08-08 PROCEDURE — 77030012965 HC NDL HUBR BBMI -A

## 2022-08-08 PROCEDURE — 74011250636 HC RX REV CODE- 250/636: Performed by: NURSE PRACTITIONER

## 2022-08-08 PROCEDURE — 85025 COMPLETE CBC W/AUTO DIFF WBC: CPT

## 2022-08-08 PROCEDURE — 36415 COLL VENOUS BLD VENIPUNCTURE: CPT

## 2022-08-08 RX ORDER — DIPHENHYDRAMINE HYDROCHLORIDE 50 MG/ML
25 INJECTION, SOLUTION INTRAMUSCULAR; INTRAVENOUS ONCE
Status: COMPLETED | OUTPATIENT
Start: 2022-08-08 | End: 2022-08-08

## 2022-08-08 RX ORDER — SODIUM CHLORIDE 9 MG/ML
10 INJECTION INTRAMUSCULAR; INTRAVENOUS; SUBCUTANEOUS AS NEEDED
Status: ACTIVE | OUTPATIENT
Start: 2022-08-08 | End: 2022-08-08

## 2022-08-08 RX ORDER — HEPARIN 100 UNIT/ML
300-500 SYRINGE INTRAVENOUS AS NEEDED
Status: ACTIVE | OUTPATIENT
Start: 2022-08-08 | End: 2022-08-08

## 2022-08-08 RX ORDER — SODIUM CHLORIDE 9 MG/ML
25 INJECTION, SOLUTION INTRAVENOUS CONTINUOUS
Status: DISCONTINUED | OUTPATIENT
Start: 2022-08-08 | End: 2022-08-09 | Stop reason: HOSPADM

## 2022-08-08 RX ORDER — SODIUM CHLORIDE 0.9 % (FLUSH) 0.9 %
10 SYRINGE (ML) INJECTION AS NEEDED
Status: DISPENSED | OUTPATIENT
Start: 2022-08-08 | End: 2022-08-08

## 2022-08-08 RX ORDER — ACETAMINOPHEN 325 MG/1
650 TABLET ORAL ONCE
Status: COMPLETED | OUTPATIENT
Start: 2022-08-08 | End: 2022-08-08

## 2022-08-08 RX ADMIN — DIPHENHYDRAMINE HYDROCHLORIDE 25 MG: 50 INJECTION, SOLUTION INTRAMUSCULAR; INTRAVENOUS at 11:15

## 2022-08-08 RX ADMIN — SODIUM CHLORIDE, PRESERVATIVE FREE 10 ML: 5 INJECTION INTRAVENOUS at 12:00

## 2022-08-08 RX ADMIN — SODIUM CHLORIDE 25 ML/HR: 9 INJECTION, SOLUTION INTRAVENOUS at 11:15

## 2022-08-08 RX ADMIN — BLEOMYCIN 30 UNITS: 15 INJECTION, POWDER, LYOPHILIZED, FOR SOLUTION INTRAMUSCULAR; INTRAPLEURAL; INTRAVENOUS; SUBCUTANEOUS at 11:45

## 2022-08-08 RX ADMIN — ACETAMINOPHEN 650 MG: 325 TABLET ORAL at 11:15

## 2022-08-08 RX ADMIN — SODIUM CHLORIDE, PRESERVATIVE FREE 10 ML: 5 INJECTION INTRAVENOUS at 10:02

## 2022-08-08 RX ADMIN — HEPARIN 500 UNITS: 100 SYRINGE at 12:00

## 2022-08-08 RX ADMIN — SODIUM CHLORIDE 1000 ML: 900 INJECTION, SOLUTION INTRAVENOUS at 10:05

## 2022-08-08 NOTE — PROGRESS NOTES
[facility] Chemo Progress Note    Date: August 8, 2022        0945: Mr. Catracho Smith Arrived to [facility] for  C3 D8 Bleomycin ambulatory in stable condition. Assessment was completed. Patient reported migraine with blurry vision this morning. Port accessed with positive blood return. Labs drawn and sent for processing. 1105: Labs reviewed. Criteria for treatment was met. Chemotherapy Flowsheet 8/8/2022   Cycle C3 D8   Date 8/8/2022   Drug / Regimen BEP   Pre Hydration Given   Pre Meds GIven   Notes Given       Mr. Gilbert's vitals were reviewed. Patient Vitals for the past 12 hrs:   Temp Pulse Resp BP   08/08/22 1200 -- 97 18 136/86   08/08/22 0945 97.3 °F (36.3 °C) (!) 112 18 (!) 146/90       Lab results were obtained and reviewed. Recent Results (from the past 12 hour(s))   CBC WITH AUTOMATED DIFF    Collection Time: 08/08/22  9:53 AM   Result Value Ref Range    WBC 6.1 4.1 - 11.1 K/uL    RBC 3.91 (L) 4.10 - 5.70 M/uL    HGB 11.3 (L) 12.1 - 17.0 g/dL    HCT 33.6 (L) 36.6 - 50.3 %    MCV 85.9 80.0 - 99.0 FL    MCH 28.9 26.0 - 34.0 PG    MCHC 33.6 30.0 - 36.5 g/dL    RDW 16.4 (H) 11.5 - 14.5 %    PLATELET 007 848 - 175 K/uL    MPV 9.8 8.9 - 12.9 FL    NRBC 0.0 0  WBC    ABSOLUTE NRBC 0.00 0.00 - 0.01 K/uL    NEUTROPHILS 77 (H) 32 - 75 %    LYMPHOCYTES 19 12 - 49 %    MONOCYTES 1 (L) 5 - 13 %    EOSINOPHILS 2 0 - 7 %    BASOPHILS 1 0 - 1 %    IMMATURE GRANULOCYTES 0 %    ABS. NEUTROPHILS 4.6 1.8 - 8.0 K/UL    ABS. LYMPHOCYTES 1.2 0.8 - 3.5 K/UL    ABS. MONOCYTES 0.1 0.0 - 1.0 K/UL    ABS. EOSINOPHILS 0.1 0.0 - 0.4 K/UL    ABS. BASOPHILS 0.1 0.0 - 0.1 K/UL    ABS. IMM. GRANS. 0.0 K/UL    DF MANUAL      RBC COMMENTS ANISOCYTOSIS  1+        WBC COMMENTS REACTIVE LYMPHS          Pre-medications  were administered as ordered and chemotherapy was initiated.   Medications Administered       0.9% sodium chloride infusion       Admin Date  08/08/2022 Action  New Bag Dose  25 mL/hr Rate  25 mL/hr Route  IntraVENous Administered By  Munday Pal              0.9% sodium chloride injection 10 mL       Admin Date  08/08/2022 Action  Given Dose  10 mL Route  IntraVENous Administered By  Lyell Andersen Date  08/08/2022 Action  Given Dose  10 mL Route  IntraVENous Administered By  Munday Pal              acetaminophen (TYLENOL) tablet 650 mg       Admin Date  08/08/2022 Action  Given Dose  650 mg Route  Oral Administered By  Tuan Pal              bleomycin (BLEOCIN) 30 Units in 0.9% sodium chloride 50 mL, overfill volume 5 mL chemo infusion       Admin Date  08/08/2022 Action  New Bag Dose  30 Units Rate  390 mL/hr Route  IntraVENous Administered By  Tuan Pal              diphenhydrAMINE (BENADRYL) injection 25 mg       Admin Date  08/08/2022 Action  Given Dose  25 mg Route  IntraVENous Administered By  Tuan Pal              heparin (porcine) pf 300-500 Units       Admin Date  08/08/2022 Action  Given Dose  500 Units Route  InterCATHeter Administered By  Tuan Pal              sodium chloride 0.9 % bolus infusion 1,000 mL       Admin Date  08/08/2022 Action  New Bag Dose  1,000 mL Rate  1,000 mL/hr Route  IntraVENous Administered By  Tuan Pal                    Two nurses verified prior to administering: Drug name, Drug dose, Infusion volume or drug volume when prepared in a syringe, Rate of administration, Route of administration, Expiration dates and/or times, Appearance and physical integrity of the drugs, Rate set on infusion pump, when used, and Sequencing of drug administration. 1205: Patient tolerated treatment well. Port maintained positive blood return throughout treatment. Port flushed, heparinized and de accessed per protocol. Patient was discharged in stable condition. Patient is aware of next scheduled [facility] appointment on 8/15/22.     Future Appointments   Date Time Provider Avila Terry   8/15/2022 10:00 AM [address] 9/9/2022 10:00 AM SS INF7 CH2 <1H [facility]   9/9/2022 10:15 AM Aminta Gomez MD [facility] BS AMB   5/11/2023  9:00 AM Natty Schneider MD [facility] BS AMB         Amelia Espinoza RN  August 8, 2022

## 2022-08-15 ENCOUNTER — HOSPITAL ENCOUNTER (OUTPATIENT)
Dept: INFUSION THERAPY | Age: 37
Discharge: HOME | End: 2022-08-15
Payer: COMMERCIAL

## 2022-08-15 ENCOUNTER — APPOINTMENT (OUTPATIENT)
Dept: INFUSION THERAPY | Age: 37
End: 2022-08-15

## 2022-08-15 VITALS
BODY MASS INDEX: 28.59 KG/M2 | HEART RATE: 95 BPM | SYSTOLIC BLOOD PRESSURE: 115 MMHG | RESPIRATION RATE: 18 BRPM | DIASTOLIC BLOOD PRESSURE: 72 MMHG | TEMPERATURE: 96.9 F | WEIGHT: 205 LBS

## 2022-08-15 DIAGNOSIS — C62.92 NON-SEMINOMATOUS CANCER OF LEFT TESTIS (HCC): ICD-10-CM

## 2022-08-15 DIAGNOSIS — Z51.11 ENCOUNTER FOR ANTINEOPLASTIC CHEMOTHERAPY: Primary | ICD-10-CM

## 2022-08-15 LAB
BASOPHILS # BLD: 0 K/UL (ref 0–0.1)
BASOPHILS NFR BLD: 1 % (ref 0–1)
DIFFERENTIAL METHOD BLD: ABNORMAL
EOSINOPHIL # BLD: 0 K/UL (ref 0–0.4)
EOSINOPHIL NFR BLD: 1 % (ref 0–7)
ERYTHROCYTE [DISTWIDTH] IN BLOOD BY AUTOMATED COUNT: 17.4 % (ref 11.5–14.5)
HCT VFR BLD AUTO: 32.1 % (ref 36.6–50.3)
HGB BLD-MCNC: 10.9 G/DL (ref 12.1–17)
IMM GRANULOCYTES # BLD AUTO: 0 K/UL
IMM GRANULOCYTES NFR BLD AUTO: 0 %
LYMPHOCYTES # BLD: 0.9 K/UL (ref 0.8–3.5)
LYMPHOCYTES NFR BLD: 39 % (ref 12–49)
MCH RBC QN AUTO: 29 PG (ref 26–34)
MCHC RBC AUTO-ENTMCNC: 34 G/DL (ref 30–36.5)
MCV RBC AUTO: 85.4 FL (ref 80–99)
MONOCYTES # BLD: 0.2 K/UL (ref 0–1)
MONOCYTES NFR BLD: 9 % (ref 5–13)
NEUTS SEG # BLD: 1.1 K/UL (ref 1.8–8)
NEUTS SEG NFR BLD: 50 % (ref 32–75)
NRBC # BLD: 0 K/UL (ref 0–0.01)
NRBC BLD-RTO: 0 PER 100 WBC
PLATELET # BLD AUTO: 242 K/UL (ref 150–400)
PMV BLD AUTO: 9.8 FL (ref 8.9–12.9)
RBC # BLD AUTO: 3.76 M/UL (ref 4.1–5.7)
RBC MORPH BLD: ABNORMAL
WBC # BLD AUTO: 2.2 K/UL (ref 4.1–11.1)

## 2022-08-15 PROCEDURE — 85025 COMPLETE CBC W/AUTO DIFF WBC: CPT

## 2022-08-15 PROCEDURE — 74011250636 HC RX REV CODE- 250/636: Performed by: NURSE PRACTITIONER

## 2022-08-15 PROCEDURE — 74011250637 HC RX REV CODE- 250/637: Performed by: NURSE PRACTITIONER

## 2022-08-15 PROCEDURE — 77030012965 HC NDL HUBR BBMI -A

## 2022-08-15 PROCEDURE — 36415 COLL VENOUS BLD VENIPUNCTURE: CPT

## 2022-08-15 PROCEDURE — 74011000258 HC RX REV CODE- 258: Performed by: NURSE PRACTITIONER

## 2022-08-15 PROCEDURE — 96361 HYDRATE IV INFUSION ADD-ON: CPT

## 2022-08-15 PROCEDURE — 96409 CHEMO IV PUSH SNGL DRUG: CPT

## 2022-08-15 PROCEDURE — 74011250636 HC RX REV CODE- 250/636: Performed by: INTERNAL MEDICINE

## 2022-08-15 PROCEDURE — 74011000250 HC RX REV CODE- 250: Performed by: INTERNAL MEDICINE

## 2022-08-15 PROCEDURE — 96375 TX/PRO/DX INJ NEW DRUG ADDON: CPT

## 2022-08-15 RX ORDER — SODIUM CHLORIDE 0.9 % (FLUSH) 0.9 %
10 SYRINGE (ML) INJECTION AS NEEDED
Status: DISPENSED | OUTPATIENT
Start: 2022-08-15 | End: 2022-08-15

## 2022-08-15 RX ORDER — DIPHENHYDRAMINE HYDROCHLORIDE 50 MG/ML
25 INJECTION, SOLUTION INTRAMUSCULAR; INTRAVENOUS ONCE
Status: COMPLETED | OUTPATIENT
Start: 2022-08-15 | End: 2022-08-15

## 2022-08-15 RX ORDER — EPINEPHRINE 1 MG/ML
0.3 INJECTION, SOLUTION, CONCENTRATE INTRAVENOUS AS NEEDED
Status: ACTIVE | OUTPATIENT
Start: 2022-08-15 | End: 2022-08-15

## 2022-08-15 RX ORDER — HEPARIN 100 UNIT/ML
300-500 SYRINGE INTRAVENOUS AS NEEDED
Status: ACTIVE | OUTPATIENT
Start: 2022-08-15 | End: 2022-08-15

## 2022-08-15 RX ORDER — ONDANSETRON 2 MG/ML
8 INJECTION INTRAMUSCULAR; INTRAVENOUS AS NEEDED
Status: ACTIVE | OUTPATIENT
Start: 2022-08-15 | End: 2022-08-15

## 2022-08-15 RX ORDER — SODIUM CHLORIDE 9 MG/ML
10 INJECTION INTRAMUSCULAR; INTRAVENOUS; SUBCUTANEOUS AS NEEDED
Status: ACTIVE | OUTPATIENT
Start: 2022-08-15 | End: 2022-08-15

## 2022-08-15 RX ORDER — ACETAMINOPHEN 325 MG/1
650 TABLET ORAL ONCE
Status: COMPLETED | OUTPATIENT
Start: 2022-08-15 | End: 2022-08-15

## 2022-08-15 RX ORDER — ACETAMINOPHEN 325 MG/1
650 TABLET ORAL AS NEEDED
Status: ACTIVE | OUTPATIENT
Start: 2022-08-15 | End: 2022-08-15

## 2022-08-15 RX ORDER — DIPHENHYDRAMINE HYDROCHLORIDE 50 MG/ML
25 INJECTION, SOLUTION INTRAMUSCULAR; INTRAVENOUS AS NEEDED
Status: ACTIVE | OUTPATIENT
Start: 2022-08-15 | End: 2022-08-15

## 2022-08-15 RX ORDER — DIPHENHYDRAMINE HYDROCHLORIDE 50 MG/ML
50 INJECTION, SOLUTION INTRAMUSCULAR; INTRAVENOUS AS NEEDED
Status: ACTIVE | OUTPATIENT
Start: 2022-08-15 | End: 2022-08-15

## 2022-08-15 RX ORDER — SODIUM CHLORIDE 9 MG/ML
25 INJECTION, SOLUTION INTRAVENOUS CONTINUOUS
Status: DISCONTINUED | OUTPATIENT
Start: 2022-08-15 | End: 2022-08-17 | Stop reason: HOSPADM

## 2022-08-15 RX ORDER — HYDROCORTISONE SODIUM SUCCINATE 100 MG/2ML
100 INJECTION, POWDER, FOR SOLUTION INTRAMUSCULAR; INTRAVENOUS AS NEEDED
Status: ACTIVE | OUTPATIENT
Start: 2022-08-15 | End: 2022-08-15

## 2022-08-15 RX ORDER — ALBUTEROL SULFATE 90 UG/1
2 AEROSOL, METERED RESPIRATORY (INHALATION) AS NEEDED
Status: ACTIVE | OUTPATIENT
Start: 2022-08-15 | End: 2022-08-15

## 2022-08-15 RX ADMIN — HEPARIN 500 UNITS: 100 SYRINGE at 14:15

## 2022-08-15 RX ADMIN — ACETAMINOPHEN 650 MG: 325 TABLET ORAL at 13:12

## 2022-08-15 RX ADMIN — BLEOMYCIN 30 UNITS: 15 INJECTION, POWDER, LYOPHILIZED, FOR SOLUTION INTRAMUSCULAR; INTRAPLEURAL; INTRAVENOUS; SUBCUTANEOUS at 13:48

## 2022-08-15 RX ADMIN — SODIUM CHLORIDE 25 ML/HR: 9 INJECTION, SOLUTION INTRAVENOUS at 13:05

## 2022-08-15 RX ADMIN — WATER 2 MG: 1 INJECTION INTRAMUSCULAR; INTRAVENOUS; SUBCUTANEOUS at 10:35

## 2022-08-15 RX ADMIN — SODIUM CHLORIDE 1000 ML: 9 INJECTION, SOLUTION INTRAVENOUS at 12:00

## 2022-08-15 RX ADMIN — DIPHENHYDRAMINE HYDROCHLORIDE 25 MG: 50 INJECTION, SOLUTION INTRAMUSCULAR; INTRAVENOUS at 13:13

## 2022-08-15 NOTE — PROGRESS NOTES
[facility] Chemo Progress Note    Date: August 15, 2022    Name: Bipin Llamas    MRN: [id number]         : 1985    1000 Mr. Gilbert Arrived to [facility] for C3D15 Bleomycin ambulatory in stable condition. Assessment was completed, no acute issues at this time, no new complaints voiced. Port accessed with positive blood return. Labs drawn and sent for processing. Chemotherapy Flowsheet 8/15/2022   Cycle C3D15   Date 8/15/2022   Drug / Regimen BEP   Pre Hydration given   Pre Meds given   Notes given             Mr. Gunnar Kiran vitals were reviewed. Patient Vitals for the past 12 hrs:   Temp Pulse Resp BP   08/15/22 1415 -- 95 18 115/72   08/15/22 0958 96.9 °F (36.1 °C) (!) 110 18 126/77         Lab results were obtained and reviewed. Recent Results (from the past 12 hour(s))   CBC WITH AUTOMATED DIFF    Collection Time: 08/15/22 10:19 AM   Result Value Ref Range    WBC 2.2 (L) 4.1 - 11.1 K/uL    RBC 3.76 (L) 4.10 - 5.70 M/uL    HGB 10.9 (L) 12.1 - 17.0 g/dL    HCT 32.1 (L) 36.6 - 50.3 %    MCV 85.4 80.0 - 99.0 FL    MCH 29.0 26.0 - 34.0 PG    MCHC 34.0 30.0 - 36.5 g/dL    RDW 17.4 (H) 11.5 - 14.5 %    PLATELET 721 195 - 638 K/uL    MPV 9.8 8.9 - 12.9 FL    NRBC 0.0 0  WBC    ABSOLUTE NRBC 0.00 0.00 - 0.01 K/uL    NEUTROPHILS 50 32 - 75 %    LYMPHOCYTES 39 12 - 49 %    MONOCYTES 9 5 - 13 %    EOSINOPHILS 1 0 - 7 %    BASOPHILS 1 0 - 1 %    IMMATURE GRANULOCYTES 0 %    ABS. NEUTROPHILS 1.1 (L) 1.8 - 8.0 K/UL    ABS. LYMPHOCYTES 0.9 0.8 - 3.5 K/UL    ABS. MONOCYTES 0.2 0.0 - 1.0 K/UL    ABS. EOSINOPHILS 0.0 0.0 - 0.4 K/UL    ABS. BASOPHILS 0.0 0.0 - 0.1 K/UL    ABS. IMM. GRANS. 0.0 K/UL    DF MANUAL      RBC COMMENTS ANISOCYTOSIS  1+           Pre-medications  were administered as ordered and chemotherapy was initiated.   Medications Administered       0.9% sodium chloride infusion       Admin Date  08/15/2022 Action  New Bag Dose  25 mL/hr Rate  25 mL/hr Route  IntraVENous Administered By  Pati Trejo, RN acetaminophen (TYLENOL) tablet 650 mg       Admin Date  08/15/2022 Action  Given Dose  650 mg Route  Oral Administered By  Nickie Mosley RN              alteplase (CATHFLO) 2 mg in sterile water (preservative free) 2 mL injection       Admin Date  08/15/2022 Action  Given Dose  2 mg Route  InterCATHeter Administered By  Gil Moore RN              bleomycin (BLEOCIN) 30 Units in 0.9% sodium chloride 50 mL, overfill volume 5 mL chemo infusion       Admin Date  08/15/2022 Action  New Bag Dose  30 Units Rate  390 mL/hr Route  IntraVENous Administered By  Nickie Mosley RN              diphenhydrAMINE (BENADRYL) injection 25 mg       Admin Date  08/15/2022 Action  Given Dose  25 mg Route  IntraVENous Administered By  Nickie Mosley RN              heparin (porcine) pf 300-500 Units       Admin Date  08/15/2022 Action  Given Dose  500 Units Route  InterCATHeter Administered By  Nickie Mosley RN              sodium chloride 0.9 % bolus infusion 1,000 mL       Admin Date  08/15/2022 Action  New Bag Dose  1,000 mL Rate  1,000 mL/hr Route  IntraVENous Administered By  Nickie Mosley RN                    Two nurses verified prior to administering:  Drug name, Drug dose, Infusion volume or drug volume when prepared in a syringe, Rate of administration, Route of administration, Expiration dates and/or times, Appearance and physical integrity of the drugs, Rate set on infusion pump, when used, and Sequencing of drug administration. 1415 Patient tolerated treatment well. Port maintained positive blood return throughout treatment. Port flushed, heparinized and de accessed per protocol. Patient was discharged from [facility] in stable condition. Patient aware of next appointment.      Future Appointments   Date Time Provider Avila Terry   9/9/2022 10:00 AM SS INF7 CH2 <1H [facility]   9/9/2022 10:15 AM Sai Gomez MD [facility] BS AMB   5/11/2023  9:00 AM Laurita Carrasco MD [facility] BS AMB Timothy Flower RN  August 15, 2022

## 2022-09-07 RX ORDER — HEPARIN 100 UNIT/ML
500 SYRINGE INTRAVENOUS AS NEEDED
OUTPATIENT
Start: 2023-01-13

## 2022-09-07 RX ORDER — SODIUM CHLORIDE 9 MG/ML
5-40 INJECTION INTRAMUSCULAR; INTRAVENOUS; SUBCUTANEOUS AS NEEDED
Status: CANCELLED | OUTPATIENT
Start: 2022-09-09

## 2022-09-07 RX ORDER — SODIUM CHLORIDE 9 MG/ML
5-250 INJECTION, SOLUTION INTRAVENOUS AS NEEDED
OUTPATIENT
Start: 2022-10-21

## 2022-09-07 RX ORDER — HEPARIN 100 UNIT/ML
500 SYRINGE INTRAVENOUS AS NEEDED
OUTPATIENT
Start: 2022-10-21

## 2022-09-07 RX ORDER — HEPARIN 100 UNIT/ML
500 SYRINGE INTRAVENOUS AS NEEDED
OUTPATIENT
Start: 2022-12-02

## 2022-09-07 RX ORDER — SODIUM CHLORIDE 0.9 % (FLUSH) 0.9 %
5-40 SYRINGE (ML) INJECTION AS NEEDED
OUTPATIENT
Start: 2023-01-13

## 2022-09-07 RX ORDER — SODIUM CHLORIDE 9 MG/ML
5-250 INJECTION, SOLUTION INTRAVENOUS AS NEEDED
Status: CANCELLED | OUTPATIENT
Start: 2022-09-09

## 2022-09-07 RX ORDER — SODIUM CHLORIDE 9 MG/ML
5-40 INJECTION INTRAMUSCULAR; INTRAVENOUS; SUBCUTANEOUS AS NEEDED
OUTPATIENT
Start: 2022-10-21

## 2022-09-07 RX ORDER — SODIUM CHLORIDE 9 MG/ML
5-40 INJECTION INTRAMUSCULAR; INTRAVENOUS; SUBCUTANEOUS AS NEEDED
OUTPATIENT
Start: 2023-01-13

## 2022-09-07 RX ORDER — SODIUM CHLORIDE 0.9 % (FLUSH) 0.9 %
5-40 SYRINGE (ML) INJECTION AS NEEDED
OUTPATIENT
Start: 2022-12-02

## 2022-09-07 RX ORDER — SODIUM CHLORIDE 0.9 % (FLUSH) 0.9 %
5-40 SYRINGE (ML) INJECTION AS NEEDED
OUTPATIENT
Start: 2022-10-21

## 2022-09-07 RX ORDER — SODIUM CHLORIDE 9 MG/ML
5-40 INJECTION INTRAMUSCULAR; INTRAVENOUS; SUBCUTANEOUS AS NEEDED
OUTPATIENT
Start: 2022-12-02

## 2022-09-07 RX ORDER — SODIUM CHLORIDE 9 MG/ML
5-250 INJECTION, SOLUTION INTRAVENOUS AS NEEDED
OUTPATIENT
Start: 2023-01-13

## 2022-09-07 RX ORDER — SODIUM CHLORIDE 9 MG/ML
5-250 INJECTION, SOLUTION INTRAVENOUS AS NEEDED
OUTPATIENT
Start: 2022-12-02

## 2022-09-09 ENCOUNTER — HOSPITAL ENCOUNTER (OUTPATIENT)
Dept: INFUSION THERAPY | Age: 37
Discharge: HOME | End: 2022-09-09
Payer: COMMERCIAL

## 2022-09-09 ENCOUNTER — OFFICE VISIT (OUTPATIENT)
Dept: ONCOLOGY | Age: 37
End: 2022-09-09

## 2022-09-09 VITALS
BODY MASS INDEX: 28.98 KG/M2 | RESPIRATION RATE: 18 BRPM | TEMPERATURE: 98.2 F | HEIGHT: 71 IN | SYSTOLIC BLOOD PRESSURE: 125 MMHG | HEART RATE: 97 BPM | WEIGHT: 207 LBS | DIASTOLIC BLOOD PRESSURE: 80 MMHG

## 2022-09-09 VITALS
TEMPERATURE: 98.2 F | HEART RATE: 97 BPM | SYSTOLIC BLOOD PRESSURE: 125 MMHG | RESPIRATION RATE: 18 BRPM | DIASTOLIC BLOOD PRESSURE: 80 MMHG

## 2022-09-09 DIAGNOSIS — C62.92 NON-SEMINOMATOUS CANCER OF LEFT TESTIS (HCC): Primary | ICD-10-CM

## 2022-09-09 DIAGNOSIS — R79.89 ELEVATED SERUM CREATININE: Primary | ICD-10-CM

## 2022-09-09 DIAGNOSIS — C62.92 NON-SEMINOMATOUS CANCER OF LEFT TESTIS (HCC): ICD-10-CM

## 2022-09-09 LAB
ALBUMIN SERPL-MCNC: 3.4 G/DL (ref 3.5–5)
ALBUMIN/GLOB SERPL: 0.8 {RATIO} (ref 1.1–2.2)
ALP SERPL-CCNC: 83 U/L (ref 45–117)
ALT SERPL-CCNC: 25 U/L (ref 12–78)
ANION GAP SERPL CALC-SCNC: 3 MMOL/L (ref 5–15)
AST SERPL-CCNC: 18 U/L (ref 15–37)
BASOPHILS # BLD: 0.2 K/UL (ref 0–0.1)
BASOPHILS NFR BLD: 2 % (ref 0–1)
BILIRUB SERPL-MCNC: 0.4 MG/DL (ref 0.2–1)
BUN SERPL-MCNC: 14 MG/DL (ref 6–20)
BUN/CREAT SERPL: 13 (ref 12–20)
CALCIUM SERPL-MCNC: 9.1 MG/DL (ref 8.5–10.1)
CHLORIDE SERPL-SCNC: 107 MMOL/L (ref 97–108)
CO2 SERPL-SCNC: 30 MMOL/L (ref 21–32)
CREAT SERPL-MCNC: 1.1 MG/DL (ref 0.7–1.3)
DIFFERENTIAL METHOD BLD: ABNORMAL
EOSINOPHIL # BLD: 2.3 K/UL (ref 0–0.4)
EOSINOPHIL NFR BLD: 24 % (ref 0–7)
ERYTHROCYTE [DISTWIDTH] IN BLOOD BY AUTOMATED COUNT: 18.4 % (ref 11.5–14.5)
GLOBULIN SER CALC-MCNC: 4.5 G/DL (ref 2–4)
GLUCOSE SERPL-MCNC: 102 MG/DL (ref 65–100)
HCT VFR BLD AUTO: 35.1 % (ref 36.6–50.3)
HGB BLD-MCNC: 11.3 G/DL (ref 12.1–17)
IMM GRANULOCYTES # BLD AUTO: 0 K/UL (ref 0–0.04)
IMM GRANULOCYTES NFR BLD AUTO: 0 % (ref 0–0.5)
LDH SERPL L TO P-CCNC: 185 U/L (ref 85–241)
LYMPHOCYTES # BLD: 2.3 K/UL (ref 0.8–3.5)
LYMPHOCYTES NFR BLD: 24 % (ref 12–49)
MAGNESIUM SERPL-MCNC: 2.2 MG/DL (ref 1.6–2.4)
MCH RBC QN AUTO: 29.3 PG (ref 26–34)
MCHC RBC AUTO-ENTMCNC: 32.2 G/DL (ref 30–36.5)
MCV RBC AUTO: 90.9 FL (ref 80–99)
MONOCYTES # BLD: 0.8 K/UL (ref 0–1)
MONOCYTES NFR BLD: 9 % (ref 5–13)
NEUTS SEG # BLD: 3.8 K/UL (ref 1.8–8)
NEUTS SEG NFR BLD: 41 % (ref 32–75)
NRBC # BLD: 0 K/UL (ref 0–0.01)
NRBC BLD-RTO: 0 PER 100 WBC
PLATELET # BLD AUTO: 333 K/UL (ref 150–400)
PMV BLD AUTO: 10.8 FL (ref 8.9–12.9)
POTASSIUM SERPL-SCNC: 3.7 MMOL/L (ref 3.5–5.1)
PROT SERPL-MCNC: 7.9 G/DL (ref 6.4–8.2)
RBC # BLD AUTO: 3.86 M/UL (ref 4.1–5.7)
RBC MORPH BLD: ABNORMAL
SODIUM SERPL-SCNC: 140 MMOL/L (ref 136–145)
WBC # BLD AUTO: 9.4 K/UL (ref 4.1–11.1)

## 2022-09-09 PROCEDURE — 77030012965 HC NDL HUBR BBMI -A

## 2022-09-09 PROCEDURE — 84702 CHORIONIC GONADOTROPIN TEST: CPT

## 2022-09-09 PROCEDURE — 80053 COMPREHEN METABOLIC PANEL: CPT

## 2022-09-09 PROCEDURE — 74011250636 HC RX REV CODE- 250/636: Performed by: NURSE PRACTITIONER

## 2022-09-09 PROCEDURE — 83735 ASSAY OF MAGNESIUM: CPT

## 2022-09-09 PROCEDURE — 82105 ALPHA-FETOPROTEIN SERUM: CPT

## 2022-09-09 PROCEDURE — 85025 COMPLETE CBC W/AUTO DIFF WBC: CPT

## 2022-09-09 PROCEDURE — 74011000250 HC RX REV CODE- 250: Performed by: NURSE PRACTITIONER

## 2022-09-09 PROCEDURE — 83615 LACTATE (LD) (LDH) ENZYME: CPT

## 2022-09-09 PROCEDURE — 36591 DRAW BLOOD OFF VENOUS DEVICE: CPT

## 2022-09-09 PROCEDURE — 99214 OFFICE O/P EST MOD 30 MIN: CPT | Performed by: INTERNAL MEDICINE

## 2022-09-09 RX ORDER — SODIUM CHLORIDE 0.9 % (FLUSH) 0.9 %
5-40 SYRINGE (ML) INJECTION AS NEEDED
Status: DISPENSED | OUTPATIENT
Start: 2022-09-09 | End: 2022-09-09

## 2022-09-09 RX ORDER — HEPARIN 100 UNIT/ML
500 SYRINGE INTRAVENOUS AS NEEDED
Status: ACTIVE | OUTPATIENT
Start: 2022-09-09 | End: 2022-09-09

## 2022-09-09 RX ADMIN — SODIUM CHLORIDE, PRESERVATIVE FREE 10 ML: 5 INJECTION INTRAVENOUS at 10:30

## 2022-09-09 RX ADMIN — Medication 500 UNITS: at 10:30

## 2022-09-09 NOTE — PROGRESS NOTES
Durga Loera is a 40 y.o. male follow up for testicular cancer. 1. Have you been to the ER, urgent care clinic since your last visit? Hospitalized since your last visit?no     2. Have you seen or consulted any other health care providers outside of the [address] [place] [address] since your last visit? Include any pap smears or colon screening.  No    Vitals 9/9/2022   Blood Pressure 125/80   Pulse 97   Temp 98.2   Resp 18   Height    Weight    SpO2    BSA

## 2022-09-09 NOTE — PROGRESS NOTES
[facility] [place] at [address], [address] [facility] [address] [facility]  Godwin Boothe: [phone]  F: [phone]      Reason for Visit:   Sarmad Bower is a 40 y.o. male who is seen in follow up for evaluation of testicular cancer. Hematology/Oncology Treatment History:   Scrotal US 11/30/2021: 3.9cm solid and cystic mass replacing left testicle. Slightly atrophic right testicle with no mass. Incidental 8mm left epididymal head cyst/spermatocele  Pre-op tumor markers 12/2/2021: .0, beta hCG 250,   Left radical inguinal orchiectomy by Dr. 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 Negative LVI, negative margins. CT A/P 12/16/2021: indeterminate splenic lesion. 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 No lymphadenopathy or other evidence of metastasis. Significant hepatic steatosis. Tumor markers 5/17/2022: AFP 5.3, beta hCG 14  Tumor markers 6/20/2022: AFP 6.3, beta hCG 24  Stage IS (pT1b cN0 M0 S1) Non-seminoma, LEFT testis  Completed BEP x 3 cycles on 8/15/2022    History of Present Illness:   Presents for follow up off therapy. States he is doing well     He is unaccompanied today. He lives in [facility] with his wife and two young children (ages 3 and 3). Review of systems was obtained and pertinent findings reviewed above. Past medical history, social history, family history, medications, and allergies are located in the electronic medical record.       Physical Exam:     Visit Vitals  /80   Pulse 97   Temp 98.2 °F (36.8 °C)   Resp 18 Ht 5' 11\" (1.803 m)   Wt 207 lb (93.9 kg)   BMI 28.87 kg/m²       ECOG PS: 0  General: no distress  Eyes: PERRLA, anicteric sclerae  HENT: atraumatic, oropharynx clear  Neck: supple  Lymphatic: no cervical, supraclavicular, or inguinal adenopathy  Respiratory: CTAB, normal respiratory effort  CV: normal rate, regular rhythm, no murmurs, no peripheral edema  GI: soft, nontender, nondistended, no masses, no hepatomegaly, no splenomegaly  MS: digits without clubbing or cyanosis. Skin: no rashes, no ecchymoses, no petechia. normal temperature, turgor, and texture. Psych: alert, oriented, appropriate affect, normal judgment/insight    Results:     Lab Results   Component Value Date/Time    WBC 9.4 09/09/2022 10:23 AM    HGB 11.3 (L) 09/09/2022 10:23 AM    HCT 35.1 (L) 09/09/2022 10:23 AM    PLATELET 064 40/10/5976 10:23 AM    MCV 90.9 09/09/2022 10:23 AM     Lab Results   Component Value Date/Time    Sodium 140 09/09/2022 10:23 AM    Potassium 3.7 09/09/2022 10:23 AM    Chloride 107 09/09/2022 10:23 AM    CO2 30 09/09/2022 10:23 AM    Anion gap 3 (L) 09/09/2022 10:23 AM    Glucose 102 (H) 09/09/2022 10:23 AM    BUN 14 09/09/2022 10:23 AM    Creatinine 1.10 09/09/2022 10:23 AM    BUN/Creatinine ratio 13 09/09/2022 10:23 AM    GFR est AA >60 09/09/2022 10:23 AM    GFR est non-AA >60 09/09/2022 10:23 AM    Calcium 9.1 09/09/2022 10:23 AM    Bilirubin, total 0.4 09/09/2022 10:23 AM    Alk.  phosphatase 83 09/09/2022 10:23 AM    Protein, total 7.9 09/09/2022 10:23 AM    Albumin 3.4 (L) 09/09/2022 10:23 AM    Globulin 4.5 (H) 09/09/2022 10:23 AM    A-G Ratio 0.8 (L) 09/09/2022 10:23 AM    ALT (SGPT) 25 09/09/2022 10:23 AM    AST (SGOT) 18 09/09/2022 10:23 AM     LDH:  Recent Labs     09/09/22  1023 08/01/22  1003 07/11/22  1001 06/20/22  0916 06/01/22  1543    190 222 200 186     Beta-HCG:  Recent Labs     08/04/22  0949 08/01/22  1003 07/11/22  1001 06/20/22  0916 06/01/22  1543   HCGTLT <1 <1 1 24* 19* AFP:  Recent Labs     22  0949 22  1003 22  1001 22  0916 22  1543   AFP 9.1* 9.7* 9.8* 6.3 5.2     2021  AFP: 168.0 (H)  beta hC (H)  LDH: 190    2022  AFP: 6.2  beta hCG: <1    2022  AFP:  4.6  beta hCG: <1    2022:  AFP:  5.6  beta hC    2022:  AFP:  5.3  beta hC    CT Chest 2022:  No evidence of intrathoracic malignancy. Assessment:   1) Non-seminoma, LEFT testis  Stage IS  He is s/p orchiectomy 2021. His pre-op hCG was elevated at 250, dropped to undetectable levels after surgery, but then steadily marcia over time to a peak of 24. Imaging was negative. We treated with BEP x3 cycles, completed 8/15/2022. Beta-hCG quickly returned to undetectable levels, indicating a response to therapy. However, his AFP marcia to 9.8 and has remained slightly elevated. I suspect this is a false positive, perhaps related to medications or ETOH. He stopped all ETOH about a week ago. We will repeat labs today. If AFP remains elevated, I will obtain imaging with CT C/A/P and MRI brain to further evaluate. If it has normalized, then we will transition to surveillance as per below. Surveillance after complete response to chemotherapy (per NCCN guidelines version 2.): Year 1: H&P and tumor markers every 2 months, CXR and CT A/P every 6 months  Year 2: H&P and tumor markers every 3 months, CXR every 6 months, CT A/P every 6-12 months  Year 3: H&P and tumor markers every 6 months, CXR and CT A/P every 12 months  Year 4: H&P and tumor markers every 6 months, CXR every 12 months  Year 5: H&P and tumor markers every 6 months, imaging as indicated      2) Splenic lesion  Possible atypical hemangioma vs PAOLA, follows yearly with Dr. Neysa Meckel (surgical oncology). 3) Ulcerative proctitis  Following with Dr. Jesusita Guan. Plans to hold Humira while on chemotherapy. On therapy with mesalamine. No scheduled fu with GI at this point.      4) Port He is interested in having this removed. Will remove if tumor markers have normalized on today's labs. 5) Emotional Well Being  Patient reports concern that his insurance coverage provided through work has started to deny his claims. When he called about this he was told he is outside of their service area and all his claims for the last 2 years should not have been covered. He is technically uninsured as of now. He has signed on with a new insurance provided by his employer and has formally requested retroactive coverage over the last 2 years. He is waiting to hear word on this. Discussed referral for Care Card if it turns out he has been uninsured with this cancer treatment. Will refer to Ash Sloan for consideration of diana coverage for chemotherapy treatments.          Plan:     Port flush with labs today: CBC, CMP, Mg, LD, AFP, BetaHCG  Add on Hep C testing  Consider port removal if tumor markers negative  Obtain CT C/A/P and MRI Brain if AFP positive  Follow up in 2 months, or sooner if needed        Signed By: Citlalli Farmer MD

## 2022-09-10 ENCOUNTER — PATIENT MESSAGE (OUTPATIENT)
Dept: ONCOLOGY | Age: 37
End: 2022-09-10

## 2022-09-11 ENCOUNTER — PATIENT MESSAGE (OUTPATIENT)
Dept: ONCOLOGY | Age: 37
End: 2022-09-11

## 2022-09-11 DIAGNOSIS — R97.8 ELEVATED TUMOR MARKERS: ICD-10-CM

## 2022-09-11 DIAGNOSIS — C62.92 NON-SEMINOMATOUS CANCER OF LEFT TESTIS (HCC): Primary | ICD-10-CM

## 2022-09-11 LAB
AFP-TM SERPL-MCNC: 7.5 NG/ML (ref 0–6.9)
HCG INTACT+B SERPL-ACNC: <1 MIU/ML (ref 0–3)

## 2022-09-11 NOTE — TELEPHONE ENCOUNTER
[address] at [facility] [address]  [phone]    AFP improving, but not normalized. Check labs again in 2 months (move his appt to November from December). Obtain CT C/A/P and MRI Brain now. Patient notified by [address]

## 2022-09-28 ENCOUNTER — TELEPHONE (OUTPATIENT)
Dept: ONCOLOGY | Age: 37
End: 2022-09-28

## 2022-09-28 NOTE — TELEPHONE ENCOUNTER
Patient was returning a call that he received this morning. Will not be able to answer phone while at work so he said that his wife can be called to give or receive any information that may have been needed.        # [phone]

## 2022-09-28 NOTE — TELEPHONE ENCOUNTER
Auth team called and stated that a per to per is needed for the MRI   #[phone]  Case [id number]

## 2022-09-28 NOTE — TELEPHONE ENCOUNTER
[address] [facility]  at [place]  [phone]    09/28/22- Spoke to patient's wife, she stated patients been feeling well but does report recent headaches. Denies dizziness or blurred vision, or any other concerns. Will update Doreen Saucedo.

## 2022-10-03 NOTE — TELEPHONE ENCOUNTER
Call to insurance for peer to peer. They are scheduling peer to peers starting on Wed. They have option of us faxing additional clinical information to [facility] which may provide faster turn around. First page of fax needs to say patient's name,  and case number-fax to [phone] ATTN: reconsideration. Call to patient and wife to clarify duration, severity of headaches. Patient's wife states patient is having persistent headaches daily at least over the last 2 weeks. States he is taking Excedrin \"constantly\" for headache pain. She also states they  have changed insurance. Denial for MRI was coming through [facility], they no longer having [employer]. Patient called on Thursday to update insurance coverage and have not heard about further denial of imaging. Will call to our scheduling team to see if they have heard word back from new insurance regarding approval of scans. It appears we should not go through with reconsideration through [employer] at this time.

## 2022-10-07 ENCOUNTER — HOSPITAL ENCOUNTER (OUTPATIENT)
Dept: CT IMAGING | Age: 37
Discharge: HOME | End: 2022-10-07
Attending: INTERNAL MEDICINE
Payer: OTHER GOVERNMENT

## 2022-10-07 ENCOUNTER — HOSPITAL ENCOUNTER (OUTPATIENT)
Dept: MRI IMAGING | Age: 37
Discharge: HOME | End: 2022-10-07
Attending: INTERNAL MEDICINE
Payer: OTHER GOVERNMENT

## 2022-10-07 VITALS — BODY MASS INDEX: 28.73 KG/M2 | WEIGHT: 206 LBS

## 2022-10-07 DIAGNOSIS — R97.8 ELEVATED TUMOR MARKERS: ICD-10-CM

## 2022-10-07 DIAGNOSIS — C62.92 NON-SEMINOMATOUS CANCER OF LEFT TESTIS (HCC): ICD-10-CM

## 2022-10-07 PROCEDURE — 74011000636 HC RX REV CODE- 636: Performed by: INTERNAL MEDICINE

## 2022-10-07 PROCEDURE — A9576 INJ PROHANCE MULTIPACK: HCPCS | Performed by: INTERNAL MEDICINE

## 2022-10-07 PROCEDURE — 74011250636 HC RX REV CODE- 250/636: Performed by: INTERNAL MEDICINE

## 2022-10-07 PROCEDURE — 74177 CT ABD & PELVIS W/CONTRAST: CPT

## 2022-10-07 PROCEDURE — 74011000250 HC RX REV CODE- 250: Performed by: INTERNAL MEDICINE

## 2022-10-07 PROCEDURE — 70553 MRI BRAIN STEM W/O & W/DYE: CPT

## 2022-10-07 RX ORDER — HEPARIN 100 UNIT/ML
300 SYRINGE INTRAVENOUS AS NEEDED
Status: DISCONTINUED | OUTPATIENT
Start: 2022-10-07 | End: 2022-10-11 | Stop reason: HOSPADM

## 2022-10-07 RX ORDER — BARIUM SULFATE 20 MG/ML
900 SUSPENSION ORAL ONCE
Status: COMPLETED | OUTPATIENT
Start: 2022-10-07 | End: 2022-10-07

## 2022-10-07 RX ADMIN — GADOTERIDOL 18 ML: 279.3 INJECTION, SOLUTION INTRAVENOUS at 08:54

## 2022-10-07 RX ADMIN — IOPAMIDOL 100 ML: 755 INJECTION, SOLUTION INTRAVENOUS at 07:51

## 2022-10-07 RX ADMIN — BARIUM SULFATE 900 ML: 20 SUSPENSION ORAL at 07:52

## 2022-10-11 ENCOUNTER — TELEPHONE (OUTPATIENT)
Dept: ONCOLOGY | Age: 37
End: 2022-10-11

## 2022-10-11 NOTE — TELEPHONE ENCOUNTER
[address] at [facility]  [phone]    Images reviewed. MRI brain negative. CT with new retroperitoneal node, 2.0 x 1.9cm. His last AFP was down to 7.5. While still technically above the ULN, it is declining over time and generally we consider levels <10-15 as normal.    My concern is that he may have a teratoma. I recommend follow up with Dr. Sveta Tinoco to consider resection of this residual disease (vs full RPLND). I called the patient to discuss, no answer, left a message for him to call me back.

## 2022-10-12 ENCOUNTER — TELEPHONE (OUTPATIENT)
Dept: ONCOLOGY | Age: 37
End: 2022-10-12

## 2022-10-12 NOTE — TELEPHONE ENCOUNTER
[address] at [facility]  [phone]    10/12/22- Per  new referral placed to [address] [place] [address] urology for surgery options. Phone call placed to [facility] spoke to Heather Mckeon [phone] she reported records needs to be faxed over then their office will call pt to schedule. Records faxed to [phone]- fax confirmation delivery successful. Patient seen on 10/14 by Jann Ivy.

## 2022-10-12 NOTE — TELEPHONE ENCOUNTER
[address] at [place]  [phone]    I called the patient this morning and reviewed his CT findings and my recommendation for him to see Dr. Regulo Carney to discuss surgery. He is agreeable. I made Dr. Rajput Daughters aware. We will assist with scheduling a follow up with him to discuss.

## 2022-10-18 ENCOUNTER — TELEPHONE (OUTPATIENT)
Dept: ONCOLOGY | Age: 37
End: 2022-10-18

## 2022-10-18 DIAGNOSIS — C62.12 MALIGNANT NEOPLASM OF DESCENDED LEFT TESTIS (HCC): Primary | ICD-10-CM

## 2022-10-18 DIAGNOSIS — J98.4 BLEOMYCIN LUNG TOXICITY: ICD-10-CM

## 2022-10-18 DIAGNOSIS — T45.1X5A BLEOMYCIN LUNG TOXICITY: ICD-10-CM

## 2022-10-18 NOTE — TELEPHONE ENCOUNTER
[address] at [facility]  [phone]    10/18/22- PFT order placed. Patient notified via [address]

## 2022-10-21 ENCOUNTER — HOSPITAL ENCOUNTER (OUTPATIENT)
Dept: PULMONOLOGY | Age: 37
Discharge: HOME | End: 2022-10-21
Attending: NURSE PRACTITIONER
Payer: OTHER GOVERNMENT

## 2022-10-21 DIAGNOSIS — C62.12 MALIGNANT NEOPLASM OF DESCENDED LEFT TESTIS (HCC): ICD-10-CM

## 2022-10-21 PROCEDURE — 94010 BREATHING CAPACITY TEST: CPT

## 2022-10-21 PROCEDURE — 94726 PLETHYSMOGRAPHY LUNG VOLUMES: CPT

## 2022-10-21 PROCEDURE — 94729 DIFFUSING CAPACITY: CPT

## 2022-11-10 ENCOUNTER — TELEPHONE (OUTPATIENT)
Dept: ONCOLOGY | Age: 37
End: 2022-11-10

## 2022-11-10 NOTE — TELEPHONE ENCOUNTER
Patient wants to know if his appointment tomorrow is necessary since he is done with chemo because he just had surgery and it would be a big ordeal for someone to bring him

## 2022-11-11 ENCOUNTER — HOSPITAL ENCOUNTER (OUTPATIENT)
Dept: INFUSION THERAPY | Age: 37
End: 2022-11-11

## 2022-11-11 NOTE — TELEPHONE ENCOUNTER
[employer] [employer] at [facility]  [phone]    11/11/22- Voicemail left for patient requesting a return call when available. 11/14/22- Spoke to patient about Dr. Mallika Shanks recommendations for follow up. He's agreeable to reschedule a visit to discuss surveillance and port removal.  Call transferred to [facility] to schedule.

## 2022-11-11 NOTE — TELEPHONE ENCOUNTER
[employer] at [facility]  [phone]    It is recommended that he follow up with a medical oncologist for surveillance, given that he has had chemotherapy and we need to monitor for long-term toxicity from chemotherapy. I had planned to discuss the recommended surveillance protocol with him at his visit. If he would prefer to follow with Dr. Desean Martin, he needs to discuss this with Dr. Desean Martin and make sure that he is comfortable monitoring post-chemotherapy.

## 2022-12-09 ENCOUNTER — APPOINTMENT (OUTPATIENT)
Dept: INFUSION THERAPY | Age: 37
End: 2022-12-09

## 2022-12-13 NOTE — PROCEDURES
[address] [place]   PULMONARY FUNCTION TEST    Name:  Aguilar Tesfaye  MR#:  [id number]  :  1985  ACCOUNT #:  [de-identified]  DATE OF SERVICE:  10/21/2022    REQUESTING PROVIDER:  Ramon Doshi NP    DIAGNOSIS:  Shortness of breath. ATS criteria for reproducibility and acceptability was met. SPIROMETRY:  FEV1/FVC ratio is 79. FEV1 is 3.88 L which is 87% of predicted. FVC is 4.94 L which is 90% of predicted. LUNG VOLUMES:  Total lung capacity is 6.66 L which is 91% of predicted. RV/TLC ratio is 26. DIFFUSION CAPACITY:  DLCO uncorrected for hemoglobin is 27.63 mL/mmHg/min which is 85% of predicted, which is normal.    Flow volume loop is normal.    INTERPRETATION:  Normal spirometry, normal lung volumes and normal diffusion capacity. Clinical correlation advised.       Joao Mart MD [email or web address]  D:  2022 13:21  T:  2022 17:14  JOB #:  [id number]  CC:  Ramon Doshi NP

## 2022-12-16 ENCOUNTER — HOSPITAL ENCOUNTER (OUTPATIENT)
Dept: INFUSION THERAPY | Age: 37
End: 2022-12-16

## 2022-12-16 NOTE — PROGRESS NOTES
[facility] [place] at [address], [address] [facility] [place]: [phone]  F: [phone]      Reason for Visit:   Mady Her is a 40 y.o. male who is seen in follow up for evaluation of testicular cancer. Hematology/Oncology Treatment History:   Scrotal US 11/30/2021: 3.9cm solid and cystic mass replacing left testicle. Slightly atrophic right testicle with no mass. Incidental 8mm left epididymal head cyst/spermatocele  Pre-op tumor markers 12/2/2021: .0, beta hCG 250,   Left radical inguinal orchiectomy by Dr. 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 Negative LVI, negative margins. CT A/P 12/16/2021: indeterminate splenic lesion. 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 No lymphadenopathy or other evidence of metastasis. Significant hepatic steatosis. Tumor markers 5/17/2022: AFP 5.3, beta hCG 14  Tumor markers 6/20/2022: AFP 6.3, beta hCG 24  Stage IS (pT1b cN0 M0 S1) Non-seminoma, LEFT testis  Completed BEP x 3 cycles on 8/15/2022  Tumor markers 8/4/2022: AFP 9.1, beta hCG <1  Tumor markers 9/9/2022: AFP 7.5, beta hCG <1  CT C/A/P 10/7/2022: new enlarged retroperitoneal lymph node measuring 20 x 19mm  RPLND with Dr. Kati Mendiola on 11/2/2022 pathology consistent with teratoma in 2 out of 18 periaortic lymph nodes and 1 out of 15 interaortocaval  Stage IIB (pT1b ypN2 M0) Non-seminoma, LEFT testis    History of Present Illness:   Presents for follow up off therapy.  Underwent surgery for lymph node dissection with Dr. Kati Mendiola since last visit. Some residual discomfort, but improving. Some intermittent constipation and diarrhea which is starting to improve. Urinating ok. Erections ok, but dry ejaculation. Energy ok, back at work. He is unaccompanied today. He lives in [facility] [place] [facility] with his wife and two young children. Review of systems was obtained and pertinent findings reviewed above. Past medical history, social history, family history, medications, and allergies are located in the electronic medical record. Physical Exam:     Visit Vitals  BP (!) 135/90 (BP 1 Location: Left upper arm, BP Patient Position: Sitting, BP Cuff Size: Large adult)   Pulse (!) 111   Temp 98.2 °F (36.8 °C) (Temporal)   Resp 20   Ht 5' 11\" (1.803 m)   Wt 201 lb 12.8 oz (91.5 kg)   SpO2 98%   BMI 28.15 kg/m²       ECOG PS: 0  General: no distress  Eyes: anicteric sclerae  HENT: oropharynx clear  Neck: supple  Lymphatic: no cervical, supraclavicular, or inguinal adenopathy  Respiratory: normal respiratory effort  CV: no peripheral edema  GI: soft, nontender, nondistended, no masses  Skin: no rashes; no ecchymoses; no petechiae        Results:     Lab Results   Component Value Date/Time    WBC 9.4 09/09/2022 10:23 AM    HGB 11.3 (L) 09/09/2022 10:23 AM    HCT 35.1 (L) 09/09/2022 10:23 AM    PLATELET 082 69/33/8034 10:23 AM    MCV 90.9 09/09/2022 10:23 AM     Lab Results   Component Value Date/Time    Sodium 140 09/09/2022 10:23 AM    Potassium 3.7 09/09/2022 10:23 AM    Chloride 107 09/09/2022 10:23 AM    CO2 30 09/09/2022 10:23 AM    Anion gap 3 (L) 09/09/2022 10:23 AM    Glucose 102 (H) 09/09/2022 10:23 AM    BUN 14 09/09/2022 10:23 AM    Creatinine 1.10 09/09/2022 10:23 AM    BUN/Creatinine ratio 13 09/09/2022 10:23 AM    GFR est AA >60 09/09/2022 10:23 AM    GFR est non-AA >60 09/09/2022 10:23 AM    Calcium 9.1 09/09/2022 10:23 AM    Bilirubin, total 0.4 09/09/2022 10:23 AM    Alk.  phosphatase 83 09/09/2022 10:23 AM    Protein, total 7.9 09/09/2022 10:23 AM    Albumin 3.4 (L) 2022 10:23 AM    Globulin 4.5 (H) 2022 10:23 AM    A-G Ratio 0.8 (L) 2022 10:23 AM    ALT (SGPT) 25 2022 10:23 AM    AST (SGOT) 18 2022 10:23 AM     LDH:  Recent Labs     22  1023 22  1003 22  1001 22  0916 22  1543    190 222 200 186     Beta-HCG:  Recent Labs     22  1023 22  0949 22  1003 22  1001 22  0916 22  1543   HCGTLT <1 <1 <1 1 24* 19*     AFP:  Recent Labs     22  1023 22  0949 22  1003 22  1001 22  0916 22  1543   AFP 7.5* 9.1* 9.7* 9.8* 6.3 5.2     2021  AFP: 168.0 (H)  beta hC (H)  LDH: 190    2022  AFP: 6.2  beta hCG: <1    2022  AFP:  4.6  beta hCG: <1    2022:  AFP:  5.6  beta hC    2022:  AFP:  5.3  beta hC    CT Chest 2022:  No evidence of intrathoracic malignancy. CT C/A/P 10/07/2022:  New enlarged retroperitoneal lymph node measuring 20 x 19 mm at the level of the  mid abdomen, consistent with metastatic disease. No evidence of intrathoracic metastatic disease. MRI brain 10/7/2022:  No evidence of intracranial metastatic disease. Mild frontal and ethmoid sinus disease. No intracranial mass, hemorrhage or evidence of acute infarction. Assessment:   1) Non-seminoma, LEFT testis  Stage IIB  He is s/p orchiectomy 2021. His pre-op hCG was elevated at 250, dropped to undetectable levels after surgery, but then steadily marcia over time to a peak of 24. Imaging was negative. We treated with BEP x3 cycles, completed 8/15/2022. Subsequent imaging noted retroperitoneal adenopathy. He is now s/p RPLND with Dr. Duane Luna, with final pathology noting teratoma in 3 nodes. Management remains with curative intent. No further chemotherapy is indicated at this time, I recommend we transition to surveillance.     Surveillance for patients with Stage II or III non-seminoma after complete response to chemotherapy +/- post-chemotherapy RPLND (per NCCN guidelines version 2.2022): Year 1: H&P and tumor markers every 2 months, CXR and CT A/P every 6 months  Year 2: H&P and tumor markers every 3 months, CXR every 6 months, CT A/P every 6-12 months  Year 3: H&P and tumor markers every 6 months, CXR and CT A/P every 12 months  Year 4: H&P and tumor markers every 6 months, CXR every 12 months  Year 5: H&P and tumor markers every 6 months, imaging as indicated    He is getting repeat scans and labs with [facility] in 2 months. I will see him 2 months after that. 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 Jacky La (surgical oncology). 3) Ulcerative proctitis  Following with Dr. Quynh Gonzales. He has been off the Humira since chemotherapy. Ok to resume if needed. On therapy with mesalamine which is helping. No scheduled fu with GI at this point. 4) Port   He is interested in having this removed.         Plan:     Labs today: beta-hCG, AFP, LDH, CBC, CMP  IR to remove port  Follow up with urology for scans and labs in February  Return to see me in April        Signed By: Nori Leonard MD

## 2022-12-19 NOTE — PROGRESS NOTES
OPIC Progress Note    Date: September 9, 2022      1020: Pt arrived ambulatory to [facility] for [place] + Lab Work in stable condition. Assessment completed. Port accessed with positive blood return. Labs drawn and sent for processing. Visit Vitals  /80 (BP 1 Location: Left upper arm, BP Patient Position: Sitting)   Pulse 97   Temp 98.2 °F (36.8 °C)   Resp 18        Medications Administered       heparin (porcine) pf 500 Units       Admin Date  09/09/2022 Action  Given Dose  500 Units Route  InterCATHeter Administered By  [facility]              sodium chloride (NS) flush 5-40 mL       Admin Date  09/09/2022 Action  Given Dose  10 mL Route  IntraVENous Administered By  [facility]             1030: Tolerated treatment well, no adverse reactions noted. Port flushed, heparinized and de- accessed per protocol. D/Cd from [facility] ambulatory and in no distress.      Future Appointments   Date Time Provider Avila Terry   5/11/2023  9:00 AM Dariela Tripp MD [facility] BS JULIANNA Youngblood RN  September 9, 2022 3 = A little assistance

## 2022-12-23 ENCOUNTER — OFFICE VISIT (OUTPATIENT)
Dept: ONCOLOGY | Age: 37
End: 2022-12-23
Payer: OTHER GOVERNMENT

## 2022-12-23 ENCOUNTER — DOCUMENTATION ONLY (OUTPATIENT)
Dept: ONCOLOGY | Age: 37
End: 2022-12-23

## 2022-12-23 VITALS
HEIGHT: 71 IN | WEIGHT: 201.8 LBS | RESPIRATION RATE: 20 BRPM | SYSTOLIC BLOOD PRESSURE: 135 MMHG | OXYGEN SATURATION: 98 % | TEMPERATURE: 98.2 F | DIASTOLIC BLOOD PRESSURE: 90 MMHG | HEART RATE: 111 BPM | BODY MASS INDEX: 28.25 KG/M2

## 2022-12-23 DIAGNOSIS — C62.92 NON-SEMINOMATOUS CANCER OF LEFT TESTIS (HCC): Primary | ICD-10-CM

## 2022-12-23 NOTE — PROGRESS NOTES
[employer]  Oncology Social Work  Encounter     Patient Name:  Migue Sabillon    Medical History: testicular cancer    Advance Directives: none on file; conversation deferred     Narrative: Met with patient to provide guidance about [employer] issues. Patient believes he's being over charged by [facility] per his insurance. He's attempted to correct the issue several times by called [employer]) insurance. Patient has been told by [facility] that his account has been noted for correction but the issue continues. Offered attempt to escalate concern if patient is able to provide account numbers. Barriers to Care: billing issues    Plan:  Patient will contact this patient with account numbers for escalation of billing concerns. Referral/Handouts:    Insurance/Entitlements referral    Thank you,   Sharon Galeano LCSW

## 2022-12-23 NOTE — PROGRESS NOTES
Myrna Blevins is a 40 y.o. male follow up for testicular cancer. 1. Have you been to the ER, urgent care clinic since your last visit? Hospitalized since your last visit?no     2. Have you seen or consulted any other health care providers outside of the [address] [place] [address] since your last visit? Include any pap smears or colon screening.  no

## 2022-12-30 ENCOUNTER — TELEPHONE (OUTPATIENT)
Dept: ONCOLOGY | Age: 37
End: 2022-12-30

## 2022-12-30 NOTE — TELEPHONE ENCOUNTER
Patient called stating that he received claims and is being billed from his insurance and wants to know how he can get this resolved       # [phone]

## 2023-01-04 NOTE — TELEPHONE ENCOUNTER
[employer]  Oncology Social Work  Encounter     Patient Name:  Shahnaz Hurt     Medical History: testicular cancer    Advance Directives: none on file; conversation deferred     Narrative: Return call placed to patient. Patient is having an ongoing billing issue and has contacted Customer Service several times in an attempt to get issues resolved. He's had a conference call the both Customer Service and his insurance. During that call Customer Service advised they would note his account for correction but patient is still receiving statements. The issues:  Account [de-identified]  Patient was billed $820. He feels this is incorrect because the other same treatments he received were only $55. His insurance EOB also states he's patient responsibility is $55.    Account [de-identified]  Patient was billed $1037. Same as above. Date of Service 9/9/22 - [facility] Flu in [place]  Patient was billed $250. His insurance is stating he should only have to pay $55.    Patient has also been making payments on his accounts. He wants to make sure those payments will go towards his balance after it's be correctly adjusted. Barriers to Care: billing issues     Plan:  escalated concern up to [facility] service and  for resolution. Referral/Handouts:    Insurance/Entitlements referral    Thank you,   Carina Bailon LCSW

## 2023-01-10 ENCOUNTER — HOSPITAL ENCOUNTER (OUTPATIENT)
Dept: INTERVENTIONAL RADIOLOGY/VASCULAR | Age: 38
Discharge: HOME | End: 2023-01-10
Attending: RADIOLOGY | Admitting: RADIOLOGY
Payer: COMMERCIAL

## 2023-01-10 ENCOUNTER — TELEPHONE (OUTPATIENT)
Dept: ONCOLOGY | Age: 38
End: 2023-01-10

## 2023-01-10 VITALS
BODY MASS INDEX: 28 KG/M2 | HEART RATE: 85 BPM | OXYGEN SATURATION: 98 % | SYSTOLIC BLOOD PRESSURE: 134 MMHG | DIASTOLIC BLOOD PRESSURE: 92 MMHG | HEIGHT: 71 IN | WEIGHT: 200 LBS | TEMPERATURE: 98 F | RESPIRATION RATE: 18 BRPM

## 2023-01-10 DIAGNOSIS — C62.92 NON-SEMINOMATOUS CANCER OF LEFT TESTIS (HCC): ICD-10-CM

## 2023-01-10 PROCEDURE — 77030010507 HC ADH SKN DERMBND J&J -B

## 2023-01-10 PROCEDURE — 74011250636 HC RX REV CODE- 250/636: Performed by: RADIOLOGY

## 2023-01-10 PROCEDURE — 74011000250 HC RX REV CODE- 250: Performed by: RADIOLOGY

## 2023-01-10 PROCEDURE — 36590 REMOVAL TUNNELED CV CATH: CPT

## 2023-01-10 PROCEDURE — 2709999900 HC NON-CHARGEABLE SUPPLY

## 2023-01-10 PROCEDURE — 77030031139 HC SUT VCRL2 J&J -A

## 2023-01-10 RX ORDER — FLUMAZENIL 0.1 MG/ML
0.5 INJECTION INTRAVENOUS
Status: DISCONTINUED | OUTPATIENT
Start: 2023-01-10 | End: 2023-01-10

## 2023-01-10 RX ORDER — NALOXONE HYDROCHLORIDE 0.4 MG/ML
0.4 INJECTION, SOLUTION INTRAMUSCULAR; INTRAVENOUS; SUBCUTANEOUS
Status: DISCONTINUED | OUTPATIENT
Start: 2023-01-10 | End: 2023-01-10

## 2023-01-10 RX ORDER — MIDAZOLAM HYDROCHLORIDE 1 MG/ML
.5-5 INJECTION, SOLUTION INTRAMUSCULAR; INTRAVENOUS
Status: DISCONTINUED | OUTPATIENT
Start: 2023-01-10 | End: 2023-01-10

## 2023-01-10 RX ORDER — LIDOCAINE HYDROCHLORIDE 10 MG/ML
20 INJECTION INFILTRATION; PERINEURAL
Status: COMPLETED | OUTPATIENT
Start: 2023-01-10 | End: 2023-01-10

## 2023-01-10 RX ORDER — SODIUM CHLORIDE 9 MG/ML
25 INJECTION, SOLUTION INTRAVENOUS
Status: DISCONTINUED | OUTPATIENT
Start: 2023-01-10 | End: 2023-01-10

## 2023-01-10 RX ORDER — FENTANYL CITRATE 50 UG/ML
25-200 INJECTION, SOLUTION INTRAMUSCULAR; INTRAVENOUS
Status: DISCONTINUED | OUTPATIENT
Start: 2023-01-10 | End: 2023-01-10

## 2023-01-10 RX ADMIN — FENTANYL CITRATE 50 MCG: 50 INJECTION, SOLUTION INTRAMUSCULAR; INTRAVENOUS at 13:15

## 2023-01-10 RX ADMIN — LIDOCAINE HYDROCHLORIDE 6 ML: 10 INJECTION, SOLUTION INFILTRATION; PERINEURAL at 13:18

## 2023-01-10 RX ADMIN — MIDAZOLAM HYDROCHLORIDE 2 MG: 1 INJECTION, SOLUTION INTRAMUSCULAR; INTRAVENOUS at 13:15

## 2023-01-10 RX ADMIN — FENTANYL CITRATE 50 MCG: 50 INJECTION, SOLUTION INTRAMUSCULAR; INTRAVENOUS at 13:20

## 2023-01-10 NOTE — TELEPHONE ENCOUNTER
Patient called to check in on some things that were being worked on regarding his insurance. Requested a call back to try and touch base.      # [phone]

## 2023-01-10 NOTE — H&P
Interventional and Vascular Radiology History and Physical    Patient: Chapincito Pugh 41 y.o. male       Chief Complaint: No chief complaint on file. History of Present Illness: finished treatment     History:    Past Medical History:   Diagnosis Date    Acid indigestion     Autoimmune disease ([address]     Cancer (HCC)     Testicular     Stool color black     ulceratie proctitis      Family History   Problem Relation Age of Onset    Hypertension Mother     Cancer Maternal Grandmother         Breast    Stroke Maternal Grandfather     Cancer Paternal Grandfather      Social History     Socioeconomic History    Marital status:      Spouse name: Not on file    Number of children: Not on file    Years of education: Not on file    Highest education level: Not on file   Occupational History    Not on file   Tobacco Use    Smoking status: Former    Smokeless tobacco: Never   Substance and Sexual Activity    Alcohol use: Yes    Drug use: Never    Sexual activity: Not on file   Other Topics Concern    Not on file   Social History Narrative    Not on file     Social Determinants of Health     Financial Resource Strain: Not on file   Food Insecurity: Not on file   Transportation Needs: Not on file   Physical Activity: Not on file   Stress: Not on file   Social Connections: Not on file   Intimate Partner Violence: Not on file   Housing Stability: Not on file       Allergies:    Allergies   Allergen Reactions    [place] Rash and Swelling       Current Medications:  Current Facility-Administered Medications   Medication Dose Route Frequency    0.9% sodium chloride infusion  25 mL/hr IntraVENous RAD CONTINUOUS    fentaNYL citrate (PF) injection  mcg   mcg IntraVENous Rad Multiple    flumazeniL (ROMAZICON) 0.1 mg/mL injection 0.5 mg  0.5 mg IntraVENous RAD PRN    midazolam (VERSED) injection 0.5-5 mg  0.5-5 mg IntraVENous Rad Multiple    naloxone (NARCAN) injection 0.4 mg  0.4 mg IntraVENous RAD PRN        Physical Exam:  Blood pressure (!) 147/79, pulse 84, temperature 98 °F (36.7 °C), resp. rate 16, height 5' 11\" (1.803 m), weight 90.7 kg (200 lb), SpO2 96 %. LUNG: clear to auscultation bilaterally, HEART: regular rate and rhythm, S1, S2 normal, no murmur, click, rub or gallop      Alerts:    Hospital Problems  Date Reviewed: 9/9/2022   None      Laboratory:    No results for input(s): HGB, HCT, WBC, PLT, INR, BUN, CREA, K, CRCLT, HGBEXT, HCTEXT, PLTEXT, INREXT in the last 72 hours. No lab exists for component: PTT, PT      Plan of Care/Planned Procedure:  Risks, benefits, and alternatives reviewed with patient and he agrees to proceed with the procedure. Conscious sedation will be performed with IV fentanyl and versed.  Plan is for chest port removal       Jack Jackson MD

## 2023-01-10 NOTE — PROGRESS NOTES
1240: Pt arrives ambulatory to angio department accompanied by spouse for port removal m procedure. All assessments completed and consent was reviewed. Education given was regarding procedure, moderate sedation, post-procedure care and  management/follow-up. Opportunity for questions was provided and all questions and concerns were addressed. 1400: Patient given copy of discharge instructions and verbalized understanding. Patient walked to exit with RN. Patient in NAD. No bleeding noted at puncture site.  IV D/c'd

## 2023-01-10 NOTE — TELEPHONE ENCOUNTER
[employer]  Oncology Social Work  Encounter     Patient Name:  Fannie Bruce    Medical History: testicular cancer    Advance Directives: none on file; conversation deferred    Narrative: Return called placed to patient. Advised a  has assigned his accounts to be reviewed. Will follow-up with patient once and update is received. He voiced understanding. Barriers to Care: billing issues    Plan: Will follow-up with patient once and update is received from Customer Service. Referral/Handouts:      Thank you,   Shailesh Simpson LCSW

## 2023-02-10 ENCOUNTER — APPOINTMENT (OUTPATIENT)
Dept: INFUSION THERAPY | Age: 38
End: 2023-02-10

## 2023-04-03 ENCOUNTER — HOSPITAL ENCOUNTER (INPATIENT)
Age: 38
LOS: 2 days | DRG: 439 | End: 2023-04-03
Attending: EMERGENCY MEDICINE | Admitting: HOSPITALIST
Payer: COMMERCIAL

## 2023-04-03 DIAGNOSIS — R10.12 ABDOMINAL PAIN, LUQ (LEFT UPPER QUADRANT): ICD-10-CM

## 2023-04-03 DIAGNOSIS — K31.89 ACQUIRED GASTRIC WALL DEFORMITY: ICD-10-CM

## 2023-04-03 DIAGNOSIS — R93.5 ABNORMAL CT OF THE ABDOMEN: Primary | ICD-10-CM

## 2023-04-03 DIAGNOSIS — K86.89 MASS OF PANCREAS: ICD-10-CM

## 2023-04-03 PROBLEM — R10.9 ABDOMINAL PAIN: Status: ACTIVE | Noted: 2023-04-03

## 2023-04-03 LAB
ALBUMIN SERPL-MCNC: 3.7 G/DL (ref 3.5–5)
ALBUMIN/GLOB SERPL: 0.8 (ref 1.1–2.2)
ALP SERPL-CCNC: 71 U/L (ref 45–117)
ALT SERPL-CCNC: 32 U/L (ref 12–78)
ANION GAP SERPL CALC-SCNC: 5 MMOL/L (ref 5–15)
APPEARANCE UR: CLEAR
AST SERPL-CCNC: 14 U/L (ref 15–37)
BACTERIA URNS QL MICRO: NEGATIVE /HPF
BASOPHILS # BLD: 0.1 K/UL (ref 0–0.1)
BASOPHILS NFR BLD: 1 % (ref 0–1)
BILIRUB SERPL-MCNC: 0.4 MG/DL (ref 0.2–1)
BILIRUB UR QL: NEGATIVE
BUN SERPL-MCNC: 13 MG/DL (ref 6–20)
BUN/CREAT SERPL: 13 (ref 12–20)
CALCIUM SERPL-MCNC: 9.7 MG/DL (ref 8.5–10.1)
CHLORIDE SERPL-SCNC: 100 MMOL/L (ref 97–108)
CO2 SERPL-SCNC: 30 MMOL/L (ref 21–32)
COLOR UR: NORMAL
COMMENT, HOLDF: NORMAL
CREAT SERPL-MCNC: 1.04 MG/DL (ref 0.7–1.3)
DIFFERENTIAL METHOD BLD: ABNORMAL
EOSINOPHIL # BLD: 0.2 K/UL (ref 0–0.4)
EOSINOPHIL NFR BLD: 2 % (ref 0–7)
EPITH CASTS URNS QL MICRO: NORMAL /LPF
ERYTHROCYTE [DISTWIDTH] IN BLOOD BY AUTOMATED COUNT: 16 % (ref 11.5–14.5)
GLOBULIN SER CALC-MCNC: 4.4 G/DL (ref 2–4)
GLUCOSE SERPL-MCNC: 99 MG/DL (ref 65–100)
GLUCOSE UR STRIP.AUTO-MCNC: NEGATIVE MG/DL
HCT VFR BLD AUTO: 41.7 % (ref 36.6–50.3)
HGB BLD-MCNC: 12.9 G/DL (ref 12.1–17)
HGB UR QL STRIP: NEGATIVE
IMM GRANULOCYTES # BLD AUTO: 0 K/UL (ref 0–0.04)
IMM GRANULOCYTES NFR BLD AUTO: 0 % (ref 0–0.5)
KETONES UR QL STRIP.AUTO: NEGATIVE MG/DL
LEUKOCYTE ESTERASE UR QL STRIP.AUTO: NEGATIVE
LIPASE SERPL-CCNC: 271 U/L (ref 73–393)
LYMPHOCYTES # BLD: 2.3 K/UL (ref 0.8–3.5)
LYMPHOCYTES NFR BLD: 24 % (ref 12–49)
MAGNESIUM SERPL-MCNC: 2.4 MG/DL (ref 1.6–2.4)
MCH RBC QN AUTO: 27.1 PG (ref 26–34)
MCHC RBC AUTO-ENTMCNC: 30.9 G/DL (ref 30–36.5)
MCV RBC AUTO: 87.6 FL (ref 80–99)
MONOCYTES # BLD: 0.7 K/UL (ref 0–1)
MONOCYTES NFR BLD: 7 % (ref 5–13)
NEUTS SEG # BLD: 6.6 K/UL (ref 1.8–8)
NEUTS SEG NFR BLD: 66 % (ref 32–75)
NITRITE UR QL STRIP.AUTO: NEGATIVE
NRBC # BLD: 0 K/UL (ref 0–0.01)
NRBC BLD-RTO: 0 PER 100 WBC
PH UR STRIP: 6.5 (ref 5–8)
PHOSPHATE SERPL-MCNC: 3.3 MG/DL (ref 2.6–4.7)
PLATELET # BLD AUTO: 330 K/UL (ref 150–400)
PMV BLD AUTO: 10.2 FL (ref 8.9–12.9)
POTASSIUM SERPL-SCNC: 3.5 MMOL/L (ref 3.5–5.1)
PROT SERPL-MCNC: 8.1 G/DL (ref 6.4–8.2)
PROT UR STRIP-MCNC: NEGATIVE MG/DL
RBC # BLD AUTO: 4.76 M/UL (ref 4.1–5.7)
RBC #/AREA URNS HPF: NORMAL /HPF (ref 0–5)
SAMPLES BEING HELD,HOLD: NORMAL
SODIUM SERPL-SCNC: 135 MMOL/L (ref 136–145)
SP GR UR REFRACTOMETRY: 1.01 (ref 1–1.03)
UA: UC IF INDICATED,UAUC: NORMAL
UROBILINOGEN UR QL STRIP.AUTO: 0.2 EU/DL (ref 0.2–1)
WBC # BLD AUTO: 9.9 K/UL (ref 4.1–11.1)
WBC URNS QL MICRO: NORMAL /HPF (ref 0–4)

## 2023-04-03 PROCEDURE — 85027 COMPLETE CBC AUTOMATED: CPT

## 2023-04-03 PROCEDURE — 85025 COMPLETE CBC W/AUTO DIFF WBC: CPT

## 2023-04-03 PROCEDURE — 65270000032 HC RM SEMIPRIVATE

## 2023-04-03 PROCEDURE — 80053 COMPREHEN METABOLIC PANEL: CPT

## 2023-04-03 PROCEDURE — 83690 ASSAY OF LIPASE: CPT

## 2023-04-03 PROCEDURE — 99221 1ST HOSP IP/OBS SF/LOW 40: CPT | Performed by: NURSE PRACTITIONER

## 2023-04-03 PROCEDURE — 83735 ASSAY OF MAGNESIUM: CPT

## 2023-04-03 PROCEDURE — 36415 COLL VENOUS BLD VENIPUNCTURE: CPT

## 2023-04-03 PROCEDURE — 84100 ASSAY OF PHOSPHORUS: CPT

## 2023-04-03 PROCEDURE — 99285 EMERGENCY DEPT VISIT HI MDM: CPT

## 2023-04-03 PROCEDURE — 74011250636 HC RX REV CODE- 250/636: Performed by: HOSPITALIST

## 2023-04-03 PROCEDURE — 74011250636 HC RX REV CODE- 250/636: Performed by: NURSE PRACTITIONER

## 2023-04-03 PROCEDURE — 99222 1ST HOSP IP/OBS MODERATE 55: CPT | Performed by: INTERNAL MEDICINE

## 2023-04-03 PROCEDURE — 74011250637 HC RX REV CODE- 250/637: Performed by: HOSPITALIST

## 2023-04-03 PROCEDURE — 96374 THER/PROPH/DIAG INJ IV PUSH: CPT

## 2023-04-03 PROCEDURE — 81001 URINALYSIS AUTO W/SCOPE: CPT

## 2023-04-03 RX ORDER — SODIUM CHLORIDE 0.9 % (FLUSH) 0.9 %
5-40 SYRINGE (ML) INJECTION AS NEEDED
Status: DISPENSED | OUTPATIENT
Start: 2023-04-03

## 2023-04-03 RX ORDER — ONDANSETRON 2 MG/ML
4 INJECTION INTRAMUSCULAR; INTRAVENOUS
Status: ACTIVE | OUTPATIENT
Start: 2023-04-03

## 2023-04-03 RX ORDER — ACETAMINOPHEN 325 MG/1
650 TABLET ORAL
Status: DISPENSED
Start: 2023-04-03

## 2023-04-03 RX ORDER — HYDROMORPHONE HYDROCHLORIDE 1 MG/ML
0.5 INJECTION, SOLUTION INTRAMUSCULAR; INTRAVENOUS; SUBCUTANEOUS
Status: DISPENSED
Start: 2023-04-03

## 2023-04-03 RX ORDER — SODIUM CHLORIDE 0.9 % (FLUSH) 0.9 %
5-40 SYRINGE (ML) INJECTION EVERY 8 HOURS
Status: DISPENSED
Start: 2023-04-03

## 2023-04-03 RX ORDER — SODIUM CHLORIDE 9 MG/ML
75 INJECTION, SOLUTION INTRAVENOUS CONTINUOUS
Status: DISPENSED
Start: 2023-04-03

## 2023-04-03 RX ORDER — MESALAMINE 400 MG/1
800 CAPSULE, DELAYED RELEASE ORAL DAILY
Status: DISCONTINUED
Start: 2023-04-04 | End: 2023-04-04

## 2023-04-03 RX ORDER — AMOXICILLIN 250 MG
1 CAPSULE ORAL
Status: ACTIVE | OUTPATIENT
Start: 2023-04-03

## 2023-04-03 RX ORDER — SODIUM CHLORIDE 9 MG/ML
1000 INJECTION, SOLUTION INTRAVENOUS ONCE
Status: COMPLETED | OUTPATIENT
Start: 2023-04-03 | End: 2023-04-03

## 2023-04-03 RX ORDER — OXYCODONE HYDROCHLORIDE 5 MG/1
5 TABLET ORAL
Status: DISPENSED
Start: 2023-04-03

## 2023-04-03 RX ORDER — MORPHINE SULFATE 2 MG/ML
2 INJECTION, SOLUTION INTRAMUSCULAR; INTRAVENOUS
Status: COMPLETED | OUTPATIENT
Start: 2023-04-03 | End: 2023-04-03

## 2023-04-03 RX ADMIN — OXYCODONE HYDROCHLORIDE 5 MG: 5 TABLET ORAL at 14:45

## 2023-04-03 RX ADMIN — SODIUM CHLORIDE 1000 ML: 9 INJECTION, SOLUTION INTRAVENOUS at 12:30

## 2023-04-03 RX ADMIN — OXYCODONE HYDROCHLORIDE 5 MG: 5 TABLET ORAL at 19:44

## 2023-04-03 RX ADMIN — MORPHINE SULFATE 2 MG: 2 INJECTION, SOLUTION INTRAMUSCULAR; INTRAVENOUS at 12:30

## 2023-04-03 RX ADMIN — HYDROMORPHONE HYDROCHLORIDE 0.5 MG: 1 INJECTION, SOLUTION INTRAMUSCULAR; INTRAVENOUS; SUBCUTANEOUS at 22:54

## 2023-04-03 RX ADMIN — SODIUM CHLORIDE 75 ML/HR: 900 INJECTION, SOLUTION INTRAVENOUS at 16:57

## 2023-04-03 RX ADMIN — ACETAMINOPHEN 650 MG: 325 TABLET ORAL at 22:22

## 2023-04-03 NOTE — H&P
History and Physical    Date of Service:  4/3/2023  Primary Care Provider: Jerrell Camarena MD  Source of information: The patient and reviewing medical record    Chief Complaint: Left upper quadrant abdominal pain 2 weeks duration    History of Presenting Illness:   Nazanin Garland is a 41 y.o. 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 He describe his abdominal pain sharp 6/10, on and off and radiating to the right side of abdomen and to his left back. No nausea, vomiting, fever, chills, urinary symptoms. He has loss bowel movement this morning and attributed to the contrast that he received for CT scan. CT scan study showed interval development of of 2 hypodense cystic mass in the left upper quadrant the large of which is associated with the tail of the pancreas and greater curvature of the stomach and the smaller which is intimately associated with the transverse colon and greater curvature of the stomach and appear more complex. He describe his abdominal pain     The patient denies any headache, blurry vision, sore throat, trouble swallowing, trouble with speech, chest pain, SOB, cough, constipation, recent travels, sick contacts, focal or generalized neurological symptoms, falls, injuries, rashes, contact with COVID-19 diagnosed patients, hematemesis, melena, hemoptysis, hematuria, rashes, denies starting any new medications and denies any other concerns or problems besides as mentioned above. Vital sign in ER: Temperature 98, pulse 92, /95 respiratory rate 20, saturation of oxygen 100% on room air       REVIEW OF SYSTEMS:  A comprehensive review of systems was negative except for that written in the History of Present Illness.      Past Medical History:   Diagnosis Date    Acid indigestion     Autoimmune disease ([facility] [address]     Cancer ([facility] [address]     Testicular     Stool color black     ulceratie proctitis       Past Surgical History:   Procedure Laterality Date    HX UROLOGICAL      IR INSERT TUNL CVC W PORT OVER 5 YEARS  6/17/2022    IR REMOVE TUNL CVAD W PORT/PUMP  1/10/2023     Prior to Admission medications    Medication Sig Start Date End Date Taking? Authorizing Provider   mesalamine ER (APRISO) 0.375 gram 24 hour capsule TAKE 4 CAPSULE BY MOUTH ONCE A DAY FOR 30 DAYS 7/8/22   Provider, Historical   magic mouthwash solution Swish and spit 15 mL every 4 hours as needed for mouth pain. May swallow for throat pain. Magic mouth wash Maalox Lidocaine 2% viscous Diphenhydramine oral solution Pharmacy to mix equal portions of ingredients to a total volume as indicated in the dispense amount. Patient not taking: No sig reported 7/6/22   China Luis NP   prochlorperazine (Compazine) 10 mg tablet Take 1 Tablet by mouth every six (6) hours as needed for Nausea. Patient not taking: No sig reported 6/9/22   Mary Carmen Gomez MD   lidocaine-prilocaine (EMLA) topical cream Apply  to affected area as needed for Pain (Apply 30-60 min. prior to having your port accessed). Patient not taking: Reported on 1/10/2023 6/9/22   Mary Carmen Gomez MD   ondansetron hcl [facility]) 8 mg tablet Take 1 Tablet by mouth every eight (8) hours as needed for Nausea. Patient not taking: No sig reported 6/9/22   Mary Carmen Gomez MD   Adderall XR 25 mg XR capsule  5/17/22   Provider, Historical   loratadine-pseudoephedrine (AllerClear D-24hr)  mg per tablet  12/20/05   Provider, Historical   omeprazole (PRILOSEC OTC) 20 mg tablet  10/20/21   Provider, Historical   multivitamin (ONE A DAY) tablet Take 1 Tablet by mouth daily.  Indications: One a Day Mens    Provider, Historical     Allergies   Allergen Reactions    Flynn Rash and Swelling      Family History   Problem Relation Age of Onset    Hypertension Mother     Cancer Maternal Grandmother         Breast    Stroke Maternal Grandfather     Cancer Paternal Grandfather       Social History:  reports that he has quit smoking. He has never used smokeless tobacco. He reports current alcohol use. He reports that he does not use drugs. Social Determinants of Health     Tobacco Use: Medium Risk    Smoking Tobacco Use: Former    Smokeless Tobacco Use: Never    Passive Exposure: Not on file   Alcohol Use: Not on file   Financial Resource Strain: Not on file   Food Insecurity: Not on file   Transportation Needs: Not on file   Physical Activity: Not on file   Stress: Not on file   Social Connections: Not on file   Intimate Partner Violence: Not on file   Depression: Not at risk    PHQ-2 Score: 0   Housing Stability: Not on file        Medications were reconciled to the best of my ability given all available resources at the time of admission. Route is PO if not otherwise noted. Family and social history were personally reviewed, all pertinent and relevant details are outlined as above.     Objective:   Visit Vitals  /87 (BP 1 Location: Right arm, BP Patient Position: At rest)   Pulse 81   Temp 98.4 °F (36.9 °C)   Resp 16   Ht 5' 11\" (1.803 m)   Wt 95.3 kg (210 lb 1.6 oz)   SpO2 100%   BMI 29.30 kg/m²      O2 Device: None (Room air)    PHYSICAL EXAM:   General: Alert x oriented x 3, awake, no acute distress,   HEENT: PEERL, EOMI, moist mucus membranes  Neck: Supple, no JVD, no meningeal signs  Chest: Clear to auscultation bilaterally   CVS: RRR, S1 S2 heard, no murmurs/rubs/gallops  Abd: Soft, non-tender, non-distended, +bowel sounds   Ext: No clubbing, no cyanosis, no edema  Neuro/Psych: Pleasant mood and affect, CN 2-12 grossly intact, sensory grossly within normal limit, Strength 5/5 in all extremities, DTR 1+ x 4  Cap refill: Brisk, less than 3 seconds  Pulses: 2+, symmetric in all extremities  Skin: Warm, dry, without rashes or lesions    Data Review:   I have independently reviewed and interpreted patient's lab and all other diagnostic data    Abnormal Labs Reviewed   CBC WITH AUTOMATED DIFF - Abnormal; Notable for the following components:       Result Value    RDW 16.0 (*)     All other components within normal limits   METABOLIC PANEL, COMPREHENSIVE - Abnormal; Notable for the following components:    Sodium 135 (*)     AST (SGOT) 14 (*)     Globulin 4.4 (*)     A-G Ratio 0.8 (*)     All other components within normal limits       All Micro Results       None            IMAGING:   No orders to display        ECG/ECHO:  No results found for this or any previous visit. Notes reviewed from all clinical/nonclinical/nursing services involved in patient's clinical care. Care coordination discussions were held with appropriate clinical/nonclinical/ nursing providers based on care coordination needs. Assessment:   Given the patient's current clinical presentation, there is a high level of concern for decompensation if discharged from the emergency department. Complex decision making was performed, which includes reviewing the patient's available past medical records, laboratory results, and imaging studies.     Active Problems:  Interval development of intraabdominal mass rule out malignancy  Hx of left testicular stage IIb none seminoma   Hx of ulcerative proctitis   Mild hyponatremia       Plan:       Intraabdominal mass rule out malignancy  -Patient to surgical floor  -Outpatient CT scan abdominal report reviewed and attached in the media section of the chart review  -As needed Tylenol, oxycodone, Protonix, IV Zofran  -Seen by surgical service  -Consult urologist    Hx of left testicular stage IIb none seminoma   -S/p orchiectomy and chemotherapy, last chemotherapy in August last year  -Consult hem/oncology    Hx of ulcerative proctitis   -Continue home mesalamine    Mild hyponatremia possible due to intravascular volume depletion   -repeat bmp in am      DIET: ADULT DIET Clear Liquid   ISOLATION PRECAUTIONS: There are currently no Active Isolations  CODE STATUS: Full Code   DVT PROPHYLAXIS: SCDs  FUNCTIONAL STATUS PRIOR TO HOSPITALIZATION: Fully active and ambulatory; able to carry on all self-care without restriction. Ambulatory status/function: By self   EARLY MOBILITY ASSESSMENT: Recommend routine ambulation while hospitalized with the assistance of nursing staff  ANTICIPATED DISCHARGE: Greater than 48 hours. ANTICIPATED DISPOSITION: Home  EMERGENCY CONTACT/SURROGATE DECISION MAKER: Susy Cisneros, spouse 0 [phone] PERFORMED FOR THIS ENCOUNTER: NO.      Signed By: Terry Galicia MD     April 3, 2023         Please note that this dictation may have been completed with Dragon, the [employer] voice recognition software. Quite often unanticipated grammatical, syntax, homophones, and other interpretive errors are inadvertently transcribed by the computer software. Please disregard these errors. Please excuse any errors that have escaped final proofreading.

## 2023-04-03 NOTE — CONSULTS
Requesting Provider: Jagruti Thomas MD - Reason for Consultation: \"testicular ca\"  Pre-existing [facility] Patient:   yes                Patient: Hugo Gay MRN: [id number]  SSN: [id number]    YOB: 1985  Age: 41 y.o. Sex: male     Location: R35/R35       Code Status: Full Code   PCP: Yuan Singleton MD  - [phone]   Emergency Contact:  Primary Emergency Contact: Earnestine Frias, Familia Phone: [phone]   Race/Christianity/Language: Caffie Lamp / Mercie Frank / Nicolas Fast: Payor: Verswathi Youngornstad / Plan: Levonia Simmonds / Product Type: PPO /    Prior Admission Data: 1/10/23 [facility] RAD ANGIO IR Avani Morrison   Hospitalized:  Hospital Day: 1 - Admitted 4/3/2023 12:14 PM     CONSULTANTS  IP CONSULT TO GENERAL SURGERY  IP CONSULT TO ONCOLOGY  IP CONSULT TO UROLOGY  IP CONSULT TO HOSPITALIST   ADMISSION DIAGNOSES    ICD-10-CM ICD-9-CM   1. Abnormal CT of the abdomen  R93.5 793.6   2. Mass of pancreas  K86.89 577.8   3. Abdominal pain, LUQ (left upper quadrant)  R10.12 789.02   4. Acquired gastric wall deformity  K31.89 537.89         Assessment/Plan:       40 yo M with hx of testicular cancer who presents with LUQ abdominal pain and abnormal CT of the abdomen and pelvis with findings of new cystic masses associated with the pancreas and colon     - It would be unusual for testicular cancer to metastasize to the location of these new masses identified. Unclear if findings represent  postinflammatory fluid collections and/or pseudocysts related to pancreatitis. Agree with evaluation by general surgery and oncology. Discussed with Dr. Sarah Billings      CC: Referral Follow Up   HPI: He is a 41 y.o. male seen in consult by Urology with hx of testicular cancer followed by Dr. Sarah Billings at [facility]. He has a history of stage Ia nonseminomatous germ cell tumor. Diagnosed at orchiectomy on 12/8/2021.   He was found to have elevated beta-hCG on surveillance and completed 3 cycles of BEP chemotherapy with Dr. Leodan Calabrese at [address]. He also has a history of a splenic hamartoma and is followed by Dr. Citlaly Bose, on surveillance, felt to be benign process. He was found to have a retroperitoneal recurrence with normal tumor markers. He underwent postchemotherapy RPLND on 11/2/2022 with final pathology noting teratoma in 3 nodes. He had a routine surveillance CT Abdomen/Pelvis today at our office and was referred to the ER for findings, see below. He has been having intermittent LUQ pain for the past 2-3 weeks. It waxes and wanes in severity. The pain radiates to his left flank at times. He denies associated fevers, chills, N/V, poor po intake, changes in bowels, voiding difficulties. He initially attributed his pain to post-op pain or a UC flare recently confirmed by colonoscopy on 3/1/23. He was prescribed steroids with no changes in his LUQ pain. This pain now feels different from his usual UC flares. In the ER he is AF with stable VS. Labs unremarkable expect for .     ====CT A/P w oral, IV contrast 4/3/2023=====  [facility]    CT ABDOMEN AND PELVIS WITH IV CONTRAST    ORDERING PROVIDER: Eva Burch MD  INTERPRETING PHYSICIAN: Luis Eduardo Ramos M.D. REASON FOR STUDY: History of testicular cancer status post retroperitoneal lymphadenectomy  CONTRAST DOSAGE: 100mL Omnipaque 350    TECHNIQUE: CT scan of the abdomen and pelvis is performed with IV contrast. Sagittal and coronal 2-D reconstructions of the abdomen and pelvis were performed. Dose reduction techniques employed include CT tube dose modulation ([employer] DIRECTV) and iterative reconstruction techniques ([employer] SAFIRE). COMPARISONS: Abdominal CTs dated 10/7/2022, 5/2/2022, and 12/16/2021    _______________    FINDINGS:    LOWER CHEST: Unremarkable. LIVER, BILIARY: Hepatic steatosis with focal fat sparing in the posterior right hepatic lobe. No suspicious liver lesion. No biliary dilation. Gallbladder is unremarkable. PANCREAS: Cystic mass identified in the left upper quadrant directly abutting the tail of the pancreas and greater curvature of the stomach measuring approximately 8.8 x 8.1 cm which is new compared to the prior abdominal CT from 10/7/2022. This is mostly simple in appearance with some debris within the mass. This displaces adjacent splenic artery and vein. SPLEEN: Hypodense mass with central stellate calcification again identified measuring approximately 6.3 cm which is similar to the prior exam, perhaps a bit smaller. Subcentimeter hypodensity at the lower pole is unchanged. ADRENALS: Normal.    RIGHT KIDNEY: Focal scarring and/or hypoenhancement at the lower pole. No suspicious renal mass or hydronephrosis. LEFT KIDNEY: No nephrolithiasis or hydronephrosis. No suspicious renal masses. BLADDER: Unremarkable. REPRODUCTIVE ORGANS: Prior left orchiectomy. LYMPH NODES: Interval retroperitoneal lymph node dissection since the prior abdominal CT. Prominent right common iliac lymph node identified on image 65 series 2 measuring 0.9 cm (0.5 cm previously). No other bulky lymphadenopathy. GASTROINTESTINAL TRACT: Heterogeneous hypodense mass identified in the left abdomen which is intimately associated with the mid to distal transverse colon. The mass measures approximately 6.8 cm on image 38 series 2 and is new compared to the prior exam. The adjacent transverse colon is circumferentially thick-walled at this location although the majority of the mass is extraluminal in the adjacent mesentery. This mass as well as the large cystic mass in the tail of the pancreas directly abut the greater curvature of the stomach. There is a focal wall defect along the greater curvature of the stomach near the large pancreatic tail cystic mass which appears markedly thinned with potential involvement by the mass. No extraluminal contrast extravasation identified.  No free air is seen.    VASCULATURE: Unremarkable. BONES: No acute or aggressive osseous abnormalities identified. OTHER: Small amount of free fluid in the left abdomen. Small fat-containing umbilical hernia.    _______________    IMPRESSION:    1. Interval development of 2 hypodense cystic appearing masses identified in the left upper quadrant the larger of which is associated with the tail of the pancreas and greater curvature of the stomach and the smaller which is intimately associated with the transverse colon and greater curvature of the stomach and appears more complex. These hypodense cystic appearing masses are not entirely specific with primary differential considerations to include postinflammatory fluid collections and/or pseudocysts related to pancreatitis versus metastatic disease/tumor recurrence secondary to the patient's known testicular cancer. There is marked thinning with poor visualization of a portion of the greater curvature of the gastric wall which may be involved or thinned secondary to the adjacent abdominal masses. 2. Interval retroperitoneal lymph node dissection since the prior abdominal CT. Prominent right common iliac lymph node identified which is slightly larger than seen previously although otherwise indeterminate. No other bulky lymphadenopathy. 3. Hepatic steatosis with focal fat sparing in the posterior right hepatic lobe. 4. Partially calcified splenic mass again identified which is similar to slightly smaller than seen previously. This is very likely a benign splenic lesion such as hamartoma, sclerosed hemangioma, sclerosed angiomatoid nodular transformation, or other benign vascular lesion. THESE RESULTS WERE DIRECTLY COMMUNICATED TO Librado Ramos MD ON 4/3/2023 9:53 AM.  Results understood and acknowledged.       ELECTRONICALLY SIGNED BY: Ifeoma Del Angel M.D.      =====Tumor markers 02/24/2023: =======  AFP 5.5  ng/mL                hCG,Beta  <1 mIU/mL LDH   147 IU/L           =====last ov note====  He returns for follow-up. Mr. Namita Campos is a 16-year-old man with history of stage Ia nonseminomatous germ cell tumor. Diagnosed at orchiectomy on 12/8/2021. Tumor 40% teratoma, 40% choriocarcinoma, 20% embryonal carcinoma with microscopic focus of yolk sac tumor. No LVI or rete testes invasion, tumor 4.5 cm. pT1b. He was found to have elevated beta-hCG on surveillance and completed 3 cycles of BEP chemotherapy with Dr. Jyoti Pappas at [address] [place] [address]. He also has a history of a splenic hamartoma and is followed by Dr. Todd Garcia. On surveillance, felt to be benign process. He was found to have a retroperitoneal recurrence with normal tumor markers. He underwent postchemotherapy RPLND on 11/2/2022. Final pathology demonstrates metastatic teratoma in 3 nodes out of 38.  1 node in the interaortocaval packet and 2 nodes in the left periaortic packet. Tumor markers from today are pending. Overall patient feels well, though he does feel like he has been experiencing a mild flare from his inflammatory bowel disease. He is no longer on Humira, following with Dr. Virgie Edwards and scheduled for a colonoscopy next week. He gets generalized abdominal pain every 2 weeks, unsure if this is related to surgery or if it is related to his inflammatory bowel disease. PAST MEDICAL HISTORY:    Allergies: * NKDA (Critical)  DENIES: Latex, Shellfish, X-Ray Dye, Iodine. Medications: mesalamine 0.375 gram capsule,extended release 24hr (mesalamine)   Adderall XR 25 mg capsule,extended release 24hr (dextroamphetamine-amphetamine)   * Allegra-D 12 Hour (fexofenadine-pseudoephedrine) once a day     Problems: Teratoma (ICD-238.9) (YGI03-O45.9)  Retroperitoneal lymphadenopathy (ICD-785.6) (KZA61-E93.0)  Testicular cancer (ICD-186.9) (VAW51-T39.90)  Testicular mass, left (ICD-608.89) (VHE82-P24.9)    Illnesses: Bowel Problems and Cancer.      Surgeries: [facility] Abdominal Surgery, andOther / Not Listed. Family History: Kidney Stones. DENIES: Prostate cancer, Kidney cancer, Kidney disease. Social History: . Smoking status: former smoker. Drinks alcohol 2 to 3 times a week. System Review: Admits to: None. DENIES: Unexplained Weight Loss, Dry Eyes, Dry Mouth, Leg Swelling, Shortness of Breath, Constipation, Involuntary Urine Loss, Lower Extremity Weakness, Dry Skin, Difficulty Walking, Psychiatric Problems, Impaired Sex Drive, Easy Bleeding, Rash. EXAMINATION: Vitals: Pulse: 87 BP: 140/80 Weight: 201 lbs Height: 5' 11\" BMI: 28.14 kg/m^2  Appearance: well-developed NAD Abdomen is soft throughout, nontender, midline laparotomy incision is well-healed with no hernias  Normal phallus  Normal right testicle with no masses or lesions, surgically absent left testicle. No lymphadenopathy       URINALYSIS from Voided specimen  Urine Dip: pH: 6.0, Bld: Neg, LE: Neg, Nit: Neg, Prot: Neg, Ket: Neg, Gluc: Neg  Urine Micro not done    IMPRESSION:    1. TESTICULAR CANCER (JQB15-A73.90) - Unchanged    2. TERATOMA (AOR13-O17.9) - Unchanged    3. RETROPERITONEAL LYMPHADENOPATHY (QKW23-G29.0) - Unchanged    PLAN: No evidence of disease recurrence today on physical exam  -Beta-hCG, AFP, and LDH today are pending  -Return to clinic in 2 months with tumor markers, chest x-ray, CT abdomen and pelvis with contrast, and physical exam    The patient was given a verbal/written log of Blood Pressure and recommendations. Moderate Hypertension: Instruct patient to have prompt (1-2 weeks) BP followup. cc: Hitesh Goldman M.D. Transcribed by Speech to Text Technology  [employer]  E&M Service    Upcoming Orders  Return Office Visit - with Starla Myers MD in 2 months  AFP, Serum, Tumor Marker, CT Abdomen/Pelvis (w/ Oral/IV Contrast), Dx Imaging;  Chest, 2 views, frontal and lateral, LDH (Lactic Acid Dehydrogenase), UA, hCG,Beta Subunit, Qnt, Serum        Electronically signed by Mike Velazquez MD on 2023 at 9:13 AM    ________________________________________________________________________      Temp (24hrs), Av.2 °F (36.8 °C), Min:98 °F (36.7 °C), Max:98.4 °F (36.9 °C)       Creatinine   Date/Time Value Ref Range Status   2023 11:38 AM 1.04 0.70 - 1.30 MG/DL Final   2022 12:00 PM 1.01 0.70 - 1.30 MG/DL Final   2022 10:23 AM 1.10 0.70 - 1.30 MG/DL Final   2022 10:03 AM 1.23 0.70 - 1.30 MG/DL Final   2022 10:01 AM 1.20 0.70 - 1.30 MG/DL Final     Current Antimicrobial Therapy (168h ago, onward)      None          Key Anti-Platelet Anticoagulant Meds       The patient is on no antiplatelet meds or anticoagulants.           Diet: ADULT DIET Clear Liquid -       Labs     Lab Results   Component Value Date/Time    WBC 9.9 2023 11:38 AM    HCT 41.7 2023 11:38 AM    PLATELET 686  11:38 AM    Sodium 135 (L) 2023 11:38 AM    Potassium 3.5 2023 11:38 AM    Chloride 100 2023 11:38 AM    CO2 30 2023 11:38 AM    BUN 13 2023 11:38 AM    Creatinine 1.04 2023 11:38 AM    Glucose 99 2023 11:38 AM    Calcium 9.7 2023 11:38 AM    Magnesium 2.2 2022 10:23 AM     UA:   Lab Results   Component Value Date/Time    Color YELLOW/STRAW 2023 12:32 PM    Appearance CLEAR 2023 12:32 PM    Specific gravity 1.011 2023 12:32 PM    pH (UA) 6.5 2023 12:32 PM    Protein Negative 2023 12:32 PM    Glucose Negative 2023 12:32 PM    Ketone Negative 2023 12:32 PM    Bilirubin Negative 2023 12:32 PM    Urobilinogen 0.2 2023 12:32 PM    Nitrites Negative 2023 12:32 PM    Leukocyte Esterase Negative 2023 12:32 PM    Epithelial cells FEW 2023 12:32 PM    Bacteria Negative 2023 12:32 PM    WBC 0-4 2023 12:32 PM    RBC 0-5 2023 12:32 PM     Imaging     Results for orders placed during the hospital encounter of 10/07/22    CT CHEST ABD PELV W CONT    Narrative  EXAM: CT CHEST ABD PELV W CONT    INDICATION: Malignant neoplasm of left testis. Elevated tumor marker. COMPARISON: December 2021 and May 2022    IV CONTRAST: 100 mL of Isovue-370. ORAL CONTRAST: Oral contrast was administered to better evaluate the bowel. TECHNIQUE:  Following the uneventful intravenous administration of contrast, thin axial  images were obtained through the chest, abdomen and pelvis. Coronal and sagittal  reformats were generated. CT dose reduction was achieved through use of a  standardized protocol tailored for this examination and automatic exposure  control for dose modulation. FINDINGS:    CHEST WALL: Right chest wall port catheter terminates in the distal SVC. THYROID: No nodule. MEDIASTINUM: No mass or lymphadenopathy. MAURICE: No mass or lymphadenopathy. THORACIC AORTA: No dissection or aneurysm. MAIN PULMONARY ARTERY: Normal in caliber. TRACHEA/BRONCHI: Patent. ESOPHAGUS: No wall thickening or dilatation. HEART: Normal in size. PLEURA: No effusion or pneumothorax. LUNGS: No nodule, mass, or airspace disease. LIVER: Hypoattenuation of the liver consistent with hepatic steatosis. . No liver  mass. BILIARY TREE: Gallbladder is within normal limits. CBD is not dilated. SPLEEN: Stable low-density splenic lesion with internal stellate calcifications. PANCREAS: No mass or ductal dilatation. ADRENALS: Unremarkable. KIDNEYS: No mass, calculus, or hydronephrosis. STOMACH: Unremarkable. SMALL BOWEL: No dilatation or wall thickening. COLON: No dilatation or wall thickening. APPENDIX: Normal  PERITONEUM: No ascites or pneumoperitoneum. RETROPERITONEUM: Enlarged retroperitoneal lymph node between the aorta and IVC  measuring 20 x 19 mm (3: 138), not present on prior imaging. No other  pathologically enlarged retroperitoneal lymph nodes. REPRODUCTIVE ORGANS: Prostate and seminal vesicles are within normal limits.   Status post left orchiectomy. URINARY BLADDER: No mass or calculus. BONES: No destructive bone lesion. ABDOMINAL WALL: Fat-containing umbilical hernia. ADDITIONAL COMMENTS: N/A    Impression  New enlarged retroperitoneal lymph node measuring 20 x 19 mm at the level of the  mid abdomen, consistent with metastatic disease. No evidence of intrathoracic metastatic disease. US Results (most recent):  No results found for this or any previous visit. Cultures     All Micro Results       None             Past History: (Complete 2+/3 areas)     Allergies   Allergen Reactions    Tehama Rash and Swelling      Current Facility-Administered Medications   Medication Dose Route Frequency    sodium chloride (NS) flush 5-40 mL  5-40 mL IntraVENous Q8H    sodium chloride (NS) flush 5-40 mL  5-40 mL IntraVENous PRN    acetaminophen (TYLENOL) tablet 650 mg  650 mg Oral Q6H PRN    oxyCODONE IR (ROXICODONE) tablet 5 mg  5 mg Oral Q4H PRN    ondansetron (ZOFRAN) injection 4 mg  4 mg IntraVENous Q4H PRN    senna-docusate (PERICOLACE) 8.6-50 mg per tablet 1 Tablet  1 Tablet Oral DAILY PRN     Current Outpatient Medications   Medication Sig    mesalamine ER (APRISO) 0.375 gram 24 hour capsule TAKE 4 CAPSULE BY MOUTH ONCE A DAY FOR 30 DAYS    magic mouthwash solution Swish and spit 15 mL every 4 hours as needed for mouth pain. May swallow for throat pain. Magic mouth wash Maalox Lidocaine 2% viscous Diphenhydramine oral solution Pharmacy to mix equal portions of ingredients to a total volume as indicated in the dispense amount. (Patient not taking: No sig reported)    prochlorperazine (Compazine) 10 mg tablet Take 1 Tablet by mouth every six (6) hours as needed for Nausea. (Patient not taking: No sig reported)    lidocaine-prilocaine (EMLA) topical cream Apply  to affected area as needed for Pain (Apply 30-60 min. prior to having your port accessed).  (Patient not taking: Reported on 1/10/2023)    ondansetron hcl (ZOFRAN) 8 mg tablet Take 1 Tablet by mouth every eight (8) hours as needed for Nausea. (Patient not taking: No sig reported)    Adderall XR 25 mg XR capsule     loratadine-pseudoephedrine (AllerClear D-24hr)  mg per tablet     omeprazole (PRILOSEC OTC) 20 mg tablet     multivitamin (ONE A DAY) tablet Take 1 Tablet by mouth daily. Indications: One a Day Mens    Prior to Admission medications    Medication Sig Start Date End Date Taking? Authorizing Provider   mesalamine ER (APRISO) 0.375 gram 24 hour capsule TAKE 4 CAPSULE BY MOUTH ONCE A DAY FOR 30 DAYS 7/8/22   Provider, Historical   magic mouthwash solution Swish and spit 15 mL every 4 hours as needed for mouth pain. May swallow for throat pain. Magic mouth wash Maalox Lidocaine 2% viscous Diphenhydramine oral solution Pharmacy to mix equal portions of ingredients to a total volume as indicated in the dispense amount. Patient not taking: No sig reported 7/6/22   Chuck Polanco NP   prochlorperazine (Compazine) 10 mg tablet Take 1 Tablet by mouth every six (6) hours as needed for Nausea. Patient not taking: No sig reported 6/9/22   Gerald Gomez MD   lidocaine-prilocaine (EMLA) topical cream Apply  to affected area as needed for Pain (Apply 30-60 min. prior to having your port accessed). Patient not taking: Reported on 1/10/2023 6/9/22   Gerald Gomez MD   ondansetron hcl [facility]) 8 mg tablet Take 1 Tablet by mouth every eight (8) hours as needed for Nausea. Patient not taking: No sig reported 6/9/22   Gerald Gomez MD   Adderall XR 25 mg XR capsule  5/17/22   Provider, Historical   loratadine-pseudoephedrine (AllerClear D-24hr)  mg per tablet  12/20/05   Provider, Historical   omeprazole (PRILOSEC OTC) 20 mg tablet  10/20/21   Provider, Historical   multivitamin (ONE A DAY) tablet Take 1 Tablet by mouth daily.  Indications: One a Day Mens    Provider, Historical        PMHx:  has a past medical history of Acid indigestion, Autoimmune disease ([address] Cancer ([facility] and Stool color black. PSurgHx:  has a past surgical history that includes hx urological; ir insert tunl cvc w port over 5 years (6/17/2022); and ir remove tunl cvad w port/pump (1/10/2023). PSocHx:  reports that he has quit smoking. He has never used smokeless tobacco. He reports current alcohol use. He reports that he does not use drugs.    ROS:  (Complete - 10 systems) - DENIES: Weightloss (Constitutional), Dry mouth (ENMT), Chest pain (CV), SOB (Respiratory), Constipation (GI), Weakness (MS), Pallor (Skin), TIA Sx (Neuro), Confusion (Psych), Easy bruising (Heme)    Physical Exam: (Comprehesive - 8+ 1995 Systems)     (1) Constitutional:  NAD; pleasant  FIO2:   on SpO2: O2 Sat (%): 100 %  O2 Device: None (Room air)    Patient Vitals for the past 24 hrs:   BP Temp Pulse Resp SpO2 Height Weight   04/03/23 1343 133/87 98.4 °F (36.9 °C) 81 16 100 % -- --   04/03/23 1118 (!) 150/95 98 °F (36.7 °C) 92 20 100 % 5' 11\" (1.803 m) 95.3 kg (210 lb 1.6 oz)          (2) ENMT:   moist mucous membranes, normal sinuses   (3) Respiratory:  breathing easily, no distress   (4) GI:  no abdominal masses, no tenderness   (5) :   Voiding independently, yellow UA, no CVA tenderness   (6) Lymphatic:  no adenopathy, neck supple   (7) Muscloskeletal:  no gross deformity, normal ROM   (8) Skin:  no rash, warm & dry   (9) Neuro:  Alert, no focal deficits, normal speech      Signed By: Lee Ann Green NP  - April 3, 2023

## 2023-04-03 NOTE — ED TRIAGE NOTES
Patient is coming in from [address] [facility] after abnormal CT scan. Patient has been having intermittent LUQ abdominal pain for the past 2 weeks.

## 2023-04-03 NOTE — ED NOTES
Has a final transfer review been performed?  Yes    Reason for Admission: abnormal CT scan  Patient comes from: home  Mental Status: Alert and oriented  ADL:self care  Ambulation:no difficulty  Pertinent Info/Safety Concerns: N/A  COVID Status: Not Indicated  MEWS Score: 1     Vitals:    04/03/23 1118 04/03/23 1343   BP: (!) 150/95 133/87   Pulse: 92 81   Resp: 20 16   Temp: 98 °F (36.7 °C) 98.4 °F (36.9 °C)   SpO2: 100% 100%   Weight: 95.3 kg (210 lb 1.6 oz)    Height: 5' 11\" (1.803 m)      Lines:   Peripheral IV 04/03/23 Right Antecubital (Active)   Site Assessment Clean, dry, & intact 04/03/23 1136   Phlebitis Assessment 0 04/03/23 1136   Infiltration Assessment 0 04/03/23 1136   Dressing Status Clean, dry, & intact 04/03/23 1136   Dressing Type Transparent 04/03/23 1136   Hub Color/Line Status Pink;Patent 04/03/23 1136   Action Taken Blood drawn 04/03/23 1136   Alcohol Cap Used No 04/03/23 1136      Transport mode:Wheelchair    Yovanny B Hoshor  being transferred to (unit) for routine progression of care     \"Notification of etransfer note given to (Name) Staff (Title) SAEID

## 2023-04-03 NOTE — ED PROVIDER NOTES
36-year-old male with past medical history of acid indigestion, autoimmune disease, testicular cancer, splenic mass, chemo, elevated serum creatinine, UC ,ulcerative proctitis presents ambulatory to the ER for evaluation of left upper quadrant pain. Patient states that he had an outpatient CT of the abdomen pelvis earlier today with IV and oral contrast by [facility] and was sent over for concerning results. Of note there are 2 new hypodense cystic appearing masses on the left upper quadrant the larger of which is associated with the tail of the pancreas and the greater curvature of the stomach and the smaller which is associated with the transverse colon. Patient states that he has had a interval retroperitoneal lymph node dissection which was in November 2022, as well as a prior left orchiectomy. Patient states that he thought that the pain was possibly associated to his UC in which he normally has diarrhea intermittently and has had blood-streaked stool. Patient currently denies fever, chills, nausea, vomiting, chest pain, shortness of breath, feeling dizzy or lightheaded, difficulty urinating or dysuria. I  Patient states that he does drink alcohol a couple times a week, denies tobacco or illicit drug use.          Past Medical History:   Diagnosis Date    Acid indigestion     Autoimmune disease ([facility]     Cancer ([facility]     Testicular     Stool color black     ulceratie proctitis        Past Surgical History:   Procedure Laterality Date    HX UROLOGICAL      IR INSERT TUNL CVC W PORT OVER 5 YEARS  6/17/2022    IR REMOVE TUNL CVAD W PORT/PUMP  1/10/2023         Family History:   Problem Relation Age of Onset    Hypertension Mother     Cancer Maternal Grandmother         Breast    Stroke Maternal Grandfather     Cancer Paternal Grandfather        Social History     Socioeconomic History    Marital status:      Spouse name: Not on file    Number of children: Not on file    Years of education: Not on file    Highest education level: Not on file   Occupational History    Not on file   Tobacco Use    Smoking status: Former    Smokeless tobacco: Never   Substance and Sexual Activity    Alcohol use: Yes    Drug use: Never    Sexual activity: Not on file   Other Topics Concern    Not on file   Social History Narrative    Not on file     Social Determinants of Health     Financial Resource Strain: Not on file   Food Insecurity: Not on file   Transportation Needs: Not on file   Physical Activity: Not on file   Stress: Not on file   Social Connections: Not on file   Intimate Partner Violence: Not on file   Housing Stability: Not on file         ALLERGIES: Flynn    Review of Systems   Constitutional:  Negative for chills and fever. Respiratory:  Negative for cough and shortness of breath. Cardiovascular:  Negative for chest pain. Gastrointestinal:  Positive for abdominal pain, blood in stool and diarrhea. Negative for nausea and vomiting. Genitourinary:  Negative for difficulty urinating, dysuria, frequency and hematuria. Neurological: Negative. Vitals:    04/03/23 1118 04/03/23 1343 04/03/23 1434   BP: (!) 150/95 133/87 134/69   Pulse: 92 81 81   Resp: 20 16 16   Temp: 98 °F (36.7 °C) 98.4 °F (36.9 °C) 97.6 °F (36.4 °C)   SpO2: 100% 100% 100%   Weight: 95.3 kg (210 lb 1.6 oz)     Height: 5' 11\" (1.803 m)              Physical Exam  Vitals and nursing note reviewed. Constitutional:       General: He is not in acute distress. Appearance: Normal appearance. He is normal weight. He is not ill-appearing. HENT:      Head: Normocephalic. Nose: Nose normal.   Cardiovascular:      Rate and Rhythm: Normal rate. Pulses: Normal pulses. Pulmonary:      Effort: Pulmonary effort is normal. No respiratory distress. Abdominal:      General: Bowel sounds are normal. There is no distension. Palpations: Abdomen is soft. Tenderness: There is abdominal tenderness.  There is no right CVA tenderness or left CVA tenderness. Musculoskeletal:         General: Normal range of motion. Cervical back: Normal range of motion. Skin:     General: Skin is warm and dry. Capillary Refill: Capillary refill takes less than 2 seconds. Neurological:      General: No focal deficit present. Mental Status: He is alert and oriented to person, place, and time. Psychiatric:         Mood and Affect: Mood normal.        Medical Decision Making  differential diagnosis: abdomen mass vs pancreatitis vs UTI vs abdominal perforation vs SBO       1. Previous notes (external to Emergency room) were reviewed: chart review    2. History was obtained by: patient    3. Chronic medical issues affecting this visit: Metastatic testicular cancer    4. I ordered the following tests, followed by review of final reads by lab and radiology: cbc, cmp, lipase, urine, consult placed to general surgery    5. I considered ordering: ct abd    6. Medical intervention: Admit to Hospitalist Team, consult, general surgery and possible GI      Surgical intervention:    [id number] serve message sent to Mary Anne Cornejo NP with general surgery for evaluation. 7. Social determinants of health: none    8 Final diagnosis: Left upper quadrant abdominal pain, abnormal CT of the abdomen, acquired gastric wall deformity and mass of pancreas. Medications prescribed: none    9. Disposition: Patient is admitted to the hospital team for further work-up evaluation, general surgery has been consulted with possible intervention by GI as well. Patient's vital signs are stable at this time, on exam patient is stable currently, provided with 1 dose of IV pain medication and IV fluid bolus while pending evaluations. Patient in agreement with plan of care.       Problems Addressed:  Abdominal pain, LUQ (left upper quadrant): chronic illness or injury  Abnormal CT of the abdomen: chronic illness or injury  Acquired gastric wall deformity: acute illness or injury  Mass of pancreas: acute illness or injury    Amount and/or Complexity of Data Reviewed  Labs: ordered. Discussion of management or test interpretation with external provider(s): Case discussed with Dr. Aman Sabillon. Risk  Prescription drug management. Decision regarding hospitalization. Procedures    VITAL SIGNS:  Patient Vitals for the past 4 hrs:   Temp Pulse Resp BP SpO2   04/03/23 1434 97.6 °F (36.4 °C) 81 16 134/69 100 %   04/03/23 1343 98.4 °F (36.9 °C) 81 16 133/87 100 %   04/03/23 1118 98 °F (36.7 °C) 92 20 (!) 150/95 100 %           LABS:  The Following labs have been ordered while in the emergency department and I have independently evaluated them. Recent Results (from the past 6 hour(s))   CBC WITH AUTOMATED DIFF    Collection Time: 04/03/23 11:38 AM   Result Value Ref Range    WBC 9.9 4.1 - 11.1 K/uL    RBC 4.76 4.10 - 5.70 M/uL    HGB 12.9 12.1 - 17.0 g/dL    HCT 41.7 36.6 - 50.3 %    MCV 87.6 80.0 - 99.0 FL    MCH 27.1 26.0 - 34.0 PG    MCHC 30.9 30.0 - 36.5 g/dL    RDW 16.0 (H) 11.5 - 14.5 %    PLATELET 204 157 - 485 K/uL    MPV 10.2 8.9 - 12.9 FL    NRBC 0.0 0  WBC    ABSOLUTE NRBC 0.00 0.00 - 0.01 K/uL    NEUTROPHILS 66 32 - 75 %    LYMPHOCYTES 24 12 - 49 %    MONOCYTES 7 5 - 13 %    EOSINOPHILS 2 0 - 7 %    BASOPHILS 1 0 - 1 %    IMMATURE GRANULOCYTES 0 0.0 - 0.5 %    ABS. NEUTROPHILS 6.6 1.8 - 8.0 K/UL    ABS. LYMPHOCYTES 2.3 0.8 - 3.5 K/UL    ABS. MONOCYTES 0.7 0.0 - 1.0 K/UL    ABS. EOSINOPHILS 0.2 0.0 - 0.4 K/UL    ABS. BASOPHILS 0.1 0.0 - 0.1 K/UL    ABS. IMM.  GRANS. 0.0 0.00 - 0.04 K/UL    DF AUTOMATED     METABOLIC PANEL, COMPREHENSIVE    Collection Time: 04/03/23 11:38 AM   Result Value Ref Range    Sodium 135 (L) 136 - 145 mmol/L    Potassium 3.5 3.5 - 5.1 mmol/L    Chloride 100 97 - 108 mmol/L    CO2 30 21 - 32 mmol/L    Anion gap 5 5 - 15 mmol/L    Glucose 99 65 - 100 mg/dL    BUN 13 6 - 20 MG/DL    Creatinine 1.04 0.70 - 1.30 MG/DL BUN/Creatinine ratio 13 12 - 20      eGFR >60 >60 ml/min/1.73m2    Calcium 9.7 8.5 - 10.1 MG/DL    Bilirubin, total 0.4 0.2 - 1.0 MG/DL    ALT (SGPT) 32 12 - 78 U/L    AST (SGOT) 14 (L) 15 - 37 U/L    Alk. phosphatase 71 45 - 117 U/L    Protein, total 8.1 6.4 - 8.2 g/dL    Albumin 3.7 3.5 - 5.0 g/dL    Globulin 4.4 (H) 2.0 - 4.0 g/dL    A-G Ratio 0.8 (L) 1.1 - 2.2     SAMPLES BEING HELD    Collection Time: 04/03/23 11:38 AM   Result Value Ref Range    SAMPLES BEING HELD 1RED 1BLU     COMMENT        Add-on orders for these samples will be processed based on acceptable specimen integrity and analyte stability, which may vary by analyte. LIPASE    Collection Time: 04/03/23 11:38 AM   Result Value Ref Range    Lipase 271 73 - 393 U/L   URINALYSIS W/ REFLEX CULTURE    Collection Time: 04/03/23 12:32 PM    Specimen: Urine   Result Value Ref Range    Color YELLOW/STRAW      Appearance CLEAR CLEAR      Specific gravity 1.011 1.003 - 1.030      pH (UA) 6.5 5.0 - 8.0      Protein Negative NEG mg/dL    Glucose Negative NEG mg/dL    Ketone Negative NEG mg/dL    Bilirubin Negative NEG      Blood Negative NEG      Urobilinogen 0.2 0.2 - 1.0 EU/dL    Nitrites Negative NEG      Leukocyte Esterase Negative NEG      WBC 0-4 0 - 4 /hpf    RBC 0-5 0 - 5 /hpf    Epithelial cells FEW FEW /lpf    Bacteria Negative NEG /hpf    UA:UC IF INDICATED CULTURE NOT INDICATED BY UA RESULT CNI            IMAGING:  The Following imaging studies have been ordered while in the emergency department and I have reviewed them. No orders to display         Medications During Visit:  I ordered/approved the ordering of the following medicines for the patient while in the emergency department.     Medications   sodium chloride (NS) flush 5-40 mL ( IntraVENous Canceled Entry 4/3/23 1413)   sodium chloride (NS) flush 5-40 mL (has no administration in time range)   acetaminophen (TYLENOL) tablet 650 mg (has no administration in time range)   oxyCODONE IR (ROXICODONE) tablet 5 mg (5 mg Oral Given 4/3/23 1445)   ondansetron (ZOFRAN) injection 4 mg (has no administration in time range)   senna-docusate (PERICOLACE) 8.6-50 mg per tablet 1 Tablet (has no administration in time range)   morphine injection 2 mg (2 mg IntraVENous Given 4/3/23 1230)   0.9% sodium chloride infusion 1,000 mL (1,000 mL IntraVENous New Bag 4/3/23 1230)     IMPRESSION:  1. Abnormal CT of the abdomen    2. Mass of pancreas    3. Abdominal pain, LUQ (left upper quadrant)    4. Acquired gastric wall deformity        DISPOSITION:  Admitted      Current Discharge Medication List           Follow-up Information    None       Perfect Serve Consult for Admission  12:55 PM    ED Room Number: 523/01  Patient Name and age:  Carmelina Gilbert 41 y.o.  male  Working Diagnosis:   1. Abnormal CT of the abdomen    2. Mass of pancreas    3. Abdominal pain, LUQ (left upper quadrant)    4. Acquired gastric wall deformity        COVID-19 Suspicion:  no  Sepsis present:  no  Reassessment needed: no  Code Status:  Full Code  Readmission: no  Isolation Requirements:  no  Recommended Level of Care:  med/surg  Department: [facility] Adult ED - [phone]  Admitting Provider: Eduard Andrade NP    Presents with c/o LUQ pain, HX testicular cancer with metastasis, had an outpatient CT abd/pel with contrast today and referred for further evaluation by [facility]. Documents scanned into chart by ED , I have the CD with the imaging. Also HX interval retroperitoneal lymph node dissection in Nov. 2022, masses on the pancreas- new, possible transverse colon, thinning of the gastric wall possibly secondary to abdominal masses. He also has a HX UC and is having diarrhea with blood streaked. Labs are stable currently, VSS. I have consulted General Surgery, whom will follow and GI will probably need to come in eventually. Patient is stable.      ALEXIS Shah  [phone]     Alyssa Huggins NP  2:57 PM

## 2023-04-03 NOTE — PROGRESS NOTES
[facility] [place] at [address], [address] [facility] [address] [facility]  Linda Faden: [phone]  F: [phone]      Reason for Visit:   Stevo Jordan is a 41 y.o. male who is seen in hospital for testicular cancer. Hematology/Oncology Treatment History:   Scrotal US 11/30/2021: 3.9cm solid and cystic mass replacing left testicle. Slightly atrophic right testicle with no mass. Incidental 8mm left epididymal head cyst/spermatocele  Pre-op tumor markers 12/2/2021: .0, beta hCG 250,   Left radical inguinal orchiectomy by Dr. 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 Negative LVI, negative margins. CT A/P 12/16/2021: indeterminate splenic lesion. 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 No lymphadenopathy or other evidence of metastasis. Significant hepatic steatosis. Tumor markers 5/17/2022: AFP 5.3, beta hCG 14  Tumor markers 6/20/2022: AFP 6.3, beta hCG 24  Stage IS (pT1b cN0 M0 S1) Non-seminoma, LEFT testis  Completed BEP x 3 cycles on 8/15/2022  Tumor markers 8/4/2022: AFP 9.1, beta hCG <1  Tumor markers 9/9/2022: AFP 7.5, beta hCG <1  CT C/A/P 10/7/2022: new enlarged retroperitoneal lymph node measuring 20 x 19mm  RPLND with Dr. Kateryna Keith on 11/2/2022 pathology consistent with teratoma in 2 out of 18 periaortic lymph nodes and 1 out of 15 interaortocaval  Stage IIB (pT1b ypN2 M0) Non-seminoma, LEFT testis    History of Present Illness:     Seen today in hospital for testicular cancer and masses on CT done at [facility].    Pt admitted for abdominal pain for last two weeks. CTs show two masses of unclear significance. Pt has not been on any cancer treatment. Pain controlled. In bed. Conversant. No fevers/ chills/ chest pain/ SOB/ nausea/ vomiting/diarrhea/         Last visit with Dr Josef Medina 12 /22:  Presents for follow up off therapy. Underwent surgery for lymph node dissection with Dr. Burt Romo since last visit. Some residual discomfort, but improving. Some intermittent constipation and diarrhea which is starting to improve. Urinating ok. Erections ok, but dry ejaculation. Energy ok, back at work. He is unaccompanied today. He lives in [facility] with his wife and two young children. Review of systems was obtained and pertinent findings reviewed above. Past medical history, social history, family history, medications, and allergies are located in the electronic medical record.     Physical Exam:     Visit Vitals  /69   Pulse 81   Temp 97.6 °F (36.4 °C)   Resp 16   Ht 5' 11\" (1.803 m)   Wt 210 lb 1.6 oz (95.3 kg)   SpO2 100%   BMI 29.30 kg/m²       ECOG PS: 0  General: no distress  Eyes: anicteric sclerae  HENT: oropharynx clear  Neck: supple  Respiratory: normal respiratory effort, lungs clear  CV: regular  GI: soft, nontender, nondistended  Skin: no rashes; no ecchymoses; no petechiae  Neuro nonfocal      Results:     Lab Results   Component Value Date/Time    WBC 9.9 04/03/2023 11:38 AM    HGB 12.9 04/03/2023 11:38 AM    HCT 41.7 04/03/2023 11:38 AM    PLATELET 491 26/12/2100 11:38 AM    MCV 87.6 04/03/2023 11:38 AM     Lab Results   Component Value Date/Time    Sodium 135 (L) 04/03/2023 11:38 AM    Potassium 3.5 04/03/2023 11:38 AM    Chloride 100 04/03/2023 11:38 AM    CO2 30 04/03/2023 11:38 AM    Anion gap 5 04/03/2023 11:38 AM    Glucose 99 04/03/2023 11:38 AM    BUN 13 04/03/2023 11:38 AM    Creatinine 1.04 04/03/2023 11:38 AM    BUN/Creatinine ratio 13 04/03/2023 11:38 AM    GFR est AA >60 09/09/2022 10:23 AM    GFR est non-AA >60 2022 10:23 AM    Calcium 9.7 2023 11:38 AM    Bilirubin, total 0.4 2023 11:38 AM    Alk. phosphatase 71 2023 11:38 AM    Protein, total 8.1 2023 11:38 AM    Albumin 3.7 2023 11:38 AM    Globulin 4.4 (H) 2023 11:38 AM    A-G Ratio 0.8 (L) 2023 11:38 AM    ALT (SGPT) 32 2023 11:38 AM    AST (SGOT) 14 (L) 2023 11:38 AM     LDH:  Recent Labs     22  1200 22  1023 22  1003 22  1001 22  0916    185 190 222 200     Beta-HCG:  Recent Labs     22  1200 22  1023 22  0949 22  1003 22  1001 22  0916   HCGTLT <1 <1 <1 <1 1 24*     AFP:  Recent Labs     22  1200 22  1023 22  0949 22  1003 22  1001 22  0916   AFP 6.3 7.5* 9.1* 9.7* 9.8* 6.3     2021  AFP: 168.0 (H)  beta hC (H)  LDH: 190    2022  AFP: 6.2  beta hCG: <1    2022  AFP:  4.6  beta hCG: <1    2022:  AFP:  5.6  beta hC    2022:  AFP:  5.3  beta hC    CT Chest 2022:  No evidence of intrathoracic malignancy. CT C/A/P 10/07/2022:  New enlarged retroperitoneal lymph node measuring 20 x 19 mm at the level of the  mid abdomen, consistent with metastatic disease. No evidence of intrathoracic metastatic disease. MRI brain 10/7/2022:  No evidence of intracranial metastatic disease. Mild frontal and ethmoid sinus disease. No intracranial mass, hemorrhage or evidence of acute infarction. [facility]:              Assessment/PLAN:   1) Non-seminoma, LEFT testis  Stage IIB  He is s/p orchiectomy 2021. His pre-op hCG was elevated at 250, dropped to undetectable levels after surgery, but then steadily marcia over time to a peak of 24. Imaging was negative. We treated with BEP x3 cycles, completed 8/15/2022. Subsequent imaging noted retroperitoneal adenopathy. He is now s/p RPLND with Dr. Karthikeyan Goldstein, with final pathology noting teratoma in 3 nodes. Management remains with curative intent. No further chemotherapy is indicated at this time, I recommend we transition to surveillance. Surveillance for patients with Stage II or III non-seminoma after complete response to chemotherapy +/- post-chemotherapy RPLND (per NCCN guidelines version 2.2022): Year 1: H&P and tumor markers every 2 months, CXR and CT A/P every 6 months  Year 2: H&P and tumor markers every 3 months, CXR every 6 months, CT A/P every 6-12 months  Year 3: H&P and tumor markers every 6 months, CXR and CT A/P every 12 months  Year 4: H&P and tumor markers every 6 months, CXR every 12 months  Year 5: H&P and tumor markers every 6 months, imaging as indicated    Seen today in hospital/ pt of Dr Ant Layne. Last seen by Dr Ant Layne 12/22. Not on any cancer treatment. Seen by [facility] with labs and scans. Send to ER for abdominal pain and abnormal CTs. Stable labs: beta-hCG, AFP, LDH, CBC, CMP   repeat scans with [facility] as above with two masses near pancreas and colon. Unclear what these are/ would be unusual to be related to his cancer at this site. Unclear if MRI or better imaging would be helpful. ? PET. Surgery seeing pt. Would get GI to see pt. Would not assume cancer and would need biopsy. Manage abdominal pain/ controlled at my visit. We will follow as needed. Call if questions  Fu with Dr Gomez      2) Splenic lesion  Possible atypical hemangioma vs PAOLA, follows yearly with  [facility] (surgical oncology). 3) Ulcerative proctitis  Following with Dr. Jenise Avila. Would consult GI    4) abdominal pain. Controlled at my visit. Monitor.       Pt of Dr Ant Layne  We will follow as needed  Call if questions  Fu with Dr Ant Layne as outpt      Signed By: Annika Tovar,

## 2023-04-03 NOTE — CONSULTS
General Surgery ER Consultation    Admit Date: 4/3/2023  Reason for Consultation: free fluid in abdomen w/ abd wall thinning    HPI:  Germain Gallagher is a 41 y.o. male w/ hx of testicular cancer diagnosed 2 years ago, s/p orchiectomy and subsequent RPLND (for mets dz) in 11/22 presents w/ LLQ pain and abnormal findings on an outpatient surveillance CT done today. Pt rec'd chemo last year for his testicular cancer. He also has hx of UC for which he had a recent flare confirmed by colonoscopy and has been on steroids x 2 weeks. For about 2-3 weeks he has had LLQ pain that radiates around to his back. No associated n/v, dysuria, fevers, chills. No unintentional weight loss, no appetite loss. Pt's urologist is Dr Burt Romo, RPLND surgery was done at [facility], oncologist is Dr Josef Medina. CT - under media  Patient states that he had an outpatient CT of the abdomen pelvis earlier today with IV and oral contrast by [facility] and was sent over for concerning results. Of note there are 2 new hypodense cystic appearing masses on the left upper quadrant the larger of which is associated with the tail of the pancreas and the greater curvature of the stomach and the smaller which is associated with the transverse colon. See study in media.        Patient Active Problem List    Diagnosis Date Noted    Abdominal pain 04/03/2023    Elevated serum creatinine 06/27/2022    Encounter for antineoplastic chemotherapy 06/06/2022    Non-seminomatous cancer of left testis ([address] 05/26/2022    Testicular cancer ([address] 05/19/2022    Ulcerative colitis ([address] 03/10/2022    Splenic mass 01/10/2022     Past Medical History:   Diagnosis Date    Acid indigestion     Autoimmune disease ([address]     Cancer ([address]     Testicular     Stool color black     ulceratie proctitis       Past Surgical History:   Procedure Laterality Date    HX UROLOGICAL      IR INSERT TUNL CVC W PORT OVER 5 YEARS  6/17/2022    IR REMOVE TUNL CVAD W PORT/PUMP  1/10/2023 Social History     Tobacco Use    Smoking status: Former    Smokeless tobacco: Never   Substance Use Topics    Alcohol use: Yes      Family History   Problem Relation Age of Onset    Hypertension Mother     Cancer Maternal Grandmother         Breast    Stroke Maternal Grandfather     Cancer Paternal Grandfather       Prior to Admission medications    Medication Sig Start Date End Date Taking? Authorizing Provider   mesalamine ER (APRISO) 0.375 gram 24 hour capsule TAKE 4 CAPSULE BY MOUTH ONCE A DAY FOR 30 DAYS 7/8/22   Provider, Historical   magic mouthwash solution Swish and spit 15 mL every 4 hours as needed for mouth pain. May swallow for throat pain. Magic mouth wash Maalox Lidocaine 2% viscous Diphenhydramine oral solution Pharmacy to mix equal portions of ingredients to a total volume as indicated in the dispense amount. Patient not taking: No sig reported 7/6/22   Brendon Hazel NP   prochlorperazine (Compazine) 10 mg tablet Take 1 Tablet by mouth every six (6) hours as needed for Nausea. Patient not taking: No sig reported 6/9/22   Marisol Gomez MD   lidocaine-prilocaine (EMLA) topical cream Apply  to affected area as needed for Pain (Apply 30-60 min. prior to having your port accessed). Patient not taking: Reported on 1/10/2023 6/9/22   Marisol Gomez MD   ondansetron hcl [facility]) 8 mg tablet Take 1 Tablet by mouth every eight (8) hours as needed for Nausea. Patient not taking: No sig reported 6/9/22   Marisol Gomez MD   Adderall XR 25 mg XR capsule  5/17/22   Provider, Historical   loratadine-pseudoephedrine (AllerClear D-24hr)  mg per tablet  12/20/05   Provider, Historical   omeprazole (PRILOSEC OTC) 20 mg tablet  10/20/21   Provider, Historical   multivitamin (ONE A DAY) tablet Take 1 Tablet by mouth daily.  Indications: One a Day Mens    Provider, Historical     Allergies   Allergen Reactions    Flynn Rash and Swelling          Subjective:     Review of Systems:    A comprehensive review of systems was negative except for that written in the History of Present Illness. Objective:     Blood pressure 133/87, pulse 81, temperature 98.4 °F (36.9 °C), resp. rate 16, height 5' 11\" (1.803 m), weight 210 lb 1.6 oz (95.3 kg), SpO2 100 %. Recent Results (from the past 24 hour(s))   CBC WITH AUTOMATED DIFF    Collection Time: 04/03/23 11:38 AM   Result Value Ref Range    WBC 9.9 4.1 - 11.1 K/uL    RBC 4.76 4.10 - 5.70 M/uL    HGB 12.9 12.1 - 17.0 g/dL    HCT 41.7 36.6 - 50.3 %    MCV 87.6 80.0 - 99.0 FL    MCH 27.1 26.0 - 34.0 PG    MCHC 30.9 30.0 - 36.5 g/dL    RDW 16.0 (H) 11.5 - 14.5 %    PLATELET 264 975 - 578 K/uL    MPV 10.2 8.9 - 12.9 FL    NRBC 0.0 0  WBC    ABSOLUTE NRBC 0.00 0.00 - 0.01 K/uL    NEUTROPHILS 66 32 - 75 %    LYMPHOCYTES 24 12 - 49 %    MONOCYTES 7 5 - 13 %    EOSINOPHILS 2 0 - 7 %    BASOPHILS 1 0 - 1 %    IMMATURE GRANULOCYTES 0 0.0 - 0.5 %    ABS. NEUTROPHILS 6.6 1.8 - 8.0 K/UL    ABS. LYMPHOCYTES 2.3 0.8 - 3.5 K/UL    ABS. MONOCYTES 0.7 0.0 - 1.0 K/UL    ABS. EOSINOPHILS 0.2 0.0 - 0.4 K/UL    ABS. BASOPHILS 0.1 0.0 - 0.1 K/UL    ABS. IMM. GRANS. 0.0 0.00 - 0.04 K/UL    DF AUTOMATED     METABOLIC PANEL, COMPREHENSIVE    Collection Time: 04/03/23 11:38 AM   Result Value Ref Range    Sodium 135 (L) 136 - 145 mmol/L    Potassium 3.5 3.5 - 5.1 mmol/L    Chloride 100 97 - 108 mmol/L    CO2 30 21 - 32 mmol/L    Anion gap 5 5 - 15 mmol/L    Glucose 99 65 - 100 mg/dL    BUN 13 6 - 20 MG/DL    Creatinine 1.04 0.70 - 1.30 MG/DL    BUN/Creatinine ratio 13 12 - 20      eGFR >60 >60 ml/min/1.73m2    Calcium 9.7 8.5 - 10.1 MG/DL    Bilirubin, total 0.4 0.2 - 1.0 MG/DL    ALT (SGPT) 32 12 - 78 U/L    AST (SGOT) 14 (L) 15 - 37 U/L    Alk.  phosphatase 71 45 - 117 U/L    Protein, total 8.1 6.4 - 8.2 g/dL    Albumin 3.7 3.5 - 5.0 g/dL    Globulin 4.4 (H) 2.0 - 4.0 g/dL    A-G Ratio 0.8 (L) 1.1 - 2.2     SAMPLES BEING HELD    Collection Time: 04/03/23 11:38 AM   Result Value Ref Range    SAMPLES BEING HELD 1RED 1BLU     COMMENT        Add-on orders for these samples will be processed based on acceptable specimen integrity and analyte stability, which may vary by analyte. LIPASE    Collection Time: 04/03/23 11:38 AM   Result Value Ref Range    Lipase 271 73 - 393 U/L   URINALYSIS W/ REFLEX CULTURE    Collection Time: 04/03/23 12:32 PM    Specimen: Urine   Result Value Ref Range    Color YELLOW/STRAW      Appearance CLEAR CLEAR      Specific gravity 1.011 1.003 - 1.030      pH (UA) 6.5 5.0 - 8.0      Protein Negative NEG mg/dL    Glucose Negative NEG mg/dL    Ketone Negative NEG mg/dL    Bilirubin Negative NEG      Blood Negative NEG      Urobilinogen 0.2 0.2 - 1.0 EU/dL    Nitrites Negative NEG      Leukocyte Esterase Negative NEG      WBC 0-4 0 - 4 /hpf    RBC 0-5 0 - 5 /hpf    Epithelial cells FEW FEW /lpf    Bacteria Negative NEG /hpf    UA:UC IF INDICATED CULTURE NOT INDICATED BY UA RESULT CNI       _____________________  Physical Exam:     General:  Alert, cooperative, no distress, appears stated age. Eyes:   Sclera clear. Throat: Lips, mucosa, and tongue normal.   Neck: Supple, symmetrical, trachea midline. Lungs:   Clear to auscultation bilaterally. Heart:  Regular rate and rhythm. Abdomen:   Soft, TTP LLQ. Bowel sounds normal. No masses,  No organomegaly. Extremities: Extremities normal, atraumatic, no cyanosis or edema. Skin: Skin color, texture, turgor normal. No rashes or lesions. Assessment:   Active Problems:    Abdominal pain (4/3/2023)            Plan:     No surgical intervention warranted at this time  Urology and Oncology need to be consulted  May be able to be handled as an outpt    Total time spent with patient: 40 minutes. Signed By: Sonia Vail NP     April 3, 2023     Pt seen and examined agree with above. Will discuss with surgical oncology tomorrow.

## 2023-04-04 LAB
ALBUMIN SERPL-MCNC: 3.7 G/DL (ref 3.5–5)
ALBUMIN SERPL-MCNC: 3.7 G/DL (ref 3.5–5)
ALBUMIN/GLOB SERPL: 0.8 (ref 1.1–2.2)
ALBUMIN/GLOB SERPL: 0.8 (ref 1.1–2.2)
ALP SERPL-CCNC: 74 U/L (ref 45–117)
ALP SERPL-CCNC: 77 U/L (ref 45–117)
ALT SERPL-CCNC: 32 U/L (ref 12–78)
ALT SERPL-CCNC: 33 U/L (ref 12–78)
ANION GAP SERPL CALC-SCNC: 5 MMOL/L (ref 5–15)
AST SERPL-CCNC: 21 U/L (ref 15–37)
AST SERPL-CCNC: 21 U/L (ref 15–37)
BILIRUB DIRECT SERPL-MCNC: 0.2 MG/DL (ref 0–0.2)
BILIRUB SERPL-MCNC: 0.6 MG/DL (ref 0.2–1)
BILIRUB SERPL-MCNC: 0.6 MG/DL (ref 0.2–1)
BUN SERPL-MCNC: 10 MG/DL (ref 6–20)
BUN/CREAT SERPL: 8 (ref 12–20)
CALCIUM SERPL-MCNC: 9.5 MG/DL (ref 8.5–10.1)
CHLORIDE SERPL-SCNC: 101 MMOL/L (ref 97–108)
CO2 SERPL-SCNC: 29 MMOL/L (ref 21–32)
CREAT SERPL-MCNC: 1.31 MG/DL (ref 0.7–1.3)
ERYTHROCYTE [DISTWIDTH] IN BLOOD BY AUTOMATED COUNT: 16 % (ref 11.5–14.5)
GLOBULIN SER CALC-MCNC: 4.6 G/DL (ref 2–4)
GLOBULIN SER CALC-MCNC: 4.7 G/DL (ref 2–4)
GLUCOSE SERPL-MCNC: 125 MG/DL (ref 65–100)
HCT VFR BLD AUTO: 43.1 % (ref 36.6–50.3)
HGB BLD-MCNC: 13.4 G/DL (ref 12.1–17)
LACTATE SERPL-SCNC: 1 MMOL/L (ref 0.4–2)
LIPASE SERPL-CCNC: 284 U/L (ref 73–393)
MCH RBC QN AUTO: 27.6 PG (ref 26–34)
MCHC RBC AUTO-ENTMCNC: 31.1 G/DL (ref 30–36.5)
MCV RBC AUTO: 88.9 FL (ref 80–99)
NRBC # BLD: 0 K/UL (ref 0–0.01)
NRBC BLD-RTO: 0 PER 100 WBC
PLATELET # BLD AUTO: 417 K/UL (ref 150–400)
PMV BLD AUTO: 10.5 FL (ref 8.9–12.9)
POTASSIUM SERPL-SCNC: 4.1 MMOL/L (ref 3.5–5.1)
PROT SERPL-MCNC: 8.3 G/DL (ref 6.4–8.2)
PROT SERPL-MCNC: 8.4 G/DL (ref 6.4–8.2)
RBC # BLD AUTO: 4.85 M/UL (ref 4.1–5.7)
SODIUM SERPL-SCNC: 135 MMOL/L (ref 136–145)
WBC # BLD AUTO: 15.2 K/UL (ref 4.1–11.1)

## 2023-04-04 PROCEDURE — 74011250636 HC RX REV CODE- 250/636: Performed by: NURSE PRACTITIONER

## 2023-04-04 PROCEDURE — 83690 ASSAY OF LIPASE: CPT

## 2023-04-04 PROCEDURE — 83605 ASSAY OF LACTIC ACID: CPT

## 2023-04-04 PROCEDURE — 80076 HEPATIC FUNCTION PANEL: CPT

## 2023-04-04 PROCEDURE — 99231 SBSQ HOSP IP/OBS SF/LOW 25: CPT | Performed by: SURGERY

## 2023-04-04 PROCEDURE — 74011250637 HC RX REV CODE- 250/637: Performed by: HOSPITALIST

## 2023-04-04 PROCEDURE — 65270000032 HC RM SEMIPRIVATE

## 2023-04-04 PROCEDURE — 74011250636 HC RX REV CODE- 250/636: Performed by: HOSPITALIST

## 2023-04-04 RX ORDER — MESALAMINE 0.38 G/1
1.5 CAPSULE, EXTENDED RELEASE ORAL DAILY
Status: ACTIVE
Start: 2023-04-04

## 2023-04-04 RX ADMIN — HYDROMORPHONE HYDROCHLORIDE 0.5 MG: 1 INJECTION, SOLUTION INTRAMUSCULAR; INTRAVENOUS; SUBCUTANEOUS at 22:58

## 2023-04-04 RX ADMIN — OXYCODONE HYDROCHLORIDE 5 MG: 5 TABLET ORAL at 22:30

## 2023-04-04 RX ADMIN — OXYCODONE HYDROCHLORIDE 5 MG: 5 TABLET ORAL at 00:15

## 2023-04-04 RX ADMIN — SODIUM CHLORIDE 75 ML/HR: 900 INJECTION, SOLUTION INTRAVENOUS at 16:11

## 2023-04-04 RX ADMIN — ACETAMINOPHEN 650 MG: 325 TABLET ORAL at 18:18

## 2023-04-04 RX ADMIN — OXYCODONE HYDROCHLORIDE 5 MG: 5 TABLET ORAL at 09:36

## 2023-04-04 RX ADMIN — OXYCODONE HYDROCHLORIDE 5 MG: 5 TABLET ORAL at 17:28

## 2023-04-04 RX ADMIN — OXYCODONE HYDROCHLORIDE 5 MG: 5 TABLET ORAL at 12:51

## 2023-04-04 NOTE — PROGRESS NOTES
Looking over all his studies I am not convinced he has had pancreatitis. More concerned about peritoneal seeding from his testicular tumor. If possible I would favor a transgastric aspiration and biopsy of the fluid  area next to the stomach. I spoke with Dr. Coty Frost who knows the patient well. Will see what he thinks as well.

## 2023-04-04 NOTE — PROGRESS NOTES
2230: Perfect Serve to Micaela Mock NP: Maury: Pt is complaining of 8-9/10 sharp abd pain similar to what he came in with. I just gave him prn tylenol and he has about an hour to go before he can get the 5 mg cristofer. I was wondering if we could up the cristofer dose or get something IV available for breakthrough pain? 0.5mg IV dilaudid Q4 ordered prn for pain. 0004: Perfect Serve to NP Alba: [place] Rapidsslime Rosenthal: He just called me in for 10/10 pain and because he ripped his IV out because he can't keep still. He is worried about \"his pancreas or something bursting\" I can give him the cristofer now but I didn't know if you wanted to order any other pain medicine or anything else. Lactic acid ordered. Other AM labs sent when new PIV placed. Pt medicated with 5mg prn cristofer and provided with heating pack. Bedside shift change report given to Kimberly Hardy (oncoming nurse) by Rylan Whitley RN (offgoing nurse). Report included the following information SBAR, Kardex, Intake/Output, MAR, and Recent Results.

## 2023-04-04 NOTE — PROGRESS NOTES
[address] [place] [address] At [place]  Reviewed CTs at cancer conference today  Unclear what masses are but ? Related to pancreatitis?   GI to see pt   Surgery eval also pending  Dr Gomez pt to fu with him at d/c

## 2023-04-04 NOTE — CONSULTS
[facility].  [address] [facility] [address], [address]  [phone]                     GI CONSULTATION NOTE      NAME:  Marthenia Osler   :   1985   MRN:   [id number]     Consult Date: 2023     Chief Complaint: abnormal CT/ oncology recommendation to consult with GI    History of Present Illness:  Patient is a 41 y.o. who is seen in consultation at the request of Cris Pérez NP for the above mentioned problem. Mr. Mikey Soriano has a pmh testicular CA followed by Dr. 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 He was diagnosed with testicular cancer via scrotal US in 2021 and is s/p left radical inguinal orchiectomy by Dr. Marty Campos in Dec 2021. In 2022 he had new enlarged retroperitoneal lymph node measuring 20 x 19 mm and had RPLND with Dr. Karthikeyan Goldstein on 2022. He had a CT at [facility] for 2 weeks of upper abdominal pain and CT showed two masses of unclear significance - \"hypodense cystic appearing masses in the LUQ the larger of which is associated with the tail of the pancreas and greater curvature of the stomach, and the smaller which is intimately associated with the transverse colon and greater curvature of the stomach and appears more complex. \". He was last seen by [facility] in January for alternating stools after RPLND and had colonoscopy earlier this month that showed ulcerative proctitis. He has been off Humira since May 2022. He has been on PO mesalamine, mesalamine suppositories and prednisone taper for 3 weeks. He reports upper abdominal, primarily left sided pain that radiates to his back. It is manageable. He is tolerating clear liquids. Afebrile. Leukocytosis of 15.2, , lipase 284, pltls 417, Hgb 13.4. On PO narcotics for pain.       PMH:  Past Medical History:   Diagnosis Date    Acid indigestion     Autoimmune disease ([address]     Cancer [facility]     Testicular     Stool color black     ulceratie proctitis        PSH:  Past Surgical History: Procedure Laterality Date    HX UROLOGICAL      IR INSERT TUNL CVC W PORT OVER 5 YEARS  6/17/2022    IR REMOVE TUNL CVAD W PORT/PUMP  1/10/2023       Allergies: Allergies   Allergen Reactions    Musella Rash and Swelling       Home Medications:  Prior to Admission Medications   Prescriptions Last Dose Informant Patient Reported? Taking? Adderall XR 25 mg XR capsule   Yes No   lidocaine-prilocaine (EMLA) topical cream   No No   Sig: Apply  to affected area as needed for Pain (Apply 30-60 min. prior to having your port accessed). Patient not taking: Reported on 1/10/2023   loratadine-pseudoephedrine (AllerClear D-24hr)  mg per tablet   Yes No   magic mouthwash solution   No No   Sig: Swish and spit 15 mL every 4 hours as needed for mouth pain. May swallow for throat pain. Magic mouth wash Maalox Lidocaine 2% viscous Diphenhydramine oral solution Pharmacy to mix equal portions of ingredients to a total volume as indicated in the dispense amount. Patient not taking: No sig reported   mesalamine ER (APRISO) 0.375 gram 24 hour capsule   Yes No   Sig: TAKE 4 CAPSULE BY MOUTH ONCE A DAY FOR 30 DAYS   multivitamin (ONE A DAY) tablet   Yes No   Sig: Take 1 Tablet by mouth daily. Indications: One a Day Mens   omeprazole (PRILOSEC OTC) 20 mg tablet   Yes No   ondansetron hcl (ZOFRAN) 8 mg tablet   No No   Sig: Take 1 Tablet by mouth every eight (8) hours as needed for Nausea. Patient not taking: No sig reported   prochlorperazine (Compazine) 10 mg tablet   No No   Sig: Take 1 Tablet by mouth every six (6) hours as needed for Nausea.    Patient not taking: No sig reported      Facility-Administered Medications: None       Hospital Medications:  Current Facility-Administered Medications   Medication Dose Route Frequency    mesalamine ER (APRISO) 24 hour capsule 1.5 g (Patient Supplied)  1.5 g Oral DAILY    sodium chloride (NS) flush 5-40 mL  5-40 mL IntraVENous Q8H    sodium chloride (NS) flush 5-40 mL  5-40 mL IntraVENous PRN    acetaminophen (TYLENOL) tablet 650 mg  650 mg Oral Q6H PRN    oxyCODONE IR (ROXICODONE) tablet 5 mg  5 mg Oral Q4H PRN    ondansetron (ZOFRAN) injection 4 mg  4 mg IntraVENous Q4H PRN    senna-docusate (PERICOLACE) 8.6-50 mg per tablet 1 Tablet  1 Tablet Oral DAILY PRN    0.9% sodium chloride infusion  75 mL/hr IntraVENous CONTINUOUS    HYDROmorphone (DILAUDID) injection 0.5 mg  0.5 mg IntraVENous Q4H PRN       Social History:  Social History     Tobacco Use    Smoking status: Former    Smokeless tobacco: Never   Substance Use Topics    Alcohol use: Yes       Family History:  Family History   Problem Relation Age of Onset    Hypertension Mother     Cancer Maternal Grandmother         Breast    Stroke Maternal Grandfather     Cancer Paternal Grandfather        Review of Systems:    Constitutional: negative fever, negative chills, negative weight loss  Eyes:   negative visual changes  ENT:   negative sore throat, tongue or lip swelling  Respiratory:  negative cough, negative dyspnea  Cards:  negative for chest pain, palpitations, lower extremity edema  GI:   See HPI  :  negative for frequency, dysuria  Integument:  negative for rash and pruritus  Heme:  negative for easy bruising and gum/nose bleeding  Musculoskel: negative for myalgias,  back pain and muscle weakness  Neuro: negative for headaches, dizziness, vertigo  Psych:  negative for feelings of anxiety, depression      Objective:   Patient Vitals for the past 8 hrs:   BP Temp Pulse Resp SpO2   04/04/23 1515 (!) 143/85 98.9 °F (37.2 °C) 88 16 97 %     No intake/output data recorded. 04/03 0701 - 04/04 1900  In: 1450 [P.O.:1450]  Out: -       PHYSICAL EXAM:  General appearance: cooperative, no distress, appears stated age  Skin: Extremities and face reveal no rashes. No jenkins erythema. HEENT: Sclerae anicteric. Extra-occular muscles are intact. Cardiovascular: Regular rate and rhythm. No murmurs, gallops, or rubs.    Respiratory: Comfortable breathing with no accessory muscle use. Clear breath sounds with no wheezes, rales, or rhonchi. GI: Abdomen mildly distended with mid to left upper abdominal tenderness to palpation. Normal active bowel sounds. Rectal: Deferred   Musculoskeletal: No pitting edema of the lower legs. No costovertebral tenderness. Neurological: Gross memory appears intact. Patient is alert and oriented. Psychiatric: Mood appears appropriate with good judgement. No anxiety or agitation. Data Review     Recent Labs     04/03/23 2359 04/03/23  1138   WBC 15.2* 9.9   HGB 13.4 12.9   HCT 43.1 41.7   * 330     Recent Labs     04/03/23 2359 04/03/23  1415 04/03/23  1138   *  --  135*   K 4.1  --  3.5     --  100   CO2 29  --  30   BUN 10  --  13   CREA 1.31*  --  1.04   *  --  99   PHOS  --  3.3  --    CA 9.5  --  9.7     Recent Labs     04/04/23  0005 04/03/23 2359 04/03/23  1138   AP 77 74 71   TP 8.3* 8.4* 8.1   ALB 3.7 3.7 3.7   GLOB 4.6* 4.7* 4.4*   LPSE 284  --  271     No results for input(s): INR, PTP, APTT, INREXT in the last 72 hours. Imaging studies reviewed    Assessment / Plan :     Mr Windy Asher has pmh ulcerative proctitis seen on colonoscopy on 3/1/23 on PO and rectal mesalamine +prednisone taper and testicular CA s/p radical left radical inguinal orchiectomy by Dr. Corrina Clement in Dec 2021 and RPLND with Dr. Freddie Bernard on Nov 2022. Two weeks ago he had acute onset upper abdominal pain and CT with [facility] showed two hypodense abdominal masses of unknown significance. Leukocytosis of 15.    - continue supportive care with fluids, antiemetics, pain management, diet as tolerated  - RUQ ultrasound to exclude gallstone  - trend CBC/CMP  - continue mesalamine  - will discuss with Dr. Tanya Skinner:  I saw and evaluated Roberto Gilbert on the above date of service and discussed the care with the nurse practitioner.  I agree with the findings and plan as documented in the note above and any changes I have noted in bold to reflect my findings and plan. Above described patient admitted with subacute epigastric pain and OSH CTAP interpretation of acute pancreatitis. This needs to be uploaded and reviewed by our radiologists. Clinical description of symptoms with imaging consistent with acute interstitial pancreatitis is diagnostic of acute pancreatitis, and current approach to this therapy is appropriate. Active IV fluid management is critical to improved morbidity and mortality outcomes; Per recent [facility] data: Moderate fluid resuscitation with of a bolus of 10 ml per kilogram in patients with hypovolemia or no bolus in patients with normovolemia, followed by 1.5 ml per kilogram per hour in all patients for 48 hrs  early nutrition likely improves outcomes; OK for clear liquids for now  IV antiemetics and opioid analgesia are appropriate and should be offered with allowed refusal within the first 48 hours  Obtain US RUQ to exclude cholelithiasis as potential cause for pancreatitis. Mild ETOH history only. Mesalamine is a potential offender if gallstones ruled out as as a Class Ia (At least 1 case report with positive rechallenge, excluding all other causes, such as alcohol, hypertriglyceridemia, gallstones, and other drugs) drug associated with AIP    RP LAD likely does not need acute sampling and can be arranged as outpatient. UC does not appear to be clinically acute in this setting, but will need to review imaging if pancolitis identified can consider flex/sig or colonoscopy.          Patient Active Hospital Problem List:   Active Problems:    Abdominal pain (4/3/2023)       Jose Hermosillo PA-C  04/04/23  7:49 PM

## 2023-04-04 NOTE — PROGRESS NOTES
Hospitalist Progress Note  Ignacio Kwon NP        Date of Service:  2023  NAME:  Nazanin Garland  :  1985  MRN:  [id number]    Admission Summary:   Mr. Nancie Morales is a 41 y.o. male with PMH significant for left testicular stage IIb none seminoma s/p orchiectomy on 2021, chemo and ulcerative proctitis was referred to ED from urologist clinic after he presented with left upper quadrant pain of 2 weeks duration. He described his abdominal pain as sharp 6/10, on and off and radiating to the right side of abdomen and to his left back. No nausea, vomiting, fever, chills, urinary symptoms. CT scan study showed interval development of of 2 hypodense cystic mass in the left upper quadrant the large of which is associated with the tail of the pancreas and greater curvature of the stomach and the smaller which is intimately associated with the transverse colon and greater curvature of the stomach and appear more complex. Interval history / Subjective:        Patient was seen and examined this morning, he was sitting up in bed in no acute distress. Cooperative and interactive with assessment. He reports his pain is currently a 6-7 out of 10 on the pain scale. Asked nursing to provide him some oxycodone. He reports that he had intense/significant pain overnight which was unrelieved with Dilaudid. He reported an \"eventual\" improvement after he received oral Oxy. Discussed plan of care with patient today, have consulted gastroenterology per oncology's recommendation.     Assessment & Plan:     Intraabdominal mass: rule out malignancy  - Outpatient CT scan abdominal report reviewed and attached in the media section of the chart review  - As needed Tylenol, oxycodone, Protonix, IV Zofran  - Seen by surgical service as well as Urology and oncology  - oncology has requested Gastroenterology be consulted ([facility])     Hx of left testicular stage IIb   -S/p orchiectomy and chemotherapy, last chemotherapy in August last year  -Consult hem/oncology ? Source of masses     Hx of ulcerative proctitis   -Continue home mesalamine     Mild hyponatremia   - possible due to intravascular volume depletion   - receiving IVF  - repeat bmp stable - check labs in am      Code status: Full  Prophylaxis: SCDs  Care Plan discussed with: patient, nurse, oncology, GI and Dr. Emy Chairez  Anticipated Disposition:  Home when able 24-48 hours ending plan from specialists     Hospital Problems  Date Reviewed: 9/9/2022            Codes Class Noted POA    Abdominal pain ICD-10-CM: R10.9  ICD-9-CM: 789.00  4/3/2023 Unknown         Review of Systems:     Denies headaches, dizziness  No chest pain or pressure  No shortness of breath or cough  No GI complaints such as nausea, vomiting, diarrhea or constipation. Tolerating liquids. Abdominal pain still a 6-7 out of 10    Vital Signs:    Last 24hrs VS reviewed since prior progress note. Most recent are:  Visit Vitals  /81   Pulse 98   Temp 98.4 °F (36.9 °C)   Resp 16   Ht 5' 11\" (1.803 m)   Wt 95.3 kg (210 lb 1.6 oz)   SpO2 97%   BMI 29.30 kg/m²       Intake/Output Summary (Last 24 hours) at 4/4/2023 0720  Last data filed at 4/3/2023 1611  Gross per 24 hour   Intake 450 ml   Output --   Net 450 ml      Physical Examination:     I had a face to face encounter with this patient and independently examined them on 4/4/2023 as outlined below:        General : Young  male lying in bed alert x 3, awake, no acute distress   HEENT: EOMI, moist mucus membrane  Neck: trachea midline  Chest: Clear to auscultation bilaterally, sats 98% RA   CVS: RRR. Hr 94  Abd: soft/ non tender, non distended, BS active + left sided flank pain  Ext: MOORE, no edema.    Neuro/Psych: pleasant mood and affect, sensory grossly within normal limit, Strength 5/5 in all extremities  Skin: warm       Data Review:   Review and/or order of clinical lab test  Review and/or order of tests in the radiology section of CPT  Review and/or order of tests in the medicine section of CPT    I have independently reviewed and interpreted patient's lab and all other diagnostic data    Notes reviewed from all clinical/nonclinical/nursing services involved in patient's clinical care. Care coordination discussions were held with appropriate clinical/nonclinical/ nursing providers based on care coordination needs. Labs:     Recent Labs     04/03/23 2359 04/03/23  1138   WBC 15.2* 9.9   HGB 13.4 12.9   HCT 43.1 41.7   * 330     Recent Labs     04/03/23 2359 04/03/23  1415 04/03/23  1138   *  --  135*   K 4.1  --  3.5     --  100   CO2 29  --  30   BUN 10  --  13   CREA 1.31*  --  1.04   *  --  99   CA 9.5  --  9.7   MG  --  2.4  --    PHOS  --  3.3  --      Recent Labs     04/04/23  0005 04/03/23 2359 04/03/23  1138   ALT 33 32 32   AP 77 74 71   TBILI 0.6 0.6 0.4   TP 8.3* 8.4* 8.1   ALB 3.7 3.7 3.7   GLOB 4.6* 4.7* 4.4*   LPSE 284  --  271     No results for input(s): INR, PTP, APTT, INREXT in the last 72 hours. No results for input(s): FE, TIBC, PSAT, FERR in the last 72 hours. No results found for: FOL, RBCF   No results for input(s): PH, PCO2, PO2 in the last 72 hours. No results for input(s): CPK, CKNDX, TROIQ in the last 72 hours.     No lab exists for component: CPKMB  No results found for: CHOL, CHOLX, CHLST, CHOLV, HDL, HDLP, LDL, LDLC, DLDLP, TGLX, TRIGL, TRIGP, CHHD, CHHDX  No results found for: [facility]  Lab Results   Component Value Date/Time    Color YELLOW/STRAW 04/03/2023 12:32 PM    Appearance CLEAR 04/03/2023 12:32 PM    Specific gravity 1.011 04/03/2023 12:32 PM    pH (UA) 6.5 04/03/2023 12:32 PM    Protein Negative 04/03/2023 12:32 PM    Glucose Negative 04/03/2023 12:32 PM    Ketone Negative 04/03/2023 12:32 PM    Bilirubin Negative 04/03/2023 12:32 PM    Urobilinogen 0.2 04/03/2023 12:32 PM    Nitrites Negative 04/03/2023 12:32 PM    Leukocyte Esterase Negative 04/03/2023 12:32 PM    Epithelial cells FEW 04/03/2023 12:32 PM    Bacteria Negative 04/03/2023 12:32 PM    WBC 0-4 04/03/2023 12:32 PM    RBC 0-5 04/03/2023 12:32 PM         Medications Reviewed:     Current Facility-Administered Medications   Medication Dose Route Frequency    sodium chloride (NS) flush 5-40 mL  5-40 mL IntraVENous Q8H    sodium chloride (NS) flush 5-40 mL  5-40 mL IntraVENous PRN    acetaminophen (TYLENOL) tablet 650 mg  650 mg Oral Q6H PRN    oxyCODONE IR (ROXICODONE) tablet 5 mg  5 mg Oral Q4H PRN    ondansetron (ZOFRAN) injection 4 mg  4 mg IntraVENous Q4H PRN    senna-docusate (PERICOLACE) 8.6-50 mg per tablet 1 Tablet  1 Tablet Oral DAILY PRN    mesalamine (DELZICOL) DR capsule 800 mg  800 mg Oral DAILY    0.9% sodium chloride infusion  75 mL/hr IntraVENous CONTINUOUS    HYDROmorphone (DILAUDID) injection 0.5 mg  0.5 mg IntraVENous Q4H PRN   ______________________________________________________________________  EXPECTED LENGTH OF STAY: - - -  ACTUAL LENGTH OF STAY:          1               Gennette Mcburney, NP

## 2023-04-05 ENCOUNTER — APPOINTMENT (OUTPATIENT)
Dept: ULTRASOUND IMAGING | Age: 38
DRG: 439 | End: 2023-04-05
Payer: COMMERCIAL

## 2023-04-05 ENCOUNTER — APPOINTMENT (OUTPATIENT)
Dept: CT IMAGING | Age: 38
DRG: 439 | End: 2023-04-05
Attending: NURSE PRACTITIONER
Payer: COMMERCIAL

## 2023-04-05 LAB
ALBUMIN SERPL-MCNC: 2.6 G/DL (ref 3.5–5)
ALBUMIN/GLOB SERPL: 0.6 (ref 1.1–2.2)
ALP SERPL-CCNC: 67 U/L (ref 45–117)
ALT SERPL-CCNC: 28 U/L (ref 12–78)
ANION GAP SERPL CALC-SCNC: 3 MMOL/L (ref 5–15)
AST SERPL-CCNC: 13 U/L (ref 15–37)
BASOPHILS # BLD: 0.1 K/UL (ref 0–0.1)
BASOPHILS NFR BLD: 1 % (ref 0–1)
BILIRUB SERPL-MCNC: 0.3 MG/DL (ref 0.2–1)
BUN SERPL-MCNC: 11 MG/DL (ref 6–20)
BUN/CREAT SERPL: 10 (ref 12–20)
CALCIUM SERPL-MCNC: 8.7 MG/DL (ref 8.5–10.1)
CHLORIDE SERPL-SCNC: 107 MMOL/L (ref 97–108)
CO2 SERPL-SCNC: 29 MMOL/L (ref 21–32)
COMMENT, HOLDF: NORMAL
CREAT SERPL-MCNC: 1.06 MG/DL (ref 0.7–1.3)
DIFFERENTIAL METHOD BLD: ABNORMAL
EOSINOPHIL # BLD: 0.2 K/UL (ref 0–0.4)
EOSINOPHIL NFR BLD: 2 % (ref 0–7)
ERYTHROCYTE [DISTWIDTH] IN BLOOD BY AUTOMATED COUNT: 15.8 % (ref 11.5–14.5)
GLOBULIN SER CALC-MCNC: 4.1 G/DL (ref 2–4)
GLUCOSE SERPL-MCNC: 114 MG/DL (ref 65–100)
HCT VFR BLD AUTO: 37.8 % (ref 36.6–50.3)
HGB BLD-MCNC: 11.6 G/DL (ref 12.1–17)
IMM GRANULOCYTES # BLD AUTO: 0 K/UL (ref 0–0.04)
IMM GRANULOCYTES NFR BLD AUTO: 0 % (ref 0–0.5)
LIPASE SERPL-CCNC: 268 U/L (ref 73–393)
LYMPHOCYTES # BLD: 1.9 K/UL (ref 0.8–3.5)
LYMPHOCYTES NFR BLD: 22 % (ref 12–49)
MAGNESIUM SERPL-MCNC: 2.3 MG/DL (ref 1.6–2.4)
MCH RBC QN AUTO: 27.6 PG (ref 26–34)
MCHC RBC AUTO-ENTMCNC: 30.7 G/DL (ref 30–36.5)
MCV RBC AUTO: 90 FL (ref 80–99)
MONOCYTES # BLD: 0.8 K/UL (ref 0–1)
MONOCYTES NFR BLD: 10 % (ref 5–13)
NEUTS SEG # BLD: 5.7 K/UL (ref 1.8–8)
NEUTS SEG NFR BLD: 65 % (ref 32–75)
NRBC # BLD: 0 K/UL (ref 0–0.01)
NRBC BLD-RTO: 0 PER 100 WBC
PLATELET # BLD AUTO: 274 K/UL (ref 150–400)
PMV BLD AUTO: 10.8 FL (ref 8.9–12.9)
POTASSIUM SERPL-SCNC: 4.2 MMOL/L (ref 3.5–5.1)
PROT SERPL-MCNC: 6.7 G/DL (ref 6.4–8.2)
RBC # BLD AUTO: 4.2 M/UL (ref 4.1–5.7)
SAMPLES BEING HELD,HOLD: NORMAL
SODIUM SERPL-SCNC: 139 MMOL/L (ref 136–145)
WBC # BLD AUTO: 8.8 K/UL (ref 4.1–11.1)

## 2023-04-05 PROCEDURE — 76705 ECHO EXAM OF ABDOMEN: CPT

## 2023-04-05 PROCEDURE — 74177 CT ABD & PELVIS W/CONTRAST: CPT

## 2023-04-05 PROCEDURE — 83735 ASSAY OF MAGNESIUM: CPT

## 2023-04-05 PROCEDURE — 74011250636 HC RX REV CODE- 250/636: Performed by: HOSPITALIST

## 2023-04-05 PROCEDURE — 74011000636 HC RX REV CODE- 636: Performed by: NURSE PRACTITIONER

## 2023-04-05 PROCEDURE — 99232 SBSQ HOSP IP/OBS MODERATE 35: CPT | Performed by: INTERNAL MEDICINE

## 2023-04-05 PROCEDURE — 36415 COLL VENOUS BLD VENIPUNCTURE: CPT

## 2023-04-05 PROCEDURE — 80053 COMPREHEN METABOLIC PANEL: CPT

## 2023-04-05 PROCEDURE — 74011250637 HC RX REV CODE- 250/637: Performed by: HOSPITALIST

## 2023-04-05 PROCEDURE — 85025 COMPLETE CBC W/AUTO DIFF WBC: CPT

## 2023-04-05 PROCEDURE — 83690 ASSAY OF LIPASE: CPT

## 2023-04-05 PROCEDURE — 74011250637 HC RX REV CODE- 250/637: Performed by: NURSE PRACTITIONER

## 2023-04-05 RX ORDER — CETIRIZINE HYDROCHLORIDE 10 MG/1
5 TABLET ORAL DAILY
Status: DISPENSED
Start: 2023-04-05

## 2023-04-05 RX ORDER — OXYCODONE HYDROCHLORIDE 5 MG/1
5 TABLET ORAL
Qty: 20 TABLET | Refills: 0 | Status: SHIPPED
Start: 2023-04-05 | End: 2023-04-08

## 2023-04-05 RX ADMIN — OXYCODONE HYDROCHLORIDE 5 MG: 5 TABLET ORAL at 02:30

## 2023-04-05 RX ADMIN — CETIRIZINE HYDROCHLORIDE 5 MG: 10 TABLET, FILM COATED ORAL at 09:47

## 2023-04-05 RX ADMIN — MESALAMINE 1.5 G: 0.38 CAPSULE, EXTENDED RELEASE ORAL at 08:27

## 2023-04-05 RX ADMIN — OXYCODONE HYDROCHLORIDE 5 MG: 5 TABLET ORAL at 14:38

## 2023-04-05 RX ADMIN — SODIUM CHLORIDE 75 ML/HR: 900 INJECTION, SOLUTION INTRAVENOUS at 02:30

## 2023-04-05 RX ADMIN — OXYCODONE HYDROCHLORIDE 5 MG: 5 TABLET ORAL at 08:26

## 2023-04-05 RX ADMIN — IOPAMIDOL 100 ML: 755 INJECTION, SOLUTION INTRAVENOUS at 14:08

## 2023-04-05 NOTE — DISCHARGE INSTRUCTIONS
Discharge Instructions     PATIENT ID: Hugo Gay  MRN: [id number]   YOB: 1985    DATE OF ADMISSION: 4/3/2023   DATE OF DISCHARGE: 4/5/2023  PRIMARY CARE PROVIDER: Jared Youngblood III   ATTENDING PHYSICIAN: Mary Estrada MD  DISCHARGING PROVIDER: Bryant Blanco NP      DISCHARGE DIAGNOSES   Abdominal pain/Abnormal CT scan  Pancreatic Pseudocyst    CONSULTATIONS:   Urology  Gastroenterology  General Surgery  Oncology    PROCEDURES/SURGERIES: none    PENDING TEST RESULTS:   At the time of discharge the following test results are still pending: none    FOLLOW UP APPOINTMENTS:   Follow-up Information       Follow up With Specialties Details Why Waldo Felty, MD Internal Medicine Physician   [facility]  [phone]      Dania Loyd MD Gastroenterology Follow up Call to make appt for follow up  Pancreatic Pseudocyst [address] [facility]  [address] [id number] [facility]      Tiffanie Vincent MD Hematology and Oncology, Internal Medicine Physician, Hematology Physician, Oncology Call Call for appointment as per their recommendations [facility] [address]  [phone]            ADDITIONAL CARE RECOMMENDATIONS:   Pain meds have been sent to your pharmacy - reserve these for moderate-severe pain. Consider ibuprofen or tylenol for mild pain. DIET: Regular Diet    ACTIVITY: Activity as tolerated    WOUND CARE: none    EQUIPMENT needed: none    DISCHARGE MEDICATIONS:   See Medication Reconciliation Form    It is important that you take the medication exactly as they are prescribed. Keep your medication in the bottles provided by the pharmacist and keep a list of the medication names, dosages, and times to be taken in your wallet. Do not take other medications without consulting your doctor. NOTIFY YOUR PHYSICIAN FOR ANY OF THE FOLLOWING:   Fever over 101 degrees for 24 hours.    Chest pain, shortness of breath, fever, chills, nausea, vomiting, diarrhea, change in mentation, falling, weakness, bleeding. Severe pain or pain not relieved by medications. Or, any other signs or symptoms that you may have questions about.     DISPOSITION:    Home With:   OT  PT  HH  RN       SNF/Inpatient Rehab/LTAC    Independent/assisted living    Hospice   xx Other: Home     CDMP Checked:   Yes xx     PROBLEM LIST Updated:  Yes xx     Signed:   Shahram Brian NP  4/5/2023  5:29 PM

## 2023-04-05 NOTE — PROGRESS NOTES
RUR: 7% Low     OZIEL: Anticipated discharge home. Patient's wife will provide transport home once medically stable. Follow-up with PCP/specialist.     Primary Contact: Wife, Kieran Hearn, [phone]    Care Management Interventions  PCP Verified by CM: Yes (Dr. Radha Ferris - seen within past 6 months )  Mode of Transport at Discharge: Other (see comment) (Wife)  Transition of Care Consult (CM Consult): Discharge Planning  Discharge Durable Medical Equipment: No  Physical Therapy Consult: No  Occupational Therapy Consult: No  Speech Therapy Consult: No  Support Systems: Spouse/Significant Other, Other Family Member(s)  Confirm Follow Up Transport: Self  The Plan for Transition of Care is Related to the Following Treatment Goals : Home  Discharge Location  Patient Expects to be Discharged to[de-identified] Home with family assistance    Reason for Admission:  Abdominal pain                    RUR Score:   7% Low     Plan for utilizing home health:   Likely none    PCP: First and Last name:  Zeynep Clemons MD   Name of Practice:    Are you a current patient: Yes/No: Yes   Approximate date of last visit: Within past 6 months per patient    Can you participate in a virtual visit with your PCP: Yes                    Current Advanced Directive/Advance Care Plan: Full Code    Healthcare Decision Maker:   Click here to complete [address] including selection of the Healthcare Decision Maker Relationship (ie \"Primary\")             Primary Decision MakerJacantonio Daniels - [phone]                  Transition of Care Plan:       Home     CM met with patient, wife and 2 daughters at bedside to introduce self and explain role. Patient lives with his wife and 2 daughters in a 2 story home with 4 steps to enter. Patient has a full flight to enter the 2nd floor. Patient was independent with ADL's, IADL's and ambulation. Patient does not own any DME. No history of home health.  CM verified patient's PCP, demographics and insurance. Preferred pharmacy is [facility] on  [address] [place] [address] in [facility] with no barriers obtaining needed prescriptions. Patient's wife will provide transport home once medically stable.      Araceli Rodríguez, MSSTACEY   [phone]

## 2023-04-05 NOTE — DISCHARGE SUMMARY
Discharge Summary     PATIENT ID: Bhavesh Rodriguez  MRN: [id number]   YOB: 1985    DATE OF ADMISSION: 4/3/2023 12:14 PM    DATE OF DISCHARGE: 4/5/2023  PRIMARY CARE PROVIDER: Polina Murdock MD   ATTENDING PHYSICIAN: Vladimir Baker MD  DISCHARGING PROVIDER: Andrea Wood NP      CONSULTATIONS: IP CONSULT TO GENERAL SURGERY  IP CONSULT TO ONCOLOGY  IP CONSULT TO UROLOGY  IP CONSULT TO GASTROENTEROLOGY    PROCEDURES/SURGERIES: * No surgery found *    [address] [facility],[address] COURSE:     Mr. Yaneth Willett is a 41 y.o. male with PMH significant for left testicular stage IIb none seminoma s/p orchiectomy on 12/8/2021, chemo and ulcerative proctitis was referred to ED from urologist clinic after he presented with left upper quadrant pain of 2 weeks duration. He described his abdominal pain as sharp 6/10, on and off and radiating to the right side of abdomen and to his left back. No nausea, vomiting, fever, chills, urinary symptoms. CT scan study showed interval development of of 2 hypodense cystic mass in the left upper quadrant the large of which is associated with the tail of the pancreas and greater curvature of the stomach and the smaller which is intimately associated with the transverse colon and greater curvature of the stomach and appear more complex. DISCHARGE DIAGNOSES / PLAN:      Abnormal CT at outside facility with Abdominal Pain:  - Outpatient CT scan abdomen report reviewed and attached in the media section of the chart review  - Seen by general surgical services , Urology and oncology  - Gastroenterology also consulted ([facility])  - GI has seen - repeat CT scan abdomen done today  - per GI - this appears to be large pancreatic pseudocyst, will resolved over time. Pt may be discharged with GI follow up.   - plan on discharging with oxycodone for moderate-severe pain     Hx of left testicular stage IIb   -S/p orchiectomy and chemotherapy, last chemotherapy in August last year     Hx of ulcerative proctitis   - Continue home mesalamine     Mild hyponatremia   - possible due to intravascular volume depletion   - receiving IVF  - repeat bmp: NA normalized, 139    BMI: Body mass index is 29.3 kg/m². . This patient: Meets criteria for overweight given BMI >/= 25 and < 30 due to excess calories/nutritional. Weight loss and lifestyle modifications should be encouraged as an outpatient. PENDING TEST RESULTS:   At the time of discharge the following test results are still pending: none     ADDITIONAL CARE RECOMMENDATIONS:   Pain meds have been sent to your pharmacy - reserve these for moderate-severe pain. Consider ibuprofen or tylenol for mild pain. DIET: Regular Diet    ACTIVITY: Activity as tolerated    WOUND CARE: none    EQUIPMENT needed: none    NOTIFY YOUR PHYSICIAN FOR ANY OF THE FOLLOWING:   Fever over 101 degrees for 24 hours. Chest pain, shortness of breath, fever, chills, nausea, vomiting, diarrhea, change in mentation, falling, weakness, bleeding. Severe pain or pain not relieved by medications, as well as any other signs or symptoms that you may have questions about.     FOLLOW UP APPOINTMENTS:    Follow-up Information       Follow up With Specialties Details Why Link Cobian MD Internal Medicine Physician   [address] [place] [address]  [phone]      Joseph Soriano MD Gastroenterology Follow up Call to make appt for follow up  Pancreatic Pseudocyst [address] [facility]  [address]      Sophia Gavin MD Hematology and Oncology, Internal Medicine Physician, Hematology Physician, Oncology Call Call for appointment as per their recommendations [address] [facility]  [address] [facility]  [phone]             DISCHARGE MEDICATIONS:  Current Discharge Medication List        START taking these medications    Details   oxyCODONE IR (ROXICODONE) 5 mg immediate release tablet Take 1 Tablet by mouth every four (4) hours as needed for Pain for up to 3 days. Max Daily Amount: 30 mg.  Qty: 20 Tablet, Refills: 0  Start date: 4/5/2023, End date: 4/8/2023    Associated Diagnoses: Lower abdominal pain           CONTINUE these medications which have NOT CHANGED    Details   mesalamine ER (APRISO) 0.375 gram 24 hour capsule TAKE 4 CAPSULE BY MOUTH ONCE A DAY FOR 30 DAYS      Adderall XR 25 mg XR capsule       loratadine-pseudoephedrine (AllerClear D-24hr)  mg per tablet       omeprazole (PRILOSEC OTC) 20 mg tablet       multivitamin (ONE A DAY) tablet Take 1 Tablet by mouth daily. Indications: One a Day Mens           STOP taking these medications       magic mouthwash solution Comments:   Reason for Stopping:         prochlorperazine (Compazine) 10 mg tablet Comments:   Reason for Stopping:         lidocaine-prilocaine (EMLA) topical cream Comments:   Reason for Stopping:         ondansetron hcl (ZOFRAN) 8 mg tablet Comments:   Reason for Stopping:             DISPOSITION:    Home With:   OT  PT  HH  RN       Long term SNF/Inpatient Rehab    Independent/assisted living    Hospice   xx Other: Home     PATIENT CONDITION AT DISCHARGE:   Functional status    Poor     Deconditioned    xx Independent      Cognition    xx Lucid     Forgetful     Dementia      Catheters/lines (plus indication)    Bowman     PICC     PEG    xx None      Code status    xx Full code     DNR      PHYSICAL EXAMINATION AT DISCHARGE:  See note from earlier today for physical assessment    1730: Discussed plan of care with gastroenterology over the phone today. Repeat CT scan of the abdomen shows a large lesion, likely a pancreatic pseudocyst.  Plan per Dr. Collins Valdes is to advance diet, allow patient to be discharged and follow-up with him in the office in several weeks. Discussed plan of care with patient over the phone, gave him opportunity to answer any questions. Patient is agreeable with the current plan and wishes to go home.     CHRONIC MEDICAL DIAGNOSES:  Problem List as of 4/5/2023 Date Reviewed: 4/5/2023            Codes Class Noted - Resolved    Abdominal pain ICD-10-CM: R10.9  ICD-9-CM: 789.00  4/3/2023 - Present        Elevated serum creatinine ICD-10-CM: R79.89  ICD-9-CM: 790.99  6/27/2022 - Present        Encounter for antineoplastic chemotherapy ICD-10-CM: Z51.11  ICD-9-CM: V58.11  6/6/2022 - Present        Non-seminomatous cancer of left testis ([facility] ICD-10-CM: C62.92  ICD-9-CM: 186.9  5/26/2022 - Present        Testicular cancer ([facility].) ICD-10-CM: C62.90  ICD-9-CM: 186.9  5/19/2022 - Present        Ulcerative colitis ([facility].) ICD-10-CM: K51.90  ICD-9-CM: 556.9  3/10/2022 - Present        Splenic mass ICD-10-CM: R16.1  ICD-9-CM: 789.2  1/10/2022 - Present         Greater than 30 minutes were spent with the patient on counseling and coordination of care    Signed:   Nancie Benton NP  4/5/2023  5:33 PM

## 2023-04-05 NOTE — PROGRESS NOTES
Patient seen this afternoon. 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 Underwent post-chemo RPLND with path demonstrating mature teratoma. Now with cystic lesions in tail of pancreas, transverse colon. Feeling better. Improved pain. This would be an unusual presentation for testicular cancer recurrence. Agree with current mgmt, aspiration. Urology to follow.

## 2023-04-05 NOTE — PROGRESS NOTES
Hospitalist Progress Note  Artemio Fofana NP     Date of Service:  2023  NAME:  Heavenly Fierro  :  1985  MRN:  [id number]    Admission Summary:     Mr. Toribio Shone is a 41 y.o. male with PMH significant for left testicular stage IIb none seminoma s/p orchiectomy on 2021, chemo and ulcerative proctitis was referred to ED from urologist clinic after he presented with left upper quadrant pain of 2 weeks duration. He described his abdominal pain as sharp 6/10, on and off and radiating to the right side of abdomen and to his left back. No nausea, vomiting, fever, chills, urinary symptoms. CT scan study showed interval development of of 2 hypodense cystic mass in the left upper quadrant the large of which is associated with the tail of the pancreas and greater curvature of the stomach and the smaller which is intimately associated with the transverse colon and greater curvature of the stomach and appear more complex. Interval history / Subjective:        0940 Patient was seen and examined this morning, he was lying in bed resting in no acute distress. He reports that pain is fairly well controlled. We discussed plan of care, it appears as though surgery and gastroenterology are trying to determine the best way to obtain samples of this fluid collection that was noted in his CT scan.       1150: GI to obtain new CT scan as imaging from [facility] is now available for detailed review    Assessment & Plan:     Intraabdominal mass:   - Outpatient CT scan abdomen report reviewed and attached in the media section of the chart review  - As needed Tylenol, oxycodone, Protonix, IV Zofran  - Seen by surgical service as well as Urology and oncology  - oncology has requested Gastroenterology be consulted ([facility])  - GI has seen - treating for pancreatitis though asked for imaging to be uploaded for radiology review (original reports were done at [facility])  - pt to have CT scan abdomen today     Hx of left testicular stage IIb   -S/p orchiectomy and chemotherapy, last chemotherapy in August last year  -? Source of masses/collections     Hx of ulcerative proctitis   - Continue home mesalamine     Mild hyponatremia   - possible due to intravascular volume depletion   - receiving IVF  - repeat bmp: NA normalized, 139      Code status: Full  Prophylaxis: SCDs  Care Plan discussed with: patient, nurse, GI and Dr. Sergio Tobin  Anticipated Disposition:  Home when able pending plan from specialists     Hospital Problems  Date Reviewed: 9/9/2022            Codes Class Noted POA    Abdominal pain ICD-10-CM: R10.9  ICD-9-CM: 789.00  4/3/2023 Unknown         Review of Systems:     Denies headaches, dizziness  No chest pain or pressure  No shortness of breath or cough  No GI complaints such as nausea, vomiting, diarrhea or constipation. Tolerating liquids. Abdominal pain controlled, hurts worse with movement    Vital Signs:    Last 24hrs VS reviewed since prior progress note. Most recent are:  Visit Vitals  BP (!) 152/72   Pulse 99   Temp 98.6 °F (37 °C)   Resp 18   Ht 5' 11\" (1.803 m)   Wt 95.3 kg (210 lb 1.6 oz)   SpO2 96%   BMI 29.30 kg/m²       Intake/Output Summary (Last 24 hours) at 4/5/2023 0746  Last data filed at 4/4/2023 1514  Gross per 24 hour   Intake 1000 ml   Output --   Net 1000 ml        Physical Examination:     I had a face to face encounter with this patient and independently examined them on 4/5/2023 as outlined below:        General : Young  male lying in bed alert x 3, awake, no acute distress   HEENT: EOMI, moist mucus membrane  Neck: trachea midline  Chest: Clear to auscultation bilaterally, sats 96% RA   CVS: RRR. HR 89  Abd: soft/ non tender, non distended, BS active + left sided flank pain which worsens with movement  Ext: MOORE, no edema.    Neuro/Psych: pleasant mood and affect, sensory grossly within normal limit, Strength 5/5 in all extremities  Skin: warm       Data Review:   Review and/or order of clinical lab test  Review and/or order of tests in the radiology section of CPT  Review and/or order of tests in the medicine section of CPT    I have independently reviewed and interpreted patient's lab and all other diagnostic data    Notes reviewed from all clinical/nonclinical/nursing services involved in patient's clinical care. Care coordination discussions were held with appropriate clinical/nonclinical/ nursing providers based on care coordination needs. Labs:     Recent Labs     04/03/23 2359 04/03/23  1138   WBC 15.2* 9.9   HGB 13.4 12.9   HCT 43.1 41.7   * 330       Recent Labs     04/05/23 0226 04/03/23 2359 04/03/23  1415 04/03/23  1138    135*  --  135*   K 4.2 4.1  --  3.5    101  --  100   CO2 29 29  --  30   BUN 11 10  --  13   CREA 1.06 1.31*  --  1.04   * 125*  --  99   CA 8.7 9.5  --  9.7   MG 2.3  --  2.4  --    PHOS  --   --  3.3  --        Recent Labs     04/05/23 0226 04/04/23  0005 04/03/23 2359 04/03/23  1138   ALT 28 33 32 32   AP 67 77 74 71   TBILI 0.3 0.6 0.6 0.4   TP 6.7 8.3* 8.4* 8.1   ALB 2.6* 3.7 3.7 3.7   GLOB 4.1* 4.6* 4.7* 4.4*   LPSE 268 284  --  271       No results for input(s): INR, PTP, APTT, INREXT, INREXT in the last 72 hours. No results for input(s): FE, TIBC, PSAT, FERR in the last 72 hours. No results found for: FOL, RBCF   No results for input(s): PH, PCO2, PO2 in the last 72 hours. No results for input(s): CPK, CKNDX, TROIQ in the last 72 hours.     No lab exists for component: CPKMB  No results found for: CHOL, CHOLX, CHLST, CHOLV, HDL, HDLP, LDL, LDLC, DLDLP, TGLX, TRIGL, TRIGP, CHHD, CHHDX  No results found for: [facility]  Lab Results   Component Value Date/Time    Color YELLOW/STRAW 04/03/2023 12:32 PM    Appearance CLEAR 04/03/2023 12:32 PM    Specific gravity 1.011 04/03/2023 12:32 PM    pH (UA) 6.5 04/03/2023 12:32 PM    Protein Negative 04/03/2023 12:32 PM    Glucose Negative 04/03/2023 12:32 PM    Ketone Negative 04/03/2023 12:32 PM    Bilirubin Negative 04/03/2023 12:32 PM    Urobilinogen 0.2 04/03/2023 12:32 PM    Nitrites Negative 04/03/2023 12:32 PM    Leukocyte Esterase Negative 04/03/2023 12:32 PM    Epithelial cells FEW 04/03/2023 12:32 PM    Bacteria Negative 04/03/2023 12:32 PM    WBC 0-4 04/03/2023 12:32 PM    RBC 0-5 04/03/2023 12:32 PM         Medications Reviewed:     Current Facility-Administered Medications   Medication Dose Route Frequency    mesalamine ER (APRISO) 24 hour capsule 1.5 g (Patient Supplied)  1.5 g Oral DAILY    sodium chloride (NS) flush 5-40 mL  5-40 mL IntraVENous Q8H    sodium chloride (NS) flush 5-40 mL  5-40 mL IntraVENous PRN    acetaminophen (TYLENOL) tablet 650 mg  650 mg Oral Q6H PRN    oxyCODONE IR (ROXICODONE) tablet 5 mg  5 mg Oral Q4H PRN    ondansetron (ZOFRAN) injection 4 mg  4 mg IntraVENous Q4H PRN    senna-docusate (PERICOLACE) 8.6-50 mg per tablet 1 Tablet  1 Tablet Oral DAILY PRN    0.9% sodium chloride infusion  75 mL/hr IntraVENous CONTINUOUS    HYDROmorphone (DILAUDID) injection 0.5 mg  0.5 mg IntraVENous Q4H PRN   ______________________________________________________________________  EXPECTED LENGTH OF STAY: 3d 2h  ACTUAL LENGTH OF STAY:          2               Brenda Montilla NP

## 2023-04-05 NOTE — PROGRESS NOTES
[facility].  [address] [place] [address] [address], [address]   [phone]                     GI PROGRESS NOTE    Patient Name: Scar Lujan      : 1985      MRN: [id number]  Admit Date: 4/3/2023  Today's Date: 2023  CC: abnormal CT    Subjective: Tolerated dinner last night. Currently NPO. Pain relatively controlled with narcotics. Objective:     Blood pressure 138/75, pulse 93, temperature 98.2 °F (36.8 °C), resp. rate 16, height 5' 11\" (1.803 m), weight 95.3 kg (210 lb 1.6 oz), SpO2 96 %. Physical Exam:  General appearance: cooperative, no distress, appears stated age  Skin: Extremities and face reveal no rashes. No jenkins erythema. HEENT: Sclerae anicteric. Extra-occular muscles are intact. Cardiovascular: Regular rate and rhythm. No murmurs, gallops, or rubs. Respiratory: Comfortable breathing with no accessory muscle use. Clear breath sounds with no wheezes, rales, or rhonchi. GI: Abdomen mildly distended with mid to left upper abdominal tenderness to palpation. Normal active bowel sounds. Rectal: Deferred   Musculoskeletal: No pitting edema of the lower legs. No costovertebral tenderness. Neurological: Gross memory appears intact. Patient is alert and oriented. Psychiatric: Mood appears appropriate with good judgement. No anxiety or agitation.       Data Review:    Recent Results (from the past 24 hour(s))   MAGNESIUM    Collection Time: 23  2:26 AM   Result Value Ref Range    Magnesium 2.3 1.6 - 2.4 mg/dL   METABOLIC PANEL, COMPREHENSIVE    Collection Time: 23  2:26 AM   Result Value Ref Range    Sodium 139 136 - 145 mmol/L    Potassium 4.2 3.5 - 5.1 mmol/L    Chloride 107 97 - 108 mmol/L    CO2 29 21 - 32 mmol/L    Anion gap 3 (L) 5 - 15 mmol/L    Glucose 114 (H) 65 - 100 mg/dL    BUN 11 6 - 20 MG/DL    Creatinine 1.06 0.70 - 1.30 MG/DL    BUN/Creatinine ratio 10 (L) 12 - 20      eGFR >60 >60 ml/min/1.73m2    Calcium 8.7 8.5 - 10.1 MG/DL Bilirubin, total 0.3 0.2 - 1.0 MG/DL    ALT (SGPT) 28 12 - 78 U/L    AST (SGOT) 13 (L) 15 - 37 U/L    Alk. phosphatase 67 45 - 117 U/L    Protein, total 6.7 6.4 - 8.2 g/dL    Albumin 2.6 (L) 3.5 - 5.0 g/dL    Globulin 4.1 (H) 2.0 - 4.0 g/dL    A-G Ratio 0.6 (L) 1.1 - 2.2     LIPASE    Collection Time: 04/05/23  2:26 AM   Result Value Ref Range    Lipase 268 73 - 393 U/L   SAMPLES BEING HELD    Collection Time: 04/05/23  2:26 AM   Result Value Ref Range    SAMPLES BEING HELD 1LAV     COMMENT        Add-on orders for these samples will be processed based on acceptable specimen integrity and analyte stability, which may vary by analyte. Assessment / Plan :     Mr Cornelius Cowden has pmh ulcerative proctitis seen on colonoscopy on 3/1/23 on PO and rectal mesalamine +prednisone taper and testicular CA s/p radical left radical inguinal orchiectomy by Dr. Arabella Cardona in Dec 2021 and RPLND with Dr. Turner Blood on Nov 2022. Two weeks ago he had acute onset upper abdominal pain and CT with [facility] showed two hypodense abdominal masses of unknown significance. Patient is tolerating a diet pain is relatively well controlled. - repeat CTAP w/ IV/PO contrast at [facility]  - keep NPO until after CT  - supportive care per hospitalist  - discussed with GI MD, surgery ROBERTO, hospitalist and patient  - likely can be followed up OP for further evaluation/intervention per Dr. Verito Valadez:  I saw and evaluated Marielena Gilbert on the above date of service and discussed the care with the nurse practitioner. I agree with the findings and plan as documented in the note above and any changes I have noted in bold to reflect my findings and plan. Above described patient admitted with subacute epigastric pain and OSH CTAP interpretation of acute pancreatitis.  Clinical description of symptoms with imaging consistent with acute interstitial pancreatitis is diagnostic of acute pancreatitis, and current approach to this therapy is appropriate. Repeat CTAP without any evidence of acute pancreatitis, and symptoms have improved with good PO tolerance. Stable for discharge with follow up with urology, oncology, and advanced endoscopy colleagues. 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 Mesalamine is a potential offender if gallstones ruled out as as a Class Ia (At least 1 case report with positive rechallenge, excluding all other causes, such as alcohol, hypertriglyceridemia, gallstones, and other drugs) drug associated with AIP. But this is uncertain, and likely needs to be continued for chronic control for UC. RP LAD likely does not need acute sampling and can be arranged as outpatient.              Patient Active Hospital Problem List:   Active Problems:    Abdominal pain (4/3/2023)        Time Spent with Patient:  20 minutes    Jordyn Collins PA-C  04/05/23  11:40 AM

## 2023-04-05 NOTE — PROGRESS NOTES
[facility] at [address], [address] [facility]  [facility]: [phone]  F: [phone]      Reason for Visit:   Marthenia Osler is a 41 y.o. male who is seen in hospital for testicular cancer. Hematology/Oncology Treatment History:   Scrotal US 11/30/2021: 3.9cm solid and cystic mass replacing left testicle. Slightly atrophic right testicle with no mass. Incidental 8mm left epididymal head cyst/spermatocele  Pre-op tumor markers 12/2/2021: .0, beta hCG 250,   Left radical inguinal orchiectomy by Dr. 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 Negative LVI, negative margins. CT A/P 12/16/2021: indeterminate splenic lesion. 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 No lymphadenopathy or other evidence of metastasis. Significant hepatic steatosis. Tumor markers 5/17/2022: AFP 5.3, beta hCG 14  Tumor markers 6/20/2022: AFP 6.3, beta hCG 24  Stage IS (pT1b cN0 M0 S1) Non-seminoma, LEFT testis  Completed BEP x 3 cycles on 8/15/2022  Tumor markers 8/4/2022: AFP 9.1, beta hCG <1  Tumor markers 9/9/2022: AFP 7.5, beta hCG <1  CT C/A/P 10/7/2022: new enlarged retroperitoneal lymph node measuring 20 x 19mm  RPLND with Dr. Karthikeyan Goldstein on 11/2/2022 pathology consistent with teratoma in 2 out of 18 periaortic lymph nodes and 1 out of 15 interaortocaval  Stage IIB (pT1b ypN2 M0) Non-seminoma, LEFT testis    History of Present Illness:     Seen today in hospital for testicular cancer and masses on CT done at [facility].    Pt admitted for abdominal pain for last two weeks. CTs show two masses of unclear significance. Pt has not been on any cancer treatment. Pain controlled. In bed. Conversant. No fevers/ chills/ chest pain/ SOB/ nausea/ vomiting/diarrhea/         Last visit with Dr Vivian Peñaloza 12 /22:  Presents for follow up off therapy. Underwent surgery for lymph node dissection with Dr. Jc Araujo since last visit. Some residual discomfort, but improving. Some intermittent constipation and diarrhea which is starting to improve. Urinating ok. Erections ok, but dry ejaculation. Energy ok, back at work. He is unaccompanied today. He lives in [facility] with his wife and two young children. Review of systems was obtained and pertinent findings reviewed above. Past medical history, social history, family history, medications, and allergies are located in the electronic medical record.     Physical Exam:     Visit Vitals  /75   Pulse 93   Temp 98.2 °F (36.8 °C)   Resp 16   Ht 5' 11\" (1.803 m)   Wt 210 lb 1.6 oz (95.3 kg)   SpO2 96%   BMI 29.30 kg/m²       ECOG PS: 0  General: no distress  Eyes: anicteric sclerae  HENT: oropharynx clear  Neck: supple  Respiratory: normal respiratory effort  Skin: no rashes; no ecchymoses; no petechiae  Neuro nonfocal  ambulatory      Results:     Lab Results   Component Value Date/Time    WBC 8.8 04/05/2023 02:26 AM    HGB 11.6 (L) 04/05/2023 02:26 AM    HCT 37.8 04/05/2023 02:26 AM    PLATELET 768 60/86/6689 02:26 AM    MCV 90.0 04/05/2023 02:26 AM     Lab Results   Component Value Date/Time    Sodium 139 04/05/2023 02:26 AM    Potassium 4.2 04/05/2023 02:26 AM    Chloride 107 04/05/2023 02:26 AM    CO2 29 04/05/2023 02:26 AM    Anion gap 3 (L) 04/05/2023 02:26 AM    Glucose 114 (H) 04/05/2023 02:26 AM    BUN 11 04/05/2023 02:26 AM    Creatinine 1.06 04/05/2023 02:26 AM    BUN/Creatinine ratio 10 (L) 04/05/2023 02:26 AM    GFR est AA >60 09/09/2022 10:23 AM    GFR est non-AA >60 09/09/2022 10:23 AM Calcium 8.7 2023 02:26 AM    Bilirubin, total 0.3 2023 02:26 AM    Alk. phosphatase 67 2023 02:26 AM    Protein, total 6.7 2023 02:26 AM    Albumin 2.6 (L) 2023 02:26 AM    Globulin 4.1 (H) 2023 02:26 AM    A-G Ratio 0.6 (L) 2023 02:26 AM    ALT (SGPT) 28 2023 02:26 AM    AST (SGOT) 13 (L) 2023 02:26 AM     LDH:  Recent Labs     22  1200 22  1023 22  1003 22  1001 22  0916    185 190 222 200     Beta-HCG:  Recent Labs     22  1200 22  1023 22  0949 22  1003 22  1001 22  0916   HCGTLT <1 <1 <1 <1 1 24*     AFP:  Recent Labs     22  1200 22  1023 22  0949 22  1003 22  1001 22  0916   AFP 6.3 7.5* 9.1* 9.7* 9.8* 6.3     2021  AFP: 168.0 (H)  beta hC (H)  LDH: 190    2022  AFP: 6.2  beta hCG: <1    2022  AFP:  4.6  beta hCG: <1    2022:  AFP:  5.6  beta hC    2022:  AFP:  5.3  beta hC    CT Chest 2022:  No evidence of intrathoracic malignancy. CT C/A/P 10/07/2022:  New enlarged retroperitoneal lymph node measuring 20 x 19 mm at the level of the  mid abdomen, consistent with metastatic disease. No evidence of intrathoracic metastatic disease. MRI brain 10/7/2022:  No evidence of intracranial metastatic disease. Mild frontal and ethmoid sinus disease. No intracranial mass, hemorrhage or evidence of acute infarction. [facility]:              Assessment/PLAN:   1) Non-seminoma, LEFT testis  Stage IIB  He is s/p orchiectomy 2021. His pre-op hCG was elevated at 250, dropped to undetectable levels after surgery, but then steadily marcia over time to a peak of 24. Imaging was negative. We treated with BEP x3 cycles, completed 8/15/2022. Subsequent imaging noted retroperitoneal adenopathy. He is now s/p RPLND with Dr. 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   Management remains with curative intent. No further chemotherapy is indicated at this time, I recommend we transition to surveillance. Surveillance for patients with Stage II or III non-seminoma after complete response to chemotherapy +/- post-chemotherapy RPLND (per NCCN guidelines version 2.2022): Year 1: H&P and tumor markers every 2 months, CXR and CT A/P every 6 months  Year 2: H&P and tumor markers every 3 months, CXR every 6 months, CT A/P every 6-12 months  Year 3: H&P and tumor markers every 6 months, CXR and CT A/P every 12 months  Year 4: H&P and tumor markers every 6 months, CXR every 12 months  Year 5: H&P and tumor markers every 6 months, imaging as indicated    Seen today in hospital/ pt of Dr Marcell Moritz. Last seen by Dr Marcell Moritz 12/22. Not on any cancer treatment. Seen by [facility] with labs and scans. Sent to ER for abdominal pain and abnormal CTs. Stable labs: beta-hCG, AFP, LDH, CBC, CMP   repeat scans with [facility] as above with two masses near pancreas and colon. Unclear what these are/ would be unusual to be related to his cancer at this site. Surgery and GI seeing pt and d/w both  Perhaps biopsy needed for further eval.    Would not assume cancer and would need biopsy. has abdominal pain/ controlled at my visit. We will follow for path if biopsy done. Reviewed all discussions with pt this am.   Call if questions  Fu with Dr Gomez      2) Splenic lesion  Possible atypical hemangioma vs PAOLA, follows yearly with Dr. Srinivasan Mata (surgical oncology). 3) Ulcerative proctitis/ diffuse changes in colon on CT at [facility]  Following with Dr. Junior France. 4) abdominal pain. Controlled at my visit. Monitor.       Pt of Dr Marcell Moritz  We will follow as needed  Call if questions  Fu with Dr Marcell Moritz as outpt      Signed By: Ana Taylor DO

## 2023-04-05 NOTE — PROGRESS NOTES
Consensus seems to be the cystic lesions represent sequellae of pancreatitis. It seems reasonable to see what happens over a short period of time, hoping resolution will confirm the diagnosis.

## 2023-04-16 ENCOUNTER — HOSPITAL ENCOUNTER (INPATIENT)
Age: 38
LOS: 12 days | Discharge: HOME | DRG: 438 | End: 2023-04-28
Attending: STUDENT IN AN ORGANIZED HEALTH CARE EDUCATION/TRAINING PROGRAM | Admitting: HOSPITALIST
Payer: COMMERCIAL

## 2023-04-16 ENCOUNTER — APPOINTMENT (OUTPATIENT)
Dept: ULTRASOUND IMAGING | Age: 38
DRG: 438 | End: 2023-04-16
Attending: STUDENT IN AN ORGANIZED HEALTH CARE EDUCATION/TRAINING PROGRAM
Payer: COMMERCIAL

## 2023-04-16 ENCOUNTER — APPOINTMENT (OUTPATIENT)
Dept: CT IMAGING | Age: 38
DRG: 438 | End: 2023-04-16
Attending: STUDENT IN AN ORGANIZED HEALTH CARE EDUCATION/TRAINING PROGRAM
Payer: COMMERCIAL

## 2023-04-16 DIAGNOSIS — K86.89 PANCREAS HEMORRHAGE: ICD-10-CM

## 2023-04-16 DIAGNOSIS — K86.3 PSEUDOCYST OF PANCREAS: ICD-10-CM

## 2023-04-16 DIAGNOSIS — R11.2 INTRACTABLE NAUSEA AND VOMITING: Primary | ICD-10-CM

## 2023-04-16 DIAGNOSIS — R10.84 GENERALIZED ABDOMINAL PAIN: ICD-10-CM

## 2023-04-16 LAB
ALBUMIN SERPL-MCNC: 3.8 G/DL (ref 3.5–5)
ALBUMIN/GLOB SERPL: 0.9 (ref 1.1–2.2)
ALP SERPL-CCNC: 62 U/L (ref 45–117)
ALT SERPL-CCNC: 27 U/L (ref 12–78)
ANION GAP SERPL CALC-SCNC: 2 MMOL/L (ref 5–15)
APPEARANCE UR: CLEAR
APTT PPP: 24.8 SEC (ref 22.1–31)
AST SERPL-CCNC: 8 U/L (ref 15–37)
ATRIAL RATE: 103 BPM
BACTERIA URNS QL MICRO: NEGATIVE /HPF
BASOPHILS # BLD: 0.1 K/UL (ref 0–0.1)
BASOPHILS NFR BLD: 0 % (ref 0–1)
BILIRUB SERPL-MCNC: 0.7 MG/DL (ref 0.2–1)
BILIRUB UR QL CFM: NEGATIVE
BUN SERPL-MCNC: 17 MG/DL (ref 6–20)
BUN/CREAT SERPL: 13 (ref 12–20)
CALCIUM SERPL-MCNC: 9.8 MG/DL (ref 8.5–10.1)
CALCULATED P AXIS, ECG09: 54 DEGREES
CALCULATED R AXIS, ECG10: 46 DEGREES
CALCULATED T AXIS, ECG11: 51 DEGREES
CAOX CRY URNS QL MICRO: ABNORMAL
CHLORIDE SERPL-SCNC: 99 MMOL/L (ref 97–108)
CO2 SERPL-SCNC: 32 MMOL/L (ref 21–32)
COLOR UR: ABNORMAL
COMMENT, HOLDF: NORMAL
COMMENT, HOLDF: NORMAL
CREAT SERPL-MCNC: 1.27 MG/DL (ref 0.7–1.3)
DIAGNOSIS, 93000: NORMAL
DIFFERENTIAL METHOD BLD: ABNORMAL
EOSINOPHIL # BLD: 0.1 K/UL (ref 0–0.4)
EOSINOPHIL NFR BLD: 1 % (ref 0–7)
EPITH CASTS URNS QL MICRO: ABNORMAL /LPF
ERYTHROCYTE [DISTWIDTH] IN BLOOD BY AUTOMATED COUNT: 15.9 % (ref 11.5–14.5)
GLOBULIN SER CALC-MCNC: 4.4 G/DL (ref 2–4)
GLUCOSE SERPL-MCNC: 119 MG/DL (ref 65–100)
GLUCOSE UR STRIP.AUTO-MCNC: NEGATIVE MG/DL
HCT VFR BLD AUTO: 33 % (ref 36.6–50.3)
HCT VFR BLD AUTO: 39.8 % (ref 36.6–50.3)
HGB BLD-MCNC: 10.3 G/DL (ref 12.1–17)
HGB BLD-MCNC: 12.4 G/DL (ref 12.1–17)
HGB UR QL STRIP: NEGATIVE
IMM GRANULOCYTES # BLD AUTO: 0.1 K/UL (ref 0–0.04)
IMM GRANULOCYTES NFR BLD AUTO: 1 % (ref 0–0.5)
KETONES UR QL STRIP.AUTO: ABNORMAL MG/DL
LEUKOCYTE ESTERASE UR QL STRIP.AUTO: NEGATIVE
LIPASE SERPL-CCNC: 311 U/L (ref 73–393)
LYMPHOCYTES # BLD: 2.5 K/UL (ref 0.8–3.5)
LYMPHOCYTES NFR BLD: 19 % (ref 12–49)
MCH RBC QN AUTO: 27.4 PG (ref 26–34)
MCHC RBC AUTO-ENTMCNC: 31.2 G/DL (ref 30–36.5)
MCV RBC AUTO: 87.9 FL (ref 80–99)
MONOCYTES # BLD: 0.9 K/UL (ref 0–1)
MONOCYTES NFR BLD: 7 % (ref 5–13)
NEUTS SEG # BLD: 9.6 K/UL (ref 1.8–8)
NEUTS SEG NFR BLD: 72 % (ref 32–75)
NITRITE UR QL STRIP.AUTO: NEGATIVE
NRBC # BLD: 0 K/UL (ref 0–0.01)
NRBC BLD-RTO: 0 PER 100 WBC
P-R INTERVAL, ECG05: 132 MS
PH UR STRIP: 5.5 (ref 5–8)
PLATELET # BLD AUTO: 427 K/UL (ref 150–400)
PMV BLD AUTO: 10.1 FL (ref 8.9–12.9)
POTASSIUM SERPL-SCNC: 3.4 MMOL/L (ref 3.5–5.1)
PROT SERPL-MCNC: 8.2 G/DL (ref 6.4–8.2)
PROT UR STRIP-MCNC: 100 MG/DL
Q-T INTERVAL, ECG07: 334 MS
QRS DURATION, ECG06: 88 MS
QTC CALCULATION (BEZET), ECG08: 437 MS
RBC # BLD AUTO: 4.53 M/UL (ref 4.1–5.7)
RBC #/AREA URNS HPF: ABNORMAL /HPF (ref 0–5)
SAMPLES BEING HELD,HOLD: NORMAL
SAMPLES BEING HELD,HOLD: NORMAL
SODIUM SERPL-SCNC: 133 MMOL/L (ref 136–145)
THERAPEUTIC RANGE,PTTT: NORMAL SECS (ref 58–77)
TROPONIN I SERPL HS-MCNC: <3 NG/L (ref 0–57)
UA: UC IF INDICATED,UAUC: ABNORMAL
UROBILINOGEN UR QL STRIP.AUTO: 1 EU/DL (ref 0.2–1)
VENTRICULAR RATE, ECG03: 103 BPM
WBC # BLD AUTO: 13.1 K/UL (ref 4.1–11.1)
WBC URNS QL MICRO: ABNORMAL /HPF (ref 0–4)

## 2023-04-16 PROCEDURE — 74011250637 HC RX REV CODE- 250/637: Performed by: HOSPITALIST

## 2023-04-16 PROCEDURE — 74011000250 HC RX REV CODE- 250: Performed by: HOSPITALIST

## 2023-04-16 PROCEDURE — 85018 HEMOGLOBIN: CPT

## 2023-04-16 PROCEDURE — 74174 CTA ABD&PLVS W/CONTRAST: CPT

## 2023-04-16 PROCEDURE — 96376 TX/PRO/DX INJ SAME DRUG ADON: CPT

## 2023-04-16 PROCEDURE — 84484 ASSAY OF TROPONIN QUANT: CPT

## 2023-04-16 PROCEDURE — 85025 COMPLETE CBC W/AUTO DIFF WBC: CPT

## 2023-04-16 PROCEDURE — 74011000636 HC RX REV CODE- 636: Performed by: HOSPITALIST

## 2023-04-16 PROCEDURE — 74011250636 HC RX REV CODE- 250/636: Performed by: HOSPITALIST

## 2023-04-16 PROCEDURE — 96375 TX/PRO/DX INJ NEW DRUG ADDON: CPT

## 2023-04-16 PROCEDURE — 74011000636 HC RX REV CODE- 636: Performed by: RADIOLOGY

## 2023-04-16 PROCEDURE — 74011250636 HC RX REV CODE- 250/636: Performed by: STUDENT IN AN ORGANIZED HEALTH CARE EDUCATION/TRAINING PROGRAM

## 2023-04-16 PROCEDURE — 83690 ASSAY OF LIPASE: CPT

## 2023-04-16 PROCEDURE — 76705 ECHO EXAM OF ABDOMEN: CPT

## 2023-04-16 PROCEDURE — 36415 COLL VENOUS BLD VENIPUNCTURE: CPT

## 2023-04-16 PROCEDURE — 96374 THER/PROPH/DIAG INJ IV PUSH: CPT

## 2023-04-16 PROCEDURE — 80053 COMPREHEN METABOLIC PANEL: CPT

## 2023-04-16 PROCEDURE — 99285 EMERGENCY DEPT VISIT HI MDM: CPT

## 2023-04-16 PROCEDURE — 85730 THROMBOPLASTIN TIME PARTIAL: CPT

## 2023-04-16 PROCEDURE — 65270000046 HC RM TELEMETRY

## 2023-04-16 PROCEDURE — 81001 URINALYSIS AUTO W/SCOPE: CPT

## 2023-04-16 PROCEDURE — 93005 ELECTROCARDIOGRAM TRACING: CPT

## 2023-04-16 PROCEDURE — 74177 CT ABD & PELVIS W/CONTRAST: CPT

## 2023-04-16 RX ORDER — ONDANSETRON 2 MG/ML
4 INJECTION INTRAMUSCULAR; INTRAVENOUS ONCE
Status: COMPLETED | OUTPATIENT
Start: 2023-04-16 | End: 2023-04-16

## 2023-04-16 RX ORDER — ACETAMINOPHEN 325 MG/1
650 TABLET ORAL
Status: DISCONTINUED | OUTPATIENT
Start: 2023-04-16 | End: 2023-04-17

## 2023-04-16 RX ORDER — POLYETHYLENE GLYCOL 3350 17 G/17G
17 POWDER, FOR SOLUTION ORAL DAILY PRN
Status: DISCONTINUED | OUTPATIENT
Start: 2023-04-16 | End: 2023-04-28 | Stop reason: HOSPADM

## 2023-04-16 RX ORDER — SODIUM CHLORIDE, SODIUM LACTATE, POTASSIUM CHLORIDE, CALCIUM CHLORIDE 600; 310; 30; 20 MG/100ML; MG/100ML; MG/100ML; MG/100ML
75 INJECTION, SOLUTION INTRAVENOUS CONTINUOUS
Status: DISCONTINUED | OUTPATIENT
Start: 2023-04-16 | End: 2023-04-28 | Stop reason: HOSPADM

## 2023-04-16 RX ORDER — POTASSIUM CHLORIDE 750 MG/1
40 TABLET, FILM COATED, EXTENDED RELEASE ORAL
Status: COMPLETED | OUTPATIENT
Start: 2023-04-16 | End: 2023-04-16

## 2023-04-16 RX ORDER — SODIUM CHLORIDE 0.9 % (FLUSH) 0.9 %
5-40 SYRINGE (ML) INJECTION AS NEEDED
Status: DISCONTINUED | OUTPATIENT
Start: 2023-04-16 | End: 2023-04-28 | Stop reason: HOSPADM

## 2023-04-16 RX ORDER — MESALAMINE 400 MG/1
800 CAPSULE, DELAYED RELEASE ORAL 3 TIMES DAILY
Status: DISCONTINUED | OUTPATIENT
Start: 2023-04-17 | End: 2023-04-28 | Stop reason: HOSPADM

## 2023-04-16 RX ORDER — PANTOPRAZOLE SODIUM 20 MG/1
20 TABLET, DELAYED RELEASE ORAL
Status: DISCONTINUED | OUTPATIENT
Start: 2023-04-17 | End: 2023-04-28 | Stop reason: HOSPADM

## 2023-04-16 RX ORDER — ONDANSETRON 4 MG/1
4 TABLET, ORALLY DISINTEGRATING ORAL
Status: DISCONTINUED | OUTPATIENT
Start: 2023-04-16 | End: 2023-04-21

## 2023-04-16 RX ORDER — SODIUM CHLORIDE 0.9 % (FLUSH) 0.9 %
5-40 SYRINGE (ML) INJECTION EVERY 8 HOURS
Status: DISCONTINUED | OUTPATIENT
Start: 2023-04-16 | End: 2023-04-28 | Stop reason: HOSPADM

## 2023-04-16 RX ORDER — ONDANSETRON 2 MG/ML
4 INJECTION INTRAMUSCULAR; INTRAVENOUS
Status: DISCONTINUED | OUTPATIENT
Start: 2023-04-16 | End: 2023-04-21

## 2023-04-16 RX ORDER — ACETAMINOPHEN 650 MG/1
650 SUPPOSITORY RECTAL
Status: DISCONTINUED | OUTPATIENT
Start: 2023-04-16 | End: 2023-04-17

## 2023-04-16 RX ORDER — PROCHLORPERAZINE EDISYLATE 5 MG/ML
10 INJECTION INTRAMUSCULAR; INTRAVENOUS
Status: DISCONTINUED | OUTPATIENT
Start: 2023-04-16 | End: 2023-04-28 | Stop reason: HOSPADM

## 2023-04-16 RX ORDER — HYDROMORPHONE HYDROCHLORIDE 1 MG/ML
1 INJECTION, SOLUTION INTRAMUSCULAR; INTRAVENOUS; SUBCUTANEOUS ONCE
Status: COMPLETED | OUTPATIENT
Start: 2023-04-16 | End: 2023-04-16

## 2023-04-16 RX ORDER — PROCHLORPERAZINE EDISYLATE 5 MG/ML
10 INJECTION INTRAMUSCULAR; INTRAVENOUS
Status: COMPLETED | OUTPATIENT
Start: 2023-04-16 | End: 2023-04-16

## 2023-04-16 RX ORDER — HYDROMORPHONE HYDROCHLORIDE 1 MG/ML
0.5 INJECTION, SOLUTION INTRAMUSCULAR; INTRAVENOUS; SUBCUTANEOUS ONCE
Status: COMPLETED | OUTPATIENT
Start: 2023-04-16 | End: 2023-04-16

## 2023-04-16 RX ORDER — OXYCODONE HYDROCHLORIDE 5 MG/1
5 TABLET ORAL
Status: DISCONTINUED | OUTPATIENT
Start: 2023-04-16 | End: 2023-04-22

## 2023-04-16 RX ORDER — HYDROMORPHONE HYDROCHLORIDE 1 MG/ML
1 INJECTION, SOLUTION INTRAMUSCULAR; INTRAVENOUS; SUBCUTANEOUS
Status: DISCONTINUED | OUTPATIENT
Start: 2023-04-16 | End: 2023-04-21

## 2023-04-16 RX ADMIN — HYDROMORPHONE HYDROCHLORIDE 1 MG: 1 INJECTION, SOLUTION INTRAMUSCULAR; INTRAVENOUS; SUBCUTANEOUS at 14:03

## 2023-04-16 RX ADMIN — IOHEXOL 100 ML: 350 INJECTION, SOLUTION INTRAVENOUS at 13:38

## 2023-04-16 RX ADMIN — PROCHLORPERAZINE EDISYLATE 10 MG: 5 INJECTION INTRAMUSCULAR; INTRAVENOUS at 19:37

## 2023-04-16 RX ADMIN — SODIUM CHLORIDE, POTASSIUM CHLORIDE, SODIUM LACTATE AND CALCIUM CHLORIDE 1000 ML: 600; 310; 30; 20 INJECTION, SOLUTION INTRAVENOUS at 16:51

## 2023-04-16 RX ADMIN — SODIUM CHLORIDE, POTASSIUM CHLORIDE, SODIUM LACTATE AND CALCIUM CHLORIDE 100 ML/HR: 600; 310; 30; 20 INJECTION, SOLUTION INTRAVENOUS at 19:05

## 2023-04-16 RX ADMIN — OXYCODONE 5 MG: 5 TABLET ORAL at 19:37

## 2023-04-16 RX ADMIN — HYDROMORPHONE HYDROCHLORIDE 1 MG: 1 INJECTION, SOLUTION INTRAMUSCULAR; INTRAVENOUS; SUBCUTANEOUS at 16:50

## 2023-04-16 RX ADMIN — HYDROMORPHONE HYDROCHLORIDE 1 MG: 1 INJECTION, SOLUTION INTRAMUSCULAR; INTRAVENOUS; SUBCUTANEOUS at 21:27

## 2023-04-16 RX ADMIN — ONDANSETRON 4 MG: 2 INJECTION INTRAMUSCULAR; INTRAVENOUS at 09:12

## 2023-04-16 RX ADMIN — PROCHLORPERAZINE EDISYLATE 10 MG: 5 INJECTION INTRAMUSCULAR; INTRAVENOUS at 14:03

## 2023-04-16 RX ADMIN — HYDROMORPHONE HYDROCHLORIDE 0.5 MG: 1 INJECTION, SOLUTION INTRAMUSCULAR; INTRAVENOUS; SUBCUTANEOUS at 09:12

## 2023-04-16 RX ADMIN — ONDANSETRON 4 MG: 2 INJECTION INTRAMUSCULAR; INTRAVENOUS at 11:11

## 2023-04-16 RX ADMIN — IOHEXOL 100 ML: 350 INJECTION, SOLUTION INTRAVENOUS at 17:41

## 2023-04-16 RX ADMIN — ONDANSETRON 4 MG: 2 INJECTION INTRAMUSCULAR; INTRAVENOUS at 16:50

## 2023-04-16 RX ADMIN — POTASSIUM CHLORIDE 40 MEQ: 750 TABLET, FILM COATED, EXTENDED RELEASE ORAL at 19:37

## 2023-04-16 RX ADMIN — SODIUM CHLORIDE, PRESERVATIVE FREE 10 ML: 5 INJECTION INTRAVENOUS at 21:28

## 2023-04-16 RX ADMIN — HYDROMORPHONE HYDROCHLORIDE 1 MG: 1 INJECTION, SOLUTION INTRAMUSCULAR; INTRAVENOUS; SUBCUTANEOUS at 11:11

## 2023-04-17 ENCOUNTER — TELEPHONE (OUTPATIENT)
Dept: ONCOLOGY | Age: 38
End: 2023-04-17

## 2023-04-17 LAB
ALBUMIN SERPL-MCNC: 3.2 G/DL (ref 3.5–5)
ALBUMIN/GLOB SERPL: 1.1 (ref 1.1–2.2)
ALP SERPL-CCNC: 50 U/L (ref 45–117)
ALT SERPL-CCNC: 20 U/L (ref 12–78)
ANION GAP SERPL CALC-SCNC: 4 MMOL/L (ref 5–15)
AST SERPL-CCNC: 12 U/L (ref 15–37)
BASOPHILS # BLD: 0.1 K/UL (ref 0–0.1)
BASOPHILS NFR BLD: 1 % (ref 0–1)
BILIRUB SERPL-MCNC: 0.5 MG/DL (ref 0.2–1)
BUN SERPL-MCNC: 10 MG/DL (ref 6–20)
BUN/CREAT SERPL: 10 (ref 12–20)
CALCIUM SERPL-MCNC: 9 MG/DL (ref 8.5–10.1)
CHLORIDE SERPL-SCNC: 106 MMOL/L (ref 97–108)
CO2 SERPL-SCNC: 28 MMOL/L (ref 21–32)
COMMENT, HOLDF: NORMAL
CREAT SERPL-MCNC: 0.97 MG/DL (ref 0.7–1.3)
DIFFERENTIAL METHOD BLD: ABNORMAL
EOSINOPHIL # BLD: 0.1 K/UL (ref 0–0.4)
EOSINOPHIL NFR BLD: 1 % (ref 0–7)
ERYTHROCYTE [DISTWIDTH] IN BLOOD BY AUTOMATED COUNT: 15.9 % (ref 11.5–14.5)
GLOBULIN SER CALC-MCNC: 3 G/DL (ref 2–4)
GLUCOSE SERPL-MCNC: 100 MG/DL (ref 65–100)
HCT VFR BLD AUTO: 33 % (ref 36.6–50.3)
HGB BLD-MCNC: 10.3 G/DL (ref 12.1–17)
IMM GRANULOCYTES # BLD AUTO: 0 K/UL (ref 0–0.04)
IMM GRANULOCYTES NFR BLD AUTO: 0 % (ref 0–0.5)
INR PPP: 1 (ref 0.9–1.1)
LYMPHOCYTES # BLD: 2 K/UL (ref 0.8–3.5)
LYMPHOCYTES NFR BLD: 29 % (ref 12–49)
MAGNESIUM SERPL-MCNC: 2.3 MG/DL (ref 1.6–2.4)
MCH RBC QN AUTO: 27.2 PG (ref 26–34)
MCHC RBC AUTO-ENTMCNC: 31.2 G/DL (ref 30–36.5)
MCV RBC AUTO: 87.3 FL (ref 80–99)
MONOCYTES # BLD: 0.6 K/UL (ref 0–1)
MONOCYTES NFR BLD: 8 % (ref 5–13)
NEUTS SEG # BLD: 4.2 K/UL (ref 1.8–8)
NEUTS SEG NFR BLD: 61 % (ref 32–75)
NRBC # BLD: 0 K/UL (ref 0–0.01)
NRBC BLD-RTO: 0 PER 100 WBC
PHOSPHATE SERPL-MCNC: 3.2 MG/DL (ref 2.6–4.7)
PLATELET # BLD AUTO: 256 K/UL (ref 150–400)
PMV BLD AUTO: 10.1 FL (ref 8.9–12.9)
POTASSIUM SERPL-SCNC: 3.9 MMOL/L (ref 3.5–5.1)
PROT SERPL-MCNC: 6.2 G/DL (ref 6.4–8.2)
PROTHROMBIN TIME: 10 SEC (ref 9–11.1)
RBC # BLD AUTO: 3.78 M/UL (ref 4.1–5.7)
SAMPLES BEING HELD,HOLD: NORMAL
SODIUM SERPL-SCNC: 138 MMOL/L (ref 136–145)
WBC # BLD AUTO: 6.9 K/UL (ref 4.1–11.1)

## 2023-04-17 PROCEDURE — 83735 ASSAY OF MAGNESIUM: CPT

## 2023-04-17 PROCEDURE — 65270000046 HC RM TELEMETRY

## 2023-04-17 PROCEDURE — 74011250637 HC RX REV CODE- 250/637: Performed by: HOSPITALIST

## 2023-04-17 PROCEDURE — 99231 SBSQ HOSP IP/OBS SF/LOW 25: CPT | Performed by: SURGERY

## 2023-04-17 PROCEDURE — 85610 PROTHROMBIN TIME: CPT

## 2023-04-17 PROCEDURE — 74011250636 HC RX REV CODE- 250/636: Performed by: HOSPITALIST

## 2023-04-17 PROCEDURE — 74011000250 HC RX REV CODE- 250: Performed by: HOSPITALIST

## 2023-04-17 PROCEDURE — 36415 COLL VENOUS BLD VENIPUNCTURE: CPT

## 2023-04-17 PROCEDURE — 85025 COMPLETE CBC W/AUTO DIFF WBC: CPT

## 2023-04-17 PROCEDURE — 84100 ASSAY OF PHOSPHORUS: CPT

## 2023-04-17 PROCEDURE — 80053 COMPREHEN METABOLIC PANEL: CPT

## 2023-04-17 PROCEDURE — 99231 SBSQ HOSP IP/OBS SF/LOW 25: CPT | Performed by: NURSE PRACTITIONER

## 2023-04-17 RX ORDER — HYDROMORPHONE HYDROCHLORIDE 1 MG/ML
1 INJECTION, SOLUTION INTRAMUSCULAR; INTRAVENOUS; SUBCUTANEOUS ONCE
Status: COMPLETED | OUTPATIENT
Start: 2023-04-17 | End: 2023-04-17

## 2023-04-17 RX ORDER — ACETAMINOPHEN 500 MG
1000 TABLET ORAL
Status: DISCONTINUED | OUTPATIENT
Start: 2023-04-17 | End: 2023-04-28 | Stop reason: HOSPADM

## 2023-04-17 RX ADMIN — HYDROMORPHONE HYDROCHLORIDE 1 MG: 1 INJECTION, SOLUTION INTRAMUSCULAR; INTRAVENOUS; SUBCUTANEOUS at 09:22

## 2023-04-17 RX ADMIN — SODIUM CHLORIDE, PRESERVATIVE FREE 10 ML: 5 INJECTION INTRAVENOUS at 05:25

## 2023-04-17 RX ADMIN — SODIUM CHLORIDE, POTASSIUM CHLORIDE, SODIUM LACTATE AND CALCIUM CHLORIDE 100 ML/HR: 600; 310; 30; 20 INJECTION, SOLUTION INTRAVENOUS at 05:25

## 2023-04-17 RX ADMIN — HYDROMORPHONE HYDROCHLORIDE 1 MG: 1 INJECTION, SOLUTION INTRAMUSCULAR; INTRAVENOUS; SUBCUTANEOUS at 20:26

## 2023-04-17 RX ADMIN — SODIUM CHLORIDE, POTASSIUM CHLORIDE, SODIUM LACTATE AND CALCIUM CHLORIDE 100 ML/HR: 600; 310; 30; 20 INJECTION, SOLUTION INTRAVENOUS at 17:01

## 2023-04-17 RX ADMIN — MESALAMINE 800 MG: 400 CAPSULE, DELAYED RELEASE ORAL at 17:01

## 2023-04-17 RX ADMIN — PANTOPRAZOLE SODIUM 20 MG: 20 TABLET, DELAYED RELEASE ORAL at 07:29

## 2023-04-17 RX ADMIN — MESALAMINE 800 MG: 400 CAPSULE, DELAYED RELEASE ORAL at 09:22

## 2023-04-17 RX ADMIN — SODIUM CHLORIDE, PRESERVATIVE FREE 10 ML: 5 INJECTION INTRAVENOUS at 22:07

## 2023-04-17 RX ADMIN — OXYCODONE 5 MG: 5 TABLET ORAL at 23:26

## 2023-04-17 RX ADMIN — HYDROMORPHONE HYDROCHLORIDE 1 MG: 1 INJECTION, SOLUTION INTRAMUSCULAR; INTRAVENOUS; SUBCUTANEOUS at 13:16

## 2023-04-17 RX ADMIN — SODIUM CHLORIDE, PRESERVATIVE FREE 10 ML: 5 INJECTION INTRAVENOUS at 13:17

## 2023-04-17 RX ADMIN — HYDROMORPHONE HYDROCHLORIDE 1 MG: 1 INJECTION, SOLUTION INTRAMUSCULAR; INTRAVENOUS; SUBCUTANEOUS at 17:01

## 2023-04-17 RX ADMIN — MESALAMINE 800 MG: 400 CAPSULE, DELAYED RELEASE ORAL at 22:06

## 2023-04-17 NOTE — TELEPHONE ENCOUNTER
[employer]  Oncology Social Work Encounter     Patient Name:  Jerry Sebastian Medical History: testicular cancer    Advance Directives: none on file; conversation deferred     Narrative: Spoke with patient regarding the below billing issues.  contacted customer service escalation regarding billing issues. Will update patient as appropriate. Date of Service: 12/23/22  Account Number: [de-identified]  Received statement for $222.36 but his insurance was never billing. He's asked for this claim to be submitted to his insurance. Date of Service: 9-9-22  Account Number: [de-identified]  Received statement for $778 and insurance [facility]) denied coverage. Patient contacted Coty Castrejon and was told  procedure was not coded correctly.  He's asking for the coding to be corrected and claim be resubmitted to his insurance      Thank you,   Allison Bower LCSW

## 2023-04-18 ENCOUNTER — APPOINTMENT (OUTPATIENT)
Dept: ULTRASOUND IMAGING | Age: 38
DRG: 438 | End: 2023-04-18
Attending: INTERNAL MEDICINE
Payer: COMMERCIAL

## 2023-04-18 ENCOUNTER — ANESTHESIA (OUTPATIENT)
Dept: ENDOSCOPY | Age: 38
End: 2023-04-18
Payer: COMMERCIAL

## 2023-04-18 ENCOUNTER — ANESTHESIA EVENT (OUTPATIENT)
Dept: ENDOSCOPY | Age: 38
End: 2023-04-18
Payer: COMMERCIAL

## 2023-04-18 LAB
ALBUMIN SERPL-MCNC: 3.2 G/DL (ref 3.5–5)
ALBUMIN/GLOB SERPL: 0.9 (ref 1.1–2.2)
ALP SERPL-CCNC: 51 U/L (ref 45–117)
ALT SERPL-CCNC: 22 U/L (ref 12–78)
ANION GAP SERPL CALC-SCNC: 2 MMOL/L (ref 5–15)
AST SERPL-CCNC: 16 U/L (ref 15–37)
BILIRUB SERPL-MCNC: 0.5 MG/DL (ref 0.2–1)
BUN SERPL-MCNC: 8 MG/DL (ref 6–20)
BUN/CREAT SERPL: 8 (ref 12–20)
CALCIUM SERPL-MCNC: 9.3 MG/DL (ref 8.5–10.1)
CHLORIDE SERPL-SCNC: 106 MMOL/L (ref 97–108)
CO2 SERPL-SCNC: 31 MMOL/L (ref 21–32)
COMMENT, HOLDF: NORMAL
CREAT SERPL-MCNC: 1.03 MG/DL (ref 0.7–1.3)
ERYTHROCYTE [DISTWIDTH] IN BLOOD BY AUTOMATED COUNT: 15.9 % (ref 11.5–14.5)
GLOBULIN SER CALC-MCNC: 3.4 G/DL (ref 2–4)
GLUCOSE SERPL-MCNC: 93 MG/DL (ref 65–100)
HCT VFR BLD AUTO: 33.4 % (ref 36.6–50.3)
HGB BLD-MCNC: 10.4 G/DL (ref 12.1–17)
INR PPP: 1 (ref 0.9–1.1)
MCH RBC QN AUTO: 27.7 PG (ref 26–34)
MCHC RBC AUTO-ENTMCNC: 31.1 G/DL (ref 30–36.5)
MCV RBC AUTO: 88.8 FL (ref 80–99)
NRBC # BLD: 0 K/UL (ref 0–0.01)
NRBC BLD-RTO: 0 PER 100 WBC
PLATELET # BLD AUTO: 255 K/UL (ref 150–400)
PMV BLD AUTO: 10 FL (ref 8.9–12.9)
POTASSIUM SERPL-SCNC: 4.2 MMOL/L (ref 3.5–5.1)
PROT SERPL-MCNC: 6.6 G/DL (ref 6.4–8.2)
PROTHROMBIN TIME: 10.5 SEC (ref 9–11.1)
RBC # BLD AUTO: 3.76 M/UL (ref 4.1–5.7)
SAMPLES BEING HELD,HOLD: NORMAL
SODIUM SERPL-SCNC: 139 MMOL/L (ref 136–145)
WBC # BLD AUTO: 5.4 K/UL (ref 4.1–11.1)

## 2023-04-18 PROCEDURE — 80053 COMPREHEN METABOLIC PANEL: CPT

## 2023-04-18 PROCEDURE — 85027 COMPLETE CBC AUTOMATED: CPT

## 2023-04-18 PROCEDURE — 74011250636 HC RX REV CODE- 250/636: Performed by: INTERNAL MEDICINE

## 2023-04-18 PROCEDURE — 88112 CYTOPATH CELL ENHANCE TECH: CPT

## 2023-04-18 PROCEDURE — 76060000032 HC ANESTHESIA 0.5 TO 1 HR: Performed by: INTERNAL MEDICINE

## 2023-04-18 PROCEDURE — 74011000250 HC RX REV CODE- 250: Performed by: NURSE ANESTHETIST, CERTIFIED REGISTERED

## 2023-04-18 PROCEDURE — 74011000250 HC RX REV CODE- 250: Performed by: HOSPITALIST

## 2023-04-18 PROCEDURE — 74011250637 HC RX REV CODE- 250/637: Performed by: HOSPITALIST

## 2023-04-18 PROCEDURE — 74011250636 HC RX REV CODE- 250/636: Performed by: HOSPITALIST

## 2023-04-18 PROCEDURE — 74011250636 HC RX REV CODE- 250/636: Performed by: NURSE ANESTHETIST, CERTIFIED REGISTERED

## 2023-04-18 PROCEDURE — 76040000007: Performed by: INTERNAL MEDICINE

## 2023-04-18 PROCEDURE — 65270000046 HC RM TELEMETRY

## 2023-04-18 PROCEDURE — 36415 COLL VENOUS BLD VENIPUNCTURE: CPT

## 2023-04-18 PROCEDURE — 0FDG8ZX EXTRACTION OF PANCREAS, VIA NATURAL OR ARTIFICIAL OPENING ENDOSCOPIC, DIAGNOSTIC: ICD-10-PCS | Performed by: INTERNAL MEDICINE

## 2023-04-18 PROCEDURE — 77030022853 HC NDL ASPIR ULTRSND BSC -C: Performed by: INTERNAL MEDICINE

## 2023-04-18 PROCEDURE — 2709999900 HC NON-CHARGEABLE SUPPLY: Performed by: INTERNAL MEDICINE

## 2023-04-18 PROCEDURE — 85610 PROTHROMBIN TIME: CPT

## 2023-04-18 RX ORDER — LEVOFLOXACIN 5 MG/ML
500 INJECTION, SOLUTION INTRAVENOUS ONCE
Status: COMPLETED | OUTPATIENT
Start: 2023-04-18 | End: 2023-04-18

## 2023-04-18 RX ORDER — LIDOCAINE HYDROCHLORIDE 20 MG/ML
INJECTION, SOLUTION EPIDURAL; INFILTRATION; INTRACAUDAL; PERINEURAL AS NEEDED
Status: DISCONTINUED | OUTPATIENT
Start: 2023-04-18 | End: 2023-04-18 | Stop reason: HOSPADM

## 2023-04-18 RX ORDER — DEXTROMETHORPHAN/PSEUDOEPHED 2.5-7.5/.8
1.2 DROPS ORAL
Status: DISCONTINUED | OUTPATIENT
Start: 2023-04-18 | End: 2023-04-18 | Stop reason: HOSPADM

## 2023-04-18 RX ORDER — SODIUM CHLORIDE 0.9 % (FLUSH) 0.9 %
5-40 SYRINGE (ML) INJECTION AS NEEDED
Status: DISCONTINUED | OUTPATIENT
Start: 2023-04-18 | End: 2023-04-28 | Stop reason: HOSPADM

## 2023-04-18 RX ORDER — SODIUM CHLORIDE 9 MG/ML
50 INJECTION, SOLUTION INTRAVENOUS CONTINUOUS
Status: DISPENSED | OUTPATIENT
Start: 2023-04-18 | End: 2023-04-18

## 2023-04-18 RX ORDER — NALOXONE HYDROCHLORIDE 0.4 MG/ML
0.4 INJECTION, SOLUTION INTRAMUSCULAR; INTRAVENOUS; SUBCUTANEOUS
Status: DISCONTINUED | OUTPATIENT
Start: 2023-04-18 | End: 2023-04-18 | Stop reason: HOSPADM

## 2023-04-18 RX ORDER — FLUMAZENIL 0.1 MG/ML
0.2 INJECTION INTRAVENOUS
Status: DISCONTINUED | OUTPATIENT
Start: 2023-04-18 | End: 2023-04-18 | Stop reason: HOSPADM

## 2023-04-18 RX ORDER — PROPOFOL 10 MG/ML
INJECTION, EMULSION INTRAVENOUS AS NEEDED
Status: DISCONTINUED | OUTPATIENT
Start: 2023-04-18 | End: 2023-04-18 | Stop reason: HOSPADM

## 2023-04-18 RX ORDER — EPINEPHRINE 0.1 MG/ML
1 INJECTION INTRACARDIAC; INTRAVENOUS
Status: DISCONTINUED | OUTPATIENT
Start: 2023-04-18 | End: 2023-04-18 | Stop reason: HOSPADM

## 2023-04-18 RX ORDER — ATROPINE SULFATE 0.1 MG/ML
0.5 INJECTION INTRAVENOUS
Status: DISCONTINUED | OUTPATIENT
Start: 2023-04-18 | End: 2023-04-18 | Stop reason: HOSPADM

## 2023-04-18 RX ORDER — SODIUM CHLORIDE 9 MG/ML
INJECTION, SOLUTION INTRAVENOUS
Status: DISCONTINUED | OUTPATIENT
Start: 2023-04-18 | End: 2023-04-18 | Stop reason: HOSPADM

## 2023-04-18 RX ADMIN — SODIUM CHLORIDE, POTASSIUM CHLORIDE, SODIUM LACTATE AND CALCIUM CHLORIDE 100 ML/HR: 600; 310; 30; 20 INJECTION, SOLUTION INTRAVENOUS at 17:45

## 2023-04-18 RX ADMIN — PROPOFOL 50 MG: 10 INJECTION, EMULSION INTRAVENOUS at 07:55

## 2023-04-18 RX ADMIN — OXYCODONE 5 MG: 5 TABLET ORAL at 05:25

## 2023-04-18 RX ADMIN — MESALAMINE 800 MG: 400 CAPSULE, DELAYED RELEASE ORAL at 10:39

## 2023-04-18 RX ADMIN — PROPOFOL 25 MG: 10 INJECTION, EMULSION INTRAVENOUS at 07:51

## 2023-04-18 RX ADMIN — PROPOFOL 25 MG: 10 INJECTION, EMULSION INTRAVENOUS at 07:47

## 2023-04-18 RX ADMIN — PROPOFOL 25 MG: 10 INJECTION, EMULSION INTRAVENOUS at 07:53

## 2023-04-18 RX ADMIN — PANTOPRAZOLE SODIUM 20 MG: 20 TABLET, DELAYED RELEASE ORAL at 06:34

## 2023-04-18 RX ADMIN — SODIUM CHLORIDE: 900 INJECTION, SOLUTION INTRAVENOUS at 07:38

## 2023-04-18 RX ADMIN — LEVOFLOXACIN 500 MG: 5 INJECTION, SOLUTION INTRAVENOUS at 07:50

## 2023-04-18 RX ADMIN — LIDOCAINE HYDROCHLORIDE 40 MG: 20 INJECTION, SOLUTION EPIDURAL; INFILTRATION; INTRACAUDAL; PERINEURAL at 07:45

## 2023-04-18 RX ADMIN — SODIUM CHLORIDE, PRESERVATIVE FREE 10 ML: 5 INJECTION INTRAVENOUS at 21:11

## 2023-04-18 RX ADMIN — PROPOFOL 50 MG: 10 INJECTION, EMULSION INTRAVENOUS at 07:59

## 2023-04-18 RX ADMIN — ONDANSETRON 4 MG: 2 INJECTION INTRAMUSCULAR; INTRAVENOUS at 19:16

## 2023-04-18 RX ADMIN — HYDROMORPHONE HYDROCHLORIDE 1 MG: 1 INJECTION, SOLUTION INTRAMUSCULAR; INTRAVENOUS; SUBCUTANEOUS at 19:16

## 2023-04-18 RX ADMIN — PROPOFOL 75 MG: 10 INJECTION, EMULSION INTRAVENOUS at 07:45

## 2023-04-18 RX ADMIN — SODIUM CHLORIDE, PRESERVATIVE FREE 10 ML: 5 INJECTION INTRAVENOUS at 05:25

## 2023-04-18 RX ADMIN — PROPOFOL 50 MG: 10 INJECTION, EMULSION INTRAVENOUS at 07:57

## 2023-04-18 RX ADMIN — PROPOFOL 50 MG: 10 INJECTION, EMULSION INTRAVENOUS at 07:49

## 2023-04-18 RX ADMIN — SODIUM CHLORIDE, PRESERVATIVE FREE 10 ML: 5 INJECTION INTRAVENOUS at 14:00

## 2023-04-18 RX ADMIN — HYDROMORPHONE HYDROCHLORIDE 1 MG: 1 INJECTION, SOLUTION INTRAMUSCULAR; INTRAVENOUS; SUBCUTANEOUS at 00:05

## 2023-04-18 RX ADMIN — MESALAMINE 800 MG: 400 CAPSULE, DELAYED RELEASE ORAL at 16:25

## 2023-04-18 RX ADMIN — MESALAMINE 800 MG: 400 CAPSULE, DELAYED RELEASE ORAL at 21:11

## 2023-04-18 RX ADMIN — OXYCODONE 5 MG: 5 TABLET ORAL at 16:25

## 2023-04-18 RX ADMIN — HYDROMORPHONE HYDROCHLORIDE 1 MG: 1 INJECTION, SOLUTION INTRAMUSCULAR; INTRAVENOUS; SUBCUTANEOUS at 22:27

## 2023-04-18 RX ADMIN — HYDROMORPHONE HYDROCHLORIDE 1 MG: 1 INJECTION, SOLUTION INTRAMUSCULAR; INTRAVENOUS; SUBCUTANEOUS at 10:39

## 2023-04-18 RX ADMIN — OXYCODONE 5 MG: 5 TABLET ORAL at 20:30

## 2023-04-18 NOTE — ANESTHESIA PREPROCEDURE EVALUATION
Relevant Problems   No relevant active problems       Anesthetic History   No history of anesthetic complications            Review of Systems / Medical History  Patient summary reviewed, nursing notes reviewed and pertinent labs reviewed    Pulmonary  Within defined limits                 Neuro/Psych   Within defined limits           Cardiovascular  Within defined limits                     GI/Hepatic/Renal     GERD          Comments: [facility]/Other        Cancer     Other Findings              Physical Exam    Airway  Mallampati: II  TM Distance: 4 - 6 cm  Neck ROM: normal range of motion   Mouth opening: Normal     Cardiovascular    Rhythm: regular  Rate: normal         Dental  No notable dental hx       Pulmonary  Breath sounds clear to auscultation               Abdominal  Abdominal exam normal       Other Findings            Anesthetic Plan    ASA: 2  Anesthesia type: MAC            Anesthetic plan and risks discussed with: Patient

## 2023-04-18 NOTE — ANESTHESIA POSTPROCEDURE EVALUATION
Procedure(s):  ENDOSCOPIC ULTRASOUND (EUS) LINEAR  ESOPHAGOGASTRODUODENOSCOPY (EGD)  FINE NEEDLE ASPIRATION. MAC    Anesthesia Post Evaluation        Patient participation: complete - patient participated  Level of consciousness: awake  Pain management: adequate  Airway patency: patent  Anesthetic complications: no  Cardiovascular status: hemodynamically stable  Respiratory status: acceptable  Hydration status: acceptable  Comments: The patient is ready for PACU discharge. Autumn Bazzi DO                   Post anesthesia nausea and vomiting:  controlled      INITIAL Post-op Vital signs: No vitals data found for the desired time range.

## 2023-04-19 LAB
HCT VFR BLD AUTO: 33 % (ref 36.6–50.3)
HGB BLD-MCNC: 10.3 G/DL (ref 12.1–17)
INR PPP: 1 (ref 0.9–1.1)
PROTHROMBIN TIME: 10.2 SEC (ref 9–11.1)

## 2023-04-19 PROCEDURE — 74011250637 HC RX REV CODE- 250/637: Performed by: HOSPITALIST

## 2023-04-19 PROCEDURE — 85018 HEMOGLOBIN: CPT

## 2023-04-19 PROCEDURE — 65270000029 HC RM PRIVATE

## 2023-04-19 PROCEDURE — 74011250636 HC RX REV CODE- 250/636: Performed by: HOSPITALIST

## 2023-04-19 PROCEDURE — 36415 COLL VENOUS BLD VENIPUNCTURE: CPT

## 2023-04-19 PROCEDURE — 85610 PROTHROMBIN TIME: CPT

## 2023-04-19 PROCEDURE — 74011000250 HC RX REV CODE- 250: Performed by: HOSPITALIST

## 2023-04-19 RX ORDER — AMOXICILLIN 250 MG
1 CAPSULE ORAL DAILY
Status: DISCONTINUED | OUTPATIENT
Start: 2023-04-19 | End: 2023-04-28

## 2023-04-19 RX ORDER — POLYETHYLENE GLYCOL 3350 17 G/17G
17 POWDER, FOR SOLUTION ORAL ONCE
Status: COMPLETED | OUTPATIENT
Start: 2023-04-19 | End: 2023-04-19

## 2023-04-19 RX ADMIN — OXYCODONE 5 MG: 5 TABLET ORAL at 09:04

## 2023-04-19 RX ADMIN — MESALAMINE 800 MG: 400 CAPSULE, DELAYED RELEASE ORAL at 09:01

## 2023-04-19 RX ADMIN — ONDANSETRON 4 MG: 2 INJECTION INTRAMUSCULAR; INTRAVENOUS at 16:09

## 2023-04-19 RX ADMIN — SODIUM CHLORIDE, POTASSIUM CHLORIDE, SODIUM LACTATE AND CALCIUM CHLORIDE 100 ML/HR: 600; 310; 30; 20 INJECTION, SOLUTION INTRAVENOUS at 15:28

## 2023-04-19 RX ADMIN — HYDROMORPHONE HYDROCHLORIDE 1 MG: 1 INJECTION, SOLUTION INTRAMUSCULAR; INTRAVENOUS; SUBCUTANEOUS at 20:16

## 2023-04-19 RX ADMIN — MESALAMINE 800 MG: 400 CAPSULE, DELAYED RELEASE ORAL at 15:26

## 2023-04-19 RX ADMIN — MESALAMINE 800 MG: 400 CAPSULE, DELAYED RELEASE ORAL at 21:47

## 2023-04-19 RX ADMIN — POLYETHYLENE GLYCOL 3350 17 G: 17 POWDER, FOR SOLUTION ORAL at 09:08

## 2023-04-19 RX ADMIN — SODIUM CHLORIDE, PRESERVATIVE FREE 10 ML: 5 INJECTION INTRAVENOUS at 21:47

## 2023-04-19 RX ADMIN — ONDANSETRON 4 MG: 2 INJECTION INTRAMUSCULAR; INTRAVENOUS at 23:53

## 2023-04-19 RX ADMIN — HYDROMORPHONE HYDROCHLORIDE 1 MG: 1 INJECTION, SOLUTION INTRAMUSCULAR; INTRAVENOUS; SUBCUTANEOUS at 16:03

## 2023-04-19 RX ADMIN — OXYCODONE 5 MG: 5 TABLET ORAL at 03:21

## 2023-04-19 RX ADMIN — PANTOPRAZOLE SODIUM 20 MG: 20 TABLET, DELAYED RELEASE ORAL at 07:24

## 2023-04-19 RX ADMIN — SODIUM CHLORIDE, POTASSIUM CHLORIDE, SODIUM LACTATE AND CALCIUM CHLORIDE 100 ML/HR: 600; 310; 30; 20 INJECTION, SOLUTION INTRAVENOUS at 03:21

## 2023-04-19 RX ADMIN — SENNOSIDES AND DOCUSATE SODIUM 1 TABLET: 50; 8.6 TABLET ORAL at 09:08

## 2023-04-19 RX ADMIN — HYDROMORPHONE HYDROCHLORIDE 1 MG: 1 INJECTION, SOLUTION INTRAMUSCULAR; INTRAVENOUS; SUBCUTANEOUS at 23:51

## 2023-04-19 RX ADMIN — SODIUM CHLORIDE, PRESERVATIVE FREE 10 ML: 5 INJECTION INTRAVENOUS at 15:26

## 2023-04-20 LAB
ALBUMIN SERPL-MCNC: 3.3 G/DL (ref 3.5–5)
ALBUMIN/GLOB SERPL: 0.8 (ref 1.1–2.2)
ALP SERPL-CCNC: 66 U/L (ref 45–117)
ALT SERPL-CCNC: 23 U/L (ref 12–78)
ANION GAP SERPL CALC-SCNC: 4 MMOL/L (ref 5–15)
AST SERPL-CCNC: 18 U/L (ref 15–37)
BILIRUB SERPL-MCNC: 0.5 MG/DL (ref 0.2–1)
BUN SERPL-MCNC: 9 MG/DL (ref 6–20)
BUN/CREAT SERPL: 9 (ref 12–20)
CALCIUM SERPL-MCNC: 9.4 MG/DL (ref 8.5–10.1)
CHLORIDE SERPL-SCNC: 105 MMOL/L (ref 97–108)
CO2 SERPL-SCNC: 25 MMOL/L (ref 21–32)
CREAT SERPL-MCNC: 1 MG/DL (ref 0.7–1.3)
ERYTHROCYTE [DISTWIDTH] IN BLOOD BY AUTOMATED COUNT: 15.6 % (ref 11.5–14.5)
GLOBULIN SER CALC-MCNC: 4.4 G/DL (ref 2–4)
GLUCOSE SERPL-MCNC: 115 MG/DL (ref 65–100)
HCT VFR BLD AUTO: 36.8 % (ref 36.6–50.3)
HGB BLD-MCNC: 11.6 G/DL (ref 12.1–17)
MCH RBC QN AUTO: 27.6 PG (ref 26–34)
MCHC RBC AUTO-ENTMCNC: 31.5 G/DL (ref 30–36.5)
MCV RBC AUTO: 87.6 FL (ref 80–99)
NRBC # BLD: 0 K/UL (ref 0–0.01)
NRBC BLD-RTO: 0 PER 100 WBC
PLATELET # BLD AUTO: 311 K/UL (ref 150–400)
PMV BLD AUTO: 10.9 FL (ref 8.9–12.9)
POTASSIUM SERPL-SCNC: 3.9 MMOL/L (ref 3.5–5.1)
PROT SERPL-MCNC: 7.7 G/DL (ref 6.4–8.2)
RBC # BLD AUTO: 4.2 M/UL (ref 4.1–5.7)
SODIUM SERPL-SCNC: 134 MMOL/L (ref 136–145)
TRIGL SERPL-MCNC: 78 MG/DL (ref ?–150)
WBC # BLD AUTO: 10.9 K/UL (ref 4.1–11.1)

## 2023-04-20 PROCEDURE — 74011250636 HC RX REV CODE- 250/636: Performed by: HOSPITALIST

## 2023-04-20 PROCEDURE — 65270000029 HC RM PRIVATE

## 2023-04-20 PROCEDURE — 80053 COMPREHEN METABOLIC PANEL: CPT

## 2023-04-20 PROCEDURE — 74011250637 HC RX REV CODE- 250/637: Performed by: HOSPITALIST

## 2023-04-20 PROCEDURE — 85027 COMPLETE CBC AUTOMATED: CPT

## 2023-04-20 PROCEDURE — 36415 COLL VENOUS BLD VENIPUNCTURE: CPT

## 2023-04-20 PROCEDURE — 84478 ASSAY OF TRIGLYCERIDES: CPT

## 2023-04-20 PROCEDURE — 74011000250 HC RX REV CODE- 250: Performed by: HOSPITALIST

## 2023-04-20 RX ADMIN — MESALAMINE 800 MG: 400 CAPSULE, DELAYED RELEASE ORAL at 16:07

## 2023-04-20 RX ADMIN — HYDROMORPHONE HYDROCHLORIDE 1 MG: 1 INJECTION, SOLUTION INTRAMUSCULAR; INTRAVENOUS; SUBCUTANEOUS at 16:08

## 2023-04-20 RX ADMIN — PANTOPRAZOLE SODIUM 20 MG: 20 TABLET, DELAYED RELEASE ORAL at 06:26

## 2023-04-20 RX ADMIN — ONDANSETRON 4 MG: 2 INJECTION INTRAMUSCULAR; INTRAVENOUS at 09:53

## 2023-04-20 RX ADMIN — SODIUM CHLORIDE, POTASSIUM CHLORIDE, SODIUM LACTATE AND CALCIUM CHLORIDE 100 ML/HR: 600; 310; 30; 20 INJECTION, SOLUTION INTRAVENOUS at 22:14

## 2023-04-20 RX ADMIN — HYDROMORPHONE HYDROCHLORIDE 1 MG: 1 INJECTION, SOLUTION INTRAMUSCULAR; INTRAVENOUS; SUBCUTANEOUS at 19:28

## 2023-04-20 RX ADMIN — ONDANSETRON 4 MG: 2 INJECTION INTRAMUSCULAR; INTRAVENOUS at 22:13

## 2023-04-20 RX ADMIN — HYDROMORPHONE HYDROCHLORIDE 1 MG: 1 INJECTION, SOLUTION INTRAMUSCULAR; INTRAVENOUS; SUBCUTANEOUS at 13:07

## 2023-04-20 RX ADMIN — HYDROMORPHONE HYDROCHLORIDE 1 MG: 1 INJECTION, SOLUTION INTRAMUSCULAR; INTRAVENOUS; SUBCUTANEOUS at 09:53

## 2023-04-20 RX ADMIN — ONDANSETRON 4 MG: 2 INJECTION INTRAMUSCULAR; INTRAVENOUS at 16:08

## 2023-04-20 RX ADMIN — SODIUM CHLORIDE, POTASSIUM CHLORIDE, SODIUM LACTATE AND CALCIUM CHLORIDE 100 ML/HR: 600; 310; 30; 20 INJECTION, SOLUTION INTRAVENOUS at 01:51

## 2023-04-20 RX ADMIN — HYDROMORPHONE HYDROCHLORIDE 1 MG: 1 INJECTION, SOLUTION INTRAMUSCULAR; INTRAVENOUS; SUBCUTANEOUS at 22:13

## 2023-04-20 RX ADMIN — MESALAMINE 800 MG: 400 CAPSULE, DELAYED RELEASE ORAL at 09:50

## 2023-04-20 RX ADMIN — MESALAMINE 800 MG: 400 CAPSULE, DELAYED RELEASE ORAL at 22:13

## 2023-04-20 RX ADMIN — SODIUM CHLORIDE, PRESERVATIVE FREE 10 ML: 5 INJECTION INTRAVENOUS at 06:25

## 2023-04-20 RX ADMIN — SODIUM CHLORIDE, PRESERVATIVE FREE 10 ML: 5 INJECTION INTRAVENOUS at 22:13

## 2023-04-21 ENCOUNTER — APPOINTMENT (OUTPATIENT)
Dept: CT IMAGING | Age: 38
DRG: 438 | End: 2023-04-21
Attending: NURSE PRACTITIONER
Payer: COMMERCIAL

## 2023-04-21 LAB
ALBUMIN SERPL-MCNC: 3.4 G/DL (ref 3.5–5)
ALBUMIN/GLOB SERPL: 0.8 (ref 1.1–2.2)
ALP SERPL-CCNC: 62 U/L (ref 45–117)
ALT SERPL-CCNC: 20 U/L (ref 12–78)
ANION GAP SERPL CALC-SCNC: 1 MMOL/L (ref 5–15)
AST SERPL-CCNC: 17 U/L (ref 15–37)
BILIRUB SERPL-MCNC: 0.7 MG/DL (ref 0.2–1)
BUN SERPL-MCNC: 8 MG/DL (ref 6–20)
BUN/CREAT SERPL: 9 (ref 12–20)
CALCIUM SERPL-MCNC: 9.3 MG/DL (ref 8.5–10.1)
CHLORIDE SERPL-SCNC: 104 MMOL/L (ref 97–108)
CO2 SERPL-SCNC: 28 MMOL/L (ref 21–32)
CREAT SERPL-MCNC: 0.94 MG/DL (ref 0.7–1.3)
CRP SERPL-MCNC: 9.07 MG/DL (ref 0–0.6)
ERYTHROCYTE [DISTWIDTH] IN BLOOD BY AUTOMATED COUNT: 15.6 % (ref 11.5–14.5)
ERYTHROCYTE [SEDIMENTATION RATE] IN BLOOD: 63 MM/HR (ref 0–15)
GLOBULIN SER CALC-MCNC: 4.4 G/DL (ref 2–4)
GLUCOSE BLD STRIP.AUTO-MCNC: 106 MG/DL (ref 65–117)
GLUCOSE SERPL-MCNC: 124 MG/DL (ref 65–100)
HCT VFR BLD AUTO: 35.6 % (ref 36.6–50.3)
HGB BLD-MCNC: 11.3 G/DL (ref 12.1–17)
LIPASE SERPL-CCNC: 169 U/L (ref 73–393)
MCH RBC QN AUTO: 27.5 PG (ref 26–34)
MCHC RBC AUTO-ENTMCNC: 31.7 G/DL (ref 30–36.5)
MCV RBC AUTO: 86.6 FL (ref 80–99)
NRBC # BLD: 0 K/UL (ref 0–0.01)
NRBC BLD-RTO: 0 PER 100 WBC
PLATELET # BLD AUTO: 353 K/UL (ref 150–400)
PMV BLD AUTO: 10.3 FL (ref 8.9–12.9)
POTASSIUM SERPL-SCNC: 3.6 MMOL/L (ref 3.5–5.1)
PROT SERPL-MCNC: 7.8 G/DL (ref 6.4–8.2)
RBC # BLD AUTO: 4.11 M/UL (ref 4.1–5.7)
SERVICE CMNT-IMP: NORMAL
SODIUM SERPL-SCNC: 133 MMOL/L (ref 136–145)
WBC # BLD AUTO: 11.3 K/UL (ref 4.1–11.1)

## 2023-04-21 PROCEDURE — 74011000636 HC RX REV CODE- 636: Performed by: RADIOLOGY

## 2023-04-21 PROCEDURE — 82787 IGG 1 2 3 OR 4 EACH: CPT

## 2023-04-21 PROCEDURE — 74011250636 HC RX REV CODE- 250/636: Performed by: HOSPITALIST

## 2023-04-21 PROCEDURE — 74011250636 HC RX REV CODE- 250/636: Performed by: FAMILY MEDICINE

## 2023-04-21 PROCEDURE — 82962 GLUCOSE BLOOD TEST: CPT

## 2023-04-21 PROCEDURE — 86140 C-REACTIVE PROTEIN: CPT

## 2023-04-21 PROCEDURE — 80053 COMPREHEN METABOLIC PANEL: CPT

## 2023-04-21 PROCEDURE — 36415 COLL VENOUS BLD VENIPUNCTURE: CPT

## 2023-04-21 PROCEDURE — 86038 ANTINUCLEAR ANTIBODIES: CPT

## 2023-04-21 PROCEDURE — 74011250637 HC RX REV CODE- 250/637: Performed by: HOSPITALIST

## 2023-04-21 PROCEDURE — 65270000032 HC RM SEMIPRIVATE

## 2023-04-21 PROCEDURE — 74011000250 HC RX REV CODE- 250: Performed by: HOSPITALIST

## 2023-04-21 PROCEDURE — 85027 COMPLETE CBC AUTOMATED: CPT

## 2023-04-21 PROCEDURE — 83690 ASSAY OF LIPASE: CPT

## 2023-04-21 PROCEDURE — 74177 CT ABD & PELVIS W/CONTRAST: CPT

## 2023-04-21 PROCEDURE — 85652 RBC SED RATE AUTOMATED: CPT

## 2023-04-21 RX ORDER — HYDROMORPHONE HYDROCHLORIDE 2 MG/ML
2 INJECTION, SOLUTION INTRAMUSCULAR; INTRAVENOUS; SUBCUTANEOUS
Status: DISCONTINUED | OUTPATIENT
Start: 2023-04-21 | End: 2023-04-22

## 2023-04-21 RX ORDER — ONDANSETRON 4 MG/1
4 TABLET, ORALLY DISINTEGRATING ORAL
Status: DISCONTINUED | OUTPATIENT
Start: 2023-04-21 | End: 2023-04-28 | Stop reason: HOSPADM

## 2023-04-21 RX ORDER — ONDANSETRON 2 MG/ML
4 INJECTION INTRAMUSCULAR; INTRAVENOUS
Status: DISCONTINUED | OUTPATIENT
Start: 2023-04-21 | End: 2023-04-28 | Stop reason: HOSPADM

## 2023-04-21 RX ADMIN — MESALAMINE 800 MG: 400 CAPSULE, DELAYED RELEASE ORAL at 15:15

## 2023-04-21 RX ADMIN — ONDANSETRON 4 MG: 2 INJECTION INTRAMUSCULAR; INTRAVENOUS at 15:16

## 2023-04-21 RX ADMIN — HYDROMORPHONE HYDROCHLORIDE 2 MG: 2 INJECTION, SOLUTION INTRAMUSCULAR; INTRAVENOUS; SUBCUTANEOUS at 21:53

## 2023-04-21 RX ADMIN — HYDROMORPHONE HYDROCHLORIDE 2 MG: 2 INJECTION, SOLUTION INTRAMUSCULAR; INTRAVENOUS; SUBCUTANEOUS at 10:44

## 2023-04-21 RX ADMIN — PROCHLORPERAZINE EDISYLATE 10 MG: 5 INJECTION INTRAMUSCULAR; INTRAVENOUS at 18:49

## 2023-04-21 RX ADMIN — HYDROMORPHONE HYDROCHLORIDE 1 MG: 1 INJECTION, SOLUTION INTRAMUSCULAR; INTRAVENOUS; SUBCUTANEOUS at 04:05

## 2023-04-21 RX ADMIN — HYDROMORPHONE HYDROCHLORIDE 2 MG: 2 INJECTION, SOLUTION INTRAMUSCULAR; INTRAVENOUS; SUBCUTANEOUS at 15:16

## 2023-04-21 RX ADMIN — HYDROMORPHONE HYDROCHLORIDE 2 MG: 2 INJECTION, SOLUTION INTRAMUSCULAR; INTRAVENOUS; SUBCUTANEOUS at 18:49

## 2023-04-21 RX ADMIN — MESALAMINE 800 MG: 400 CAPSULE, DELAYED RELEASE ORAL at 22:27

## 2023-04-21 RX ADMIN — HYDROMORPHONE HYDROCHLORIDE 1 MG: 1 INJECTION, SOLUTION INTRAMUSCULAR; INTRAVENOUS; SUBCUTANEOUS at 07:27

## 2023-04-21 RX ADMIN — ONDANSETRON 4 MG: 2 INJECTION INTRAMUSCULAR; INTRAVENOUS at 10:41

## 2023-04-21 RX ADMIN — SODIUM CHLORIDE, PRESERVATIVE FREE 10 ML: 5 INJECTION INTRAVENOUS at 15:16

## 2023-04-21 RX ADMIN — SODIUM CHLORIDE, POTASSIUM CHLORIDE, SODIUM LACTATE AND CALCIUM CHLORIDE 100 ML/HR: 600; 310; 30; 20 INJECTION, SOLUTION INTRAVENOUS at 21:53

## 2023-04-21 RX ADMIN — SODIUM CHLORIDE, PRESERVATIVE FREE 10 ML: 5 INJECTION INTRAVENOUS at 07:28

## 2023-04-21 RX ADMIN — SODIUM CHLORIDE, POTASSIUM CHLORIDE, SODIUM LACTATE AND CALCIUM CHLORIDE 100 ML/HR: 600; 310; 30; 20 INJECTION, SOLUTION INTRAVENOUS at 08:24

## 2023-04-21 RX ADMIN — IOHEXOL 50 ML: 240 INJECTION, SOLUTION INTRATHECAL; INTRAVASCULAR; INTRAVENOUS; ORAL at 10:15

## 2023-04-21 RX ADMIN — ONDANSETRON 4 MG: 2 INJECTION INTRAMUSCULAR; INTRAVENOUS at 04:05

## 2023-04-21 RX ADMIN — SENNOSIDES AND DOCUSATE SODIUM 1 TABLET: 50; 8.6 TABLET ORAL at 08:22

## 2023-04-21 RX ADMIN — SODIUM CHLORIDE, PRESERVATIVE FREE 10 ML: 5 INJECTION INTRAVENOUS at 21:53

## 2023-04-21 RX ADMIN — ONDANSETRON 4 MG: 2 INJECTION INTRAMUSCULAR; INTRAVENOUS at 21:53

## 2023-04-21 RX ADMIN — HYDROMORPHONE HYDROCHLORIDE 1 MG: 1 INJECTION, SOLUTION INTRAMUSCULAR; INTRAVENOUS; SUBCUTANEOUS at 01:15

## 2023-04-21 RX ADMIN — IOHEXOL 100 ML: 350 INJECTION, SOLUTION INTRAVENOUS at 12:20

## 2023-04-21 RX ADMIN — PANTOPRAZOLE SODIUM 20 MG: 20 TABLET, DELAYED RELEASE ORAL at 07:27

## 2023-04-21 RX ADMIN — MESALAMINE 800 MG: 400 CAPSULE, DELAYED RELEASE ORAL at 08:22

## 2023-04-22 LAB
ALBUMIN SERPL-MCNC: 3.2 G/DL (ref 3.5–5)
ALBUMIN/GLOB SERPL: 0.6 (ref 1.1–2.2)
ALP SERPL-CCNC: 60 U/L (ref 45–117)
ALT SERPL-CCNC: 23 U/L (ref 12–78)
ANA SER QL: NEGATIVE
ANION GAP SERPL CALC-SCNC: 6 MMOL/L (ref 5–15)
AST SERPL-CCNC: 28 U/L (ref 15–37)
BILIRUB SERPL-MCNC: 0.8 MG/DL (ref 0.2–1)
BUN SERPL-MCNC: 7 MG/DL (ref 6–20)
BUN/CREAT SERPL: 7 (ref 12–20)
CALCIUM SERPL-MCNC: 9.2 MG/DL (ref 8.5–10.1)
CHLORIDE SERPL-SCNC: 102 MMOL/L (ref 97–108)
CO2 SERPL-SCNC: 26 MMOL/L (ref 21–32)
CREAT SERPL-MCNC: 1.04 MG/DL (ref 0.7–1.3)
ERYTHROCYTE [DISTWIDTH] IN BLOOD BY AUTOMATED COUNT: 15.7 % (ref 11.5–14.5)
GLOBULIN SER CALC-MCNC: 5 G/DL (ref 2–4)
GLUCOSE SERPL-MCNC: 101 MG/DL (ref 65–100)
HCT VFR BLD AUTO: 36.6 % (ref 36.6–50.3)
HGB BLD-MCNC: 11.6 G/DL (ref 12.1–17)
LIPASE SERPL-CCNC: 163 U/L (ref 73–393)
MCH RBC QN AUTO: 27.5 PG (ref 26–34)
MCHC RBC AUTO-ENTMCNC: 31.7 G/DL (ref 30–36.5)
MCV RBC AUTO: 86.7 FL (ref 80–99)
NRBC # BLD: 0 K/UL (ref 0–0.01)
NRBC BLD-RTO: 0 PER 100 WBC
PLATELET # BLD AUTO: 361 K/UL (ref 150–400)
PMV BLD AUTO: 10.5 FL (ref 8.9–12.9)
POTASSIUM SERPL-SCNC: 3.8 MMOL/L (ref 3.5–5.1)
PROT SERPL-MCNC: 8.2 G/DL (ref 6.4–8.2)
RBC # BLD AUTO: 4.22 M/UL (ref 4.1–5.7)
SODIUM SERPL-SCNC: 134 MMOL/L (ref 136–145)
WBC # BLD AUTO: 14.1 K/UL (ref 4.1–11.1)

## 2023-04-22 PROCEDURE — 74011250637 HC RX REV CODE- 250/637: Performed by: FAMILY MEDICINE

## 2023-04-22 PROCEDURE — 85027 COMPLETE CBC AUTOMATED: CPT

## 2023-04-22 PROCEDURE — 74011250636 HC RX REV CODE- 250/636: Performed by: HOSPITALIST

## 2023-04-22 PROCEDURE — 65270000032 HC RM SEMIPRIVATE

## 2023-04-22 PROCEDURE — 86301 IMMUNOASSAY TUMOR CA 19-9: CPT

## 2023-04-22 PROCEDURE — 74011000250 HC RX REV CODE- 250: Performed by: INTERNAL MEDICINE

## 2023-04-22 PROCEDURE — 83690 ASSAY OF LIPASE: CPT

## 2023-04-22 PROCEDURE — 80053 COMPREHEN METABOLIC PANEL: CPT

## 2023-04-22 PROCEDURE — 74011250637 HC RX REV CODE- 250/637: Performed by: HOSPITALIST

## 2023-04-22 PROCEDURE — 74011000250 HC RX REV CODE- 250: Performed by: HOSPITALIST

## 2023-04-22 PROCEDURE — 99232 SBSQ HOSP IP/OBS MODERATE 35: CPT | Performed by: SURGERY

## 2023-04-22 PROCEDURE — 74011250636 HC RX REV CODE- 250/636: Performed by: FAMILY MEDICINE

## 2023-04-22 PROCEDURE — 36415 COLL VENOUS BLD VENIPUNCTURE: CPT

## 2023-04-22 RX ORDER — HYDROMORPHONE HYDROCHLORIDE 4 MG/1
4 TABLET ORAL
Status: DISCONTINUED | OUTPATIENT
Start: 2023-04-22 | End: 2023-04-24

## 2023-04-22 RX ORDER — HYDROMORPHONE HYDROCHLORIDE 2 MG/1
2 TABLET ORAL
Status: DISCONTINUED | OUTPATIENT
Start: 2023-04-22 | End: 2023-04-24

## 2023-04-22 RX ORDER — HYDROMORPHONE HYDROCHLORIDE 1 MG/ML
1 INJECTION, SOLUTION INTRAMUSCULAR; INTRAVENOUS; SUBCUTANEOUS
Status: DISCONTINUED | OUTPATIENT
Start: 2023-04-22 | End: 2023-04-24

## 2023-04-22 RX ADMIN — PROCHLORPERAZINE EDISYLATE 10 MG: 5 INJECTION INTRAMUSCULAR; INTRAVENOUS at 01:08

## 2023-04-22 RX ADMIN — HYDROMORPHONE HYDROCHLORIDE 2 MG: 2 INJECTION, SOLUTION INTRAMUSCULAR; INTRAVENOUS; SUBCUTANEOUS at 04:18

## 2023-04-22 RX ADMIN — HYDROMORPHONE HYDROCHLORIDE 2 MG: 2 INJECTION, SOLUTION INTRAMUSCULAR; INTRAVENOUS; SUBCUTANEOUS at 01:08

## 2023-04-22 RX ADMIN — PROCHLORPERAZINE EDISYLATE 10 MG: 5 INJECTION INTRAMUSCULAR; INTRAVENOUS at 13:50

## 2023-04-22 RX ADMIN — MESALAMINE 800 MG: 400 CAPSULE, DELAYED RELEASE ORAL at 10:39

## 2023-04-22 RX ADMIN — HYDROMORPHONE HYDROCHLORIDE 2 MG: 2 INJECTION, SOLUTION INTRAMUSCULAR; INTRAVENOUS; SUBCUTANEOUS at 13:53

## 2023-04-22 RX ADMIN — ONDANSETRON 4 MG: 2 INJECTION INTRAMUSCULAR; INTRAVENOUS at 16:44

## 2023-04-22 RX ADMIN — SENNOSIDES AND DOCUSATE SODIUM 1 TABLET: 50; 8.6 TABLET ORAL at 10:39

## 2023-04-22 RX ADMIN — SODIUM CHLORIDE, PRESERVATIVE FREE 10 ML: 5 INJECTION INTRAVENOUS at 10:33

## 2023-04-22 RX ADMIN — MESALAMINE 800 MG: 400 CAPSULE, DELAYED RELEASE ORAL at 21:16

## 2023-04-22 RX ADMIN — PROCHLORPERAZINE EDISYLATE 10 MG: 5 INJECTION INTRAMUSCULAR; INTRAVENOUS at 21:11

## 2023-04-22 RX ADMIN — HYDROMORPHONE HYDROCHLORIDE 4 MG: 4 TABLET ORAL at 16:43

## 2023-04-22 RX ADMIN — SODIUM CHLORIDE, POTASSIUM CHLORIDE, SODIUM LACTATE AND CALCIUM CHLORIDE 100 ML/HR: 600; 310; 30; 20 INJECTION, SOLUTION INTRAVENOUS at 07:14

## 2023-04-22 RX ADMIN — MESALAMINE 800 MG: 400 CAPSULE, DELAYED RELEASE ORAL at 16:48

## 2023-04-22 RX ADMIN — ONDANSETRON 4 MG: 2 INJECTION INTRAMUSCULAR; INTRAVENOUS at 04:18

## 2023-04-22 RX ADMIN — PANTOPRAZOLE SODIUM 20 MG: 20 TABLET, DELAYED RELEASE ORAL at 07:13

## 2023-04-22 RX ADMIN — HYDROMORPHONE HYDROCHLORIDE 2 MG: 2 INJECTION, SOLUTION INTRAMUSCULAR; INTRAVENOUS; SUBCUTANEOUS at 07:15

## 2023-04-22 RX ADMIN — SODIUM CHLORIDE, PRESERVATIVE FREE 10 ML: 5 INJECTION INTRAVENOUS at 16:45

## 2023-04-22 RX ADMIN — HYDROMORPHONE HYDROCHLORIDE 4 MG: 4 TABLET ORAL at 21:08

## 2023-04-22 RX ADMIN — PROCHLORPERAZINE EDISYLATE 10 MG: 5 INJECTION INTRAMUSCULAR; INTRAVENOUS at 07:15

## 2023-04-22 RX ADMIN — ONDANSETRON 4 MG: 2 INJECTION INTRAMUSCULAR; INTRAVENOUS at 10:32

## 2023-04-22 RX ADMIN — HYDROMORPHONE HYDROCHLORIDE 2 MG: 2 INJECTION, SOLUTION INTRAMUSCULAR; INTRAVENOUS; SUBCUTANEOUS at 10:36

## 2023-04-22 RX ADMIN — SODIUM CHLORIDE, POTASSIUM CHLORIDE, SODIUM LACTATE AND CALCIUM CHLORIDE 100 ML/HR: 600; 310; 30; 20 INJECTION, SOLUTION INTRAVENOUS at 19:45

## 2023-04-22 RX ADMIN — SODIUM CHLORIDE, PRESERVATIVE FREE 10 ML: 5 INJECTION INTRAVENOUS at 21:11

## 2023-04-22 RX ADMIN — SODIUM CHLORIDE, PRESERVATIVE FREE 10 ML: 5 INJECTION INTRAVENOUS at 13:50

## 2023-04-23 LAB
ALBUMIN SERPL-MCNC: 3 G/DL (ref 3.5–5)
ALBUMIN/GLOB SERPL: 0.7 (ref 1.1–2.2)
ALP SERPL-CCNC: 63 U/L (ref 45–117)
ALT SERPL-CCNC: 21 U/L (ref 12–78)
ANION GAP SERPL CALC-SCNC: 1 MMOL/L (ref 5–15)
AST SERPL-CCNC: 22 U/L (ref 15–37)
BILIRUB SERPL-MCNC: 0.7 MG/DL (ref 0.2–1)
BUN SERPL-MCNC: 6 MG/DL (ref 6–20)
BUN/CREAT SERPL: 6 (ref 12–20)
CALCIUM SERPL-MCNC: 9 MG/DL (ref 8.5–10.1)
CHLORIDE SERPL-SCNC: 103 MMOL/L (ref 97–108)
CO2 SERPL-SCNC: 30 MMOL/L (ref 21–32)
CREAT SERPL-MCNC: 1.08 MG/DL (ref 0.7–1.3)
ERYTHROCYTE [DISTWIDTH] IN BLOOD BY AUTOMATED COUNT: 15.9 % (ref 11.5–14.5)
GLOBULIN SER CALC-MCNC: 4.3 G/DL (ref 2–4)
GLUCOSE SERPL-MCNC: 108 MG/DL (ref 65–100)
HCT VFR BLD AUTO: 31.7 % (ref 36.6–50.3)
HGB BLD-MCNC: 9.6 G/DL (ref 12.1–17)
LIPASE SERPL-CCNC: 151 U/L (ref 73–393)
MCH RBC QN AUTO: 26.7 PG (ref 26–34)
MCHC RBC AUTO-ENTMCNC: 30.3 G/DL (ref 30–36.5)
MCV RBC AUTO: 88.1 FL (ref 80–99)
NRBC # BLD: 0 K/UL (ref 0–0.01)
NRBC BLD-RTO: 0 PER 100 WBC
PLATELET # BLD AUTO: 321 K/UL (ref 150–400)
PMV BLD AUTO: 10.6 FL (ref 8.9–12.9)
POTASSIUM SERPL-SCNC: 3.8 MMOL/L (ref 3.5–5.1)
PROT SERPL-MCNC: 7.3 G/DL (ref 6.4–8.2)
RBC # BLD AUTO: 3.6 M/UL (ref 4.1–5.7)
SODIUM SERPL-SCNC: 134 MMOL/L (ref 136–145)
WBC # BLD AUTO: 13.1 K/UL (ref 4.1–11.1)

## 2023-04-23 PROCEDURE — 99232 SBSQ HOSP IP/OBS MODERATE 35: CPT | Performed by: SURGERY

## 2023-04-23 PROCEDURE — 74011000250 HC RX REV CODE- 250: Performed by: HOSPITALIST

## 2023-04-23 PROCEDURE — 80053 COMPREHEN METABOLIC PANEL: CPT

## 2023-04-23 PROCEDURE — 85027 COMPLETE CBC AUTOMATED: CPT

## 2023-04-23 PROCEDURE — 74011250636 HC RX REV CODE- 250/636: Performed by: FAMILY MEDICINE

## 2023-04-23 PROCEDURE — 36415 COLL VENOUS BLD VENIPUNCTURE: CPT

## 2023-04-23 PROCEDURE — 74011250637 HC RX REV CODE- 250/637: Performed by: HOSPITALIST

## 2023-04-23 PROCEDURE — 83690 ASSAY OF LIPASE: CPT

## 2023-04-23 PROCEDURE — 74011250637 HC RX REV CODE- 250/637: Performed by: FAMILY MEDICINE

## 2023-04-23 PROCEDURE — 65270000032 HC RM SEMIPRIVATE

## 2023-04-23 PROCEDURE — 74011250636 HC RX REV CODE- 250/636: Performed by: HOSPITALIST

## 2023-04-23 PROCEDURE — 74011000250 HC RX REV CODE- 250: Performed by: INTERNAL MEDICINE

## 2023-04-23 RX ADMIN — SODIUM CHLORIDE, PRESERVATIVE FREE 10 ML: 5 INJECTION INTRAVENOUS at 05:26

## 2023-04-23 RX ADMIN — SODIUM CHLORIDE, PRESERVATIVE FREE 10 ML: 5 INJECTION INTRAVENOUS at 13:39

## 2023-04-23 RX ADMIN — PROCHLORPERAZINE EDISYLATE 10 MG: 5 INJECTION INTRAMUSCULAR; INTRAVENOUS at 21:47

## 2023-04-23 RX ADMIN — PROCHLORPERAZINE EDISYLATE 10 MG: 5 INJECTION INTRAMUSCULAR; INTRAVENOUS at 05:26

## 2023-04-23 RX ADMIN — HYDROMORPHONE HYDROCHLORIDE 4 MG: 4 TABLET ORAL at 21:47

## 2023-04-23 RX ADMIN — SODIUM CHLORIDE, POTASSIUM CHLORIDE, SODIUM LACTATE AND CALCIUM CHLORIDE 75 ML/HR: 600; 310; 30; 20 INJECTION, SOLUTION INTRAVENOUS at 21:48

## 2023-04-23 RX ADMIN — MESALAMINE 800 MG: 400 CAPSULE, DELAYED RELEASE ORAL at 15:53

## 2023-04-23 RX ADMIN — ONDANSETRON 4 MG: 2 INJECTION INTRAMUSCULAR; INTRAVENOUS at 17:47

## 2023-04-23 RX ADMIN — HYDROMORPHONE HYDROCHLORIDE 4 MG: 4 TABLET ORAL at 05:26

## 2023-04-23 RX ADMIN — PROCHLORPERAZINE EDISYLATE 10 MG: 5 INJECTION INTRAMUSCULAR; INTRAVENOUS at 13:39

## 2023-04-23 RX ADMIN — SODIUM CHLORIDE, PRESERVATIVE FREE 10 ML: 5 INJECTION INTRAVENOUS at 21:47

## 2023-04-23 RX ADMIN — SODIUM CHLORIDE, POTASSIUM CHLORIDE, SODIUM LACTATE AND CALCIUM CHLORIDE 100 ML/HR: 600; 310; 30; 20 INJECTION, SOLUTION INTRAVENOUS at 05:25

## 2023-04-23 RX ADMIN — HYDROMORPHONE HYDROCHLORIDE 4 MG: 4 TABLET ORAL at 09:36

## 2023-04-23 RX ADMIN — MESALAMINE 800 MG: 400 CAPSULE, DELAYED RELEASE ORAL at 09:36

## 2023-04-23 RX ADMIN — SENNOSIDES AND DOCUSATE SODIUM 1 TABLET: 50; 8.6 TABLET ORAL at 09:36

## 2023-04-23 RX ADMIN — POLYETHYLENE GLYCOL 3350 17 G: 17 POWDER, FOR SOLUTION ORAL at 13:39

## 2023-04-23 RX ADMIN — SODIUM CHLORIDE, PRESERVATIVE FREE 10 ML: 5 INJECTION INTRAVENOUS at 01:17

## 2023-04-23 RX ADMIN — PANTOPRAZOLE SODIUM 20 MG: 20 TABLET, DELAYED RELEASE ORAL at 06:49

## 2023-04-23 RX ADMIN — MESALAMINE 800 MG: 400 CAPSULE, DELAYED RELEASE ORAL at 21:47

## 2023-04-23 RX ADMIN — SODIUM CHLORIDE, PRESERVATIVE FREE 10 ML: 5 INJECTION INTRAVENOUS at 09:38

## 2023-04-23 RX ADMIN — HYDROMORPHONE HYDROCHLORIDE 4 MG: 4 TABLET ORAL at 17:47

## 2023-04-23 RX ADMIN — ONDANSETRON 4 MG: 2 INJECTION INTRAMUSCULAR; INTRAVENOUS at 09:36

## 2023-04-23 RX ADMIN — HYDROMORPHONE HYDROCHLORIDE 4 MG: 4 TABLET ORAL at 13:37

## 2023-04-23 RX ADMIN — HYDROMORPHONE HYDROCHLORIDE 4 MG: 4 TABLET ORAL at 01:17

## 2023-04-23 RX ADMIN — ONDANSETRON 4 MG: 2 INJECTION INTRAMUSCULAR; INTRAVENOUS at 01:17

## 2023-04-24 LAB
ALBUMIN SERPL-MCNC: 2.6 G/DL (ref 3.5–5)
ALBUMIN/GLOB SERPL: 0.6 (ref 1.1–2.2)
ALP SERPL-CCNC: 61 U/L (ref 45–117)
ALT SERPL-CCNC: 16 U/L (ref 12–78)
ANION GAP SERPL CALC-SCNC: 3 MMOL/L (ref 5–15)
AST SERPL-CCNC: 21 U/L (ref 15–37)
BILIRUB SERPL-MCNC: 0.6 MG/DL (ref 0.2–1)
BUN SERPL-MCNC: 6 MG/DL (ref 6–20)
BUN/CREAT SERPL: 6 (ref 12–20)
CALCIUM SERPL-MCNC: 8.7 MG/DL (ref 8.5–10.1)
CANCER AG19-9 SERPL-ACNC: 3 U/ML (ref 0–35)
CHLORIDE SERPL-SCNC: 105 MMOL/L (ref 97–108)
CO2 SERPL-SCNC: 27 MMOL/L (ref 21–32)
CREAT SERPL-MCNC: 0.96 MG/DL (ref 0.7–1.3)
ERYTHROCYTE [DISTWIDTH] IN BLOOD BY AUTOMATED COUNT: 15.8 % (ref 11.5–14.5)
GLOBULIN SER CALC-MCNC: 4.4 G/DL (ref 2–4)
GLUCOSE SERPL-MCNC: 114 MG/DL (ref 65–100)
HCT VFR BLD AUTO: 27.7 % (ref 36.6–50.3)
HGB BLD-MCNC: 8.8 G/DL (ref 12.1–17)
LIPASE SERPL-CCNC: 153 U/L (ref 73–393)
MCH RBC QN AUTO: 27.2 PG (ref 26–34)
MCHC RBC AUTO-ENTMCNC: 31.8 G/DL (ref 30–36.5)
MCV RBC AUTO: 85.8 FL (ref 80–99)
NRBC # BLD: 0 K/UL (ref 0–0.01)
NRBC BLD-RTO: 0 PER 100 WBC
PLATELET # BLD AUTO: 282 K/UL (ref 150–400)
PMV BLD AUTO: 10.3 FL (ref 8.9–12.9)
POTASSIUM SERPL-SCNC: 3.8 MMOL/L (ref 3.5–5.1)
PROT SERPL-MCNC: 7 G/DL (ref 6.4–8.2)
RBC # BLD AUTO: 3.23 M/UL (ref 4.1–5.7)
SODIUM SERPL-SCNC: 135 MMOL/L (ref 136–145)
WBC # BLD AUTO: 8.6 K/UL (ref 4.1–11.1)

## 2023-04-24 PROCEDURE — 65270000032 HC RM SEMIPRIVATE

## 2023-04-24 PROCEDURE — 74011250637 HC RX REV CODE- 250/637: Performed by: HOSPITALIST

## 2023-04-24 PROCEDURE — 36415 COLL VENOUS BLD VENIPUNCTURE: CPT

## 2023-04-24 PROCEDURE — 74011250636 HC RX REV CODE- 250/636: Performed by: FAMILY MEDICINE

## 2023-04-24 PROCEDURE — 85027 COMPLETE CBC AUTOMATED: CPT

## 2023-04-24 PROCEDURE — 74011000250 HC RX REV CODE- 250: Performed by: HOSPITALIST

## 2023-04-24 PROCEDURE — 83690 ASSAY OF LIPASE: CPT

## 2023-04-24 PROCEDURE — 74011250636 HC RX REV CODE- 250/636: Performed by: HOSPITALIST

## 2023-04-24 PROCEDURE — 74011250637 HC RX REV CODE- 250/637: Performed by: FAMILY MEDICINE

## 2023-04-24 PROCEDURE — 80053 COMPREHEN METABOLIC PANEL: CPT

## 2023-04-24 PROCEDURE — 74011000250 HC RX REV CODE- 250: Performed by: INTERNAL MEDICINE

## 2023-04-24 RX ORDER — HYDROMORPHONE HYDROCHLORIDE 2 MG/ML
2 INJECTION, SOLUTION INTRAMUSCULAR; INTRAVENOUS; SUBCUTANEOUS
Status: DISCONTINUED | OUTPATIENT
Start: 2023-04-24 | End: 2023-04-28 | Stop reason: HOSPADM

## 2023-04-24 RX ADMIN — HYDROMORPHONE HYDROCHLORIDE 4 MG: 4 TABLET ORAL at 05:53

## 2023-04-24 RX ADMIN — SODIUM CHLORIDE, PRESERVATIVE FREE 10 ML: 5 INJECTION INTRAVENOUS at 01:55

## 2023-04-24 RX ADMIN — ONDANSETRON 4 MG: 2 INJECTION INTRAMUSCULAR; INTRAVENOUS at 01:55

## 2023-04-24 RX ADMIN — SENNOSIDES AND DOCUSATE SODIUM 1 TABLET: 50; 8.6 TABLET ORAL at 08:27

## 2023-04-24 RX ADMIN — HYDROMORPHONE HYDROCHLORIDE 2 MG: 2 INJECTION, SOLUTION INTRAMUSCULAR; INTRAVENOUS; SUBCUTANEOUS at 21:22

## 2023-04-24 RX ADMIN — SODIUM CHLORIDE, PRESERVATIVE FREE 10 ML: 5 INJECTION INTRAVENOUS at 18:24

## 2023-04-24 RX ADMIN — ONDANSETRON 4 MG: 2 INJECTION INTRAMUSCULAR; INTRAVENOUS at 10:13

## 2023-04-24 RX ADMIN — PROCHLORPERAZINE EDISYLATE 10 MG: 5 INJECTION INTRAMUSCULAR; INTRAVENOUS at 21:22

## 2023-04-24 RX ADMIN — SODIUM CHLORIDE, PRESERVATIVE FREE 10 ML: 5 INJECTION INTRAVENOUS at 10:13

## 2023-04-24 RX ADMIN — SODIUM CHLORIDE, PRESERVATIVE FREE 10 ML: 5 INJECTION INTRAVENOUS at 05:53

## 2023-04-24 RX ADMIN — PANTOPRAZOLE SODIUM 20 MG: 20 TABLET, DELAYED RELEASE ORAL at 06:50

## 2023-04-24 RX ADMIN — ONDANSETRON 4 MG: 2 INJECTION INTRAMUSCULAR; INTRAVENOUS at 18:23

## 2023-04-24 RX ADMIN — MESALAMINE 800 MG: 400 CAPSULE, DELAYED RELEASE ORAL at 21:22

## 2023-04-24 RX ADMIN — MESALAMINE 800 MG: 400 CAPSULE, DELAYED RELEASE ORAL at 18:23

## 2023-04-24 RX ADMIN — MESALAMINE 800 MG: 400 CAPSULE, DELAYED RELEASE ORAL at 08:27

## 2023-04-24 RX ADMIN — PROCHLORPERAZINE EDISYLATE 10 MG: 5 INJECTION INTRAMUSCULAR; INTRAVENOUS at 14:17

## 2023-04-24 RX ADMIN — SODIUM CHLORIDE, PRESERVATIVE FREE 10 ML: 5 INJECTION INTRAVENOUS at 21:22

## 2023-04-24 RX ADMIN — HYDROMORPHONE HYDROCHLORIDE 4 MG: 4 TABLET ORAL at 10:12

## 2023-04-24 RX ADMIN — HYDROMORPHONE HYDROCHLORIDE 4 MG: 4 TABLET ORAL at 14:17

## 2023-04-24 RX ADMIN — SODIUM CHLORIDE, PRESERVATIVE FREE 10 ML: 5 INJECTION INTRAVENOUS at 14:18

## 2023-04-24 RX ADMIN — HYDROMORPHONE HYDROCHLORIDE 2 MG: 2 INJECTION, SOLUTION INTRAMUSCULAR; INTRAVENOUS; SUBCUTANEOUS at 18:23

## 2023-04-24 RX ADMIN — SODIUM CHLORIDE, POTASSIUM CHLORIDE, SODIUM LACTATE AND CALCIUM CHLORIDE 75 ML/HR: 600; 310; 30; 20 INJECTION, SOLUTION INTRAVENOUS at 06:25

## 2023-04-24 RX ADMIN — HYDROMORPHONE HYDROCHLORIDE 4 MG: 4 TABLET ORAL at 01:55

## 2023-04-24 RX ADMIN — PROCHLORPERAZINE EDISYLATE 10 MG: 5 INJECTION INTRAMUSCULAR; INTRAVENOUS at 05:53

## 2023-04-24 NOTE — PROGRESS NOTES
Problem: Pain  Goal: *Control of Pain  Outcome: Progressing Towards Goal     Problem: Falls - Risk of  Goal: *Absence of Falls  Description: Document Daron Fall Risk and appropriate interventions in the flowsheet.   Outcome: Progressing Towards Goal  Note: Fall Risk Interventions:            Medication Interventions: Teach patient to arise slowly                   Problem: Patient Education: Go to Patient Education Activity  Goal: Patient/Family Education  Outcome: Progressing Towards Goal

## 2023-04-24 NOTE — PROGRESS NOTES
OZIEL:    RUR 10%    POD 6 ENDOSCOPIC ULTRASOUND (EUS) LINEAR  ESOPHAGOGASTRODUODENOSCOPY (EGD)  FINE NEEDLE ASPIRATION     Disposition: Home with family at discharge.     Transportation: Family    Follow up: PCP/Specialist    Primary contact: Saskia Gilbert(Spouse) [phone]    Patrice Meehan RN/CRM  [phone]

## 2023-04-24 NOTE — PROGRESS NOTES
[address] [facility] Adult  Hospitalist Group                                                                                          Hospitalist Progress Note  Denita Vegas MD  Answering service: [phone] OR [phone] from in house phone        Date of Service:  2023  NAME:  Eva Noland  :  1985  MRN:  [id number]       Admission Summary:   41 y.o. male who presents with abdominal pain. He has a history of metastatic testicular cancer. He was recently admitted for a pseudocyst of the pancreas after undergoing a surveillance CT for testicular cancer, though no clear history of pancreatitis. He was discharged and was doing well. He returned with acute abdominal pain and was found to have a hemorrhagic pseudocyst.       Interval history / Subjective:   Pain persists, some nausea no vomiting  Most recent CT scan results reviewed- no acute events overnight  Reviewed his vitals, labs, meds, careplan   Normal ISELA, Ca19-9 pending; advanced to full liq diet     Assessment & Plan:     Pancreatic pseudocyst with hemorrhage:  -s/p EUS w/ FNA cytology showing inflammatory cells  -pain control and antiemetics - improving  -appreciate GI/surgery consultants  -trend H/H - Hb dropped today- ?  Dilutional effects or slow continued bleed into hemorrhagic cyst  -CTA  without active hemorrhage- repeat CT 23 showing little change  Elevated  SED rate, CRP with negative ISELA,   Advanced to full liq diet and pain meds changed to PO    Hypokalemia:-replete PRN     UC: -continue mesalamine     Hx of Stage IIIb testicular cancer     Code status: full  Prophylaxis: SCDs  Care Plan discussed with: pt   Anticipated Disposition: home when ready  Inpatient  Cardiac monitoring: Remote Telemetry     Hospital Problems  Date Reviewed: 2023            Codes Class Noted POA    Pancreas hemorrhage ICD-10-CM: K86.89  ICD-9-CM: 577.8  2023 Unknown         Social Determinants of Health     Tobacco Use: Medium Risk Smoking Tobacco Use: Former    Smokeless Tobacco Use: Never    Passive Exposure: Not on file   Alcohol Use: Not on file   Financial Resource Strain: Not on file   Food Insecurity: Not on file   Transportation Needs: Not on file   Physical Activity: Not on file   Stress: Not on file   Social Connections: Not on file   Intimate Partner Violence: Not on file   Depression: Not at risk    PHQ-2 Score: 0   Housing Stability: Not on file       Review of Systems:   A comprehensive review of systems was negative except for that written in the HPI. Vital Signs:    Last 24hrs VS reviewed since prior progress note. Most recent are:  Visit Vitals  /68 (BP 1 Location: Right arm, BP Patient Position: At rest)   Pulse (!) 104   Temp 99.7 °F (37.6 °C)   Resp 18   Ht 5' 11\" (1.803 m)   Wt 93.9 kg (207 lb)   SpO2 94%   BMI 28.87 kg/m²     Patient Vitals for the past 24 hrs:   Temp Pulse Resp BP SpO2   04/24/23 1500 99.7 °F (37.6 °C) (!) 104 18 123/68 94 %   04/24/23 0750 99.8 °F (37.7 °C) 92 18 111/63 94 %   04/24/23 0201 99.1 °F (37.3 °C) 89 16 125/79 93 %   04/23/23 2107 99.2 °F (37.3 °C) 98 16 139/72 96 %           No intake or output data in the 24 hours ending 04/24/23 1715       Physical Examination:     I had a face to face encounter with this patient and independently examined them on 4/24/2023 as outlined below:          PHYSICAL EXAM:   General: NAD non-toxic  HEENT: anicteric sclerae  Neck: Supple, no JVD, no meningeal signs  Chest: normal work of breathing  CVS: RRR  Abd: Soft, mild epigastric and LUQ tenderness, non-distended,  Ext: No clubbing, no cyanosis, no edema  Neuro/Psych: grossly non-focal    Data Review:    Review and/or order of clinical lab test  Review and/or order of tests in the radiology section of CPT      I have personally and independently reviewed all pertinent labs, diagnostic studies, imaging, and have provided independent interpretation of the same.      Labs:     Recent Labs 04/24/23  0213 04/23/23  0011   WBC 8.6 13.1*   HGB 8.8* 9.6*   HCT 27.7* 31.7*    321       Recent Labs     04/24/23 0213 04/23/23  0011 04/22/23  0106   * 134* 134*   K 3.8 3.8 3.8    103 102   CO2 27 30 26   BUN 6 6 7   CREA 0.96 1.08 1.04   * 108* 101*   CA 8.7 9.0 9.2       Recent Labs     04/24/23  0213 04/23/23  0011 04/22/23  0106   ALT 16 21 23   AP 61 63 60   TBILI 0.6 0.7 0.8   TP 7.0 7.3 8.2   ALB 2.6* 3.0* 3.2*   GLOB 4.4* 4.3* 5.0*   LPSE 153 151 163       No results for input(s): INR, PTP, APTT, INREXT, INREXT in the last 72 hours. No results for input(s): FE, TIBC, PSAT, FERR in the last 72 hours. No results found for: FOL, RBCF   No results for input(s): PH, PCO2, PO2 in the last 72 hours. No results for input(s): CPK, CKNDX, TROIQ in the last 72 hours. No lab exists for component: CPKMB  Lab Results   Component Value Date/Time    Triglyceride 78 04/20/2023 12:05 PM     Lab Results   Component Value Date/Time    Glucose (POC) 106 04/21/2023 06:37 AM     Lab Results   Component Value Date/Time    Color DARK YELLOW 04/16/2023 08:47 AM    Appearance CLEAR 04/16/2023 08:47 AM    Specific gravity 1.011 04/03/2023 12:32 PM    pH (UA) 5.5 04/16/2023 08:47 AM    Protein 100 (A) 04/16/2023 08:47 AM    Glucose Negative 04/16/2023 08:47 AM    Ketone TRACE (A) 04/16/2023 08:47 AM    Bilirubin Negative 04/03/2023 12:32 PM    Urobilinogen 1.0 04/16/2023 08:47 AM    Nitrites Negative 04/16/2023 08:47 AM    Leukocyte Esterase Negative 04/16/2023 08:47 AM    Epithelial cells FEW 04/16/2023 08:47 AM    Bacteria Negative 04/16/2023 08:47 AM    WBC 0-4 04/16/2023 08:47 AM    RBC 0-5 04/16/2023 08:47 AM       Notes reviewed from all clinical/nonclinical/nursing services involved in patient's clinical care. Care coordination discussions were held with appropriate clinical/nonclinical/ nursing providers based on care coordination needs.          Patients current active Medications were reviewed, considered, added and adjusted based on the clinical condition today. Home Medications were reconciled to the best of my ability given all available resources at the time of admission. Route is PO if not otherwise noted.       Admission Status:[id number]:::1}      Medications Reviewed:     Current Facility-Administered Medications   Medication Dose Route Frequency    HYDROmorphone (DILAUDID) injection 2 mg  2 mg IntraVENous Q3H PRN    ondansetron (ZOFRAN ODT) tablet 4 mg  4 mg Oral Q8H PRN    Or    ondansetron (ZOFRAN) injection 4 mg  4 mg IntraVENous Q4H PRN    senna-docusate (PERICOLACE) 8.6-50 mg per tablet 1 Tablet  1 Tablet Oral DAILY    sodium chloride (NS) flush 5-40 mL  5-40 mL IntraVENous PRN    acetaminophen (TYLENOL) tablet 1,000 mg  1,000 mg Oral Q6H PRN    sodium chloride (NS) flush 5-40 mL  5-40 mL IntraVENous Q8H    sodium chloride (NS) flush 5-40 mL  5-40 mL IntraVENous PRN    polyethylene glycol (MIRALAX) packet 17 g  17 g Oral DAILY PRN    pantoprazole (PROTONIX) tablet 20 mg  20 mg Oral ACB    mesalamine (DELZICOL) DR capsule 800 mg  800 mg Oral TID    lactated Ringers infusion  75 mL/hr IntraVENous CONTINUOUS    prochlorperazine (COMPAZINE) injection 10 mg  10 mg IntraVENous Q6H PRN     ______________________________________________________________________  EXPECTED LENGTH OF STAY: 4d 16h  ACTUAL LENGTH OF STAY:          8                 Russell Ron MD

## 2023-04-24 NOTE — PROGRESS NOTES
Surgical Oncology Daily Progress Note    Admit Date: 2023  Post-Operative Day: 6 Days Post-Op from Procedure(s):  ENDOSCOPIC ULTRASOUND (EUS) LINEAR  ESOPHAGOGASTRODUODENOSCOPY (EGD)  FINE NEEDLE ASPIRATION     Subjective:     Last 24 hrs: pt is still having some pain but manageable; tolerating fulls; hesitant to try solids again  Is eager for a plan. CA 19-9 pending,  WBC nl       Objective:     Blood pressure 111/63, pulse 92, temperature 99.8 °F (37.7 °C), resp. rate 18, height 5' 11\" (1.803 m), weight 207 lb (93.9 kg), SpO2 94 %. Temp (24hrs), Av.2 °F (37.3 °C), Min:98.6 °F (37 °C), Max:99.8 °F (37.7 °C)      _____________________  Physical Exam:     Alert and Oriented, x3, in no acute distress. Cardiovascular: RRR, no peripheral edema  Abdomen: soft, TTP epigastric area      Assessment:   Active Problems:    Pancreas hemorrhage (2023)            Plan:     Cont full liquids  ?  Drainage of larger cyst  Follow up CA 19-9    Data Review:    Recent Labs     23  0011 23  0106   WBC 8.6 13.1* 14.1*   HGB 8.8* 9.6* 11.6*   HCT 27.7* 31.7* 36.6    321 361     Recent Labs     23  0213 23  0011 23  0106   * 134* 134*   K 3.8 3.8 3.8    103 102   CO2 27 30 26   * 108* 101*   BUN 6 6 7   CREA 0.96 1.08 1.04   CA 8.7 9.0 9.2   ALB 2.6* 3.0* 3.2*   ALT 16  23     Recent Labs     23  0213 04/23/23  0011 23  0106   LPSE 153 151 163           ______________________  Medications:    Current Facility-Administered Medications   Medication Dose Route Frequency    HYDROmorphone (DILAUDID) tablet 2 mg  2 mg Oral Q4H PRN    HYDROmorphone (DILAUDID) tablet 4 mg  4 mg Oral Q4H PRN    HYDROmorphone (DILAUDID) injection 1 mg  1 mg IntraVENous Q3H PRN    ondansetron (ZOFRAN ODT) tablet 4 mg  4 mg Oral Q8H PRN    Or    ondansetron (ZOFRAN) injection 4 mg  4 mg IntraVENous Q4H PRN    senna-docusate (PERICOLACE) 8.6-50 mg per tablet 1 Tablet  1 Tablet Oral DAILY    sodium chloride (NS) flush 5-40 mL  5-40 mL IntraVENous PRN    acetaminophen (TYLENOL) tablet 1,000 mg  1,000 mg Oral Q6H PRN    sodium chloride (NS) flush 5-40 mL  5-40 mL IntraVENous Q8H    sodium chloride (NS) flush 5-40 mL  5-40 mL IntraVENous PRN    polyethylene glycol (MIRALAX) packet 17 g  17 g Oral DAILY PRN    pantoprazole (PROTONIX) tablet 20 mg  20 mg Oral ACB    mesalamine (DELZICOL) DR capsule 800 mg  800 mg Oral TID    lactated Ringers infusion  75 mL/hr IntraVENous CONTINUOUS    prochlorperazine (COMPAZINE) injection 10 mg  10 mg IntraVENous Q6H PRN       Eran Branch NP  4/24/2023  ATTENDING ADDENDUM  I supervised the APC and reviewed the note. We discussed the plan of care  The non hemorrhagic cyst is maturing and is probably responsible for most of hisearly satiety and inability to take but liquids. Pain is most likely from the hemorrhagic cyst.  I would favor a cyst gastrostomy sooner rather than later to get this part out of the way. Endoscopic, laparoscopic or open are all good options, although his previous operations may make all but the endoscopic a little more challenging. He is in favor of doing something as well.

## 2023-04-24 NOTE — PROGRESS NOTES
[address] [facility].  [address] [place] [address] E Virgil Chester, [address]  [phone]                     GI PROGRESS NOTE    Patient Name: Jewel Zabala      : 1985      MRN: [id number]  Admit Date: 2023  Today's Date: 2023  CC: Pancreatic pseudocyst with hemorrhage    Subjective:   Mr. Mateusz Caban is sitting up in bed with his wife at bedside . He continues with LUQ pain, nausea and feeling of abdominal fullness. + formed BM today with no blood or mucous. Objective:     Blood pressure 111/63, pulse 92, temperature 99.8 °F (37.7 °C), resp. rate 18, height 5' 11\" (1.803 m), weight 93.9 kg (207 lb), SpO2 94 %. Physical Exam:  General appearance: cooperative,  male no distress, appears stated age  Skin: Extremities and face reveal no rashes. N  HEENT: Sclerae anicteric. Cardiovascular: Regular rate and rhythm. Respiratory: Comfortable breathing with no accessory muscle use. GI: Abdomen nondistended, soft, and epigastric/LUQ pain . Normal active bowel sounds. Rectal: Deferred   Musculoskeletal: No pitting edema of the lower legs. Neurological: Patient is alert and oriented. Psychiatric:  No anxiety or agitation.       Data Review:    Recent Results (from the past 24 hour(s))   CBC W/O DIFF    Collection Time: 23  2:13 AM   Result Value Ref Range    WBC 8.6 4.1 - 11.1 K/uL    RBC 3.23 (L) 4.10 - 5.70 M/uL    HGB 8.8 (L) 12.1 - 17.0 g/dL    HCT 27.7 (L) 36.6 - 50.3 %    MCV 85.8 80.0 - 99.0 FL    MCH 27.2 26.0 - 34.0 PG    MCHC 31.8 30.0 - 36.5 g/dL    RDW 15.8 (H) 11.5 - 14.5 %    PLATELET 476 062 - 860 K/uL    MPV 10.3 8.9 - 12.9 FL    NRBC 0.0 0  WBC    ABSOLUTE NRBC 0.00 0.00 - 8.65 K/uL   METABOLIC PANEL, COMPREHENSIVE    Collection Time: 23  2:13 AM   Result Value Ref Range    Sodium 135 (L) 136 - 145 mmol/L    Potassium 3.8 3.5 - 5.1 mmol/L    Chloride 105 97 - 108 mmol/L    CO2 27 21 - 32 mmol/L    Anion gap 3 (L) 5 - 15 mmol/L    Glucose 114 (H) 65 - 100 mg/dL    BUN 6 6 - 20 MG/DL    Creatinine 0.96 0.70 - 1.30 MG/DL    BUN/Creatinine ratio 6 (L) 12 - 20      eGFR >60 >60 ml/min/1.73m2    Calcium 8.7 8.5 - 10.1 MG/DL    Bilirubin, total 0.6 0.2 - 1.0 MG/DL    ALT (SGPT) 16 12 - 78 U/L    AST (SGOT) 21 15 - 37 U/L    Alk. 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 EGD 4/18/23: Esophagus:Normal mucosa. Small sliding hiatal hernia was noted with Z-line at 38 cm and diaphragmatic pinch at 40 cm. Stomach: Extrinsic compression was noted in the gastric body. Congestion was noted in the stomach body. Nonbleeding fundic varices were noted (IGV1). No bleeding or stigmata were noted. EUS 4/18/23: A large heterogeneous solid cystic lesion was noted in the pancreatic tail and measured about 67 x 84 mm in size. Extensive varices were noted around this lesion going up to the splenic hilum. Part of the pancreas body and tail were obscured by this lesion. Head was unremarkable. The lesion described in the transverse mesocolon could not be completely evaluated given the anatomic location    Cytology results: No cells diagnostic for malignancy, Rare benign acinic cells, macrophages, and acute inflammation    Interval History: Continues with abdominal pain and nausea, requiring IV dilaudid. CTAP w cont 4/21/23  IMPRESSION  1. Peripancreatic fluid collection extending with mass effect on the gastric  wall. The current study, the collection is more homogeneous and demonstrates  peripheral enhancement.  Findings are consistent with a pseudocyst.  2.  Second collection in the tail of pancreas with internal high density  components likely reflecting prior hemorrhage which is decreased in size. - Continue full liquid diet given abdominal pain  - CA 19-9 pending  - IgG4 pending  - Monitor H&H and transfuse for hgb <7   - Surgery following  - Supportive care analgesia and anti-emetics per hospitalist  - GI will follow along     ATTENDING ADDENDUM OF ROBERTO NOTE:  I saw and evaluated Roberto Gilbert on the above date of service and discussed the care with the nurse practitioner. I agree with the findings and plan as documented in the note above and any changes I have noted in bold to reflect my findings and plan. Above described patient w ulcerative proctitis seen on colonoscopy on 3/1/23 on PO and rectal mesalamine +prednisone taper and testicular CA s/p radical left radical inguinal orchiectomy by Dr. Corrina Clement in Dec 2021 and RPLND with Dr. Freddie Bernard on Nov 2022. He was admitted in early April with acute pancreatitis that improved with supportive care. Re-admitted 4/17 with enlarged pseudocyst with features concerning for hemorrhage from splenic artery aneurym vs varix. Underwent EUS on 4/18, a large heterogeneous cystic lesion in the TOP was noted, extensive varices noted around the lesion from splenic hilum, cytology from the TOP cystic lesion unremarkable. He has been unable to tolerate advancing diet beyond liquids. Reviewed current imaging (4/21/23) with radiology today, there are interval PPFCs compressing the posterior gastric wall and pylorus that are consistent with pseudocysts, but also seem to be in communication with the hemorrhagic TOP cyst.  Risks and benefits of this complex fluid collection are not certain to outweigh the other. The natural history of this post-pancreatitis complication is gradual but spontaneous resolution. Instrumentation poses risk of recurrent hemorrhage as well as intraabdominal infection.  Will assess objective nutritional needs and monitor strict calorie counts over the next 48 hours; if unable to meet these goals, intervention by means of (a) draining the PPFC or (b) placing a temporary post-pyloric feedint tube will need to be discussed.        Patient Active Hospital Problem List:   Active Problems:    Pancreas hemorrhage (4/16/2023)        Time Spent with Patient: Demetrius Roach NP

## 2023-04-25 LAB
ALBUMIN SERPL-MCNC: 2.8 G/DL (ref 3.5–5)
ALBUMIN/GLOB SERPL: 0.6 (ref 1.1–2.2)
ALP SERPL-CCNC: 74 U/L (ref 45–117)
ALT SERPL-CCNC: 23 U/L (ref 12–78)
ANION GAP SERPL CALC-SCNC: 4 MMOL/L (ref 5–15)
AST SERPL-CCNC: 25 U/L (ref 15–37)
BILIRUB SERPL-MCNC: 0.6 MG/DL (ref 0.2–1)
BUN SERPL-MCNC: 8 MG/DL (ref 6–20)
BUN/CREAT SERPL: 8 (ref 12–20)
CALCIUM SERPL-MCNC: 9.1 MG/DL (ref 8.5–10.1)
CHLORIDE SERPL-SCNC: 102 MMOL/L (ref 97–108)
CO2 SERPL-SCNC: 29 MMOL/L (ref 21–32)
CREAT SERPL-MCNC: 1.06 MG/DL (ref 0.7–1.3)
ERYTHROCYTE [DISTWIDTH] IN BLOOD BY AUTOMATED COUNT: 16 % (ref 11.5–14.5)
GLOBULIN SER CALC-MCNC: 5 G/DL (ref 2–4)
GLUCOSE SERPL-MCNC: 109 MG/DL (ref 65–100)
HCT VFR BLD AUTO: 31.2 % (ref 36.6–50.3)
HGB BLD-MCNC: 9.7 G/DL (ref 12.1–17)
LIPASE SERPL-CCNC: 178 U/L (ref 73–393)
MCH RBC QN AUTO: 27.1 PG (ref 26–34)
MCHC RBC AUTO-ENTMCNC: 31.1 G/DL (ref 30–36.5)
MCV RBC AUTO: 87.2 FL (ref 80–99)
NRBC # BLD: 0 K/UL (ref 0–0.01)
NRBC BLD-RTO: 0 PER 100 WBC
PLATELET # BLD AUTO: 320 K/UL (ref 150–400)
PMV BLD AUTO: 10.5 FL (ref 8.9–12.9)
POTASSIUM SERPL-SCNC: 3.9 MMOL/L (ref 3.5–5.1)
PROT SERPL-MCNC: 7.8 G/DL (ref 6.4–8.2)
RBC # BLD AUTO: 3.58 M/UL (ref 4.1–5.7)
SODIUM SERPL-SCNC: 135 MMOL/L (ref 136–145)
WBC # BLD AUTO: 9.2 K/UL (ref 4.1–11.1)

## 2023-04-25 PROCEDURE — 83690 ASSAY OF LIPASE: CPT

## 2023-04-25 PROCEDURE — 74011000258 HC RX REV CODE- 258: Performed by: INTERNAL MEDICINE

## 2023-04-25 PROCEDURE — 87040 BLOOD CULTURE FOR BACTERIA: CPT

## 2023-04-25 PROCEDURE — 74011250636 HC RX REV CODE- 250/636: Performed by: FAMILY MEDICINE

## 2023-04-25 PROCEDURE — 80053 COMPREHEN METABOLIC PANEL: CPT

## 2023-04-25 PROCEDURE — 36415 COLL VENOUS BLD VENIPUNCTURE: CPT

## 2023-04-25 PROCEDURE — 65270000032 HC RM SEMIPRIVATE

## 2023-04-25 PROCEDURE — 74011250636 HC RX REV CODE- 250/636: Performed by: INTERNAL MEDICINE

## 2023-04-25 PROCEDURE — 74011000250 HC RX REV CODE- 250: Performed by: HOSPITALIST

## 2023-04-25 PROCEDURE — 74011250637 HC RX REV CODE- 250/637: Performed by: SURGERY

## 2023-04-25 PROCEDURE — 85027 COMPLETE CBC AUTOMATED: CPT

## 2023-04-25 PROCEDURE — 74011250637 HC RX REV CODE- 250/637: Performed by: HOSPITALIST

## 2023-04-25 PROCEDURE — 74011250636 HC RX REV CODE- 250/636: Performed by: HOSPITALIST

## 2023-04-25 RX ADMIN — MESALAMINE 800 MG: 400 CAPSULE, DELAYED RELEASE ORAL at 22:56

## 2023-04-25 RX ADMIN — SODIUM CHLORIDE, PRESERVATIVE FREE 10 ML: 5 INJECTION INTRAVENOUS at 06:27

## 2023-04-25 RX ADMIN — HYDROMORPHONE HYDROCHLORIDE 2 MG: 2 INJECTION, SOLUTION INTRAMUSCULAR; INTRAVENOUS; SUBCUTANEOUS at 20:18

## 2023-04-25 RX ADMIN — HYDROMORPHONE HYDROCHLORIDE 2 MG: 2 INJECTION, SOLUTION INTRAMUSCULAR; INTRAVENOUS; SUBCUTANEOUS at 00:20

## 2023-04-25 RX ADMIN — ACETAMINOPHEN 1000 MG: 500 TABLET ORAL at 23:38

## 2023-04-25 RX ADMIN — SODIUM CHLORIDE, PRESERVATIVE FREE 10 ML: 5 INJECTION INTRAVENOUS at 22:58

## 2023-04-25 RX ADMIN — HYDROMORPHONE HYDROCHLORIDE 2 MG: 2 INJECTION, SOLUTION INTRAMUSCULAR; INTRAVENOUS; SUBCUTANEOUS at 13:29

## 2023-04-25 RX ADMIN — HYDROMORPHONE HYDROCHLORIDE 2 MG: 2 INJECTION, SOLUTION INTRAMUSCULAR; INTRAVENOUS; SUBCUTANEOUS at 03:22

## 2023-04-25 RX ADMIN — MEROPENEM 1 G: 1 INJECTION, POWDER, FOR SOLUTION INTRAVENOUS at 22:56

## 2023-04-25 RX ADMIN — ONDANSETRON 4 MG: 2 INJECTION INTRAMUSCULAR; INTRAVENOUS at 06:26

## 2023-04-25 RX ADMIN — PROCHLORPERAZINE EDISYLATE 10 MG: 5 INJECTION INTRAMUSCULAR; INTRAVENOUS at 10:21

## 2023-04-25 RX ADMIN — HYDROMORPHONE HYDROCHLORIDE 2 MG: 2 INJECTION, SOLUTION INTRAMUSCULAR; INTRAVENOUS; SUBCUTANEOUS at 10:21

## 2023-04-25 RX ADMIN — PANTOPRAZOLE SODIUM 20 MG: 20 TABLET, DELAYED RELEASE ORAL at 06:31

## 2023-04-25 RX ADMIN — HYDROMORPHONE HYDROCHLORIDE 2 MG: 2 INJECTION, SOLUTION INTRAMUSCULAR; INTRAVENOUS; SUBCUTANEOUS at 23:38

## 2023-04-25 RX ADMIN — HYDROMORPHONE HYDROCHLORIDE 2 MG: 2 INJECTION, SOLUTION INTRAMUSCULAR; INTRAVENOUS; SUBCUTANEOUS at 06:26

## 2023-04-25 RX ADMIN — SODIUM CHLORIDE, PRESERVATIVE FREE 10 ML: 5 INJECTION INTRAVENOUS at 17:06

## 2023-04-25 RX ADMIN — PROCHLORPERAZINE EDISYLATE 10 MG: 5 INJECTION INTRAMUSCULAR; INTRAVENOUS at 03:22

## 2023-04-25 RX ADMIN — ONDANSETRON 4 MG: 2 INJECTION INTRAMUSCULAR; INTRAVENOUS at 00:20

## 2023-04-25 RX ADMIN — PROCHLORPERAZINE EDISYLATE 10 MG: 5 INJECTION INTRAMUSCULAR; INTRAVENOUS at 17:04

## 2023-04-25 RX ADMIN — SODIUM CHLORIDE, POTASSIUM CHLORIDE, SODIUM LACTATE AND CALCIUM CHLORIDE 75 ML/HR: 600; 310; 30; 20 INJECTION, SOLUTION INTRAVENOUS at 20:27

## 2023-04-25 RX ADMIN — MESALAMINE 800 MG: 400 CAPSULE, DELAYED RELEASE ORAL at 17:04

## 2023-04-25 RX ADMIN — MESALAMINE 800 MG: 400 CAPSULE, DELAYED RELEASE ORAL at 10:35

## 2023-04-25 RX ADMIN — ONDANSETRON 4 MG: 2 INJECTION INTRAMUSCULAR; INTRAVENOUS at 20:18

## 2023-04-25 RX ADMIN — ACETAMINOPHEN 1000 MG: 500 TABLET ORAL at 10:34

## 2023-04-25 RX ADMIN — HYDROMORPHONE HYDROCHLORIDE 2 MG: 2 INJECTION, SOLUTION INTRAMUSCULAR; INTRAVENOUS; SUBCUTANEOUS at 17:04

## 2023-04-25 RX ADMIN — ACETAMINOPHEN 1000 MG: 500 TABLET ORAL at 17:30

## 2023-04-25 NOTE — PROGRESS NOTES
[address] [facility] Adult  Hospitalist Group                                                                                          Hospitalist Progress Note  Namita Urias MD  Answering service: [phone] OR [phone] from in house phone        Date of Service:  2023  NAME:  Jefe Gonzáles  :  1985  MRN:  [id number]       Admission Summary:   41 y.o. male who presents with abdominal pain. He has a history of metastatic testicular cancer. He was recently admitted for a pseudocyst of the pancreas after undergoing a surveillance CT for testicular cancer, though no clear history of pancreatitis. He was discharged and was doing well. He returned with acute abdominal pain and was found to have a hemorrhagic pseudocyst.       Interval history / Subjective:   Pain persists, some nausea no vomiting  Most recent CT scan results reviewed- no acute events overnight  Reviewed his vitals, labs, meds, careplan   Normal ISELA, Ca19-9 pending; advanced to full liq diet yesterday  Awaiting further recs from GI and surgery     Assessment & Plan:     Pancreatic pseudocyst with hemorrhage:  -s/p EUS w/ FNA cytology showing inflammatory cells  -pain control and antiemetics - improving  -appreciate GI/surgery consultants  -trend H/H - Hb dropped today- ?  Dilutional effects or slow continued bleed into hemorrhagic cyst  -CTA  without active hemorrhage- repeat CT 23 showing little change  Elevated  SED rate, CRP with negative ISELA,   Advanced to full liq diet and pain meds changed to PO    Hypokalemia:-replete PRN     UC: -continue mesalamine     Hx of Stage IIIb testicular cancer     Code status: full  Prophylaxis: SCDs  Care Plan discussed with: pt   Anticipated Disposition: home when ready  Inpatient  Cardiac monitoring: Remote Telemetry     Hospital Problems  Date Reviewed: 2023            Codes Class Noted POA    Pancreas hemorrhage ICD-10-CM: K86.89  ICD-9-CM: 577.8  2023 Unknown         Social Determinants of Health     Tobacco Use: Medium Risk    Smoking Tobacco Use: Former    Smokeless Tobacco Use: Never    Passive Exposure: Not on file   Alcohol Use: Not on file   Financial Resource Strain: Not on file   Food Insecurity: Not on file   Transportation Needs: Not on file   Physical Activity: Not on file   Stress: Not on file   Social Connections: Not on file   Intimate Partner Violence: Not on file   Depression: Not at risk    PHQ-2 Score: 0   Housing Stability: Not on file       Review of Systems:   A comprehensive review of systems was negative except for that written in the HPI. Vital Signs:    Last 24hrs VS reviewed since prior progress note. Most recent are:  Visit Vitals  /75   Pulse (!) 105   Temp 98.9 °F (37.2 °C)   Resp 18   Ht 5' 11\" (1.803 m)   Wt 93.9 kg (207 lb)   SpO2 99%   BMI 28.87 kg/m²     Patient Vitals for the past 24 hrs:   Temp Pulse Resp BP SpO2   04/25/23 1023 98.9 °F (37.2 °C) (!) 105 18 138/75 99 %   04/25/23 1015 98.9 °F (37.2 °C) (!) 119 18 134/70 96 %   04/25/23 0329 99.1 °F (37.3 °C) (!) 101 17 (!) 146/72 98 %   04/24/23 1915 (!) 100.6 °F (38.1 °C) (!) 106 18 114/64 96 %           No intake or output data in the 24 hours ending 04/25/23 1524       Physical Examination:     I had a face to face encounter with this patient and independently examined them on 4/25/2023 as outlined below:          PHYSICAL EXAM:   General: NAD non-toxic  HEENT: anicteric sclerae  Neck: Supple, no JVD, no meningeal signs  Chest: normal work of breathing  CVS: RRR  Abd: Soft, mild epigastric and LUQ tenderness, non-distended,  Ext: No clubbing, no cyanosis, no edema  Neuro/Psych: grossly non-focal    Data Review:    Review and/or order of clinical lab test  Review and/or order of tests in the radiology section of CPT      I have personally and independently reviewed all pertinent labs, diagnostic studies, imaging, and have provided independent interpretation of the same.      Labs: Recent Labs     04/25/23  0008 04/24/23 0213   WBC 9.2 8.6   HGB 9.7* 8.8*   HCT 31.2* 27.7*    282       Recent Labs     04/25/23  0008 04/24/23 0213 04/23/23  0011   * 135* 134*   K 3.9 3.8 3.8    105 103   CO2 29 27 30   BUN 8 6 6   CREA 1.06 0.96 1.08   * 114* 108*   CA 9.1 8.7 9.0       Recent Labs     04/25/23  0008 04/24/23 0213 04/23/23  0011   ALT 23 16 21   AP 74 61 63   TBILI 0.6 0.6 0.7   TP 7.8 7.0 7.3   ALB 2.8* 2.6* 3.0*   GLOB 5.0* 4.4* 4.3*   LPSE 178 153 151       No results for input(s): INR, PTP, APTT, INREXT, INREXT in the last 72 hours. No results for input(s): FE, TIBC, PSAT, FERR in the last 72 hours. No results found for: FOL, RBCF   No results for input(s): PH, PCO2, PO2 in the last 72 hours. No results for input(s): CPK, CKNDX, TROIQ in the last 72 hours. No lab exists for component: CPKMB  Lab Results   Component Value Date/Time    Triglyceride 78 04/20/2023 12:05 PM     Lab Results   Component Value Date/Time    Glucose (POC) 106 04/21/2023 06:37 AM     Lab Results   Component Value Date/Time    Color DARK YELLOW 04/16/2023 08:47 AM    Appearance CLEAR 04/16/2023 08:47 AM    Specific gravity 1.011 04/03/2023 12:32 PM    pH (UA) 5.5 04/16/2023 08:47 AM    Protein 100 (A) 04/16/2023 08:47 AM    Glucose Negative 04/16/2023 08:47 AM    Ketone TRACE (A) 04/16/2023 08:47 AM    Bilirubin Negative 04/03/2023 12:32 PM    Urobilinogen 1.0 04/16/2023 08:47 AM    Nitrites Negative 04/16/2023 08:47 AM    Leukocyte Esterase Negative 04/16/2023 08:47 AM    Epithelial cells FEW 04/16/2023 08:47 AM    Bacteria Negative 04/16/2023 08:47 AM    WBC 0-4 04/16/2023 08:47 AM    RBC 0-5 04/16/2023 08:47 AM       Notes reviewed from all clinical/nonclinical/nursing services involved in patient's clinical care. Care coordination discussions were held with appropriate clinical/nonclinical/ nursing providers based on care coordination needs.          Patients current active Medications were reviewed, considered, added and adjusted based on the clinical condition today. Home Medications were reconciled to the best of my ability given all available resources at the time of admission. Route is PO if not otherwise noted.       Admission Status:[id number]:::1}      Medications Reviewed:     Current Facility-Administered Medications   Medication Dose Route Frequency    HYDROmorphone (DILAUDID) injection 2 mg  2 mg IntraVENous Q3H PRN    ondansetron (ZOFRAN ODT) tablet 4 mg  4 mg Oral Q8H PRN    Or    ondansetron (ZOFRAN) injection 4 mg  4 mg IntraVENous Q4H PRN    senna-docusate (PERICOLACE) 8.6-50 mg per tablet 1 Tablet  1 Tablet Oral DAILY    sodium chloride (NS) flush 5-40 mL  5-40 mL IntraVENous PRN    acetaminophen (TYLENOL) tablet 1,000 mg  1,000 mg Oral Q6H PRN    sodium chloride (NS) flush 5-40 mL  5-40 mL IntraVENous Q8H    sodium chloride (NS) flush 5-40 mL  5-40 mL IntraVENous PRN    polyethylene glycol (MIRALAX) packet 17 g  17 g Oral DAILY PRN    pantoprazole (PROTONIX) tablet 20 mg  20 mg Oral ACB    mesalamine (DELZICOL) DR capsule 800 mg  800 mg Oral TID    lactated Ringers infusion  75 mL/hr IntraVENous CONTINUOUS    prochlorperazine (COMPAZINE) injection 10 mg  10 mg IntraVENous Q6H PRN     ______________________________________________________________________  EXPECTED LENGTH OF STAY: 4d 16h  ACTUAL LENGTH OF STAY:          9                 Glenny Blanton MD

## 2023-04-25 NOTE — CONSULTS
Comprehensive Nutrition Assessment    Type and Reason for Visit: Initial, Consult    Nutrition Recommendations/Plan:   Diet advanced to soft and bite sized low fat  Modified ons to Ensure high protein BID, ensure enlive once daily. Added protein rich snacks  Spoke with RN who will record meal intakes- Appreciate assistance       Malnutrition Assessment:  Malnutrition Status:  No malnutrition (04/25/23 1242)           Nutrition Assessment:    PMHx: UC, h/o testicular cancer    Consult appreciated. 41 y.o. male admitted with pancreatic pseudocyst with hemorrhage. GI and surgery following. Per GI possible placement of Gtube if pt is unable to meet calorie needs po. Visited with pt at bedside. He complained of some nausea, no vomiting. Diarrhea started today- noted he refused pericolace today. He feels he's ready to try some soft solid food. Spoke with attending MD who okayed diet advancement. Pt asked about appropriate diet r/t pancreatitis- advised to focus on low fat high protein foods and gave examples of these. Talked about use of small frequent meals, eating on a schedule every 2-3 hours to combat early satiety and poor appetite. He likes ensure supplements- adjusted to ensure high protein BID and ensure enlive once daily to increase protein and decrease fat. He's also agreeable to boiled eggs, low fat cottage cheese as snacks which are appropriate on his soft bite sized, low fat diet. Stated -210#; current bedscale weight 207#. Pt denied any weight loss pta which is consistent with weight history in EMR. Appropriate fat and muscle stores for his age. No concern for malnutrition. Spoke with RN about recording intake of meals and supplements- appreciate help. Will continue to follow to assess nutrition status going forward.    , Na 135      Nutritionally Significant Medications:  Mesalamine, protonix, pericolace, ringers  Prn: dilaudid, zofran, compazine      Estimated Daily Nutrient Needs:  Energy Requirements Based On: Kcal/kg  Weight Used for Energy Requirements: Current  Energy (kcal/day): 1541-1940 (20-22 kcal/kg)  Weight Used for Protein Requirements: Current  Protein (g/day): 94 (1 g/kg)     Fluid (ml/day): 1 ml/kcal    Nutrition Related Findings:   Edema: No data recorded    Last BM: 04/25/23,      Wounds: None      Current Nutrition Therapies:  Diet: full liquid low fat  Supplements: ensure enlive BID  Meal intake: Patient Vitals for the past 168 hrs:   % Diet Eaten   04/20/23 0953 76 - 100%     Supplement intake: No data found. Nutrition Support: none      Anthropometric Measures:  Height: 5' 11\" (180.3 cm)  Ideal Body Weight (IBW): 172 lbs (78 kg)     Current Body Wt:  93.9 kg (207 lb 0.2 oz), 120.4 % IBW. Bed scale  Current BMI (kg/m2): 28.9        Weight Adjustment: No adjustment                 BMI Category: Overweight (BMI 25.0-29. 9)    Wt Readings from Last 10 Encounters:   04/20/23 93.9 kg (207 lb)   04/03/23 95.3 kg (210 lb 1.6 oz)   01/10/23 90.7 kg (200 lb)   12/23/22 91.5 kg (201 lb 12.8 oz)   10/07/22 93.4 kg (206 lb)   09/09/22 93.9 kg (207 lb)   08/15/22 93 kg (205 lb)   08/08/22 92.1 kg (203 lb)   08/01/22 92.4 kg (203 lb 9.6 oz)   07/29/22 92.8 kg (204 lb 9.6 oz)           Nutrition Diagnosis:   Inadequate oral intake related to altered GI function as evidenced by  (pancreatic pseudocyst)    Nutrition Interventions:   Food and/or Nutrient Delivery: Modify current diet, Modify oral nutrition supplement, Snacks (specify)  Nutrition Education/Counseling: Counseling completed  Coordination of Nutrition Care: Continue to monitor while inpatient, Interdisciplinary rounds  Plan of Care discussed with: RN, MD    Goals:     Goals: PO intake 50% or greater, Meet at least 75% of estimated needs, by next RD assessment       Nutrition Monitoring and Evaluation:   Behavioral-Environmental Outcomes: None identified  Food/Nutrient Intake Outcomes: Food and nutrient intake, Supplement intake, Diet advancement/tolerance  Physical Signs/Symptoms Outcomes: Biochemical data, GI status, Meal time behavior, Weight    Discharge Planning:    Continue oral nutrition supplement      Recent Labs     04/25/23 0008 04/24/23 0213 04/23/23  0011   * 114* 108*   BUN 8 6 6   CREA 1.06 0.96 1.08   * 135* 134*   K 3.9 3.8 3.8    105 103   CO2 29 27 30   CA 9.1 8.7 9.0       Recent Labs     04/25/23 0008 04/24/23 0213 04/23/23  0011   ALT 23 16 21   AST 25 21 22   AP 74 61 63   TBILI 0.6 0.6 0.7   TP 7.8 7.0 7.3   ALB 2.8* 2.6* 3.0*   GLOB 5.0* 4.4* 4.3*   LPSE 178 153 151       No results for input(s): LAC in the last 72 hours. Recent Labs     04/25/23 0008 04/24/23 0213   WBC 9.2 8.6   HGB 9.7* 8.8*   HCT 31.2* 27.7*    282       No results for input(s): PREALB in the last 72 hours. No results found for: PREALB    No results for input(s): TRIGL in the last 72 hours. Triglyceride   Date Value Ref Range Status   04/20/2023 78 <150 MG/DL Final     Comment:     Based on NCEP-ATP III:  Triglycerides <150 mg/dL  is considered normal, 150-199 mg/dL  borderline high,  200-499 mg/dL high and  greater than or equal to 500 mg/dL very high. No results for input(s): GLUCPOC in the last 72 hours. No results found for: HBA1C, UNK1TNQZ, CMB7UHBQ, EOU6ELOB    Iron Profile    No results for input(s): IRON, FOL, B12LT, VB6LT in the last 72 hours. No results for input(s): NH4 in the last 72 hours.     No results found for: IRON, TIBC, PSAT    No results found for: FERR    B Vitamins  No results found for: FOL, B12LT, VB6LT, VIB1LT    Zinc  No results found for: ZINCLT    Copper  No results found for: COSLT    Selenium  No results found for: SELNLT    Vit C  No results found for: [id number]    Fat Soluble Vitamins    No results found for: VITALT, VITD3, VITE1T, VITEG, VIK1LT    No results found for: YANY Gomes RD  Available via [employer]

## 2023-04-25 NOTE — PROGRESS NOTES
Problem: Pain  Goal: *Control of Pain  Outcome: Progressing Towards Goal  Goal: *PALLIATIVE CARE:  Alleviation of Pain  Outcome: Progressing Towards Goal     Problem: Patient Education: Go to Patient Education Activity  Goal: Patient/Family Education  Outcome: Progressing Towards Goal     Problem: Pancreatitis  Goal: *Control of acute pain  Outcome: Progressing Towards Goal  Goal: *Absence of nausea/vomiting  Outcome: Progressing Towards Goal  Goal: *Optimize nutritional status  Outcome: Progressing Towards Goal  Goal: *Labs within defined limits  Outcome: Progressing Towards Goal     Problem: Falls - Risk of  Goal: *Absence of Falls  Description: Document Daron Fall Risk and appropriate interventions in the flowsheet.   Outcome: Progressing Towards Goal  Note: Fall Risk Interventions:            Medication Interventions: Teach patient to arise slowly                   Problem: Patient Education: Go to Patient Education Activity  Goal: Patient/Family Education  Outcome: Progressing Towards Goal

## 2023-04-25 NOTE — PROGRESS NOTES
[address] [facility].  [address] [address] [address], [address]  [phone]                     GI PROGRESS NOTE    Patient Name: Odalis Mcmahon      : 1985      MRN: [id number]  Admit Date: 2023  Today's Date: 2023  CC: Pancreatic pseudocyst with hemorrhage    Subjective:   Mr. Keily Baron is laying comfortably in bed. He continues with LUQ pain, nausea and feeling of abdominal fullness. + diarrhea today with no blood or mucous. He is experiencing body aches and chills. Objective:     Blood pressure 138/75, pulse (!) 105, temperature 98.9 °F (37.2 °C), resp. rate 18, height 5' 11\" (1.803 m), weight 93.9 kg (207 lb), SpO2 99 %. Physical Exam:  General appearance: cooperative,  male no distress, appears stated age  Skin: Extremities and face reveal no rashes. diaphoretic   HEENT: Sclerae anicteric. Cardiovascular: Regular rate and rhythm. Respiratory: Comfortable breathing with no accessory muscle use. GI: Abdomen nondistended, soft, and epigastric/LUQ pain . Normal active bowel sounds. Rectal: Deferred   Musculoskeletal: No pitting edema of the lower legs. Neurological: Patient is alert and oriented. Psychiatric:  No anxiety or agitation.       Data Review:    Recent Results (from the past 24 hour(s))   CBC W/O DIFF    Collection Time: 23 12:08 AM   Result Value Ref Range    WBC 9.2 4.1 - 11.1 K/uL    RBC 3.58 (L) 4.10 - 5.70 M/uL    HGB 9.7 (L) 12.1 - 17.0 g/dL    HCT 31.2 (L) 36.6 - 50.3 %    MCV 87.2 80.0 - 99.0 FL    MCH 27.1 26.0 - 34.0 PG    MCHC 31.1 30.0 - 36.5 g/dL    RDW 16.0 (H) 11.5 - 14.5 %    PLATELET 839 474 - 384 K/uL    MPV 10.5 8.9 - 12.9 FL    NRBC 0.0 0  WBC    ABSOLUTE NRBC 0.00 0.00 - 4.36 K/uL   METABOLIC PANEL, COMPREHENSIVE    Collection Time: 23 12:08 AM   Result Value Ref Range    Sodium 135 (L) 136 - 145 mmol/L    Potassium 3.9 3.5 - 5.1 mmol/L    Chloride 102 97 - 108 mmol/L    CO2 29 21 - 32 mmol/L    Anion gap 4 Patient called back. Informed patient of result notes. Patient verbalized understanding.    ADRIEN Carrington MA     (L) 5 - 15 mmol/L    Glucose 109 (H) 65 - 100 mg/dL    BUN 8 6 - 20 MG/DL    Creatinine 1.06 0.70 - 1.30 MG/DL    BUN/Creatinine ratio 8 (L) 12 - 20      eGFR >60 >60 ml/min/1.73m2    Calcium 9.1 8.5 - 10.1 MG/DL    Bilirubin, total 0.6 0.2 - 1.0 MG/DL    ALT (SGPT) 23 12 - 78 U/L    AST (SGOT) 25 15 - 37 U/L    Alk. phosphatase 74 45 - 117 U/L    Protein, total 7.8 6.4 - 8.2 g/dL    Albumin 2.8 (L) 3.5 - 5.0 g/dL    Globulin 5.0 (H) 2.0 - 4.0 g/dL    A-G Ratio 0.6 (L) 1.1 - 2.2     LIPASE    Collection Time: 04/25/23 12:08 AM   Result Value Ref Range    Lipase 178 73 - 393 U/L           Assessment / Plan :     40 yo male presents with pancreatic pseudocyst with hemorrhage and increase in size-suspect bleed from splenic artery aneurysm/varices. EGD 4/18/23: Esophagus:Normal mucosa. Small sliding hiatal hernia was noted with Z-line at 38 cm and diaphragmatic pinch at 40 cm. Stomach: Extrinsic compression was noted in the gastric body. Congestion was noted in the stomach body. Nonbleeding fundic varices were noted (IGV1). No bleeding or stigmata were noted. EUS 4/18/23: A large heterogeneous solid cystic lesion was noted in the pancreatic tail and measured about 67 x 84 mm in size. Extensive varices were noted around this lesion going up to the splenic hilum. Part of the pancreas body and tail were obscured by this lesion. Head was unremarkable. The lesion described in the transverse mesocolon could not be completely evaluated given the anatomic location    Cytology results: No cells diagnostic for malignancy, Rare benign acinic cells, macrophages, and acute inflammation    Interval History: Continues with abdominal pain and nausea, requiring IV dilaudid. CTAP w cont 4/21/23  IMPRESSION  1. Peripancreatic fluid collection extending with mass effect on the gastric  wall. The current study, the collection is more homogeneous and demonstrates  peripheral enhancement.  Findings are consistent with a pseudocyst.  2.  Second collection in the tail of pancreas with internal high density  components likely reflecting prior hemorrhage which is decreased in size. CA 19-9 3     Our team reviewed current imaging (4/21/23) with radiology, there are interval PPFCs compressing the posterior gastric wall and pylorus that are consistent with pseudocysts, but also seem to be in communication with the hemorrhagic TOP cyst.  Risks and benefits of this complex fluid collection are not certain to outweigh the other. Instrumentation poses risk of recurrent hemorrhage as well as intraabdominal infection. We will continue to monitor with goal of pursuing cyst gastrostomy within the coming weeks. We had a lengthy discussion about importance of adequate nutrition. He has been unable to tolerate advancing diet beyond liquids. We discussed possible need for DHT if he is unable to advance his diet. Would recommend against TPN as nutritional source. Patient with low grade fever( 100.6), chills and body aches. WBCs WNL (9.2). Blood cultures ordered. Monitor fever closely.     - Nutrition consulted   - IgG4 pending  - Monitor H&H and transfuse for hgb <7   - Surgery following  - Supportive care analgesia and anti-emetics per hospitalist  - GI will follow along            Patient Active Hospital Problem List:   Active Problems:    Pancreas hemorrhage (4/16/2023)        Time Spent with Patient: Hannah Brice NP      This encounter was done in conjunction with ROBERTO. I provided the substantive portion of this visit by personally performing the MDM and exam component. I verify that the notes documented by the ROBERTO are correct. Additionally, I am documenting that history and exam reveals fatigued, non toxic male. Abdomen is slightly distended with mild tenderness throughout.       Finally, my impression and plan related to this encounter is complex shantel-pancreatic fluid collections with hemorrhagic  cystic lesion in tail of pancreas. He is symptomatic from fluid collection compressing stomach though initially plan was for watchful waiting for further maturation if possible. However, Tmax 100.6 today and he feels achy/chills. I think it is reasonable to obtain blood cultures and plan for perc drainage tomorrow via IR. NPO midnight. I think it appropriate to start empiric antibiotics tonight.      Stephanie Viera MD

## 2023-04-25 NOTE — PROGRESS NOTES
2000: Bedside and Verbal shift change report given to Jenny Buitrago RN (oncoming nurse) by Kaylin Gee (offgoing nurse). Report included the following information SBAR, Kardex, ED Summary, Intake/Output, MAR, Recent Results.

## 2023-04-25 NOTE — PROGRESS NOTES
Low grade temp, feels and looks worse. I think percutaneous drainage for culture as well as to relieve the pressure on the stomach is a good idea. Should he wind up with a fistula that can be dealt with at a later time. I do not think we will stir up the hemorrhage in the other cyst.    He is in agreement with trying this as a first go at relieving his problem.

## 2023-04-26 ENCOUNTER — APPOINTMENT (OUTPATIENT)
Dept: CT IMAGING | Age: 38
DRG: 438 | End: 2023-04-26
Attending: SURGERY
Payer: COMMERCIAL

## 2023-04-26 LAB
ALBUMIN SERPL-MCNC: 2.7 G/DL (ref 3.5–5)
ALBUMIN/GLOB SERPL: 0.6 (ref 1.1–2.2)
ALP SERPL-CCNC: 81 U/L (ref 45–117)
ALT SERPL-CCNC: 24 U/L (ref 12–78)
ANION GAP SERPL CALC-SCNC: 4 MMOL/L (ref 5–15)
APPEARANCE FLD: ABNORMAL
AST SERPL-CCNC: 31 U/L (ref 15–37)
BILIRUB SERPL-MCNC: 0.6 MG/DL (ref 0.2–1)
BUN SERPL-MCNC: 8 MG/DL (ref 6–20)
BUN/CREAT SERPL: 8 (ref 12–20)
CALCIUM SERPL-MCNC: 8.8 MG/DL (ref 8.5–10.1)
CHLORIDE SERPL-SCNC: 100 MMOL/L (ref 97–108)
CO2 SERPL-SCNC: 28 MMOL/L (ref 21–32)
COLOR FLD: ABNORMAL
CREAT SERPL-MCNC: 1.02 MG/DL (ref 0.7–1.3)
EOSINOPHIL NFR FLD MANUAL: 0 %
ERYTHROCYTE [DISTWIDTH] IN BLOOD BY AUTOMATED COUNT: 16 % (ref 11.5–14.5)
GLOBULIN SER CALC-MCNC: 4.9 G/DL (ref 2–4)
GLUCOSE BLD STRIP.AUTO-MCNC: 131 MG/DL (ref 65–117)
GLUCOSE SERPL-MCNC: 122 MG/DL (ref 65–100)
HCT VFR BLD AUTO: 29 % (ref 36.6–50.3)
HGB BLD-MCNC: 8.8 G/DL (ref 12.1–17)
LIPASE SERPL-CCNC: 168 U/L (ref 73–393)
LYMPHOCYTES NFR FLD: 0 %
MCH RBC QN AUTO: 26.4 PG (ref 26–34)
MCHC RBC AUTO-ENTMCNC: 30.3 G/DL (ref 30–36.5)
MCV RBC AUTO: 87.1 FL (ref 80–99)
MESOTHL CELL NFR FLD: 0 %
MONOS+MACROS NFR FLD: 0 %
NEUTROPHILS NFR FLD: 0 %
NRBC # BLD: 0 K/UL (ref 0–0.01)
NRBC BLD-RTO: 0 PER 100 WBC
NUC CELL # FLD: ABNORMAL /CU MM
OTHER CELLS NFR FLD MANUAL: 0 %
PLATELET # BLD AUTO: 304 K/UL (ref 150–400)
PMV BLD AUTO: 10.7 FL (ref 8.9–12.9)
POTASSIUM SERPL-SCNC: 4 MMOL/L (ref 3.5–5.1)
PROT SERPL-MCNC: 7.6 G/DL (ref 6.4–8.2)
RBC # BLD AUTO: 3.33 M/UL (ref 4.1–5.7)
RBC # FLD: >100 /CU MM
SERVICE CMNT-IMP: ABNORMAL
SODIUM SERPL-SCNC: 132 MMOL/L (ref 136–145)
SPECIMEN SOURCE FLD: ABNORMAL
TOTAL CELLS COUNTED SPEC: 0
WBC # BLD AUTO: 7.5 K/UL (ref 4.1–11.1)

## 2023-04-26 PROCEDURE — 74011250636 HC RX REV CODE- 250/636

## 2023-04-26 PROCEDURE — 80053 COMPREHEN METABOLIC PANEL: CPT

## 2023-04-26 PROCEDURE — 74011250636 HC RX REV CODE- 250/636: Performed by: HOSPITALIST

## 2023-04-26 PROCEDURE — 85027 COMPLETE CBC AUTOMATED: CPT

## 2023-04-26 PROCEDURE — 74011000258 HC RX REV CODE- 258: Performed by: INTERNAL MEDICINE

## 2023-04-26 PROCEDURE — 74011000250 HC RX REV CODE- 250: Performed by: FAMILY MEDICINE

## 2023-04-26 PROCEDURE — 83690 ASSAY OF LIPASE: CPT

## 2023-04-26 PROCEDURE — 89050 BODY FLUID CELL COUNT: CPT

## 2023-04-26 PROCEDURE — 74011250636 HC RX REV CODE- 250/636: Performed by: INTERNAL MEDICINE

## 2023-04-26 PROCEDURE — 36415 COLL VENOUS BLD VENIPUNCTURE: CPT

## 2023-04-26 PROCEDURE — 74011250637 HC RX REV CODE- 250/637: Performed by: HOSPITALIST

## 2023-04-26 PROCEDURE — 87077 CULTURE AEROBIC IDENTIFY: CPT

## 2023-04-26 PROCEDURE — 65270000032 HC RM SEMIPRIVATE

## 2023-04-26 PROCEDURE — 74011250637 HC RX REV CODE- 250/637: Performed by: SURGERY

## 2023-04-26 PROCEDURE — 87075 CULTR BACTERIA EXCEPT BLOOD: CPT

## 2023-04-26 PROCEDURE — 49406 IMAGE CATH FLUID PERI/RETRO: CPT

## 2023-04-26 PROCEDURE — 74011000250 HC RX REV CODE- 250: Performed by: HOSPITALIST

## 2023-04-26 PROCEDURE — 87205 SMEAR GRAM STAIN: CPT

## 2023-04-26 PROCEDURE — 77030020268 HC MISC GENERAL SUPPLY

## 2023-04-26 PROCEDURE — 82962 GLUCOSE BLOOD TEST: CPT

## 2023-04-26 PROCEDURE — 74011250636 HC RX REV CODE- 250/636: Performed by: FAMILY MEDICINE

## 2023-04-26 RX ORDER — LIDOCAINE HYDROCHLORIDE 10 MG/ML
10 INJECTION INFILTRATION; PERINEURAL
Status: COMPLETED | OUTPATIENT
Start: 2023-04-26 | End: 2023-04-26

## 2023-04-26 RX ORDER — MIDAZOLAM HYDROCHLORIDE 1 MG/ML
5 INJECTION, SOLUTION INTRAMUSCULAR; INTRAVENOUS
Status: DISCONTINUED | OUTPATIENT
Start: 2023-04-26 | End: 2023-04-26 | Stop reason: ALTCHOICE

## 2023-04-26 RX ORDER — FENTANYL CITRATE 50 UG/ML
200 INJECTION, SOLUTION INTRAMUSCULAR; INTRAVENOUS
Status: DISCONTINUED | OUTPATIENT
Start: 2023-04-26 | End: 2023-04-26 | Stop reason: ALTCHOICE

## 2023-04-26 RX ORDER — LIDOCAINE HYDROCHLORIDE 10 MG/ML
INJECTION, SOLUTION EPIDURAL; INFILTRATION; INTRACAUDAL; PERINEURAL
Status: DISPENSED
Start: 2023-04-26 | End: 2023-04-27

## 2023-04-26 RX ADMIN — PROCHLORPERAZINE EDISYLATE 10 MG: 5 INJECTION INTRAMUSCULAR; INTRAVENOUS at 17:41

## 2023-04-26 RX ADMIN — FENTANYL CITRATE 50 MCG: 0.05 INJECTION, SOLUTION INTRAMUSCULAR; INTRAVENOUS at 11:55

## 2023-04-26 RX ADMIN — HYDROMORPHONE HYDROCHLORIDE 2 MG: 2 INJECTION, SOLUTION INTRAMUSCULAR; INTRAVENOUS; SUBCUTANEOUS at 14:37

## 2023-04-26 RX ADMIN — MEROPENEM 1 G: 1 INJECTION, POWDER, FOR SOLUTION INTRAVENOUS at 14:36

## 2023-04-26 RX ADMIN — HYDROMORPHONE HYDROCHLORIDE 2 MG: 2 INJECTION, SOLUTION INTRAMUSCULAR; INTRAVENOUS; SUBCUTANEOUS at 17:34

## 2023-04-26 RX ADMIN — SODIUM CHLORIDE, PRESERVATIVE FREE 10 ML: 5 INJECTION INTRAVENOUS at 05:26

## 2023-04-26 RX ADMIN — FENTANYL CITRATE 50 MCG: 0.05 INJECTION, SOLUTION INTRAMUSCULAR; INTRAVENOUS at 12:05

## 2023-04-26 RX ADMIN — ACETAMINOPHEN 1000 MG: 500 TABLET ORAL at 05:26

## 2023-04-26 RX ADMIN — MIDAZOLAM 1 MG: 1 INJECTION INTRAMUSCULAR; INTRAVENOUS at 12:00

## 2023-04-26 RX ADMIN — MEROPENEM 1 G: 1 INJECTION, POWDER, FOR SOLUTION INTRAVENOUS at 05:47

## 2023-04-26 RX ADMIN — ACETAMINOPHEN 1000 MG: 500 TABLET ORAL at 17:25

## 2023-04-26 RX ADMIN — SODIUM CHLORIDE, PRESERVATIVE FREE 10 ML: 5 INJECTION INTRAVENOUS at 21:28

## 2023-04-26 RX ADMIN — MESALAMINE 800 MG: 400 CAPSULE, DELAYED RELEASE ORAL at 21:32

## 2023-04-26 RX ADMIN — FENTANYL CITRATE 50 MCG: 0.05 INJECTION, SOLUTION INTRAMUSCULAR; INTRAVENOUS at 12:00

## 2023-04-26 RX ADMIN — HYDROMORPHONE HYDROCHLORIDE 2 MG: 2 INJECTION, SOLUTION INTRAMUSCULAR; INTRAVENOUS; SUBCUTANEOUS at 02:51

## 2023-04-26 RX ADMIN — ONDANSETRON 4 MG: 2 INJECTION INTRAMUSCULAR; INTRAVENOUS at 14:37

## 2023-04-26 RX ADMIN — SODIUM CHLORIDE, PRESERVATIVE FREE 10 ML: 5 INJECTION INTRAVENOUS at 14:38

## 2023-04-26 RX ADMIN — MIDAZOLAM 1 MG: 1 INJECTION INTRAMUSCULAR; INTRAVENOUS at 11:55

## 2023-04-26 RX ADMIN — HYDROMORPHONE HYDROCHLORIDE 2 MG: 2 INJECTION, SOLUTION INTRAMUSCULAR; INTRAVENOUS; SUBCUTANEOUS at 21:29

## 2023-04-26 RX ADMIN — ONDANSETRON 4 MG: 2 INJECTION INTRAMUSCULAR; INTRAVENOUS at 21:27

## 2023-04-26 RX ADMIN — HYDROMORPHONE HYDROCHLORIDE 2 MG: 2 INJECTION, SOLUTION INTRAMUSCULAR; INTRAVENOUS; SUBCUTANEOUS at 05:46

## 2023-04-26 RX ADMIN — ONDANSETRON 4 MG: 2 INJECTION INTRAMUSCULAR; INTRAVENOUS at 05:26

## 2023-04-26 RX ADMIN — LIDOCAINE HYDROCHLORIDE 10 ML: 10 INJECTION, SOLUTION EPIDURAL; INFILTRATION; INTRACAUDAL; PERINEURAL at 12:19

## 2023-04-26 RX ADMIN — MESALAMINE 800 MG: 400 CAPSULE, DELAYED RELEASE ORAL at 17:25

## 2023-04-26 RX ADMIN — MIDAZOLAM 1 MG: 1 INJECTION INTRAMUSCULAR; INTRAVENOUS at 12:05

## 2023-04-26 RX ADMIN — MEROPENEM 1 G: 1 INJECTION, POWDER, FOR SOLUTION INTRAVENOUS at 21:27

## 2023-04-26 RX ADMIN — HYDROMORPHONE HYDROCHLORIDE 2 MG: 2 INJECTION, SOLUTION INTRAMUSCULAR; INTRAVENOUS; SUBCUTANEOUS at 09:05

## 2023-04-26 NOTE — PROGRESS NOTES
Problem: Pain  Goal: *Control of Pain  Outcome: Progressing Towards Goal     Problem: Pancreatitis  Goal: *Control of acute pain  Outcome: Progressing Towards Goal  Goal: *Absence of nausea/vomiting  Outcome: Progressing Towards Goal  Goal: *Optimize nutritional status  Outcome: Progressing Towards Goal  Goal: *Labs within defined limits  Outcome: Progressing Towards Goal

## 2023-04-26 NOTE — PROGRESS NOTES
[address] [facility].  [address] [address] [address], [address]  [phone]                     GI PROGRESS NOTE    Patient Name: Lilly Littlejohn      : 1985      MRN: [id number]  Admit Date: 2023  Today's Date: 2023  CC: Pancreatic pseudocyst with hemorrhage    Subjective:   Mr. Montez Strange is laying in bed with his wife at bedside. Continues with chills and body aches. Did not sleep well due to increase in pain. Objective:     Blood pressure 123/74, pulse 97, temperature 98.5 °F (36.9 °C), resp. rate 16, height 5' 11\" (1.803 m), weight 93.9 kg (207 lb), SpO2 94 %. Physical Exam:  General appearance: cooperative,  male, appears stated age  Skin: pale, clammy, diaphoretic   HEENT: Sclerae anicteric. Cardiovascular: Regular rate and rhythm. Respiratory: Comfortable breathing with no accessory muscle use. GI: Abdomen nondistended, soft, and epigastric/LUQ pain . Normal active bowel sounds. Rectal: Deferred   Musculoskeletal: No pitting edema of the lower legs. Neurological: Patient is alert and oriented. Psychiatric:  No anxiety or agitation.       Data Review:    Recent Results (from the past 24 hour(s))   CULTURE, BLOOD    Collection Time: 23  7:14 PM    Specimen: Blood   Result Value Ref Range    Special Requests: NO SPECIAL REQUESTS      Culture result: NO GROWTH <24 HRS     CBC W/O DIFF    Collection Time: 23 11:37 PM   Result Value Ref Range    WBC 7.5 4.1 - 11.1 K/uL    RBC 3.33 (L) 4.10 - 5.70 M/uL    HGB 8.8 (L) 12.1 - 17.0 g/dL    HCT 29.0 (L) 36.6 - 50.3 %    MCV 87.1 80.0 - 99.0 FL    MCH 26.4 26.0 - 34.0 PG    MCHC 30.3 30.0 - 36.5 g/dL    RDW 16.0 (H) 11.5 - 14.5 %    PLATELET 302 654 - 554 K/uL    MPV 10.7 8.9 - 12.9 FL    NRBC 0.0 0  WBC    ABSOLUTE NRBC 0.00 0.00 - 3.16 K/uL   METABOLIC PANEL, COMPREHENSIVE    Collection Time: 23 11:37 PM   Result Value Ref Range    Sodium 132 (L) 136 - 145 mmol/L    Potassium 4.0 3.5 - 5.1 mmol/L    Chloride 100 97 - 108 mmol/L    CO2 28 21 - 32 mmol/L    Anion gap 4 (L) 5 - 15 mmol/L    Glucose 122 (H) 65 - 100 mg/dL    BUN 8 6 - 20 MG/DL    Creatinine 1.02 0.70 - 1.30 MG/DL    BUN/Creatinine ratio 8 (L) 12 - 20      eGFR >60 >60 ml/min/1.73m2    Calcium 8.8 8.5 - 10.1 MG/DL    Bilirubin, total 0.6 0.2 - 1.0 MG/DL    ALT (SGPT) 24 12 - 78 U/L    AST (SGOT) 31 15 - 37 U/L    Alk. phosphatase 81 45 - 117 U/L    Protein, total 7.6 6.4 - 8.2 g/dL    Albumin 2.7 (L) 3.5 - 5.0 g/dL    Globulin 4.9 (H) 2.0 - 4.0 g/dL    A-G Ratio 0.6 (L) 1.1 - 2.2     LIPASE    Collection Time: 04/25/23 11:37 PM   Result Value Ref Range    Lipase 168 73 - 393 U/L   GLUCOSE, POC    Collection Time: 04/26/23  7:19 AM   Result Value Ref Range    Glucose (POC) 131 (H) 65 - 117 mg/dL    Performed by Autumn Perez, BODY FLUID    Collection Time: 04/26/23 12:36 PM   Result Value Ref Range    BODY FLUID TYPE ABSCESS      FLUID COLOR BLOOD      FLUID APPEARANCE BLOOD      FLUID RBC CT. >100 (H) 0 /cu mm    FLUID NUCLEATED CELLS 31,900 /cu mm           Assessment / Plan :     42 yo male presents with pancreatic pseudocyst with hemorrhage and increase in size-suspect bleed from splenic artery aneurysm/varices. EGD 4/18/23: Esophagus:Normal mucosa. Small sliding hiatal hernia was noted with Z-line at 38 cm and diaphragmatic pinch at 40 cm. Stomach: Extrinsic compression was noted in the gastric body. Congestion was noted in the stomach body. Nonbleeding fundic varices were noted (IGV1). No bleeding or stigmata were noted. EUS 4/18/23: A large heterogeneous solid cystic lesion was noted in the pancreatic tail and measured about 67 x 84 mm in size. Extensive varices were noted around this lesion going up to the splenic hilum. Part of the pancreas body and tail were obscured by this lesion. Head was unremarkable.   The lesion described in the transverse mesocolon could not be completely evaluated given the anatomic location    Cytology results: No cells diagnostic for malignancy, Rare benign acinic cells, macrophages, and acute inflammation    Interval History: Continues with abdominal pain and nausea, requiring IV dilaudid. CTAP w cont 4/21/23  IMPRESSION  1. Peripancreatic fluid collection extending with mass effect on the gastric  wall. The current study, the collection is more homogeneous and demonstrates  peripheral enhancement. Findings are consistent with a pseudocyst.  2.  Second collection in the tail of pancreas with internal high density  components likely reflecting prior hemorrhage which is decreased in size. CA 19-9 3     Due to  low grade fever, chills and body aches there is concern for infection. Blood cultures pending, Meropenem started, and plan for perc drainage of pseudocyst via IR today.   Surgery in agreement.     - Nutrition consulted   - Monitor H&H and transfuse for hgb <7   - Surgery following  - Supportive care analgesia and anti-emetics per hospitalist  - GI will follow along            Patient C/Jethro Yen 1104 Problem List:   Active Problems:    Pancreas hemorrhage (4/16/2023)        Time Spent with Patient: Bina Short NP

## 2023-04-26 NOTE — PROGRESS NOTES
TRANSFER - OUT REPORT:    Verbal report given to Tha Chan RN on [employer]  being transferred to [address] [place] [address] for routine progression of care       Report consisted of patients Situation, Background, Assessment and   Recommendations(SBAR). Information from the following report(s) Procedure Summary and MAR was reviewed with the receiving nurse. Lines:   Peripheral IV 04/26/23 Right Antecubital (Active)   Site Assessment Clean, dry, & intact 04/26/23 0910   Phlebitis Assessment 0 04/26/23 0910   Infiltration Assessment 0 04/26/23 0910   Dressing Status Clean, dry, & intact; New 04/26/23 0910   Dressing Type Transparent 04/26/23 0910   Hub Color/Line Status Flushed; Infusing 04/26/23 0910   Action Taken Dressing reinforced 04/26/23 0820   Alcohol Cap Used Yes 04/26/23 0910        Opportunity for questions and clarification was provided.       Patient transported with hospital transportation

## 2023-04-26 NOTE — PROGRESS NOTES
Pt arrives on stretcher to angio department for abdominal fluid collection drain placement procedure. All assessments completed and consent was reviewed. Education given was regarding procedure, moderate sedation, post-procedure care and  management/follow-up. Opportunity for questions was provided and all questions and concerns were addressed.

## 2023-04-26 NOTE — PROGRESS NOTES
OZIEL:  1. RUR-11%  2. Patient going to surgery today, return home when stable. Cm following for any discharge needs as they may arise.       Kingsley Gtz, [facility]

## 2023-04-26 NOTE — PROGRESS NOTES
Notified MD Patt Montana that lab had called and reported that the fluid from abscess was unsuitable for differential analysis.

## 2023-04-27 LAB
ALBUMIN SERPL-MCNC: 2.4 G/DL (ref 3.5–5)
ALBUMIN/GLOB SERPL: 0.6 (ref 1.1–2.2)
ALP SERPL-CCNC: 69 U/L (ref 45–117)
ALT SERPL-CCNC: 23 U/L (ref 12–78)
ANION GAP SERPL CALC-SCNC: 3 MMOL/L (ref 5–15)
AST SERPL-CCNC: 19 U/L (ref 15–37)
BILIRUB SERPL-MCNC: 0.4 MG/DL (ref 0.2–1)
BUN SERPL-MCNC: 10 MG/DL (ref 6–20)
BUN/CREAT SERPL: 11 (ref 12–20)
CALCIUM SERPL-MCNC: 8.4 MG/DL (ref 8.5–10.1)
CHLORIDE SERPL-SCNC: 104 MMOL/L (ref 97–108)
CO2 SERPL-SCNC: 29 MMOL/L (ref 21–32)
CREAT SERPL-MCNC: 0.9 MG/DL (ref 0.7–1.3)
ERYTHROCYTE [DISTWIDTH] IN BLOOD BY AUTOMATED COUNT: 16.3 % (ref 11.5–14.5)
GLOBULIN SER CALC-MCNC: 4.3 G/DL (ref 2–4)
GLUCOSE SERPL-MCNC: 136 MG/DL (ref 65–100)
HCT VFR BLD AUTO: 26.9 % (ref 36.6–50.3)
HGB BLD-MCNC: 8.5 G/DL (ref 12.1–17)
IGG4 SER-MCNC: 29 MG/DL (ref 2–96)
LIPASE SERPL-CCNC: 80 U/L (ref 73–393)
MCH RBC QN AUTO: 27.2 PG (ref 26–34)
MCHC RBC AUTO-ENTMCNC: 31.6 G/DL (ref 30–36.5)
MCV RBC AUTO: 85.9 FL (ref 80–99)
NRBC # BLD: 0 K/UL (ref 0–0.01)
NRBC BLD-RTO: 0 PER 100 WBC
PLATELET # BLD AUTO: 307 K/UL (ref 150–400)
PMV BLD AUTO: 10 FL (ref 8.9–12.9)
POTASSIUM SERPL-SCNC: 3.5 MMOL/L (ref 3.5–5.1)
PROT SERPL-MCNC: 6.7 G/DL (ref 6.4–8.2)
RBC # BLD AUTO: 3.13 M/UL (ref 4.1–5.7)
SODIUM SERPL-SCNC: 136 MMOL/L (ref 136–145)
WBC # BLD AUTO: 5.7 K/UL (ref 4.1–11.1)

## 2023-04-27 PROCEDURE — 74011250636 HC RX REV CODE- 250/636: Performed by: HOSPITALIST

## 2023-04-27 PROCEDURE — 74011000250 HC RX REV CODE- 250: Performed by: HOSPITALIST

## 2023-04-27 PROCEDURE — 74011250636 HC RX REV CODE- 250/636: Performed by: INTERNAL MEDICINE

## 2023-04-27 PROCEDURE — 74011250637 HC RX REV CODE- 250/637: Performed by: STUDENT IN AN ORGANIZED HEALTH CARE EDUCATION/TRAINING PROGRAM

## 2023-04-27 PROCEDURE — 74011250636 HC RX REV CODE- 250/636: Performed by: FAMILY MEDICINE

## 2023-04-27 PROCEDURE — 74011250637 HC RX REV CODE- 250/637: Performed by: HOSPITALIST

## 2023-04-27 PROCEDURE — 65270000032 HC RM SEMIPRIVATE

## 2023-04-27 PROCEDURE — 74011000258 HC RX REV CODE- 258: Performed by: INTERNAL MEDICINE

## 2023-04-27 PROCEDURE — 74011250637 HC RX REV CODE- 250/637: Performed by: SURGERY

## 2023-04-27 RX ORDER — HYDROMORPHONE HYDROCHLORIDE 2 MG/1
2 TABLET ORAL
Status: DISCONTINUED | OUTPATIENT
Start: 2023-04-27 | End: 2023-04-28 | Stop reason: HOSPADM

## 2023-04-27 RX ADMIN — ACETAMINOPHEN 1000 MG: 500 TABLET ORAL at 19:54

## 2023-04-27 RX ADMIN — MEROPENEM 1 G: 1 INJECTION, POWDER, FOR SOLUTION INTRAVENOUS at 14:05

## 2023-04-27 RX ADMIN — SENNOSIDES AND DOCUSATE SODIUM 1 TABLET: 50; 8.6 TABLET ORAL at 09:23

## 2023-04-27 RX ADMIN — SODIUM CHLORIDE, PRESERVATIVE FREE 10 ML: 5 INJECTION INTRAVENOUS at 14:06

## 2023-04-27 RX ADMIN — HYDROMORPHONE HYDROCHLORIDE 2 MG: 2 INJECTION, SOLUTION INTRAMUSCULAR; INTRAVENOUS; SUBCUTANEOUS at 02:48

## 2023-04-27 RX ADMIN — HYDROMORPHONE HYDROCHLORIDE 2 MG: 2 TABLET ORAL at 18:09

## 2023-04-27 RX ADMIN — MESALAMINE 800 MG: 400 CAPSULE, DELAYED RELEASE ORAL at 21:55

## 2023-04-27 RX ADMIN — SODIUM CHLORIDE, PRESERVATIVE FREE 10 ML: 5 INJECTION INTRAVENOUS at 05:26

## 2023-04-27 RX ADMIN — HYDROMORPHONE HYDROCHLORIDE 2 MG: 2 INJECTION, SOLUTION INTRAMUSCULAR; INTRAVENOUS; SUBCUTANEOUS at 06:08

## 2023-04-27 RX ADMIN — HYDROMORPHONE HYDROCHLORIDE 2 MG: 2 INJECTION, SOLUTION INTRAMUSCULAR; INTRAVENOUS; SUBCUTANEOUS at 09:22

## 2023-04-27 RX ADMIN — PROCHLORPERAZINE EDISYLATE 10 MG: 5 INJECTION INTRAMUSCULAR; INTRAVENOUS at 22:31

## 2023-04-27 RX ADMIN — PROCHLORPERAZINE EDISYLATE 10 MG: 5 INJECTION INTRAMUSCULAR; INTRAVENOUS at 09:23

## 2023-04-27 RX ADMIN — ONDANSETRON 4 MG: 2 INJECTION INTRAMUSCULAR; INTRAVENOUS at 06:08

## 2023-04-27 RX ADMIN — MEROPENEM 1 G: 1 INJECTION, POWDER, FOR SOLUTION INTRAVENOUS at 05:26

## 2023-04-27 RX ADMIN — MEROPENEM 1 G: 1 INJECTION, POWDER, FOR SOLUTION INTRAVENOUS at 21:55

## 2023-04-27 RX ADMIN — PANTOPRAZOLE SODIUM 20 MG: 20 TABLET, DELAYED RELEASE ORAL at 11:51

## 2023-04-27 RX ADMIN — SODIUM CHLORIDE, POTASSIUM CHLORIDE, SODIUM LACTATE AND CALCIUM CHLORIDE 75 ML/HR: 600; 310; 30; 20 INJECTION, SOLUTION INTRAVENOUS at 09:30

## 2023-04-27 RX ADMIN — PROCHLORPERAZINE EDISYLATE 10 MG: 5 INJECTION INTRAMUSCULAR; INTRAVENOUS at 02:48

## 2023-04-27 RX ADMIN — MESALAMINE 800 MG: 400 CAPSULE, DELAYED RELEASE ORAL at 18:09

## 2023-04-27 RX ADMIN — MESALAMINE 800 MG: 400 CAPSULE, DELAYED RELEASE ORAL at 09:24

## 2023-04-27 RX ADMIN — HYDROMORPHONE HYDROCHLORIDE 2 MG: 2 TABLET ORAL at 21:55

## 2023-04-27 NOTE — PROGRESS NOTES
Looks and feels better. Tolerating diet well so far. Drainage is still bloody but down. CT in a couple of days prior to hopefully getting him home.

## 2023-04-27 NOTE — CONSULTS
Comprehensive Nutrition Assessment    Type and Reason for Visit: Reassess    Nutrition Recommendations/Plan:   Diet advanced to regular low fat  Ensure enlive TID plus preferred protein rich snacks  Please record supplement and meal intakes. Thank you nursing. Pt c/o diarrhea- discontinue pericolace as this is a stimulant laxative. Can try stool softener like miralax or soluble fiber supplement like metamucil for regular bowel movements  Updated standing scale weight please       Malnutrition Assessment:  Malnutrition Status:  No malnutrition (04/25/23 1242)           Nutrition Assessment:    PMHx: UC, h/o testicular cancer    4/27: Follow up. Pt was NPO most of yesterday for perc drainage of shantel-pancreatic fluid. Advanced to full liquid last night. Got okay from attending MD in rounds to advance to solid food. Visited with pt and family at bedside. Pt did not like soft bite sized food on Wednesday- advanced to easy to chew low fat today. Pt likes ensure supplements and has been drinking TID, which would be 1050 kcals and 60 g pro total. No updated weight. Pt ate 100% of breakfast today, no c/o abd pain after eating. A little bit of nausea today- noted prn nausea meds given. C/o diarrhea- pericolace given again today. This is a stimulant laxative- recommend discontinuing this and doing a stool softener or soluble fiber supplement instead. Overall pt is well nourished and has an appetite. Is drinking ensure supplements. Do not feel a NGT (DHT) to maintain nutrition status is necessary at this time. Obviously with ongoing treatment his appetite may wax/wane but for now his intake is sufficient. Family/pt asked about appropriate diet after discharge- reinforced to avoid high fat, greasy/fried foods, do small frequent meals dense in protein. All questions answered. Brought him a cranberry juice as requested.       4/25: Consult appreciated. 41 y.o. male admitted with pancreatic pseudocyst with hemorrhage. GI and surgery following. Per GI possible placement of Gtube if pt is unable to meet calorie needs po. Visited with pt at bedside. He complained of some nausea, no vomiting. Diarrhea started today- noted he refused pericolace today. He feels he's ready to try some soft solid food. Spoke with attending MD who okayed diet advancement. Pt asked about appropriate diet r/t pancreatitis- advised to focus on low fat high protein foods and gave examples of these. Talked about use of small frequent meals, eating on a schedule every 2-3 hours to combat early satiety and poor appetite. He likes ensure supplements- adjusted to ensure high protein BID and ensure enlive once daily to increase protein and decrease fat. He's also agreeable to boiled eggs, low fat cottage cheese as snacks which are appropriate on his soft bite sized, low fat diet. Stated -210#; current bedscale weight 207#. Pt denied any weight loss pta which is consistent with weight history in EMR. Appropriate fat and muscle stores for his age. No concern for malnutrition. Spoke with RN about recording intake of meals and supplements- appreciate help. Will continue to follow to assess nutrition status going forward. , Na 135      Nutritionally Significant Medications:  Merrem, mesalamine, protonix, pericolace, ringers  Prn: zofran, compazine, dilaudid      Estimated Daily Nutrient Needs:  Energy Requirements Based On: Kcal/kg  Weight Used for Energy Requirements: Current  Energy (kcal/day): 4849-0359 (20-22 kcal/kg)  Weight Used for Protein Requirements: Current  Protein (g/day): 94 (1 g/kg)     Fluid (ml/day): 1 ml/kcal    Nutrition Related Findings:   Edema: No data recorded    Last BM: 04/26/23,      Wounds: None      Current Nutrition Therapies:  Diet: full liquid   Supplements: ensure enlive BID plus protein rich snacks  Meal intake: No data found. Supplement intake: No data found.   Nutrition Support: none      Anthropometric Measures:  Height: 5' 11\" (180.3 cm)  Ideal Body Weight (IBW): 172 lbs (78 kg)     Current Body Wt:  93.9 kg (207 lb 0.2 oz), 120.4 % IBW. Bed scale  Current BMI (kg/m2): 28.9        Weight Adjustment: No adjustment                 BMI Category: Overweight (BMI 25.0-29. 9)    Wt Readings from Last 10 Encounters:   04/26/23 93.9 kg (207 lb)   04/03/23 95.3 kg (210 lb 1.6 oz)   01/10/23 90.7 kg (200 lb)   12/23/22 91.5 kg (201 lb 12.8 oz)   10/07/22 93.4 kg (206 lb)   09/09/22 93.9 kg (207 lb)   08/15/22 93 kg (205 lb)   08/08/22 92.1 kg (203 lb)   08/01/22 92.4 kg (203 lb 9.6 oz)   07/29/22 92.8 kg (204 lb 9.6 oz)           Nutrition Diagnosis:   Inadequate oral intake related to altered GI function as evidenced by  (pancreatic pseudocyst)    Nutrition Interventions:   Food and/or Nutrient Delivery: Modify current diet, Continue oral nutrition supplement, Snacks (specify)  Nutrition Education/Counseling: Counseling completed  Coordination of Nutrition Care: Continue to monitor while inpatient, Interdisciplinary rounds  Plan of Care discussed with: MD    Goals:  Previous Goal Met: Progressing toward goal(s)  Goals: PO intake 50% or greater, Meet at least 75% of estimated needs, by next RD assessment       Nutrition Monitoring and Evaluation:   Behavioral-Environmental Outcomes: None identified  Food/Nutrient Intake Outcomes: Food and nutrient intake, Supplement intake, Diet advancement/tolerance  Physical Signs/Symptoms Outcomes: Biochemical data, GI status, Meal time behavior, Weight    Discharge Planning:    Continue oral nutrition supplement      Recent Labs     04/26/23 2350 04/25/23 2337 04/25/23  0008   * 122* 109*   BUN 10 8 8   CREA 0.90 1.02 1.06    132* 135*   K 3.5 4.0 3.9    100 102   CO2 29 28 29   CA 8.4* 8.8 9.1       Recent Labs     04/26/23 2350 04/25/23  2337 04/25/23  0008   ALT 23 24 23   AST 19 31 25   AP 69 81 74   TBILI 0.4 0.6 0.6   TP 6.7 7.6 7.8   ALB 2.4* 2.7* 2.8* GLOB 4.3* 4.9* 5.0*   LPSE 80 168 178       No results for input(s): LAC in the last 72 hours. Recent Labs     04/26/23  2350 04/25/23  2337   WBC 5.7 7.5   HGB 8.5* 8.8*   HCT 26.9* 29.0*    304       No results for input(s): PREALB in the last 72 hours. No results found for: PREALB    No results for input(s): TRIGL in the last 72 hours. Triglyceride   Date Value Ref Range Status   04/20/2023 78 <150 MG/DL Final     Comment:     Based on NCEP-ATP III:  Triglycerides <150 mg/dL  is considered normal, 150-199 mg/dL  borderline high,  200-499 mg/dL high and  greater than or equal to 500 mg/dL very high. Recent Labs     04/26/23  0719   GLUCPOC 131*       No results found for: HBA1C, PIV1VUMU, CCY7GFUS, UBO6QLHN    Iron Profile    No results for input(s): IRON, FOL, B12LT, VB6LT in the last 72 hours. No results for input(s): NH4 in the last 72 hours.     No results found for: IRON, TIBC, PSAT    No results found for: FERR    B Vitamins  No results found for: FOL, B12LT, VB6LT, VIB1LT    Zinc  No results found for: ZINCLT    Copper  No results found for: COSLT    Selenium  No results found for: SELNLT    Vit C  No results found for: [id number]    Fat Soluble Vitamins    No results found for: VITALT, VITD3, VITE1T, VITEG, VIK1LT    No results found for: NH4        Nilsa Graham RD  Available via [employer]

## 2023-04-27 NOTE — PROGRESS NOTES
Problem: Pain  Goal: *Control of Pain  Outcome: Progressing Towards Goal     Problem: Pancreatitis  Goal: *Control of acute pain  Outcome: Progressing Towards Goal  Goal: *Absence of nausea/vomiting  Outcome: Progressing Towards Goal  Goal: *Optimize nutritional status  Outcome: Progressing Towards Goal  Goal: *Labs within defined limits  Outcome: Progressing Towards Goal     Problem: Falls - Risk of  Goal: *Absence of Falls  Description: Document Daron Fall Risk and appropriate interventions in the flowsheet.   Outcome: Progressing Towards Goal  Note: Fall Risk Interventions:       Medication Interventions: Teach patient to arise slowly     Problem: Patient Education: Go to Patient Education Activity  Goal: Patient/Family Education  Outcome: Progressing Towards Goal

## 2023-04-27 NOTE — PROGRESS NOTES
[address] [facility]  Hospitalist Group                                                                                          Hospitalist Progress Note  Estela Cates MD  Answering service: [phone] OR [phone] from in house phone        Date of Service:  2023  NAME:  Timothy Alberts  :  1985  MRN:  [id number]       Admission Summary:   41 y.o. male who presents with abdominal pain. He has a history of metastatic testicular cancer. He was recently admitted for a pseudocyst of the pancreas after undergoing a surveillance CT for testicular cancer, though no clear history of pancreatitis. He was discharged and was doing well. He returned with acute abdominal pain and was found to have a hemorrhagic pseudocyst.       Interval history / Subjective:   Pain persists, some nausea no vomiting  Most recent CT scan results reviewed- no acute events overnight  Reviewed his vitals, labs, meds, careplan   Normal ISELA, Ca19-9 pending;   Plans for IR to place drain into pancreatic cysts today     Assessment & Plan:     Pancreatic pseudocyst with hemorrhage:  -s/p EUS w/ FNA cytology showing inflammatory cells  IR to place drain today  -pain control and antiemetics - improving  -appreciate GI/surgery consultants  -trend H/H - Hb dropped today- ?  Dilutional effects or slow continued bleed into hemorrhagic cyst  -CTA  without active hemorrhage- repeat CT 23 showing little change  Elevated  SED rate, CRP with negative ISELA,   Cont liq diet and IV pain meds    Hypokalemia:-replete PRN     UC: -continue mesalamine     Hx of Stage IIIb testicular cancer     Code status: full  Prophylaxis: SCDs  Care Plan discussed with: pt   Anticipated Disposition: home when ready  Inpatient  Cardiac monitoring: Remote Telemetry     Hospital Problems  Date Reviewed: 2023            Codes Class Noted POA    Pancreas hemorrhage ICD-10-CM: K86.89  ICD-9-CM: 577.8  2023 Unknown         Social Determinants of Health     Tobacco Use: Medium Risk    Smoking Tobacco Use: Former    Smokeless Tobacco Use: Never    Passive Exposure: Not on file   Alcohol Use: Not on file   Financial Resource Strain: Not on file   Food Insecurity: Not on file   Transportation Needs: Not on file   Physical Activity: Not on file   Stress: Not on file   Social Connections: Not on file   Intimate Partner Violence: Not on file   Depression: Not at risk    PHQ-2 Score: 0   Housing Stability: Not on file       Review of Systems:   A comprehensive review of systems was negative except for that written in the HPI. Vital Signs:    Last 24hrs VS reviewed since prior progress note.  Most recent are:  Visit Vitals  /64 (BP 1 Location: Left arm, BP Patient Position: At rest)   Pulse 98   Temp 98.3 °F (36.8 °C)   Resp 18   Ht 5' 11\" (1.803 m)   Wt 93.9 kg (207 lb)   SpO2 96%   BMI 28.87 kg/m²     Patient Vitals for the past 24 hrs:   Temp Pulse Resp BP SpO2   04/26/23 2104 98.3 °F (36.8 °C) 98 18 110/64 96 %   04/26/23 1729 98.3 °F (36.8 °C) (!) 105 18 138/65 95 %   04/26/23 1433 98.8 °F (37.1 °C) (!) 102 16 125/74 96 %   04/26/23 1330 -- 97 16 123/74 94 %   04/26/23 1315 -- 90 18 123/72 93 %   04/26/23 1300 -- 88 17 123/68 92 %   04/26/23 1245 -- 88 13 119/71 92 %   04/26/23 1230 -- 93 15 121/68 91 %   04/26/23 1221 -- 91 16 (!) 115/59 95 %   04/26/23 1215 -- 92 13 114/64 96 %   04/26/23 1210 -- 88 14 109/65 95 %   04/26/23 1206 -- 94 16 105/66 90 %   04/26/23 1201 -- 94 18 119/64 91 %   04/26/23 1156 -- 96 -- 120/66 94 %   04/26/23 1151 -- 95 18 129/75 97 %   04/26/23 1146 -- 97 14 131/82 94 %   04/26/23 1141 -- (!) 104 12 129/76 93 %   04/26/23 0904 98.5 °F (36.9 °C) (!) 104 16 134/84 98 %             Intake/Output Summary (Last 24 hours) at 4/26/2023 2156  Last data filed at 4/26/2023 1449  Gross per 24 hour   Intake 60 ml   Output 300 ml   Net -240 ml          Physical Examination:     I had a face to face encounter with this patient and independently examined them on 4/26/2023 as outlined below:          PHYSICAL EXAM:   General: NAD non-toxic  HEENT: anicteric sclerae  Neck: Supple, no JVD, no meningeal signs  Chest: normal work of breathing  CVS: RRR  Abd: Soft, mild epigastric and LUQ tenderness, non-distended,  Ext: No clubbing, no cyanosis, no edema  Neuro/Psych: grossly non-focal    Data Review:    Review and/or order of clinical lab test  Review and/or order of tests in the radiology section of CPT      I have personally and independently reviewed all pertinent labs, diagnostic studies, imaging, and have provided independent interpretation of the same. Labs:     Recent Labs     04/25/23 2337 04/25/23 0008   WBC 7.5 9.2   HGB 8.8* 9.7*   HCT 29.0* 31.2*    320       Recent Labs     04/25/23 2337 04/25/23 0008 04/24/23 0213   * 135* 135*   K 4.0 3.9 3.8    102 105   CO2 28 29 27   BUN 8 8 6   CREA 1.02 1.06 0.96   * 109* 114*   CA 8.8 9.1 8.7       Recent Labs     04/25/23 2337 04/25/23 0008 04/24/23 0213   ALT 24 23 16   AP 81 74 61   TBILI 0.6 0.6 0.6   TP 7.6 7.8 7.0   ALB 2.7* 2.8* 2.6*   GLOB 4.9* 5.0* 4.4*   LPSE 168 178 153       No results for input(s): INR, PTP, APTT, INREXT, INREXT in the last 72 hours. No results for input(s): FE, TIBC, PSAT, FERR in the last 72 hours. No results found for: FOL, RBCF   No results for input(s): PH, PCO2, PO2 in the last 72 hours. No results for input(s): CPK, CKNDX, TROIQ in the last 72 hours.     No lab exists for component: CPKMB  Lab Results   Component Value Date/Time    Triglyceride 78 04/20/2023 12:05 PM     Lab Results   Component Value Date/Time    Glucose (POC) 131 (H) 04/26/2023 07:19 AM    Glucose (POC) 106 04/21/2023 06:37 AM     Lab Results   Component Value Date/Time    Color DARK YELLOW 04/16/2023 08:47 AM    Appearance CLEAR 04/16/2023 08:47 AM    Specific gravity 1.011 04/03/2023 12:32 PM    pH (UA) 5.5 04/16/2023 08:47 AM    Protein 100 (A) 04/16/2023 08:47 AM    Glucose Negative 04/16/2023 08:47 AM    Ketone TRACE (A) 04/16/2023 08:47 AM    Bilirubin Negative 04/03/2023 12:32 PM    Urobilinogen 1.0 04/16/2023 08:47 AM    Nitrites Negative 04/16/2023 08:47 AM    Leukocyte Esterase Negative 04/16/2023 08:47 AM    Epithelial cells FEW 04/16/2023 08:47 AM    Bacteria Negative 04/16/2023 08:47 AM    WBC 0-4 04/16/2023 08:47 AM    RBC 0-5 04/16/2023 08:47 AM       Notes reviewed from all clinical/nonclinical/nursing services involved in patient's clinical care. Care coordination discussions were held with appropriate clinical/nonclinical/ nursing providers based on care coordination needs. Patients current active Medications were reviewed, considered, added and adjusted based on the clinical condition today. Home Medications were reconciled to the best of my ability given all available resources at the time of admission. Route is PO if not otherwise noted.       Admission Status:[id number]:::1}      Medications Reviewed:     Current Facility-Administered Medications   Medication Dose Route Frequency    lidocaine (PF) (XYLOCAINE) 10 mg/mL (1 %) injection        meropenem (MERREM) 1 g in 0.9% sodium chloride (MBP/ADV) 50 mL MBP  1 g IntraVENous Q8H    HYDROmorphone (DILAUDID) injection 2 mg  2 mg IntraVENous Q3H PRN    ondansetron (ZOFRAN ODT) tablet 4 mg  4 mg Oral Q8H PRN    Or    ondansetron (ZOFRAN) injection 4 mg  4 mg IntraVENous Q4H PRN    senna-docusate (PERICOLACE) 8.6-50 mg per tablet 1 Tablet  1 Tablet Oral DAILY    sodium chloride (NS) flush 5-40 mL  5-40 mL IntraVENous PRN    acetaminophen (TYLENOL) tablet 1,000 mg  1,000 mg Oral Q6H PRN    sodium chloride (NS) flush 5-40 mL  5-40 mL IntraVENous Q8H    sodium chloride (NS) flush 5-40 mL  5-40 mL IntraVENous PRN    polyethylene glycol (MIRALAX) packet 17 g  17 g Oral DAILY PRN    pantoprazole (PROTONIX) tablet 20 mg  20 mg Oral ACB    mesalamine (DELZICOL) DR capsule 800 mg  800 mg Oral TID    lactated Ringers infusion  75 mL/hr IntraVENous CONTINUOUS    prochlorperazine (COMPAZINE) injection 10 mg  10 mg IntraVENous Q6H PRN     ______________________________________________________________________  EXPECTED LENGTH OF STAY: 4d 16h  ACTUAL LENGTH OF STAY:          10                 Josy Garcia MD

## 2023-04-27 NOTE — PROGRESS NOTES
[address]   [address] [address] [address], [address]   [phone]                     GI PROGRESS NOTE    Patient Name: Nazanin Garland      : 1985      MRN: [id number]  Admit Date: 2023  Today's Date: 2023  CC: Pancreatic pseudocyst with hemorrhage    Subjective:   Mr. Cuong Doll is sitting up in bedside chair with family at bedside. He feels markedly better this morning. His pain and nausea are reduced. He did well with full liquid diet this morning. Objective:     Blood pressure 128/76, pulse 88, temperature 97.8 °F (36.6 °C), resp. rate 18, height 5' 11\" (1.803 m), weight 93.9 kg (207 lb), SpO2 95 %. Physical Exam:  General appearance: cooperative,  male, appears stated age  Skin: pale, clammy, diaphoretic   HEENT: Sclerae anicteric. Cardiovascular: Regular rate and rhythm. Respiratory: Comfortable breathing with no accessory muscle use. GI: Abdomen nondistended, soft, and TTP. Normal active bowel sounds. Drain in place with bloody output. Rectal: Deferred   Musculoskeletal: No pitting edema of the lower legs. Neurological: Patient is alert and oriented. Psychiatric:  No anxiety or agitation. Data Review:    Recent Results (from the past 24 hour(s))   CULTURE, BODY FLUID W GRAM STAIN    Collection Time: 23 12:36 PM    Specimen: Abdominal Fluid;  Body Fluid   Result Value Ref Range    Special Requests: NO SPECIAL REQUESTS      GRAM STAIN NO WBC'S SEEN      GRAM STAIN NO DEFINITE ORGANISM SEEN      Culture result: Culture performed on Fluid swab specimen      Culture result: NO GROWTH THUS FAR     CULTURE, ANAEROBIC    Collection Time: 23 12:36 PM    Specimen: Abscess   Result Value Ref Range    Special Requests: NO SPECIAL REQUESTS      Culture result: NO GROWTH THUS FAR     CELL COUNT, BODY FLUID    Collection Time: 23 12:36 PM   Result Value Ref Range    BODY FLUID TYPE ABSCESS      FLUID COLOR BLOOD      FLUID APPEARANCE BLOOD      FLUID RBC CT. >100 (H) 0 /cu mm    FLUID NUCLEATED CELLS 31,900 /cu mm    FLD NEUTROPHILS 0 (A) NRRE %    FLD LYMPHS 0 (A) NRRE %    FLD MONO/MACROPHAGES 0 (A) NRRE %    FLD EOSINS 0 (A) NRRE %    FLD OTHER CELLS 0 0 %    FLUID MESOTHELIAL 0 (A) NRRE %    TOTAL CELLS COUNTED 0     LIPASE    Collection Time: 04/26/23 11:50 PM   Result Value Ref Range    Lipase 80 73 - 393 U/L   CBC W/O DIFF    Collection Time: 04/26/23 11:50 PM   Result Value Ref Range    WBC 5.7 4.1 - 11.1 K/uL    RBC 3.13 (L) 4.10 - 5.70 M/uL    HGB 8.5 (L) 12.1 - 17.0 g/dL    HCT 26.9 (L) 36.6 - 50.3 %    MCV 85.9 80.0 - 99.0 FL    MCH 27.2 26.0 - 34.0 PG    MCHC 31.6 30.0 - 36.5 g/dL    RDW 16.3 (H) 11.5 - 14.5 %    PLATELET 943 748 - 225 K/uL    MPV 10.0 8.9 - 12.9 FL    NRBC 0.0 0  WBC    ABSOLUTE NRBC 0.00 0.00 - 4.67 K/uL   METABOLIC PANEL, COMPREHENSIVE    Collection Time: 04/26/23 11:50 PM   Result Value Ref Range    Sodium 136 136 - 145 mmol/L    Potassium 3.5 3.5 - 5.1 mmol/L    Chloride 104 97 - 108 mmol/L    CO2 29 21 - 32 mmol/L    Anion gap 3 (L) 5 - 15 mmol/L    Glucose 136 (H) 65 - 100 mg/dL    BUN 10 6 - 20 MG/DL    Creatinine 0.90 0.70 - 1.30 MG/DL    BUN/Creatinine ratio 11 (L) 12 - 20      eGFR >60 >60 ml/min/1.73m2    Calcium 8.4 (L) 8.5 - 10.1 MG/DL    Bilirubin, total 0.4 0.2 - 1.0 MG/DL    ALT (SGPT) 23 12 - 78 U/L    AST (SGOT) 19 15 - 37 U/L    Alk. 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 EGD 4/18/23: Esophagus:Normal mucosa. Small sliding hiatal hernia was noted with Z-line at 38 cm and diaphragmatic pinch at 40 cm. Stomach: Extrinsic compression was noted in the gastric body.   Congestion was noted in the stomach body. Nonbleeding fundic varices were noted (IGV1). No bleeding or stigmata were noted. EUS 4/18/23: A large heterogeneous solid cystic lesion was noted in the pancreatic tail and measured about 67 x 84 mm in size. Extensive varices were noted around this lesion going up to the splenic hilum. Part of the pancreas body and tail were obscured by this lesion. Head was unremarkable. The lesion described in the transverse mesocolon could not be completely evaluated given the anatomic location    Cytology results: No cells diagnostic for malignancy, Rare benign acinic cells, macrophages, and acute inflammation    Interval History: Continues with abdominal pain and nausea, requiring IV dilaudid. CTAP w cont 4/21/23  IMPRESSION  1. Peripancreatic fluid collection extending with mass effect on the gastric  wall. The current study, the collection is more homogeneous and demonstrates  peripheral enhancement. Findings are consistent with a pseudocyst.  2.  Second collection in the tail of pancreas with internal high density  components likely reflecting prior hemorrhage which is decreased in size. CA 19-9 3     Percutaneous drain placed 4/26/23 via IR. Pain is greatly improved. He is open to trying oral dilaudid.      - Full liquid diet- if he tolerates throughout the day will advance tomorrow   - Nutrition following   - Monitor H&H and transfuse for hgb <7   - Surgery following  - Supportive care analgesia and anti-emetics per hospitalist  - GI will follow along            Patient C/Jethro Yen 1808 Problem List:   Active Problems:    Pancreas hemorrhage (4/16/2023)        Time Spent with Patient: Momo Vyas NP

## 2023-04-28 ENCOUNTER — APPOINTMENT (OUTPATIENT)
Dept: CT IMAGING | Age: 38
DRG: 438 | End: 2023-04-28
Attending: PHYSICIAN ASSISTANT
Payer: COMMERCIAL

## 2023-04-28 ENCOUNTER — APPOINTMENT (OUTPATIENT)
Dept: INTERVENTIONAL RADIOLOGY/VASCULAR | Age: 38
DRG: 438 | End: 2023-04-28
Attending: SURGERY
Payer: COMMERCIAL

## 2023-04-28 VITALS
BODY MASS INDEX: 28.98 KG/M2 | HEART RATE: 98 BPM | OXYGEN SATURATION: 97 % | RESPIRATION RATE: 18 BRPM | SYSTOLIC BLOOD PRESSURE: 133 MMHG | HEIGHT: 71 IN | DIASTOLIC BLOOD PRESSURE: 81 MMHG | WEIGHT: 207 LBS | TEMPERATURE: 98.5 F

## 2023-04-28 LAB
BACTERIA SPEC CULT: NORMAL
LIPASE FLD-CCNC: >3000 U/L
SERVICE CMNT-IMP: NORMAL
SPECIMEN SOURCE FLD: NORMAL

## 2023-04-28 PROCEDURE — 74011250637 HC RX REV CODE- 250/637: Performed by: STUDENT IN AN ORGANIZED HEALTH CARE EDUCATION/TRAINING PROGRAM

## 2023-04-28 PROCEDURE — 74011000258 HC RX REV CODE- 258: Performed by: INTERNAL MEDICINE

## 2023-04-28 PROCEDURE — 74011250637 HC RX REV CODE- 250/637: Performed by: HOSPITALIST

## 2023-04-28 PROCEDURE — 99232 SBSQ HOSP IP/OBS MODERATE 35: CPT | Performed by: SURGERY

## 2023-04-28 PROCEDURE — 74011250636 HC RX REV CODE- 250/636: Performed by: INTERNAL MEDICINE

## 2023-04-28 PROCEDURE — 74011000636 HC RX REV CODE- 636: Performed by: RADIOLOGY

## 2023-04-28 PROCEDURE — 74011000250 HC RX REV CODE- 250: Performed by: HOSPITALIST

## 2023-04-28 PROCEDURE — 74177 CT ABD & PELVIS W/CONTRAST: CPT

## 2023-04-28 PROCEDURE — 74011250636 HC RX REV CODE- 250/636: Performed by: FAMILY MEDICINE

## 2023-04-28 PROCEDURE — 83690 ASSAY OF LIPASE: CPT

## 2023-04-28 RX ORDER — CETIRIZINE HYDROCHLORIDE 10 MG/1
10 TABLET ORAL DAILY
Status: DISCONTINUED | OUTPATIENT
Start: 2023-04-29 | End: 2023-04-28 | Stop reason: HOSPADM

## 2023-04-28 RX ORDER — PSEUDOEPHEDRINE HCL 120 MG/1
120 TABLET, FILM COATED, EXTENDED RELEASE ORAL EVERY 12 HOURS
Status: DISCONTINUED | OUTPATIENT
Start: 2023-04-28 | End: 2023-04-28 | Stop reason: HOSPADM

## 2023-04-28 RX ORDER — AMOXICILLIN AND CLAVULANATE POTASSIUM 875; 125 MG/1; MG/1
1 TABLET, FILM COATED ORAL EVERY 12 HOURS
Qty: 20 TABLET | Refills: 0 | Status: SHIPPED | OUTPATIENT
Start: 2023-04-28 | End: 2023-05-08

## 2023-04-28 RX ORDER — HYDROMORPHONE HYDROCHLORIDE 2 MG/1
2 TABLET ORAL
Qty: 16 TABLET | Refills: 0 | Status: SHIPPED | OUTPATIENT
Start: 2023-04-28 | End: 2023-05-03

## 2023-04-28 RX ADMIN — SODIUM CHLORIDE, POTASSIUM CHLORIDE, SODIUM LACTATE AND CALCIUM CHLORIDE 75 ML/HR: 600; 310; 30; 20 INJECTION, SOLUTION INTRAVENOUS at 08:19

## 2023-04-28 RX ADMIN — HYDROMORPHONE HYDROCHLORIDE 2 MG: 2 TABLET ORAL at 06:53

## 2023-04-28 RX ADMIN — SODIUM CHLORIDE, PRESERVATIVE FREE 10 ML: 5 INJECTION INTRAVENOUS at 13:23

## 2023-04-28 RX ADMIN — PSEUDOEPHEDRINE HCL 120 MG: 120 TABLET, FILM COATED, EXTENDED RELEASE ORAL at 17:01

## 2023-04-28 RX ADMIN — IOHEXOL 50 ML: 350 INJECTION, SOLUTION INTRAVENOUS at 13:53

## 2023-04-28 RX ADMIN — MEROPENEM 1 G: 1 INJECTION, POWDER, FOR SOLUTION INTRAVENOUS at 08:19

## 2023-04-28 RX ADMIN — ONDANSETRON 4 MG: 2 INJECTION INTRAMUSCULAR; INTRAVENOUS at 02:57

## 2023-04-28 RX ADMIN — HYDROMORPHONE HYDROCHLORIDE 2 MG: 2 TABLET ORAL at 11:01

## 2023-04-28 RX ADMIN — SODIUM CHLORIDE, PRESERVATIVE FREE 10 ML: 5 INJECTION INTRAVENOUS at 09:21

## 2023-04-28 RX ADMIN — MEROPENEM 1 G: 1 INJECTION, POWDER, FOR SOLUTION INTRAVENOUS at 13:23

## 2023-04-28 RX ADMIN — PANTOPRAZOLE SODIUM 20 MG: 20 TABLET, DELAYED RELEASE ORAL at 06:53

## 2023-04-28 RX ADMIN — MESALAMINE 800 MG: 400 CAPSULE, DELAYED RELEASE ORAL at 17:01

## 2023-04-28 RX ADMIN — HYDROMORPHONE HYDROCHLORIDE 2 MG: 2 TABLET ORAL at 17:01

## 2023-04-28 RX ADMIN — MESALAMINE 800 MG: 400 CAPSULE, DELAYED RELEASE ORAL at 09:21

## 2023-04-28 RX ADMIN — HYDROMORPHONE HYDROCHLORIDE 2 MG: 2 TABLET ORAL at 02:57

## 2023-04-28 NOTE — PROGRESS NOTES
Patient discharged home with family. IV removed prior to discharge, tip intact, minimal blood loss. Patient alert and oriented and ambulatory. Problem: Pain  Goal: *Control of Pain  Outcome: Progressing Towards Goal     Problem: Pancreatitis  Goal: *Control of acute pain  Outcome: Progressing Towards Goal  Goal: *Absence of nausea/vomiting  Outcome: Progressing Towards Goal  Goal: *Optimize nutritional status  Outcome: Progressing Towards Goal  Goal: *Labs within defined limits  Outcome: Progressing Towards Goal     Problem: Falls - Risk of  Goal: *Absence of Falls  Description: Document Daron Fall Risk and appropriate interventions in the flowsheet.   Outcome: Progressing Towards Goal  Note: Fall Risk Interventions:            Medication Interventions: Teach patient to arise slowly                   Problem: Patient Education: Go to Patient Education Activity  Goal: Patient/Family Education  Outcome: Progressing Towards Goal

## 2023-04-28 NOTE — DISCHARGE SUMMARY
Physician Discharge Summary     Patient ID:    Noé Shabazz  [id number]  70 yo  1985    Admit date: 4/16/2023    Noé Shabazz is a 41 y.o. male who presents with abdominal pain. He has a history of metastatic testicular cancer. He was recently admitted for a pseudocyst of the pancreas after undergoing a surveillance CT for testicular cancer, though no clear history of pancreatitis. He was discharged and was doing well. Unfortunately, he developed acute bilateral upper abdominal pain yesterday. It is constant with intermittent worsening, associated with nausea without emesis. No fever. ROS is notable a recent proctitis flare for which he completed prednisone for. Pancreatic pseudocyst with hemorrhage:  -keep NPO and on IVF  -pain control and antiemetics  -appreciate GI/surgery consultants  -trend H/H  -CTA pending, if there is active bleeding notify IR (consulted by ED)     Hypokalemia:  -replete     UC:  -continue mesalamine     Stage IIIb testicular cancer    Discharge date and time: 4/28/2023      DISCHARGE CONDITION: Good        Hospital Diagnoses and Treatment Rendered:       Patient was seen by GI and general surgery. Patient had EUS done with FNA which inflammatory cells. Patient had drain placed with IR and had massive improvement in symptoms. Patient was drained bloody fluid but after about 24 hours the drain had very decreased output. On repeat imaging, pseudocyst less improved but still large. General surgery discussed with IR and they recommended upsizing the patient's drain. After discussion with general surgery and GI, initially was determined the patient would stay longer to have drain changed with IR. Patient managed to get scheduled to have the drain changed on 5/1 as an outpatient. Per general surgery and GI patient was cleared to discharge with the current drain and on antibiotics.   GI will follow-up with the patient in about a week and general surgery should follow the patient out 2 weeks. Pancreatic pseudocyst with hemorrhage:  -Drain placed with IR  - Outpatient upsizing of drain scheduled for 5/1  - Follow up with general surgery  - Follow up with GI  -Pain control with Dilaudid  -Augmentin twice daily for 10 days     Hypokalemia:-replete PRN     UC: -continue mesalamine     Hx of Stage IIIb testicular cancer      Chronic Diagnoses:    Problem List as of 4/28/2023 Date Reviewed: 4/5/2023            Codes Class Noted - Resolved    Pancreas hemorrhage ICD-10-CM: K86.89  ICD-9-CM: 577.8  4/16/2023 - Present        Abdominal pain ICD-10-CM: R10.9  ICD-9-CM: 789.00  4/3/2023 - Present        Elevated serum creatinine ICD-10-CM: R79.89  ICD-9-CM: 790.99  6/27/2022 - Present        Encounter for antineoplastic chemotherapy ICD-10-CM: Z51.11  ICD-9-CM: V58.11  6/6/2022 - Present        Non-seminomatous cancer of left testis ([facility] ICD-10-CM: C62.92  ICD-9-CM: 186.9  5/26/2022 - Present        Testicular cancer ([facility] ICD-10-CM: C62.90  ICD-9-CM: 186.9  5/19/2022 - Present        Ulcerative colitis ([facility] ICD-10-CM: K51.90  ICD-9-CM: 556.9  3/10/2022 - Present        Splenic mass ICD-10-CM: R16.1  ICD-9-CM: 789.2  1/10/2022 - Present           Discharge Medications:       Good  Discharge Medication List as of 4/28/2023  5:44 PM        START taking these medications    Details   HYDROmorphone (DILAUDID) 2 mg tablet Take 1 Tablet by mouth every six (6) hours as needed for Pain for up to 5 days. Max Daily Amount: 8 mg., Normal, Disp-16 Tablet, R-0      amoxicillin-clavulanate (Augmentin) 875-125 mg per tablet Take 1 Tablet by mouth every twelve (12) hours for 10 days. , Normal, Disp-20 Tablet, R-0           CONTINUE these medications which have NOT CHANGED    Details   mesalamine ER (APRISO) 0.375 gram 24 hour capsule TAKE 4 CAPSULE BY MOUTH ONCE A DAY FOR 30 DAYS, Historical Med      Adderall XR 25 mg XR capsule Historical Med, EDWIN      loratadine-pseudoephedrine (AllerClear D-24hr)  mg per tablet Historical Med      omeprazole (PRILOSEC OTC) 20 mg tablet Historical Med      multivitamin (ONE A DAY) tablet Take 1 Tablet by mouth daily. Indications: One a Day Mens, Historical Med               Follow up Care:    1. Gerry Meigs, MD in 1-2 weeks. Please call to set up an appointment shortly after discharge. Diet:  Low fat, Low cholesterol    Disposition:  Home. Advanced Directive:   FULL X   DNR      Discharge Exam:  General: NAD. A&O x3. No pain. HEENT: anicteric sclerae. Mucous membranes moist.  Neck: Supple, no JVD, no meningeal signs  Chest: No respiratory distress. No wheezing, rhonchi, rales. CVS: RRR. No murmurs/rubs/gallops. No chest pain. Abd: Soft, drain coming out of abdomen. Very small amount of hemorrhagic fluid. Ext: No clubbing, no cyanosis, no edema  Neuro/Psych: Moves all fours. Strength 5/5 throughout. No appreciable focal signs grossly. CONSULTATIONS: GI and General Surgery    Significant Diagnostic Studies:   4/16/2023: BUN 17 MG/DL (Ref range: 6 - 20 MG/DL); Calcium 9.8 MG/DL (Ref range: 8.5 - 10.1 MG/DL); CO2 32 mmol/L (Ref range: 21 - 32 mmol/L); Creatinine 1.27 MG/DL (Ref range: 0.70 - 1.30 MG/DL); Glucose 119 mg/dL (H; Ref range: 65 - 100 mg/dL); HCT 39.8 % (Ref range: 36.6 - 50.3 %); HCT 33.0 % (L; Ref range: 36.6 - 50.3 %); HGB 12.4 g/dL (Ref range: 12.1 - 17.0 g/dL); HGB 10.3 g/dL (L; Ref range: 12.1 - 17.0 g/dL); Potassium 3.4 mmol/L (L; Ref range: 3.5 - 5.1 mmol/L); Sodium 133 mmol/L (L; Ref range: 136 - 145 mmol/L)  4/17/2023: BUN 10 MG/DL (Ref range: 6 - 20 MG/DL); Calcium 9.0 MG/DL (Ref range: 8.5 - 10.1 MG/DL); CO2 28 mmol/L (Ref range: 21 - 32 mmol/L); Creatinine 0.97 MG/DL (Ref range: 0.70 - 1.30 MG/DL); Glucose 100 mg/dL (Ref range: 65 - 100 mg/dL); HCT 33.0 % (L; Ref range: 36.6 - 50.3 %); HGB 10.3 g/dL (L; Ref range: 12.1 - 17.0 g/dL); Potassium 3.9 mmol/L (Ref range: 3.5 - 5.1 mmol/L);  Sodium 138 mmol/L (Ref range: 136 - 145 mmol/L)  Recent Labs     04/26/23 2350 04/25/23 2337   WBC 5.7 7.5   HGB 8.5* 8.8*   HCT 26.9* 29.0*    304     Recent Labs     04/26/23 2350 04/25/23 2337    132*   K 3.5 4.0    100   CO2 29 28   BUN 10 8   CREA 0.90 1.02   * 122*   CA 8.4* 8.8     Recent Labs     04/26/23 2350 04/25/23 2337   AP 69 81   TP 6.7 7.6   ALB 2.4* 2.7*   GLOB 4.3* 4.9*   LPSE 80 168     No results for input(s): INR, PTP, APTT, INREXT in the last 72 hours. No results for input(s): FE, TIBC, PSAT, FERR in the last 72 hours. No results for input(s): PH, PCO2, PO2 in the last 72 hours. No results for input(s): CPK, CKMB in the last 72 hours. No lab exists for component: TROPONINI  No components found for: [address] than 30-minute spent on discharge.     Signed:  Serenity Hunt MD  4/28/2023  7:14 PM

## 2023-04-28 NOTE — PROGRESS NOTES
[address] [facility]  Hospitalist Group                                                                                          Hospitalist Progress Note  Chip Jerome MD  Answering service: [phone] OR [phone] from in house phone        Date of Service:  2023  NAME:  Odalis Mcmahon  :  1985  MRN:  [id number]       Admission Summary:   41 y.o. male who presents with abdominal pain. He has a history of metastatic testicular cancer. He was recently admitted for a pseudocyst of the pancreas after undergoing a surveillance CT for testicular cancer, though no clear history of pancreatitis. He was discharged and was doing well. He returned with acute abdominal pain and was found to have a hemorrhagic pseudocyst.       Interval history / Subjective:   Seen and examined earlier today by me for follow-up of pancreatic pseudocyst with hemorrhage. Patient denies acute complaint at the time of my exam. We discussed that his drain needs to be upsized and that this would not be done until Monday hopefully. Patient frustrated with the situation. Assessment & Plan:     Pancreatic pseudocyst with hemorrhage:  -s/p EUS w/ FNA cytology showing inflammatory cells  IR to place drain today  -pain control and antiemetics - improving  -appreciate GI/surgery consultants  -trend H/H - Hb dropped today- ?  Dilutional effects or slow continued bleed into hemorrhagic cyst  -PO dilaudid  -Soft and low-fat diet started  -Drain has not been draining much today  - Repeat CT shows improvement  - patient will need to have his drain upsized, but this likely cannot happen until Monday   - Will need to continue current management until his drain can be upsized  - Lipase shows that it is pseudocyst    Hypokalemia:-replete PRN     UC: -continue mesalamine     Hx of Stage IIIb testicular cancer     Code status: full  Prophylaxis: SCDs  Care Plan discussed with: pt   Anticipated Disposition: home likely 48 hours or more  Inpatient  Cardiac monitoring: Remote Telemetry     Hospital Problems  Date Reviewed: 4/5/2023            Codes Class Noted POA    Pancreas hemorrhage ICD-10-CM: K86.89  ICD-9-CM: 577.8  4/16/2023 Unknown         Social Determinants of Health     Tobacco Use: Medium Risk    Smoking Tobacco Use: Former    Smokeless Tobacco Use: Never    Passive Exposure: Not on file   Alcohol Use: Not on file   Financial Resource Strain: Not on file   Food Insecurity: Not on file   Transportation Needs: Not on file   Physical Activity: Not on file   Stress: Not on file   Social Connections: Not on file   Intimate Partner Violence: Not on file   Depression: Not at risk    PHQ-2 Score: 0   Housing Stability: Not on file       Review of Systems:   A comprehensive review of systems was negative except for that written in the HPI. Vital Signs:    Last 24hrs VS reviewed since prior progress note. Most recent are:  Visit Vitals  /81 (BP 1 Location: Left arm, BP Patient Position: At rest)   Pulse 98   Temp 98.5 °F (36.9 °C)   Resp 18   Ht 5' 11\" (1.803 m)   Wt 93.9 kg (207 lb)   SpO2 97%   BMI 28.87 kg/m²     Patient Vitals for the past 24 hrs:   Temp Pulse Resp BP SpO2   04/28/23 1507 98.5 °F (36.9 °C) 98 18 133/81 97 %   04/28/23 0754 98.4 °F (36.9 °C) 97 18 131/79 96 %   04/28/23 0320 97.9 °F (36.6 °C) 79 18 114/64 99 %   04/27/23 1900 99 °F (37.2 °C) 88 18 112/75 97 %             Intake/Output Summary (Last 24 hours) at 4/28/2023 1541  Last data filed at 4/28/2023 1158  Gross per 24 hour   Intake 98857 ml   Output 20 ml   Net 59056 ml          Physical Examination:     I had a face to face encounter with this patient and independently examined them on 4/28/2023 as outlined below:          PHYSICAL EXAM:   General: NAD. A&O x3. No pain. HEENT: anicteric sclerae. Mucous membranes moist.  Neck: Supple, no JVD, no meningeal signs  Chest: No respiratory distress. No wheezing, rhonchi, rales. CVS: RRR.   No murmurs/rubs/gallops. No chest pain. Abd: Soft, drain coming out of abdomen. Very small amount of hemorrhagic fluid. Ext: No clubbing, no cyanosis, no edema  Neuro/Psych: Moves all fours. Strength 5/5 throughout. No appreciable focal signs grossly. Data Review:    Review and/or order of clinical lab test  Review and/or order of tests in the radiology section of CPT  Review and/or order of tests in the medicine section of CPT      I have personally and independently reviewed all pertinent labs, diagnostic studies, imaging, and have provided independent interpretation of the same. Labs:     Recent Labs     04/26/23 2350 04/25/23 2337   WBC 5.7 7.5   HGB 8.5* 8.8*   HCT 26.9* 29.0*    304       Recent Labs     04/26/23 2350 04/25/23 2337    132*   K 3.5 4.0    100   CO2 29 28   BUN 10 8   CREA 0.90 1.02   * 122*   CA 8.4* 8.8       Recent Labs     04/26/23 2350 04/25/23 2337   ALT 23 24   AP 69 81   TBILI 0.4 0.6   TP 6.7 7.6   ALB 2.4* 2.7*   GLOB 4.3* 4.9*   LPSE 80 168       No results for input(s): INR, PTP, APTT, INREXT, INREXT in the last 72 hours. No results for input(s): FE, TIBC, PSAT, FERR in the last 72 hours. No results found for: FOL, RBCF   No results for input(s): PH, PCO2, PO2 in the last 72 hours. No results for input(s): CPK, CKNDX, TROIQ in the last 72 hours.     No lab exists for component: CPKMB  Lab Results   Component Value Date/Time    Triglyceride 78 04/20/2023 12:05 PM     Lab Results   Component Value Date/Time    Glucose (POC) 131 (H) 04/26/2023 07:19 AM    Glucose (POC) 106 04/21/2023 06:37 AM     Lab Results   Component Value Date/Time    Color DARK YELLOW 04/16/2023 08:47 AM    Appearance CLEAR 04/16/2023 08:47 AM    Specific gravity 1.011 04/03/2023 12:32 PM    pH (UA) 5.5 04/16/2023 08:47 AM    Protein 100 (A) 04/16/2023 08:47 AM    Glucose Negative 04/16/2023 08:47 AM    Ketone TRACE (A) 04/16/2023 08:47 AM    Bilirubin Negative 04/03/2023 12:32 PM    Urobilinogen 1.0 04/16/2023 08:47 AM    Nitrites Negative 04/16/2023 08:47 AM    Leukocyte Esterase Negative 04/16/2023 08:47 AM    Epithelial cells FEW 04/16/2023 08:47 AM    Bacteria Negative 04/16/2023 08:47 AM    WBC 0-4 04/16/2023 08:47 AM    RBC 0-5 04/16/2023 08:47 AM       Notes reviewed from all clinical/nonclinical/nursing services involved in patient's clinical care. Care coordination discussions were held with appropriate clinical/nonclinical/ nursing providers based on care coordination needs. Patients current active Medications were reviewed, considered, added and adjusted based on the clinical condition today. Home Medications were reconciled to the best of my ability given all available resources at the time of admission. Route is PO if not otherwise noted.       Admission Status:[id number]:::1}      Medications Reviewed:     Current Facility-Administered Medications   Medication Dose Route Frequency    pseudoephedrine CR (SUDAFED) tablet 120 mg  120 mg Oral Q12H    And    [START ON 4/29/2023] cetirizine (ZYRTEC) tablet 10 mg  10 mg Oral DAILY    HYDROmorphone (DILAUDID) tablet 2 mg  2 mg Oral Q4H PRN    meropenem (MERREM) 1 g in 0.9% sodium chloride (MBP/ADV) 50 mL MBP  1 g IntraVENous Q8H    [Held by provider] HYDROmorphone (DILAUDID) injection 2 mg  2 mg IntraVENous Q3H PRN    ondansetron (ZOFRAN ODT) tablet 4 mg  4 mg Oral Q8H PRN    Or    ondansetron (ZOFRAN) injection 4 mg  4 mg IntraVENous Q4H PRN    sodium chloride (NS) flush 5-40 mL  5-40 mL IntraVENous PRN    acetaminophen (TYLENOL) tablet 1,000 mg  1,000 mg Oral Q6H PRN    sodium chloride (NS) flush 5-40 mL  5-40 mL IntraVENous Q8H    sodium chloride (NS) flush 5-40 mL  5-40 mL IntraVENous PRN    polyethylene glycol (MIRALAX) packet 17 g  17 g Oral DAILY PRN    pantoprazole (PROTONIX) tablet 20 mg  20 mg Oral ACB    mesalamine (DELZICOL) DR capsule 800 mg  800 mg Oral TID    lactated Ringers infusion  75 mL/hr IntraVENous CONTINUOUS    prochlorperazine (COMPAZINE) injection 10 mg  10 mg IntraVENous Q6H PRN     ______________________________________________________________________  EXPECTED LENGTH OF STAY: 4d 16h  ACTUAL LENGTH OF STAY:          [id number] [address] Rhiannon Cali MD

## 2023-04-28 NOTE — PROGRESS NOTES
0730- Assumed care of patient    0930- Patient ambulating frequently in hallways    1100- Patient medicated for 5/10 abd pain. Lipase fluid sent to lab      1600- Bedside shift change report given to [place] (oncoming nurse) by Bandar Fry RN (offgoing nurse). Report included the following information SBAR, Kardex, Procedure Summary, Intake/Output, MAR, and Recent Results.

## 2023-04-28 NOTE — DISCHARGE INSTRUCTIONS
Leave drain intact with plan for outpatient IR upsize of drain size on Monday May 1. IR dept instructed to come in at 12:00.

## 2023-04-28 NOTE — PROGRESS NOTES
[address] [facility], [place].  [address] [address] [address], [address]   [phone]                     GI PROGRESS NOTE    Patient Name: Zoltan Carmona      : 1985      MRN: [id number]  Admit Date: 2023  Today's Date: 2023  CC: Pancreatic pseudocyst with hemorrhage    Subjective:   Mr. Samuel Norton is sitting up in bedside chair with family at bedside. Endorses body aches and chills overnight, no fever. He is doing very well with diet. He even ate a second lunch yesterday. Pain is improving. Drain output is decreasing. Objective:     Blood pressure 131/79, pulse 97, temperature 98.4 °F (36.9 °C), resp. rate 18, height 5' 11\" (1.803 m), weight 93.9 kg (207 lb), SpO2 96 %. Physical Exam:  General appearance: cooperative,  male, appears stated age  Skin: pale, clammy, diaphoretic   HEENT: Sclerae anicteric. Cardiovascular: Regular rate and rhythm. Respiratory: Comfortable breathing with no accessory muscle use. GI: Abdomen nondistended, soft, and TTP. Normal active bowel sounds. Drain in place with no output in drainage bag    Rectal: Deferred   Musculoskeletal: No pitting edema of the lower legs. Neurological: Patient is alert and oriented. Psychiatric:  No anxiety or agitation. 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 EGD 23: Esophagus:Normal mucosa. Small sliding hiatal hernia was noted with Z-line at 38 cm and diaphragmatic pinch at 40 cm. Stomach: Extrinsic compression was noted in the gastric body. Congestion was noted in the stomach body. Nonbleeding fundic varices were noted (IGV1). No bleeding or stigmata were noted. EUS 23:  A large heterogeneous solid cystic lesion was noted in the pancreatic tail and measured about 67 x 84 mm in size. Extensive varices were noted around this lesion going up to the splenic hilum. Part of the pancreas body and tail were obscured by this lesion. Head was unremarkable. The lesion described in the transverse mesocolon could not be completely evaluated given the anatomic location    Cytology results: No cells diagnostic for malignancy, Rare benign acinic cells, macrophages, and acute inflammation    CTAP w cont 4/21/23  IMPRESSION  1. Peripancreatic fluid collection extending with mass effect on the gastric  wall. The current study, the collection is more homogeneous and demonstrates  peripheral enhancement. Findings are consistent with a pseudocyst.  2.  Second collection in the tail of pancreas with internal high density  components likely reflecting prior hemorrhage which is decreased in size. CA 19-9 3     Percutaneous drain placed 4/26/23 via IR.    Blood cultures with NGTD    - CTAP pending  - Continue low fat diet   - Nutrition following   - Monitor H&H and transfuse for hgb <7   - Surgery following  - Supportive care analgesia and anti-emetics per hospitalist  - GI will follow along            Patient Active Hospital Problem List:   Active Problems:    Pancreas hemorrhage (4/16/2023)        Time Spent with Patient: Megan Kimbrough NP

## 2023-04-28 NOTE — PROGRESS NOTES
[facility]      Subjective     No acute events. Patient feels much better and is tolerating his diet. He is hopeful to go home soon. Drain with minimal output overnight that continues to look like old hematoma fluid. Objective     Patient Vitals for the past 24 hrs:   Temp Pulse Resp BP SpO2   04/28/23 1507 98.5 °F (36.9 °C) 98 18 133/81 97 %   04/28/23 0754 98.4 °F (36.9 °C) 97 18 131/79 96 %   04/28/23 0320 97.9 °F (36.6 °C) 79 18 114/64 99 %   04/27/23 1900 99 °F (37.2 °C) 88 18 112/75 97 %       Date 04/27/23 0700 - 04/28/23 0659 04/28/23 0700 - 04/29/23 0659   Shift 5508-6697 9063-2764 24 Hour Total 3722-0384 0913-7059 24 Hour Total   INTAKE   P.O.    240  240     P. O.    240  240   I. V.(mL/kg/hr)    35121  18321     Volume (lactated Ringers infusion)    28241  84307   NG/GT 10  10 10  10     Intake (ml) (Drain Abdominal fluid collection Drain  04/26/23 Mid Abdomen) 10  10 10  10   Shift Total(mL/kg) 10(0.1)  10(0.1) 52167(491.8)  79206(289.8)   OUTPUT   Drains 140  140 10  10     Output (ml) (Drain Abdominal fluid collection Drain  04/26/23 Mid Abdomen) 140  140 10  10   Blood    10  10     Estimated Blood Loss    10  10   Shift Total(mL/kg) 140(1.5)  140(1.5) 20(0.2)  20(0.2)   NET -130  -130 49695  85290   Weight (kg) 93.9 93.9 93.9 93.9 93.9 93.9       PE  GEN - Awake, alert, communicating appropriately. NAD  Abd - soft, NT, ND. IR drain with small amount of dark, thin serosanguinous fluid. Labs  Recent Results (from the past 24 hour(s))   LIPASE, FLUID    Collection Time: 04/28/23 11:03 AM   Result Value Ref Range    Fluid Type: HANK DRAIN     Lipase, fluid >3,000 U/L       Assessment     Marielena Gilbert is a 41 y. o.yr old male with idiopathic pancreatitis with pseudocyst formation x 2. The more central/larger pseudocyst was drained percutaneously due to compressive symptoms, pain and inability to eat.       Plan     - Drain lipase ordered today is > 3000 confirming this is a pseudocyst.  CT today shows improvement but not resolution of the drained pseudocyst.  I reviewed with radiology who felt that the persistent pseudocyst would likely benefit from upsizing his current drain. Anticipate that this will be Monday before that can be completed. Discussed with Dr. David Keane.       Harpreet Sterling MD  4/28/2023  3:43 PM

## 2023-04-29 LAB
BACTERIA SPEC CULT: NORMAL
BACTERIA SPEC CULT: NORMAL
GRAM STN SPEC: NORMAL
GRAM STN SPEC: NORMAL
SERVICE CMNT-IMP: NORMAL

## 2023-04-30 LAB
BACTERIA SPEC CULT: NORMAL
SERVICE CMNT-IMP: NORMAL

## 2023-05-01 ENCOUNTER — HOSPITAL ENCOUNTER (OUTPATIENT)
Dept: INTERVENTIONAL RADIOLOGY/VASCULAR | Age: 38
Setting detail: OUTPATIENT SURGERY
Discharge: HOME | End: 2023-05-01
Attending: STUDENT IN AN ORGANIZED HEALTH CARE EDUCATION/TRAINING PROGRAM | Admitting: STUDENT IN AN ORGANIZED HEALTH CARE EDUCATION/TRAINING PROGRAM
Payer: COMMERCIAL

## 2023-05-01 VITALS
SYSTOLIC BLOOD PRESSURE: 115 MMHG | WEIGHT: 195 LBS | RESPIRATION RATE: 20 BRPM | DIASTOLIC BLOOD PRESSURE: 85 MMHG | OXYGEN SATURATION: 97 % | TEMPERATURE: 98.1 F | HEIGHT: 71 IN | BODY MASS INDEX: 27.3 KG/M2 | HEART RATE: 93 BPM

## 2023-05-01 PROCEDURE — C1769 GUIDE WIRE: HCPCS

## 2023-05-01 PROCEDURE — 74011000250 HC RX REV CODE- 250

## 2023-05-01 PROCEDURE — C1892 INTRO/SHEATH,FIXED,PEEL-AWAY: HCPCS

## 2023-05-01 PROCEDURE — C1729 CATH, DRAINAGE: HCPCS

## 2023-05-01 PROCEDURE — 74011000636 HC RX REV CODE- 636: Performed by: STUDENT IN AN ORGANIZED HEALTH CARE EDUCATION/TRAINING PROGRAM

## 2023-05-01 PROCEDURE — 74011250636 HC RX REV CODE- 250/636: Performed by: STUDENT IN AN ORGANIZED HEALTH CARE EDUCATION/TRAINING PROGRAM

## 2023-05-01 PROCEDURE — 77030011229 HC DIL VESL COON COOK -A

## 2023-05-01 PROCEDURE — 49423 EXCHANGE DRAINAGE CATHETER: CPT

## 2023-05-01 RX ORDER — LIDOCAINE HYDROCHLORIDE 10 MG/ML
20 INJECTION INFILTRATION; PERINEURAL
Status: CANCELLED | OUTPATIENT
Start: 2023-05-01 | End: 2023-05-02

## 2023-05-01 RX ORDER — NALOXONE HYDROCHLORIDE 0.4 MG/ML
1 INJECTION, SOLUTION INTRAMUSCULAR; INTRAVENOUS; SUBCUTANEOUS ONCE
Status: DISCONTINUED | OUTPATIENT
Start: 2023-05-01 | End: 2023-05-01

## 2023-05-01 RX ORDER — MIDAZOLAM HYDROCHLORIDE 1 MG/ML
.5-5 INJECTION, SOLUTION INTRAMUSCULAR; INTRAVENOUS
Status: DISCONTINUED | OUTPATIENT
Start: 2023-05-01 | End: 2023-05-01 | Stop reason: ALTCHOICE

## 2023-05-01 RX ORDER — FENTANYL CITRATE 50 UG/ML
12.5-2 INJECTION, SOLUTION INTRAMUSCULAR; INTRAVENOUS
Status: DISCONTINUED | OUTPATIENT
Start: 2023-05-01 | End: 2023-05-01 | Stop reason: ALTCHOICE

## 2023-05-01 RX ORDER — SODIUM CHLORIDE 9 MG/ML
50 INJECTION, SOLUTION INTRAVENOUS
Status: DISCONTINUED | OUTPATIENT
Start: 2023-05-01 | End: 2023-05-01 | Stop reason: ALTCHOICE

## 2023-05-01 RX ORDER — LIDOCAINE HYDROCHLORIDE 10 MG/ML
INJECTION INFILTRATION; PERINEURAL
Status: COMPLETED
Start: 2023-05-01 | End: 2023-05-01

## 2023-05-01 RX ORDER — FLUMAZENIL 0.1 MG/ML
0.4 INJECTION INTRAVENOUS AS NEEDED
Status: DISCONTINUED | OUTPATIENT
Start: 2023-05-01 | End: 2023-05-01 | Stop reason: ALTCHOICE

## 2023-05-01 RX ADMIN — IOHEXOL 10 ML: 350 INJECTION, SOLUTION INTRAVENOUS at 15:35

## 2023-05-01 RX ADMIN — FENTANYL CITRATE 50 MCG: 50 INJECTION, SOLUTION INTRAMUSCULAR; INTRAVENOUS at 15:18

## 2023-05-01 RX ADMIN — LIDOCAINE HYDROCHLORIDE 4 MG: 10 INJECTION, SOLUTION INFILTRATION; PERINEURAL at 15:28

## 2023-05-01 RX ADMIN — FENTANYL CITRATE 50 MCG: 50 INJECTION, SOLUTION INTRAMUSCULAR; INTRAVENOUS at 15:21

## 2023-05-01 RX ADMIN — MIDAZOLAM 2 MG: 1 INJECTION INTRAMUSCULAR; INTRAVENOUS at 15:17

## 2023-05-01 NOTE — ROUTINE PROCESS
Pt arrives ambulatory to angio department accompanied by spouse for catheter procedure. All assessments completed and consent was reviewed. Education given was regarding procedure, moderate sedation, post-procedure care and  management/follow-up. Opportunity for questions was provided and all questions and concerns were addressed.

## 2023-05-01 NOTE — PROGRESS NOTES
Name of procedure:  abscess drain exchange     Sedation medications given: fentanyl 100mcg, versed 2mg     Sedation tolerated: well     Total Procedure time: 10 min     Vital Signs: stable     Any complications related to procedure: see orders     Post Procedure Care Needed/order sets placed in connect care: see orders     Pt tolerated procedure well. VSS. No C/O pain. Dressing to site D&I. No bleeding or hematoma noted to site. IV D/Cd. Discharge instructions given. Copy on chart and copy given to pt. Pt verbalized understanding. Pt taken to car by wheelchair and taken home by family. NAD noted at time of discharge.

## 2023-05-01 NOTE — H&P
Interventional and Vascular Radiology History and Physical    Patient: Rafael Gilbert 41 y.o. male       Chief Complaint: pancreatitis collections, malfunctioning drain     History of Present Illness: 41year old male with malfunctioning pancreatic collection drain presents for exchange with upsizing    History:    Past Medical History:   Diagnosis Date    Acid indigestion     Autoimmune disease ([facility]     Cancer ([facility]     Testicular     Stool color black     ulceratie proctitis      Family History   Problem Relation Age of Onset    Hypertension Mother     Cancer Maternal Grandmother         Breast    Stroke Maternal Grandfather     Cancer Paternal Grandfather      Social History     Socioeconomic History    Marital status:      Spouse name: Not on file    Number of children: Not on file    Years of education: Not on file    Highest education level: Not on file   Occupational History    Not on file   Tobacco Use    Smoking status: Former    Smokeless tobacco: Never   Substance and Sexual Activity    Alcohol use: Yes    Drug use: Never    Sexual activity: Not on file   Other Topics Concern    Not on file   Social History Narrative    Not on file     Social Determinants of Health     Financial Resource Strain: Not on file   Food Insecurity: Not on file   Transportation Needs: Not on file   Physical Activity: Not on file   Stress: Not on file   Social Connections: Not on file   Intimate Partner Violence: Not on file   Housing Stability: Not on file       Allergies:    Allergies   Allergen Reactions    Flynn Rash and Swelling       Current Medications:  Current Facility-Administered Medications   Medication Dose Route Frequency    0.9% sodium chloride infusion  50 mL/hr IntraVENous RAD ONCE    fentaNYL citrate (PF) injection 12.5-200 mcg  12.5-200 mcg IntraVENous Rad Multiple    midazolam (VERSED) injection 0.5-5 mg  0.5-5 mg IntraVENous Multiple    flumazeniL (ROMAZICON) 0.1 mg/mL injection 0.4 mg  0.4 mg IntraVENous PRN    naloxone (NARCAN) injection 1 mg  1 mg IntraVENous ONCE        Physical Exam:  Blood pressure 110/75, pulse 89, temperature 98.1 °F (36.7 °C), resp. rate 15, height 5' 11\" (1.803 m), weight 88.5 kg (195 lb), SpO2 95 %. No acute distress  Good peripheral perfusion  Nonlabored respirations  Abdomen nondistended    Alerts:    Hospital Problems  Date Reviewed: 4/5/2023   None      Laboratory:    No results for input(s): HGB, HCT, WBC, PLT, INR, BUN, CREA, K, CRCLT, HGBEXT, HCTEXT, PLTEXT, INREXT in the last 72 hours. No lab exists for component: PTT, PT      Plan of Care/Planned Procedure:  Risks, benefits, and alternatives discussed with the appropriate decision-maker, who agreed to proceed. Proceeding with conscious sedation using IV fentanyl and versed.      Muna Pagan MD

## 2023-05-01 NOTE — DISCHARGE INSTRUCTIONS
[address] GENERAL DRAIN DISCHARGE INSTRUCTIONS    General Information:     A plastic catheter has been inserted through your skin and into area that needs to be drained. Your doctor ordered this procedure to be done in the event of an abscess, or other fluid collection in your body. You will notice a drainage bag attached to the catheter. When the fluid has all been removed, your doctor will send you back to us for the removal of the catheter. If you are going home with the catheter in place, your doctor may order a home health nurse to come to your house and assist with the care of the catheter. Your doctor will most likely order antibiotic tablets for you to take while you have this tube. Home Care Instructions: You can resume your regular diet and medication regimen. Do not drink alcohol, drive, or make any important legal decisions in the next 24 hours. Do not lift anything heavier than a gallon of milk, or do anything strenuous for the next 24 hours. Do not take a bath, swim or immerse yourself in water as long as you have this drainage tube. You should clean the tube and change the dressing every  48 hours and as needed. Keep the dressing clean and dry. Keep up with how much drainage you get from the tube and report this to your doctor on each visit. Call If:     You should call your Physician and/or the Radiology Nurse if you have any bleeding other than a small spot on your bandage. Call if you have any signs of infection, fever, or increased pain at the site of the tube. Call if you should have leakage around the tube, an increased yellowing of the skin, increased pain in the abdomen, a change in the amount or appearance of the fluid, or if the tube gets pulled out some or all the way out. Follow-Up Instructions: Please see your ordering doctor as he/she has requested. You have received sedation medications today.  YOU SHOULD NOT DRIVE FOR 24 HOURS, DO NOT OPERATE HEAVY MACHINERY, DO NOT MAKE ANY LEGAL DECISIONS OR SIGN LEGAL DOCUMENTS FOR 24 HOURS. DO NOT DRINK ALCOHOL, TAKE ANY MEDICATIONS UNLESS PRESCRIBED BY YOUR DOCTOR. IF YOU ARE A CAREGIVER, SOMEONE SHOULD TAKE THAT ROLE FOR 24 HOURS. Side effects of sedation medications and other medications used today have been reviewed  Those side effects can include but are not limited to: dizziness, drowsiness, poor balance, fatigue, sleepiness. Take precautions at home to prevent falls, such as assistance with walking or stairs if allowed and /or when needed or position changes. Allergic or adverse reactions could include nausea, itching, hives, dizziness, or anything else out of the ordinary. Should you experience any of these significant changes, please call [phone] between the hours of 7:30 am and 3:30 pm or [phone] after hours. After hours, ask the  to page the X-ray Technologist, and describe the problem to the technologist. If you are experiencing chest pain, shortness of breath, altered mental state, unusual bleeding or any other emergent symptom you should call 911 immediately. Flush the tube daily with 10 ml of saline and ensure drain is patent and still draining appropriately.      Patient Signature:  Date: 5/1/2023  Discharging Nurse: Lakisha Boateng RN

## 2023-05-04 ENCOUNTER — OFFICE VISIT (OUTPATIENT)
Dept: SURGERY | Age: 38
End: 2023-05-04

## 2023-05-04 VITALS
BODY MASS INDEX: 27.52 KG/M2 | RESPIRATION RATE: 18 BRPM | WEIGHT: 196.6 LBS | HEIGHT: 71 IN | DIASTOLIC BLOOD PRESSURE: 83 MMHG | TEMPERATURE: 97.5 F | OXYGEN SATURATION: 97 % | SYSTOLIC BLOOD PRESSURE: 128 MMHG | HEART RATE: 95 BPM

## 2023-05-04 DIAGNOSIS — K86.3 PSEUDOCYST OF PANCREAS: Primary | ICD-10-CM

## 2023-05-05 ENCOUNTER — TRANSCRIBE ORDERS (OUTPATIENT)
Facility: HOSPITAL | Age: 38
End: 2023-05-05

## 2023-05-05 ENCOUNTER — DOCUMENTATION ONLY (OUTPATIENT)
Dept: ONCOLOGY | Age: 38
End: 2023-05-05

## 2023-05-05 ENCOUNTER — TELEPHONE (OUTPATIENT)
Dept: SURGERY | Age: 38
End: 2023-05-05

## 2023-05-05 DIAGNOSIS — K86.3 CYST AND PSEUDOCYST OF PANCREAS: Primary | ICD-10-CM

## 2023-05-05 DIAGNOSIS — K86.2 CYST AND PSEUDOCYST OF PANCREAS: Primary | ICD-10-CM

## 2023-05-08 ENCOUNTER — HOSPITAL ENCOUNTER (OUTPATIENT)
Facility: HOSPITAL | Age: 38
Discharge: HOME | End: 2023-05-11
Payer: COMMERCIAL

## 2023-05-08 DIAGNOSIS — K86.3 CYST AND PSEUDOCYST OF PANCREAS: ICD-10-CM

## 2023-05-08 DIAGNOSIS — K86.2 CYST AND PSEUDOCYST OF PANCREAS: ICD-10-CM

## 2023-05-08 PROCEDURE — 74160 CT ABDOMEN W/CONTRAST: CPT

## 2023-05-08 PROCEDURE — 6360000004 HC RX CONTRAST MEDICATION: Performed by: RADIOLOGY

## 2023-05-08 RX ORDER — AMOXICILLIN AND CLAVULANATE POTASSIUM 875; 125 MG/1; MG/1
1 TABLET, FILM COATED ORAL EVERY 12 HOURS
Qty: 20 TABLET | Refills: 0 | COMMUNITY
Start: 2023-04-28 | End: 2023-05-08

## 2023-05-08 RX ADMIN — IOPAMIDOL 100 ML: 755 INJECTION, SOLUTION INTRAVENOUS at 14:58

## 2023-05-11 LAB
AEROBIC ID BY SEQ, ORI2T: NORMAL
ORG ID BY SEQUENCING, ORI1T: NORMAL
SPECIMEN SOURCE: NORMAL
SPECIMEN SOURCE: NORMAL

## 2023-05-12 ENCOUNTER — APPOINTMENT (OUTPATIENT)
Dept: INFUSION THERAPY | Age: 38
End: 2023-05-12

## 2023-05-12 ENCOUNTER — TELEPHONE (OUTPATIENT)
Age: 38
End: 2023-05-12

## 2023-05-12 NOTE — TELEPHONE ENCOUNTER
Returned call to patient. Two patient identifiers used. Patient is calling to get results for his CT Scan. Explained to patient that Dr. Chyna Boyer is in surgery and that I would leave him a message. Pt in agreement.

## 2023-05-12 NOTE — TELEPHONE ENCOUNTER
Patient called stating that he would like to know the results of his CT that Dr. Douglas Graves ordered.

## 2023-05-14 DIAGNOSIS — K85.00 IDIOPATHIC ACUTE PANCREATITIS, UNSPECIFIED COMPLICATION STATUS: Primary | ICD-10-CM

## 2023-05-14 LAB
BACTERIA ISLT: ABNORMAL
OTHER ANTIBIOTIC SUSC ISLT: ABNORMAL
SOURCE, RSRC70: ABNORMAL
SPECIMEN SOURCE: ABNORMAL

## 2023-05-14 RX ORDER — GABAPENTIN 100 MG/1
100 CAPSULE ORAL 3 TIMES DAILY
Qty: 15 CAPSULE | Refills: 0 | Status: SHIPPED | OUTPATIENT
Start: 2023-05-14 | End: 2023-05-19

## 2023-05-15 ENCOUNTER — TELEPHONE (OUTPATIENT)
Age: 38
End: 2023-05-15

## 2023-05-15 NOTE — TELEPHONE ENCOUNTER
Returned call to patient. Two patient identifiers used. Pt stated he has had some pancreatic pain. He called oncall and was prescribed gabapentin which has been effective. Pt stated he is currently 4/10 pain. No output in drain for last 5 days. Pt would like to move appt up/speak to Dr. Nick Brown regarding questions related to previous conversation with GI and their suggestions. Will forward to Dr. Nick Brown.

## 2023-05-15 NOTE — TELEPHONE ENCOUNTER
Patient called stating that he was told to call when his drain stopped producing any output and he noticed that was the case last night/this am. Patient also stated that he has been having pancreatic pain again and wanted to inform Dr. Promise Branham. Patient states that he has an appointment on 5/18 but did not know if Dr. Promise Branham wanted to see him sooner given the drain and also the pain he has been having.

## 2023-05-16 LAB
BACTERIA ISLT: ABNORMAL
OTHER ANTIBIOTIC SUSC ISLT: ABNORMAL
OTHER ANTIBIOTIC SUSC ISLT: ABNORMAL
SOURCE, RSRC70: ABNORMAL
SPECIMEN SOURCE: ABNORMAL

## 2023-05-18 ENCOUNTER — OFFICE VISIT (OUTPATIENT)
Age: 38
End: 2023-05-18
Payer: COMMERCIAL

## 2023-05-18 VITALS
BODY MASS INDEX: 27.3 KG/M2 | DIASTOLIC BLOOD PRESSURE: 83 MMHG | WEIGHT: 195 LBS | SYSTOLIC BLOOD PRESSURE: 144 MMHG | HEIGHT: 71 IN | OXYGEN SATURATION: 97 % | HEART RATE: 77 BPM | RESPIRATION RATE: 18 BRPM | TEMPERATURE: 97.7 F

## 2023-05-18 DIAGNOSIS — K86.3 PSEUDOCYST OF PANCREAS DUE TO ACUTE PANCREATITIS: Primary | ICD-10-CM

## 2023-05-18 DIAGNOSIS — K85.90 PSEUDOCYST OF PANCREAS DUE TO ACUTE PANCREATITIS: Primary | ICD-10-CM

## 2023-05-18 PROCEDURE — 99214 OFFICE O/P EST MOD 30 MIN: CPT | Performed by: SURGERY

## 2023-05-18 ASSESSMENT — PATIENT HEALTH QUESTIONNAIRE - PHQ9
1. LITTLE INTEREST OR PLEASURE IN DOING THINGS: 0
2. FEELING DOWN, DEPRESSED OR HOPELESS: 0
SUM OF ALL RESPONSES TO PHQ QUESTIONS 1-9: 0
SUM OF ALL RESPONSES TO PHQ9 QUESTIONS 1 & 2: 0
SUM OF ALL RESPONSES TO PHQ QUESTIONS 1-9: 0

## 2023-05-18 NOTE — PROGRESS NOTES
Identified pt with two pt identifiers (name and ). Reviewed chart in preparation for visit and have obtained necessary documentation. Brock Salazar is a 41 y.o. male  Chief Complaint   Patient presents with    Follow-up     Pseudocyst of pancreas     BP (!) 144/85 (Site: Left Upper Arm, Position: Sitting, Cuff Size: Medium Adult)   Pulse 77   Temp 97.7 °F (36.5 °C) (Oral)   Resp 18   Ht 5' 11\" (1.803 m)   Wt 195 lb (88.5 kg)   SpO2 97%   BMI 27.20 kg/m²     1. Have you been to the ER, urgent care clinic since your last visit? Hospitalized since your last visit?no    2. Have you seen or consulted any other health care providers outside of the [address] [place] [address] since your last visit? Include any pap smears or colon screening. No    Patient and provider made aware of elevated BP x2. Patient asymptomatic. Patient reminded to monitor BP, continue to take BP medications if prescribed, and follow up with PCP/Cardiologist.  Patient expressed understanding and agreement.
contrast.  Multislice helical CT was performed from the diaphragm to the iliac crest during  uneventful rapid bolus intravenous contrast administration. Contiguous 5 mm  axial images were reconstructed and lung and soft tissue windows were generated. Coronal and sagittal reformations were generated. CT dose reduction was  achieved through use of a standardized protocol tailored for this examination  and automatic exposure control for dose modulation. FINDINGS:  LOWER THORAX: No significant abnormality in the incidentally imaged lower chest.    LIVER:  shaped perfusion anomaly posterior right hepatic lobe unchanged. Hepatic steatosis. BILIARY TREE: Gallbladder is within normal limits. CBD is not dilated. SPLEEN: Unchanged splenic lesion. PANCREAS: There is pseudocyst in the tail of the pancreas has increased in size,  now measuring 5.1 x 6.0 cm, previously measuring 4.9 x 5.9 cm. The fluid  collection inferior to the stomach has decreased in size, now measuring 6.4 x  5.2 cm, previously measuring 9.1 x 5.0 cm. Extension of this collection into the  gastrohepatic space persists, also decreased in size. Percutaneous drainage  catheter is well seated within this collection. ADRENALS: Unremarkable. KIDNEYS: No mass, calculus, or hydronephrosis. STOMACH: Unremarkable. SMALL BOWEL: No dilatation or wall thickening. COLON: No dilatation or wall thickening. APPENDIX: Within normal limits. PERITONEUM: No ascites or pneumoperitoneum. RETROPERITONEUM: Multiple surgical clips again demonstrated. BONES: No destructive bone lesion. ABDOMINAL WALL: Small periumbilical hernia containing only fat. ADDITIONAL COMMENTS: N/A    Impression  Overall improvement in the size of the pseudocyst in the mid mesentery with  drainage catheter in position. Interval increase in the size of the pseudocyst  involving the tail of the pancreas. Assessment     Cordell Kiran is a 41 y. o.yr old male seen in follow up from

## 2023-06-22 ENCOUNTER — OFFICE VISIT (OUTPATIENT)
Age: 38
End: 2023-06-22
Payer: COMMERCIAL

## 2023-06-22 VITALS
WEIGHT: 196.6 LBS | BODY MASS INDEX: 27.52 KG/M2 | OXYGEN SATURATION: 98 % | HEIGHT: 71 IN | HEART RATE: 104 BPM | RESPIRATION RATE: 18 BRPM | TEMPERATURE: 97.8 F | DIASTOLIC BLOOD PRESSURE: 80 MMHG | SYSTOLIC BLOOD PRESSURE: 127 MMHG

## 2023-06-22 DIAGNOSIS — K85.90 PSEUDOCYST OF PANCREAS DUE TO ACUTE PANCREATITIS: Primary | ICD-10-CM

## 2023-06-22 DIAGNOSIS — K86.3 PSEUDOCYST OF PANCREAS DUE TO ACUTE PANCREATITIS: Primary | ICD-10-CM

## 2023-06-22 PROCEDURE — 99213 OFFICE O/P EST LOW 20 MIN: CPT | Performed by: SURGERY

## 2023-06-22 ASSESSMENT — PATIENT HEALTH QUESTIONNAIRE - PHQ9
1. LITTLE INTEREST OR PLEASURE IN DOING THINGS: 0
SUM OF ALL RESPONSES TO PHQ QUESTIONS 1-9: 0
SUM OF ALL RESPONSES TO PHQ QUESTIONS 1-9: 0
2. FEELING DOWN, DEPRESSED OR HOPELESS: 0
SUM OF ALL RESPONSES TO PHQ9 QUESTIONS 1 & 2: 0
SUM OF ALL RESPONSES TO PHQ QUESTIONS 1-9: 0
SUM OF ALL RESPONSES TO PHQ QUESTIONS 1-9: 0

## 2023-06-22 NOTE — PROGRESS NOTES
Identified pt with two pt identifiers (name and ). Reviewed chart in preparation for visit and have obtained necessary documentation. Lillian Salazar is a 41 y.o. male  Chief Complaint   Patient presents with    Follow-up     1 month follow up from  appt for Pseudocyst of pancreas     /80 (Site: Left Upper Arm, Position: Sitting, Cuff Size: Small Adult)   Pulse (!) 104   Temp 97.8 °F (36.6 °C) (Oral)   Resp 18   Ht 5' 11\" (1.803 m)   Wt 196 lb 9.6 oz (89.2 kg)   SpO2 98%   BMI 27.42 kg/m²     1. Have you been to the ER, urgent care clinic since your last visit? Hospitalized since your last visit?no    2. Have you seen or consulted any other health care providers outside of the [address] [place] [address] since your last visit? Include any pap smears or colon screening.  no

## 2023-06-23 NOTE — PROGRESS NOTES
[facility]      Clinic Note - Follow up    Nicole returns for scheduled follow up today. He is seen in follow up from a recent hospitalization for acute pancreatitis with development of symptomatic pancreatic pseudocysts. He is also known to me for prior history of a CINDY of the spleen. The patient had IR drain placement into the largest pseudocyst that was causing compressive symptoms against the stomach and pain. This was upsized as an outpatient on 5/1/23 and he returns today for additional follow up. The patient states he is feeling much better overall. He has not had any issues since his drain was removed. He did have 1 day of abdominal discomfort last week after eating beef jerky but this has since resolved. No fevers or chills. He is tolerating a diet and having regular bowel function. The patient denies any changes to the [facility], PSHx, SHx or FHx since their last visit except as mentioned above. ROS is negative except as mentioned in the HPI. Objective     /80 (Site: Left Upper Arm, Position: Sitting, Cuff Size: Small Adult)   Pulse (!) 104   Temp 97.8 °F (36.6 °C) (Oral)   Resp 18   Ht 5' 11\" (1.803 m)   Wt 196 lb 9.6 oz (89.2 kg)   SpO2 98%   BMI 27.42 kg/m²       PE  GEN - Awake, alert, communicating appropriately. NAD  Pulm - CTAB  CV - RRR  Abd - soft, NT, ND. No palpable masses or organomegaly. Drain site is dry. Ext - warm, well perfused, no edema. All other systems negative unless indicated above. Labs  None      Imaging  None      Assessment     Chidi Roberts is a 41 y. o.yr old male seen in follow up from a recent hospitalization for acute pancreatitis with development of symptomatic pancreatic pseudocysts. He is also known to me for prior history of a CINDY of the spleen. The patient had IR drain placement into the largest pseudocyst that was causing compressive symptoms against the stomach and pain.   This

## 2023-07-18 LAB
AEROBIC ID BY SEQ, ORI2T: NORMAL
BACTERIA ISLT: ABNORMAL
OTHER ANTIBIOTIC SUSC ISLT: ABNORMAL
SOURCE, RSRC70: ABNORMAL
SPECIMEN SOURCE: NORMAL

## 2023-07-29 ENCOUNTER — CLINICAL DOCUMENTATION (OUTPATIENT)
Age: 38
End: 2023-07-29

## 2023-07-29 NOTE — PROGRESS NOTES
Theron Castillo at [facility]  [phone]    Patient cancelled his follow up with me earlier this year. I reviewed the chart, obtained records from [facility]. He is obtaining surveillance (H&P, labs, imaging) through their office. I will remain on standby if needed.

## 2023-09-19 ENCOUNTER — TELEPHONE (OUTPATIENT)
Age: 38
End: 2023-09-19

## 2023-09-20 ENCOUNTER — TELEPHONE (OUTPATIENT)
Age: 38
End: 2023-09-20

## 2023-09-20 NOTE — TELEPHONE ENCOUNTER
Called patient to confirm appointment with Dr. Hank Boateng for tomorrow. Patient did not answer. Left voicemail to return call.

## 2023-09-21 ENCOUNTER — OFFICE VISIT (OUTPATIENT)
Age: 38
End: 2023-09-21
Payer: COMMERCIAL

## 2023-09-21 VITALS
HEIGHT: 71 IN | TEMPERATURE: 98.2 F | HEART RATE: 97 BPM | SYSTOLIC BLOOD PRESSURE: 142 MMHG | WEIGHT: 201.8 LBS | BODY MASS INDEX: 28.25 KG/M2 | OXYGEN SATURATION: 95 % | DIASTOLIC BLOOD PRESSURE: 95 MMHG

## 2023-09-21 DIAGNOSIS — K86.3 PSEUDOCYST OF PANCREAS: Primary | ICD-10-CM

## 2023-09-21 PROCEDURE — 99213 OFFICE O/P EST LOW 20 MIN: CPT | Performed by: SURGERY

## 2023-10-16 ENCOUNTER — TELEPHONE (OUTPATIENT)
Age: 38
End: 2023-10-16

## 2023-10-16 NOTE — TELEPHONE ENCOUNTER
I called Mr Salazar verified patient with 2 patient identifiers. I informed patient I will have the  call him to schedule appointment with Dr Finn Dewey for hernia evaluation.

## 2023-10-18 ENCOUNTER — OFFICE VISIT (OUTPATIENT)
Age: 38
End: 2023-10-18
Payer: COMMERCIAL

## 2023-10-18 VITALS
DIASTOLIC BLOOD PRESSURE: 89 MMHG | SYSTOLIC BLOOD PRESSURE: 138 MMHG | BODY MASS INDEX: 28.08 KG/M2 | TEMPERATURE: 98.1 F | WEIGHT: 200.6 LBS | RESPIRATION RATE: 20 BRPM | HEIGHT: 71 IN | OXYGEN SATURATION: 96 % | HEART RATE: 104 BPM

## 2023-10-18 DIAGNOSIS — K86.3 PSEUDOCYST OF PANCREAS: Primary | ICD-10-CM

## 2023-10-18 DIAGNOSIS — K43.2 INCISIONAL HERNIA, WITHOUT OBSTRUCTION OR GANGRENE: ICD-10-CM

## 2023-10-18 PROCEDURE — 99214 OFFICE O/P EST MOD 30 MIN: CPT | Performed by: SURGERY

## 2023-10-18 ASSESSMENT — ANXIETY QUESTIONNAIRES
2. NOT BEING ABLE TO STOP OR CONTROL WORRYING: 0
3. WORRYING TOO MUCH ABOUT DIFFERENT THINGS: 0
4. TROUBLE RELAXING: 0
GAD7 TOTAL SCORE: 0
1. FEELING NERVOUS, ANXIOUS, OR ON EDGE: 0
6. BECOMING EASILY ANNOYED OR IRRITABLE: 0
IF YOU CHECKED OFF ANY PROBLEMS ON THIS QUESTIONNAIRE, HOW DIFFICULT HAVE THESE PROBLEMS MADE IT FOR YOU TO DO YOUR WORK, TAKE CARE OF THINGS AT HOME, OR GET ALONG WITH OTHER PEOPLE: NOT DIFFICULT AT ALL
7. FEELING AFRAID AS IF SOMETHING AWFUL MIGHT HAPPEN: 0
5. BEING SO RESTLESS THAT IT IS HARD TO SIT STILL: 0

## 2023-10-18 ASSESSMENT — PATIENT HEALTH QUESTIONNAIRE - PHQ9
SUM OF ALL RESPONSES TO PHQ QUESTIONS 1-9: 0
1. LITTLE INTEREST OR PLEASURE IN DOING THINGS: 0
2. FEELING DOWN, DEPRESSED OR HOPELESS: 0
SUM OF ALL RESPONSES TO PHQ QUESTIONS 1-9: 0
SUM OF ALL RESPONSES TO PHQ9 QUESTIONS 1 & 2: 0

## 2023-11-01 ENCOUNTER — HOSPITAL ENCOUNTER (OUTPATIENT)
Facility: HOSPITAL | Age: 38
Setting detail: INFUSION SERIES
Discharge: HOME | End: 2023-11-01
Payer: COMMERCIAL

## 2023-11-01 ENCOUNTER — CLINICAL DOCUMENTATION (OUTPATIENT)
Facility: HOSPITAL | Age: 38
End: 2023-11-01

## 2023-11-01 VITALS
HEART RATE: 97 BPM | SYSTOLIC BLOOD PRESSURE: 146 MMHG | TEMPERATURE: 98.3 F | OXYGEN SATURATION: 99 % | RESPIRATION RATE: 18 BRPM | DIASTOLIC BLOOD PRESSURE: 81 MMHG

## 2023-11-01 DIAGNOSIS — K51.919 ULCERATIVE COLITIS WITH COMPLICATION, UNSPECIFIED LOCATION (HCC): Primary | ICD-10-CM

## 2023-11-01 PROCEDURE — 6360000002 HC RX W HCPCS

## 2023-11-01 PROCEDURE — 96365 THER/PROPH/DIAG IV INF INIT: CPT

## 2023-11-01 PROCEDURE — 2580000003 HC RX 258

## 2023-11-01 RX ORDER — EPINEPHRINE 1 MG/ML
0.3 INJECTION, SOLUTION, CONCENTRATE INTRAVENOUS PRN
OUTPATIENT
Start: 2023-11-15

## 2023-11-01 RX ORDER — SODIUM CHLORIDE 9 MG/ML
INJECTION, SOLUTION INTRAVENOUS CONTINUOUS
OUTPATIENT
Start: 2023-11-15

## 2023-11-01 RX ORDER — SODIUM CHLORIDE 9 MG/ML
5-250 INJECTION, SOLUTION INTRAVENOUS PRN
OUTPATIENT
Start: 2023-11-15

## 2023-11-01 RX ORDER — ONDANSETRON 2 MG/ML
8 INJECTION INTRAMUSCULAR; INTRAVENOUS
OUTPATIENT
Start: 2023-11-15

## 2023-11-01 RX ORDER — HEPARIN 100 UNIT/ML
500 SYRINGE INTRAVENOUS PRN
OUTPATIENT
Start: 2023-11-15

## 2023-11-01 RX ORDER — SODIUM CHLORIDE 0.9 % (FLUSH) 0.9 %
5-40 SYRINGE (ML) INJECTION PRN
Status: DISCONTINUED | OUTPATIENT
Start: 2023-11-01 | End: 2023-11-02 | Stop reason: HOSPADM

## 2023-11-01 RX ORDER — ALBUTEROL SULFATE 90 UG/1
4 AEROSOL, METERED RESPIRATORY (INHALATION) PRN
OUTPATIENT
Start: 2023-11-15

## 2023-11-01 RX ORDER — SODIUM CHLORIDE 0.9 % (FLUSH) 0.9 %
5-40 SYRINGE (ML) INJECTION PRN
OUTPATIENT
Start: 2023-11-15

## 2023-11-01 RX ORDER — DIPHENHYDRAMINE HYDROCHLORIDE 50 MG/ML
50 INJECTION INTRAMUSCULAR; INTRAVENOUS
OUTPATIENT
Start: 2023-11-15

## 2023-11-01 RX ORDER — SODIUM CHLORIDE 9 MG/ML
5-250 INJECTION, SOLUTION INTRAVENOUS PRN
Status: DISCONTINUED | OUTPATIENT
Start: 2023-11-01 | End: 2023-11-02 | Stop reason: HOSPADM

## 2023-11-01 RX ORDER — ACETAMINOPHEN 325 MG/1
650 TABLET ORAL
OUTPATIENT
Start: 2023-11-15

## 2023-11-01 RX ADMIN — Medication 10 ML: at 11:22

## 2023-11-01 RX ADMIN — VEDOLIZUMAB 300 MG: 300 INJECTION, POWDER, LYOPHILIZED, FOR SOLUTION INTRAVENOUS at 10:48

## 2023-11-01 RX ADMIN — Medication 10 ML: at 10:08

## 2023-11-01 RX ADMIN — SODIUM CHLORIDE 25 ML/HR: 9 INJECTION, SOLUTION INTRAVENOUS at 10:34

## 2023-11-01 NOTE — DISCHARGE INSTRUCTIONS
vedolizumab  Pronunciation:  MADELIN peres  IvanBarbara Rudolph  What is the most important information I should know about vedolizumab? This medicine can cause serious side effects on your brain or liver, and may cause a serious infection. Call your doctor right away if you have a fever, tiredness, muscle aches, sore throat, shortness of breath, skin sores, painful urination, loss of appetite, upper stomach pain, dark urine, yellowing of your skin and eyes, or problems with speech, thought, vision, or muscle movement. Tell your caregivers if you have any reactions during the injection, such as dizziness, nausea, itching, headache, chest tightness, trouble breathing, or swelling in your face. What is vedolizumab? Vedolizumab is used in adults with moderate to severe ulcerative colitis (UC), or moderate to severe Crohn's disease. Vedolizumab treats active disease and may help keep UC or Crohn's symptoms under control long term. Vedolizumab may also reduce the need for steroid medicines in helping to control symptoms long term. Vedolizumab is usually given after other treatments have failed. Vedolizumab may also be used for purposes not listed in this medication guide. What should I discuss with my healthcare provider before receiving vedolizumab? You should not use vedolizumab if you are allergic to it. Tell your doctor if you have ever had:  an active or recent infection;  liver disease;  signs of infection such as fever, cough, or flu symptoms;  if you are scheduled to receive any vaccines. Tell your doctor if you have ever had tuberculosis or if anyone in your household has tuberculosis. Also tell your doctor if you have recently traveled. Tuberculosis and some fungal infections are more common in certain parts of the world, and you may have been exposed during travel. You should be up to date with all needed vaccinations before receiving vedolizumab.    Tell your doctor if you are pregnant or plan

## 2023-11-02 ENCOUNTER — TELEPHONE (OUTPATIENT)
Age: 38
End: 2023-11-02

## 2023-11-10 ENCOUNTER — PREP FOR PROCEDURE (OUTPATIENT)
Age: 38
End: 2023-11-10

## 2023-11-10 PROBLEM — K43.2 INCISIONAL HERNIA: Status: ACTIVE | Noted: 2023-11-10

## 2023-11-13 RX ORDER — SODIUM CHLORIDE 0.9 % (FLUSH) 0.9 %
5-40 SYRINGE (ML) INJECTION EVERY 12 HOURS SCHEDULED
Status: CANCELLED | OUTPATIENT
Start: 2023-11-13

## 2023-11-13 RX ORDER — SODIUM CHLORIDE 9 MG/ML
INJECTION, SOLUTION INTRAVENOUS PRN
Status: CANCELLED | OUTPATIENT
Start: 2023-11-13

## 2023-11-13 RX ORDER — SODIUM CHLORIDE 0.9 % (FLUSH) 0.9 %
5-40 SYRINGE (ML) INJECTION PRN
Status: CANCELLED | OUTPATIENT
Start: 2023-11-13

## 2023-11-15 ENCOUNTER — APPOINTMENT (OUTPATIENT)
Facility: HOSPITAL | Age: 38
End: 2023-11-15
Payer: COMMERCIAL

## 2023-11-15 ENCOUNTER — HOSPITAL ENCOUNTER (OUTPATIENT)
Facility: HOSPITAL | Age: 38
Setting detail: INFUSION SERIES
Discharge: HOME | End: 2023-11-15
Payer: COMMERCIAL

## 2023-11-15 VITALS
DIASTOLIC BLOOD PRESSURE: 84 MMHG | SYSTOLIC BLOOD PRESSURE: 142 MMHG | HEART RATE: 101 BPM | TEMPERATURE: 97.5 F | RESPIRATION RATE: 18 BRPM

## 2023-11-15 DIAGNOSIS — K51.919 ULCERATIVE COLITIS WITH COMPLICATION, UNSPECIFIED LOCATION (HCC): Primary | ICD-10-CM

## 2023-11-15 PROCEDURE — 2580000003 HC RX 258

## 2023-11-15 PROCEDURE — 96413 CHEMO IV INFUSION 1 HR: CPT

## 2023-11-15 PROCEDURE — 6360000002 HC RX W HCPCS

## 2023-11-15 RX ORDER — SODIUM CHLORIDE 0.9 % (FLUSH) 0.9 %
5-40 SYRINGE (ML) INJECTION PRN
OUTPATIENT
Start: 2023-11-15

## 2023-11-15 RX ORDER — EPINEPHRINE 1 MG/ML
0.3 INJECTION, SOLUTION, CONCENTRATE INTRAVENOUS PRN
OUTPATIENT
Start: 2023-11-15

## 2023-11-15 RX ORDER — ACETAMINOPHEN 325 MG/1
650 TABLET ORAL
OUTPATIENT
Start: 2023-11-15

## 2023-11-15 RX ORDER — ONDANSETRON 2 MG/ML
8 INJECTION INTRAMUSCULAR; INTRAVENOUS
OUTPATIENT
Start: 2023-11-15

## 2023-11-15 RX ORDER — SODIUM CHLORIDE 0.9 % (FLUSH) 0.9 %
5-40 SYRINGE (ML) INJECTION PRN
Status: DISCONTINUED | OUTPATIENT
Start: 2023-11-15 | End: 2023-11-16 | Stop reason: HOSPADM

## 2023-11-15 RX ORDER — SODIUM CHLORIDE 9 MG/ML
5-250 INJECTION, SOLUTION INTRAVENOUS PRN
OUTPATIENT
Start: 2023-11-15

## 2023-11-15 RX ORDER — SODIUM CHLORIDE 9 MG/ML
INJECTION, SOLUTION INTRAVENOUS CONTINUOUS
OUTPATIENT
Start: 2023-11-15

## 2023-11-15 RX ORDER — ALBUTEROL SULFATE 90 UG/1
4 AEROSOL, METERED RESPIRATORY (INHALATION) PRN
OUTPATIENT
Start: 2023-11-15

## 2023-11-15 RX ORDER — SODIUM CHLORIDE 9 MG/ML
5-250 INJECTION, SOLUTION INTRAVENOUS PRN
Status: DISCONTINUED | OUTPATIENT
Start: 2023-11-15 | End: 2023-11-16 | Stop reason: HOSPADM

## 2023-11-15 RX ORDER — DIPHENHYDRAMINE HYDROCHLORIDE 50 MG/ML
50 INJECTION INTRAMUSCULAR; INTRAVENOUS
OUTPATIENT
Start: 2023-11-15

## 2023-11-15 RX ORDER — HEPARIN 100 UNIT/ML
500 SYRINGE INTRAVENOUS PRN
OUTPATIENT
Start: 2023-11-15

## 2023-11-15 RX ADMIN — VEDOLIZUMAB 300 MG: 300 INJECTION, POWDER, LYOPHILIZED, FOR SOLUTION INTRAVENOUS at 09:05

## 2023-11-15 RX ADMIN — SODIUM CHLORIDE 25 ML/HR: 9 INJECTION, SOLUTION INTRAVENOUS at 08:26

## 2023-11-17 ENCOUNTER — HOSPITAL ENCOUNTER (OUTPATIENT)
Facility: HOSPITAL | Age: 38
Discharge: HOME | End: 2023-11-17
Payer: COMMERCIAL

## 2023-11-17 VITALS
SYSTOLIC BLOOD PRESSURE: 137 MMHG | TEMPERATURE: 97.5 F | HEIGHT: 71 IN | HEART RATE: 96 BPM | WEIGHT: 200 LBS | DIASTOLIC BLOOD PRESSURE: 87 MMHG | BODY MASS INDEX: 28 KG/M2

## 2023-11-17 LAB
BASOPHILS # BLD: 0.1 K/UL (ref 0–0.1)
BASOPHILS NFR BLD: 1 % (ref 0–1)
DIFFERENTIAL METHOD BLD: ABNORMAL
EOSINOPHIL # BLD: 0.2 K/UL (ref 0–0.4)
EOSINOPHIL NFR BLD: 3 % (ref 0–7)
ERYTHROCYTE [DISTWIDTH] IN BLOOD BY AUTOMATED COUNT: 15.8 % (ref 11.5–14.5)
HCT VFR BLD AUTO: 44.4 % (ref 36.6–50.3)
HGB BLD-MCNC: 14.4 G/DL (ref 12.1–17)
IMM GRANULOCYTES # BLD AUTO: 0 K/UL (ref 0–0.04)
IMM GRANULOCYTES NFR BLD AUTO: 0 % (ref 0–0.5)
LYMPHOCYTES # BLD: 2.3 K/UL (ref 0.8–3.5)
LYMPHOCYTES NFR BLD: 39 % (ref 12–49)
MCH RBC QN AUTO: 28.6 PG (ref 26–34)
MCHC RBC AUTO-ENTMCNC: 32.4 G/DL (ref 30–36.5)
MCV RBC AUTO: 88.3 FL (ref 80–99)
MONOCYTES # BLD: 0.6 K/UL (ref 0–1)
MONOCYTES NFR BLD: 10 % (ref 5–13)
NEUTS SEG # BLD: 2.7 K/UL (ref 1.8–8)
NEUTS SEG NFR BLD: 47 % (ref 32–75)
NRBC # BLD: 0 K/UL (ref 0–0.01)
NRBC BLD-RTO: 0 PER 100 WBC
PLATELET # BLD AUTO: 266 K/UL (ref 150–400)
PMV BLD AUTO: 11 FL (ref 8.9–12.9)
RBC # BLD AUTO: 5.03 M/UL (ref 4.1–5.7)
WBC # BLD AUTO: 5.8 K/UL (ref 4.1–11.1)

## 2023-11-17 PROCEDURE — 85025 COMPLETE CBC W/AUTO DIFF WBC: CPT

## 2023-11-17 PROCEDURE — 36415 COLL VENOUS BLD VENIPUNCTURE: CPT

## 2023-11-17 NOTE — PERIOP NOTE
Testing staff can be reached at [phone]. Special instructions: NONE      Eating and Drinking Before Surgery    You may eat a regular dinner at the usual time on the day before your surgery. Do NOT eat any solid foods after midnight. You may drink clear liquids only from 12 midnight until 1 hours prior to your arrival time at the hospital on the day of your surgery. Do NOT drink alcohol. Clear liquids include:  Water  Fruit juices without pulp( i.e. apple juice)  Carbonated beverages  Black coffee (no cream/milk)  Tea (no cream/milk)  Gatorade  You may drink up to 12-16 ounces at one time every 4 hours between the hours of midnight and 1 hour before your arrival time at the hospital. Example- if your arrival time at the hospital is 6am, you may drink 12-16 ounces of clear liquids no later than 5am.  If you have any questions, please contact your surgeon's office. Current Outpatient Medications   Medication Sig    Multiple Vitamin (MULTI-VITAMIN DAILY PO) Take 1 tablet by mouth daily    BUDESONIDE PO Take 3 mg by mouth 3 times daily    amphetamine-dextroamphetamine (ADDERALL XR) 25 MG extended release capsule Take 1 capsule by mouth every morning. loratadine-pseudoephedrine (KLS ALLERCLEAR D-24HR)  MG per extended release tablet Take 1 tablet by mouth daily (Patient not taking: Reported on 10/18/2023)    omeprazole (PRILOSEC OTC) 20 MG tablet 1 tablet daily     No current facility-administered medications for this encounter. YOU MUST ONLY TAKE THESE MEDICATIONS THE MORNING OF SURGERY  BUDESONIDE, OMEPRAZOLE    MEDICATIONS TO TAKE THE MORNING OF SURGERY ONLY IF NEEDED: LORATADINE    HOLD these prescription medications BEFORE Surgery: STOP VITAMINS 11/24/23      Stop all vitamins, herbal medicines and Aspirin containing products 7 days prior to surgery. Stop any non-steroidal anti-inflammatory drugs (i.e. Ibuprofen, Naproxen, Advil, Aleve) 3 days before surgery. You may take Tylenol. Preventing Infections Before and After - Your Surgery    IMPORTANT INSTRUCTIONS    You play an important role in your health and preparation for surgery. To reduce the germs on your skin you will need to shower with CHG soap (Chorhexidine gluconate 4%) two times before surgery. CHG soap (Hibiclens, Hex-A-Clens or store brand)  CHG soap will be provided at your Preadmission Testing (PAT) appointment. If you do not have a PAT appointment before surgery, you may arrange to  CHG soap from our office or purchase CHG soap at a pharmacy, grocery or department store. You need to purchase TWO 4 ounce bottles to use for your 2 showers. Steps to follow:  Deborha Ruiz your hair with your normal shampoo and your body with regular soap and rinse well to remove shampoo and soap from your skin. Wet a clean washcloth and turn off the shower. Put CHG soap on washcloth and apply to your entire body from the neck down. Do not use on your head, face or private parts(genitals). Do not use CHG soap on open sores, wounds or areas of skin irritation. Wash you body gently for 5 minutes. Do not wash your skin too hard. This soap does not create lather. Pay special attention to your underarms and from your belly button to your feet. Turn the shower back on and rinse well to get CHG soap off your body. Pat your skin dry with a clean, dry towel. Do not apply lotions or moisturizer. Put on clean clothes and sleep on fresh bed sheets and do not allow pets to sleep with you. Shower with CHG soap 2 times before your surgery  The evening before your surgery  The morning of your surgery      Tips to help prevent infections after your surgery:  Protect your surgical wound from germs:  Hand washing is the most important thing you and your caregivers can do to prevent infections. Keep your bandage clean and dry! Do not touch your surgical wound.   Use clean, freshly washed towels and washcloths every time you shower; do not

## 2023-12-01 ENCOUNTER — HOSPITAL ENCOUNTER (INPATIENT)
Facility: HOSPITAL | Age: 38
LOS: 2 days | Discharge: HOME | DRG: 355 | End: 2023-12-03
Attending: SURGERY | Admitting: SURGERY
Payer: COMMERCIAL

## 2023-12-01 ENCOUNTER — ANESTHESIA (OUTPATIENT)
Facility: HOSPITAL | Age: 38
End: 2023-12-01
Payer: COMMERCIAL

## 2023-12-01 ENCOUNTER — ANESTHESIA EVENT (OUTPATIENT)
Facility: HOSPITAL | Age: 38
End: 2023-12-01
Payer: COMMERCIAL

## 2023-12-01 DIAGNOSIS — K43.2 INCISIONAL HERNIA OF ANTERIOR ABDOMINAL WALL WITHOUT OBSTRUCTION OR GANGRENE: Primary | ICD-10-CM

## 2023-12-01 PROCEDURE — 2580000003 HC RX 258: Performed by: SURGERY

## 2023-12-01 PROCEDURE — 49329 UNLSTD LAPS PX ABD PERTM&OMN: CPT | Performed by: SURGERY

## 2023-12-01 PROCEDURE — 6370000000 HC RX 637 (ALT 250 FOR IP): Performed by: STUDENT IN AN ORGANIZED HEALTH CARE EDUCATION/TRAINING PROGRAM

## 2023-12-01 PROCEDURE — 0HX7XZZ TRANSFER ABDOMEN SKIN, EXTERNAL APPROACH: ICD-10-PCS | Performed by: SURGERY

## 2023-12-01 PROCEDURE — 2580000003 HC RX 258: Performed by: STUDENT IN AN ORGANIZED HEALTH CARE EDUCATION/TRAINING PROGRAM

## 2023-12-01 PROCEDURE — 6360000002 HC RX W HCPCS: Performed by: STUDENT IN AN ORGANIZED HEALTH CARE EDUCATION/TRAINING PROGRAM

## 2023-12-01 PROCEDURE — 6360000002 HC RX W HCPCS

## 2023-12-01 PROCEDURE — 49595 RPR AA HRN 1ST > 10 RDC: CPT | Performed by: SURGERY

## 2023-12-01 PROCEDURE — 6360000002 HC RX W HCPCS: Performed by: SURGERY

## 2023-12-01 PROCEDURE — 2720000010 HC SURG SUPPLY STERILE: Performed by: SURGERY

## 2023-12-01 PROCEDURE — 1200000000 HC SEMI PRIVATE

## 2023-12-01 PROCEDURE — 3600000009 HC SURGERY ROBOT BASE: Performed by: SURGERY

## 2023-12-01 PROCEDURE — 7100000000 HC PACU RECOVERY - FIRST 15 MIN: Performed by: SURGERY

## 2023-12-01 PROCEDURE — C1781 MESH (IMPLANTABLE): HCPCS | Performed by: SURGERY

## 2023-12-01 PROCEDURE — 2500000003 HC RX 250 WO HCPCS: Performed by: STUDENT IN AN ORGANIZED HEALTH CARE EDUCATION/TRAINING PROGRAM

## 2023-12-01 PROCEDURE — 3700000000 HC ANESTHESIA ATTENDED CARE: Performed by: SURGERY

## 2023-12-01 PROCEDURE — S2900 ROBOTIC SURGICAL SYSTEM: HCPCS | Performed by: SURGERY

## 2023-12-01 PROCEDURE — 3700000001 HC ADD 15 MINUTES (ANESTHESIA): Performed by: SURGERY

## 2023-12-01 PROCEDURE — 8E0W0CZ ROBOTIC ASSISTED PROCEDURE OF TRUNK REGION, OPEN APPROACH: ICD-10-PCS | Performed by: SURGERY

## 2023-12-01 PROCEDURE — 7100000001 HC PACU RECOVERY - ADDTL 15 MIN: Performed by: SURGERY

## 2023-12-01 PROCEDURE — 2709999900 HC NON-CHARGEABLE SUPPLY: Performed by: SURGERY

## 2023-12-01 PROCEDURE — 0WUF0JZ SUPPLEMENT ABDOMINAL WALL WITH SYNTHETIC SUBSTITUTE, OPEN APPROACH: ICD-10-PCS | Performed by: SURGERY

## 2023-12-01 PROCEDURE — 3600000019 HC SURGERY ROBOT ADDTL 15MIN: Performed by: SURGERY

## 2023-12-01 DEVICE — BARD SOFT MESH, 12" X 12" (30. 5 CM X 30.5 CM)
Type: IMPLANTABLE DEVICE | Site: ABDOMEN | Status: FUNCTIONAL
Brand: BARD

## 2023-12-01 RX ORDER — OXYCODONE HYDROCHLORIDE 5 MG/1
5 TABLET ORAL
Status: DISCONTINUED | OUTPATIENT
Start: 2023-12-01 | End: 2023-12-01 | Stop reason: HOSPADM

## 2023-12-01 RX ORDER — HYDROMORPHONE HYDROCHLORIDE 1 MG/ML
1 INJECTION, SOLUTION INTRAMUSCULAR; INTRAVENOUS; SUBCUTANEOUS
Status: DISCONTINUED | OUTPATIENT
Start: 2023-12-01 | End: 2023-12-03 | Stop reason: HOSPADM

## 2023-12-01 RX ORDER — SODIUM CHLORIDE 0.9 % (FLUSH) 0.9 %
5-40 SYRINGE (ML) INJECTION EVERY 12 HOURS SCHEDULED
Status: DISCONTINUED | OUTPATIENT
Start: 2023-12-01 | End: 2023-12-01 | Stop reason: HOSPADM

## 2023-12-01 RX ORDER — SODIUM CHLORIDE 9 MG/ML
INJECTION, SOLUTION INTRAVENOUS PRN
Status: DISCONTINUED | OUTPATIENT
Start: 2023-12-01 | End: 2023-12-01 | Stop reason: HOSPADM

## 2023-12-01 RX ORDER — ACETAMINOPHEN 500 MG
1000 TABLET ORAL ONCE
Status: COMPLETED | OUTPATIENT
Start: 2023-12-01 | End: 2023-12-01

## 2023-12-01 RX ORDER — HYDRALAZINE HYDROCHLORIDE 20 MG/ML
20 INJECTION INTRAMUSCULAR; INTRAVENOUS EVERY 6 HOURS PRN
Status: DISCONTINUED | OUTPATIENT
Start: 2023-12-01 | End: 2023-12-03 | Stop reason: HOSPADM

## 2023-12-01 RX ORDER — ROCURONIUM BROMIDE 10 MG/ML
INJECTION, SOLUTION INTRAVENOUS PRN
Status: DISCONTINUED | OUTPATIENT
Start: 2023-12-01 | End: 2023-12-01 | Stop reason: SDUPTHER

## 2023-12-01 RX ORDER — SODIUM CHLORIDE 0.9 % (FLUSH) 0.9 %
5-40 SYRINGE (ML) INJECTION PRN
Status: DISCONTINUED | OUTPATIENT
Start: 2023-12-01 | End: 2023-12-01 | Stop reason: HOSPADM

## 2023-12-01 RX ORDER — SODIUM CHLORIDE 0.9 % (FLUSH) 0.9 %
5-40 SYRINGE (ML) INJECTION PRN
Status: DISCONTINUED | OUTPATIENT
Start: 2023-12-01 | End: 2023-12-03 | Stop reason: HOSPADM

## 2023-12-01 RX ORDER — HYDROMORPHONE HYDROCHLORIDE 2 MG/ML
INJECTION, SOLUTION INTRAMUSCULAR; INTRAVENOUS; SUBCUTANEOUS PRN
Status: DISCONTINUED | OUTPATIENT
Start: 2023-12-01 | End: 2023-12-01 | Stop reason: SDUPTHER

## 2023-12-01 RX ORDER — FENTANYL CITRATE 50 UG/ML
INJECTION, SOLUTION INTRAMUSCULAR; INTRAVENOUS PRN
Status: DISCONTINUED | OUTPATIENT
Start: 2023-12-01 | End: 2023-12-01 | Stop reason: SDUPTHER

## 2023-12-01 RX ORDER — OMEPRAZOLE 20 MG/1
20 TABLET, DELAYED RELEASE ORAL DAILY
Status: DISCONTINUED | OUTPATIENT
Start: 2023-12-01 | End: 2023-12-01 | Stop reason: CLARIF

## 2023-12-01 RX ORDER — KETAMINE HCL IN NACL, ISO-OSM 100MG/10ML
SYRINGE (ML) INJECTION PRN
Status: DISCONTINUED | OUTPATIENT
Start: 2023-12-01 | End: 2023-12-01 | Stop reason: SDUPTHER

## 2023-12-01 RX ORDER — PROCHLORPERAZINE EDISYLATE 5 MG/ML
5 INJECTION INTRAMUSCULAR; INTRAVENOUS
Status: DISCONTINUED | OUTPATIENT
Start: 2023-12-01 | End: 2023-12-01 | Stop reason: HOSPADM

## 2023-12-01 RX ORDER — SODIUM CHLORIDE, SODIUM LACTATE, POTASSIUM CHLORIDE, CALCIUM CHLORIDE 600; 310; 30; 20 MG/100ML; MG/100ML; MG/100ML; MG/100ML
INJECTION, SOLUTION INTRAVENOUS CONTINUOUS
Status: DISCONTINUED | OUTPATIENT
Start: 2023-12-01 | End: 2023-12-03 | Stop reason: HOSPADM

## 2023-12-01 RX ORDER — ONDANSETRON 2 MG/ML
4 INJECTION INTRAMUSCULAR; INTRAVENOUS
Status: DISCONTINUED | OUTPATIENT
Start: 2023-12-01 | End: 2023-12-01 | Stop reason: HOSPADM

## 2023-12-01 RX ORDER — LIDOCAINE HYDROCHLORIDE 20 MG/ML
INJECTION, SOLUTION EPIDURAL; INFILTRATION; INTRACAUDAL; PERINEURAL PRN
Status: DISCONTINUED | OUTPATIENT
Start: 2023-12-01 | End: 2023-12-01 | Stop reason: SDUPTHER

## 2023-12-01 RX ORDER — SODIUM CHLORIDE 9 MG/ML
INJECTION, SOLUTION INTRAVENOUS PRN
Status: DISCONTINUED | OUTPATIENT
Start: 2023-12-01 | End: 2023-12-03 | Stop reason: HOSPADM

## 2023-12-01 RX ORDER — HYDROMORPHONE HYDROCHLORIDE 1 MG/ML
0.5 INJECTION, SOLUTION INTRAMUSCULAR; INTRAVENOUS; SUBCUTANEOUS EVERY 5 MIN PRN
Status: COMPLETED | OUTPATIENT
Start: 2023-12-01 | End: 2023-12-01

## 2023-12-01 RX ORDER — LIDOCAINE HYDROCHLORIDE 10 MG/ML
1 INJECTION, SOLUTION EPIDURAL; INFILTRATION; INTRACAUDAL; PERINEURAL
Status: DISCONTINUED | OUTPATIENT
Start: 2023-12-01 | End: 2023-12-01 | Stop reason: HOSPADM

## 2023-12-01 RX ORDER — ONDANSETRON 2 MG/ML
4 INJECTION INTRAMUSCULAR; INTRAVENOUS EVERY 6 HOURS PRN
Status: DISCONTINUED | OUTPATIENT
Start: 2023-12-01 | End: 2023-12-03 | Stop reason: HOSPADM

## 2023-12-01 RX ORDER — SODIUM CHLORIDE, SODIUM LACTATE, POTASSIUM CHLORIDE, CALCIUM CHLORIDE 600; 310; 30; 20 MG/100ML; MG/100ML; MG/100ML; MG/100ML
INJECTION, SOLUTION INTRAVENOUS CONTINUOUS PRN
Status: DISCONTINUED | OUTPATIENT
Start: 2023-12-01 | End: 2023-12-01 | Stop reason: SDUPTHER

## 2023-12-01 RX ORDER — ONDANSETRON 2 MG/ML
INJECTION INTRAMUSCULAR; INTRAVENOUS PRN
Status: DISCONTINUED | OUTPATIENT
Start: 2023-12-01 | End: 2023-12-01 | Stop reason: SDUPTHER

## 2023-12-01 RX ORDER — DEXAMETHASONE SODIUM PHOSPHATE 4 MG/ML
INJECTION, SOLUTION INTRA-ARTICULAR; INTRALESIONAL; INTRAMUSCULAR; INTRAVENOUS; SOFT TISSUE PRN
Status: DISCONTINUED | OUTPATIENT
Start: 2023-12-01 | End: 2023-12-01 | Stop reason: SDUPTHER

## 2023-12-01 RX ORDER — FENTANYL CITRATE 50 UG/ML
100 INJECTION, SOLUTION INTRAMUSCULAR; INTRAVENOUS
Status: DISCONTINUED | OUTPATIENT
Start: 2023-12-01 | End: 2023-12-01 | Stop reason: HOSPADM

## 2023-12-01 RX ORDER — ENOXAPARIN SODIUM 100 MG/ML
40 INJECTION SUBCUTANEOUS DAILY
Status: DISCONTINUED | OUTPATIENT
Start: 2023-12-02 | End: 2023-12-03 | Stop reason: HOSPADM

## 2023-12-01 RX ORDER — IBUPROFEN 600 MG/1
600 TABLET ORAL 3 TIMES DAILY PRN
Qty: 30 TABLET | Refills: 0 | Status: SHIPPED | OUTPATIENT
Start: 2023-12-01

## 2023-12-01 RX ORDER — PANTOPRAZOLE SODIUM 40 MG/1
40 TABLET, DELAYED RELEASE ORAL
Status: DISCONTINUED | OUTPATIENT
Start: 2023-12-02 | End: 2023-12-03 | Stop reason: HOSPADM

## 2023-12-01 RX ORDER — MIDAZOLAM HYDROCHLORIDE 2 MG/2ML
2 INJECTION, SOLUTION INTRAMUSCULAR; INTRAVENOUS
Status: DISCONTINUED | OUTPATIENT
Start: 2023-12-01 | End: 2023-12-01 | Stop reason: HOSPADM

## 2023-12-01 RX ORDER — MIDAZOLAM HYDROCHLORIDE 1 MG/ML
INJECTION INTRAMUSCULAR; INTRAVENOUS PRN
Status: DISCONTINUED | OUTPATIENT
Start: 2023-12-01 | End: 2023-12-01 | Stop reason: SDUPTHER

## 2023-12-01 RX ORDER — BUPIVACAINE HYDROCHLORIDE 5 MG/ML
INJECTION, SOLUTION EPIDURAL; INTRACAUDAL PRN
Status: DISCONTINUED | OUTPATIENT
Start: 2023-12-01 | End: 2023-12-01 | Stop reason: HOSPADM

## 2023-12-01 RX ORDER — OXYCODONE HYDROCHLORIDE AND ACETAMINOPHEN 5; 325 MG/1; MG/1
2 TABLET ORAL EVERY 4 HOURS PRN
Status: DISCONTINUED | OUTPATIENT
Start: 2023-12-01 | End: 2023-12-03 | Stop reason: HOSPADM

## 2023-12-01 RX ORDER — FENTANYL CITRATE 50 UG/ML
25 INJECTION, SOLUTION INTRAMUSCULAR; INTRAVENOUS EVERY 5 MIN PRN
Status: DISCONTINUED | OUTPATIENT
Start: 2023-12-01 | End: 2023-12-01 | Stop reason: HOSPADM

## 2023-12-01 RX ORDER — OXYCODONE HYDROCHLORIDE AND ACETAMINOPHEN 5; 325 MG/1; MG/1
1 TABLET ORAL EVERY 6 HOURS PRN
Qty: 20 TABLET | Refills: 0 | Status: SHIPPED | OUTPATIENT
Start: 2023-12-01 | End: 2023-12-08

## 2023-12-01 RX ORDER — SUCCINYLCHOLINE/SOD CL,ISO/PF 200MG/10ML
SYRINGE (ML) INTRAVENOUS PRN
Status: DISCONTINUED | OUTPATIENT
Start: 2023-12-01 | End: 2023-12-01 | Stop reason: SDUPTHER

## 2023-12-01 RX ORDER — ONDANSETRON 4 MG/1
4 TABLET, ORALLY DISINTEGRATING ORAL EVERY 8 HOURS PRN
Status: DISCONTINUED | OUTPATIENT
Start: 2023-12-01 | End: 2023-12-03 | Stop reason: HOSPADM

## 2023-12-01 RX ORDER — FENTANYL CITRATE 50 UG/ML
INJECTION, SOLUTION INTRAMUSCULAR; INTRAVENOUS
Status: COMPLETED
Start: 2023-12-01 | End: 2023-12-01

## 2023-12-01 RX ORDER — HYDRALAZINE HYDROCHLORIDE 20 MG/ML
10 INJECTION INTRAMUSCULAR; INTRAVENOUS
Status: DISCONTINUED | OUTPATIENT
Start: 2023-12-01 | End: 2023-12-01 | Stop reason: HOSPADM

## 2023-12-01 RX ORDER — DEXMEDETOMIDINE HYDROCHLORIDE 100 UG/ML
INJECTION, SOLUTION INTRAVENOUS PRN
Status: DISCONTINUED | OUTPATIENT
Start: 2023-12-01 | End: 2023-12-01 | Stop reason: SDUPTHER

## 2023-12-01 RX ORDER — SODIUM CHLORIDE, SODIUM LACTATE, POTASSIUM CHLORIDE, CALCIUM CHLORIDE 600; 310; 30; 20 MG/100ML; MG/100ML; MG/100ML; MG/100ML
INJECTION, SOLUTION INTRAVENOUS CONTINUOUS
Status: DISCONTINUED | OUTPATIENT
Start: 2023-12-01 | End: 2023-12-01 | Stop reason: HOSPADM

## 2023-12-01 RX ORDER — SODIUM CHLORIDE 0.9 % (FLUSH) 0.9 %
5-40 SYRINGE (ML) INJECTION EVERY 12 HOURS SCHEDULED
Status: DISCONTINUED | OUTPATIENT
Start: 2023-12-01 | End: 2023-12-03 | Stop reason: HOSPADM

## 2023-12-01 RX ORDER — ACETAMINOPHEN 325 MG/1
650 TABLET ORAL EVERY 6 HOURS
Status: DISCONTINUED | OUTPATIENT
Start: 2023-12-01 | End: 2023-12-03 | Stop reason: HOSPADM

## 2023-12-01 RX ADMIN — ROCURONIUM BROMIDE 5 MG: 10 SOLUTION INTRAVENOUS at 11:16

## 2023-12-01 RX ADMIN — FENTANYL CITRATE 50 MCG: 50 INJECTION, SOLUTION INTRAMUSCULAR; INTRAVENOUS at 11:16

## 2023-12-01 RX ADMIN — FENTANYL CITRATE 25 MCG: 50 INJECTION INTRAMUSCULAR; INTRAVENOUS at 16:37

## 2023-12-01 RX ADMIN — SODIUM CHLORIDE, PRESERVATIVE FREE 10 ML: 5 INJECTION INTRAVENOUS at 20:34

## 2023-12-01 RX ADMIN — FENTANYL CITRATE 25 MCG: 50 INJECTION INTRAMUSCULAR; INTRAVENOUS at 17:15

## 2023-12-01 RX ADMIN — ROCURONIUM BROMIDE 10 MG: 10 SOLUTION INTRAVENOUS at 14:18

## 2023-12-01 RX ADMIN — LIDOCAINE HYDROCHLORIDE 100 MG: 20 INJECTION, SOLUTION EPIDURAL; INFILTRATION; INTRACAUDAL; PERINEURAL at 11:16

## 2023-12-01 RX ADMIN — ROCURONIUM BROMIDE 20 MG: 10 SOLUTION INTRAVENOUS at 11:47

## 2023-12-01 RX ADMIN — SODIUM CHLORIDE, POTASSIUM CHLORIDE, SODIUM LACTATE AND CALCIUM CHLORIDE: 600; 310; 30; 20 INJECTION, SOLUTION INTRAVENOUS at 11:11

## 2023-12-01 RX ADMIN — ROCURONIUM BROMIDE 10 MG: 10 SOLUTION INTRAVENOUS at 12:19

## 2023-12-01 RX ADMIN — PROPOFOL 150 MG: 10 INJECTION, EMULSION INTRAVENOUS at 11:16

## 2023-12-01 RX ADMIN — FENTANYL CITRATE 25 MCG: 50 INJECTION INTRAMUSCULAR; INTRAVENOUS at 16:32

## 2023-12-01 RX ADMIN — SODIUM CHLORIDE, POTASSIUM CHLORIDE, SODIUM LACTATE AND CALCIUM CHLORIDE: 600; 310; 30; 20 INJECTION, SOLUTION INTRAVENOUS at 18:50

## 2023-12-01 RX ADMIN — Medication 30 MG: at 11:48

## 2023-12-01 RX ADMIN — SODIUM CHLORIDE 2000 MG: 9 INJECTION, SOLUTION INTRAVENOUS at 15:22

## 2023-12-01 RX ADMIN — ONDANSETRON 4 MG: 2 INJECTION INTRAMUSCULAR; INTRAVENOUS at 15:45

## 2023-12-01 RX ADMIN — HYDROMORPHONE HYDROCHLORIDE 1 MG: 1 INJECTION, SOLUTION INTRAMUSCULAR; INTRAVENOUS; SUBCUTANEOUS at 18:49

## 2023-12-01 RX ADMIN — MIDAZOLAM 5 MG: 1 INJECTION INTRAMUSCULAR; INTRAVENOUS at 11:10

## 2023-12-01 RX ADMIN — SODIUM CHLORIDE, POTASSIUM CHLORIDE, SODIUM LACTATE AND CALCIUM CHLORIDE: 600; 310; 30; 20 INJECTION, SOLUTION INTRAVENOUS at 09:05

## 2023-12-01 RX ADMIN — Medication 120 MG: at 11:16

## 2023-12-01 RX ADMIN — ACETAMINOPHEN 1000 MG: 500 TABLET ORAL at 09:20

## 2023-12-01 RX ADMIN — Medication 10 MG: at 12:19

## 2023-12-01 RX ADMIN — HYDROMORPHONE HYDROCHLORIDE 0.5 MG: 2 INJECTION, SOLUTION INTRAMUSCULAR; INTRAVENOUS; SUBCUTANEOUS at 15:44

## 2023-12-01 RX ADMIN — DEXMEDETOMIDINE HYDROCHLORIDE 10 MCG: 100 INJECTION, SOLUTION, CONCENTRATE INTRAVENOUS at 12:25

## 2023-12-01 RX ADMIN — SODIUM CHLORIDE 2000 MG: 9 INJECTION, SOLUTION INTRAVENOUS at 11:22

## 2023-12-01 RX ADMIN — ROCURONIUM BROMIDE 20 MG: 10 SOLUTION INTRAVENOUS at 13:02

## 2023-12-01 RX ADMIN — HYDROMORPHONE HYDROCHLORIDE 0.5 MG: 1 INJECTION, SOLUTION INTRAMUSCULAR; INTRAVENOUS; SUBCUTANEOUS at 17:17

## 2023-12-01 RX ADMIN — HYDROMORPHONE HYDROCHLORIDE 1 MG: 1 INJECTION, SOLUTION INTRAMUSCULAR; INTRAVENOUS; SUBCUTANEOUS at 20:51

## 2023-12-01 RX ADMIN — ROCURONIUM BROMIDE 20 MG: 10 SOLUTION INTRAVENOUS at 13:30

## 2023-12-01 RX ADMIN — DEXAMETHASONE SODIUM PHOSPHATE 8 MG: 4 INJECTION, SOLUTION INTRAMUSCULAR; INTRAVENOUS at 11:24

## 2023-12-01 RX ADMIN — DEXMEDETOMIDINE HYDROCHLORIDE 10 MCG: 100 INJECTION, SOLUTION, CONCENTRATE INTRAVENOUS at 12:06

## 2023-12-01 RX ADMIN — HYDROMORPHONE HYDROCHLORIDE 0.5 MG: 2 INJECTION, SOLUTION INTRAMUSCULAR; INTRAVENOUS; SUBCUTANEOUS at 15:56

## 2023-12-01 RX ADMIN — SODIUM CHLORIDE, POTASSIUM CHLORIDE, SODIUM LACTATE AND CALCIUM CHLORIDE: 600; 310; 30; 20 INJECTION, SOLUTION INTRAVENOUS at 20:43

## 2023-12-01 RX ADMIN — SUGAMMADEX 200 MG: 100 INJECTION, SOLUTION INTRAVENOUS at 15:45

## 2023-12-01 RX ADMIN — HYDROMORPHONE HYDROCHLORIDE 0.5 MG: 1 INJECTION, SOLUTION INTRAMUSCULAR; INTRAVENOUS; SUBCUTANEOUS at 17:40

## 2023-12-01 RX ADMIN — HYDROMORPHONE HYDROCHLORIDE 0.5 MG: 2 INJECTION, SOLUTION INTRAMUSCULAR; INTRAVENOUS; SUBCUTANEOUS at 15:03

## 2023-12-01 RX ADMIN — HYDROMORPHONE HYDROCHLORIDE 0.5 MG: 2 INJECTION, SOLUTION INTRAMUSCULAR; INTRAVENOUS; SUBCUTANEOUS at 12:24

## 2023-12-01 RX ADMIN — Medication 10 MG: at 13:14

## 2023-12-01 RX ADMIN — ROCURONIUM BROMIDE 25 MG: 10 SOLUTION INTRAVENOUS at 11:24

## 2023-12-01 RX ADMIN — FENTANYL CITRATE 50 MCG: 50 INJECTION, SOLUTION INTRAMUSCULAR; INTRAVENOUS at 11:28

## 2023-12-01 ASSESSMENT — PAIN DESCRIPTION - LOCATION
LOCATION: ABDOMEN

## 2023-12-01 ASSESSMENT — PAIN SCALES - GENERAL
PAINLEVEL_OUTOF10: 7
PAINLEVEL_OUTOF10: 5
PAINLEVEL_OUTOF10: 7
PAINLEVEL_OUTOF10: 0
PAINLEVEL_OUTOF10: 7

## 2023-12-01 ASSESSMENT — PAIN - FUNCTIONAL ASSESSMENT: PAIN_FUNCTIONAL_ASSESSMENT: 0-10

## 2023-12-01 NOTE — BRIEF OP NOTE
Brief Postoperative Note      Patient: Martin Herrera  YOB: 1985  MRN: [id number]    Date of Procedure: 12/1/2023    Pre-Op Diagnosis Codes:     * Incisional hernia [K43.2]    Post-Op Diagnosis: Same         Procedure(s):  ROBOTIC INCISIONAL HERNIA REPAIR WITH MESH, BILATERAL RETRORECTUS MYOFASCIAL ADVANCEMENT FLAPS POSTERIOR COMPONENT SEPERATION, EXTENDED TOTALLY EXTRAPERITONEAL REPAIR  Surgeon(s):  Heath Dennis MD    Assistant:  Surgical Assistant: Lazarus Spine    Anesthesia: General    Estimated Blood Loss (mL): less than 764     Complications: None    Specimens:   * No specimens in log *    Implants:  Implant Name Type Inv. Item Serial No.  Lot No. LRB No. Used Action   MESH SKYLAR [id number] INGUINAL POLYPR SQ L PORE MFIL SF - SNA  MESH SKYLAR [id number] INGUINAL POLYPR SQ L PORE MFIL SF NA BARD DAVOL-WD [id number] N/A 1 Implanted         Drains:   Closed/Suction Drain Left Abdomen Bulb (Active)   Site Description Clean, dry & intact 12/01/23 1554       Urinary Catheter 12/01/23 2 Way; Saldaña (Active)   $ Urethral catheter insertion Inserted for procedure 12/01/23 1322   Urine Color Yellow 12/01/23 1322   Urine Appearance Clear 12/01/23 1322   Collection Container Standard 12/01/23 1322   Securement Method Securing device (Describe) 12/01/23 1322   Catheter Best Practices  Tamper seal intact; Bag below bladder;Bag not on floor; Lack of dependent loop in tubing;Drainage tube clipped to bed;Drainage bag less than half full 12/01/23 1322   Status Draining 12/01/23 1322       Findings: 20cm long x 6cm wide incisional hernia, repaired primarily and with [id number] Bard Soft Mesh placed retrorectus. Bilateral retrorectus myofascial advancement flaps required to advance the rectus muscles midline for fascial closure.   19Fr Toney drain retrorectus      Electronically signed by Freddy Dukes MD on 12/1/2023 at 4:00 PM

## 2023-12-01 NOTE — H&P
release capsule, Take 4 capsules by mouth daily (Patient not taking: Reported on 10/18/2023), Disp: , Rfl:              Allergies   Allergen Reactions    Mangifera Indica Rash and Swelling         Social History               Socioeconomic History    Marital status:        Spouse name: Not on file    Number of children: Not on file    Years of education: Not on file    Highest education level: Not on file   Occupational History    Not on file   Tobacco Use    Smoking status: Former    Smokeless tobacco: Never   Substance and Sexual Activity    Alcohol use: Yes    Drug use: Never    Sexual activity: Not on file   Other Topics Concern    Not on file   Social History Narrative    Not on file      Social Determinants of Health      Financial Resource Strain: Not on file   Food Insecurity: Not on file   Transportation Needs: Not on file   Physical Activity: Not on file   Stress: Not on file   Social Connections: Not on file   Intimate Partner Violence: Not on file   Housing Stability: Not on file            Family History         Family History   Problem Relation Age of Onset    Cancer Maternal Grandmother           Breast    Stroke Maternal Grandfather      Hypertension Mother      Cancer Paternal Grandfather              ROS   Constitutional: negative  Ears, Nose, Mouth, Throat, and Face: negative  Respiratory: negative  Cardiovascular: negative  Gastrointestinal:  Incisional hernia  Genitourinary:negative  Integument/Breast: negative  Hematologic/Lymphatic: negative  Behavioral/Psychiatric: negative  Allergic/Immunologic: negative        Physical Exam:          Vitals:     10/18/23 1442   BP: 138/89   Pulse: (!) 104   Resp:     Temp:     SpO2:        Last Weight Metrics:       10/18/2023     2:33 PM 9/21/2023    10:15 AM 6/22/2023    10:37 AM 5/18/2023    10:51 AM 5/4/2023    10:13 AM 5/1/2023     1:24 PM 4/26/2023    11:40 AM   Weight Loss Metrics   Height 5' 11\" 5' 11\" 5' 11\" 5' 11\" 5' 11\" 5' 11\" 5' 11\"

## 2023-12-01 NOTE — DISCHARGE INSTRUCTIONS
[address] [facility] Surgery at [address] Operative Instructions      Please call your surgeons  office for a 2 week follow-up appointment with Dr Kristan Reid . Office address is: Karlene Milner [address]  [phone]      Discharge Instructions: You may resume your usual diet as well as your usual medications. You are not cleared to drive if you are taking narcotic pain medication. If you are only using tylenol for pain and are comfortable sitting in a car, you may drive. No heavy lifting. No strenuous exercise. You are cleared to go up and down stairs. You may shower but no baths or swimming. Your incisions dont need any special care. You can wash them gently with  warm soap and water daily and pat them dry. Your stitches are under the skin and dont need to be removed. Ask you doctor when you can return to work. Call our office at  [phone] to make a 2 week follow up appointment. Call our office with any problems, questions or concerns. Call our office if you have any     Fevers of 101 or higher   Worsening pain  Nausea/Vomiting  Difficulty eating or drinking  Incisions that are red, open, draining or tender.

## 2023-12-02 LAB
ANION GAP SERPL CALC-SCNC: 9 MMOL/L (ref 5–15)
BUN SERPL-MCNC: 9 MG/DL (ref 6–20)
BUN/CREAT SERPL: 10 (ref 12–20)
CALCIUM SERPL-MCNC: 8.9 MG/DL (ref 8.5–10.1)
CHLORIDE SERPL-SCNC: 108 MMOL/L (ref 97–108)
CO2 SERPL-SCNC: 23 MMOL/L (ref 21–32)
CREAT SERPL-MCNC: 0.89 MG/DL (ref 0.7–1.3)
ERYTHROCYTE [DISTWIDTH] IN BLOOD BY AUTOMATED COUNT: 15.3 % (ref 11.5–14.5)
GLUCOSE SERPL-MCNC: 123 MG/DL (ref 65–100)
HCT VFR BLD AUTO: 38.6 % (ref 36.6–50.3)
HGB BLD-MCNC: 12.9 G/DL (ref 12.1–17)
MCH RBC QN AUTO: 29 PG (ref 26–34)
MCHC RBC AUTO-ENTMCNC: 33.4 G/DL (ref 30–36.5)
MCV RBC AUTO: 86.7 FL (ref 80–99)
NRBC # BLD: 0 K/UL (ref 0–0.01)
NRBC BLD-RTO: 0 PER 100 WBC
PLATELET # BLD AUTO: 219 K/UL (ref 150–400)
PMV BLD AUTO: 10.7 FL (ref 8.9–12.9)
POTASSIUM SERPL-SCNC: 4.3 MMOL/L (ref 3.5–5.1)
RBC # BLD AUTO: 4.45 M/UL (ref 4.1–5.7)
SODIUM SERPL-SCNC: 140 MMOL/L (ref 136–145)
WBC # BLD AUTO: 10.4 K/UL (ref 4.1–11.1)

## 2023-12-02 PROCEDURE — 80048 BASIC METABOLIC PNL TOTAL CA: CPT

## 2023-12-02 PROCEDURE — 99024 POSTOP FOLLOW-UP VISIT: CPT | Performed by: SURGERY

## 2023-12-02 PROCEDURE — 2580000003 HC RX 258: Performed by: SURGERY

## 2023-12-02 PROCEDURE — 94760 N-INVAS EAR/PLS OXIMETRY 1: CPT

## 2023-12-02 PROCEDURE — 85027 COMPLETE CBC AUTOMATED: CPT

## 2023-12-02 PROCEDURE — 6360000002 HC RX W HCPCS: Performed by: SURGERY

## 2023-12-02 PROCEDURE — 6370000000 HC RX 637 (ALT 250 FOR IP): Performed by: SURGERY

## 2023-12-02 PROCEDURE — 1200000000 HC SEMI PRIVATE

## 2023-12-02 PROCEDURE — 36415 COLL VENOUS BLD VENIPUNCTURE: CPT

## 2023-12-02 RX ADMIN — SODIUM CHLORIDE, PRESERVATIVE FREE 10 ML: 5 INJECTION INTRAVENOUS at 19:57

## 2023-12-02 RX ADMIN — HYDROMORPHONE HYDROCHLORIDE 1 MG: 1 INJECTION, SOLUTION INTRAMUSCULAR; INTRAVENOUS; SUBCUTANEOUS at 18:28

## 2023-12-02 RX ADMIN — ACETAMINOPHEN 650 MG: 325 TABLET ORAL at 00:23

## 2023-12-02 RX ADMIN — OXYCODONE AND ACETAMINOPHEN 2 TABLET: 5; 325 TABLET ORAL at 23:08

## 2023-12-02 RX ADMIN — ACETAMINOPHEN 650 MG: 325 TABLET ORAL at 12:18

## 2023-12-02 RX ADMIN — ENOXAPARIN SODIUM 40 MG: 100 INJECTION SUBCUTANEOUS at 09:18

## 2023-12-02 RX ADMIN — HYDROMORPHONE HYDROCHLORIDE 1 MG: 1 INJECTION, SOLUTION INTRAMUSCULAR; INTRAVENOUS; SUBCUTANEOUS at 04:57

## 2023-12-02 RX ADMIN — PANTOPRAZOLE SODIUM 40 MG: 40 TABLET, DELAYED RELEASE ORAL at 04:57

## 2023-12-02 RX ADMIN — HYDROMORPHONE HYDROCHLORIDE 1 MG: 1 INJECTION, SOLUTION INTRAMUSCULAR; INTRAVENOUS; SUBCUTANEOUS at 20:50

## 2023-12-02 RX ADMIN — HYDROMORPHONE HYDROCHLORIDE 1 MG: 1 INJECTION, SOLUTION INTRAMUSCULAR; INTRAVENOUS; SUBCUTANEOUS at 12:18

## 2023-12-02 RX ADMIN — HYDROMORPHONE HYDROCHLORIDE 1 MG: 1 INJECTION, SOLUTION INTRAMUSCULAR; INTRAVENOUS; SUBCUTANEOUS at 23:03

## 2023-12-02 RX ADMIN — ACETAMINOPHEN 650 MG: 325 TABLET ORAL at 18:28

## 2023-12-02 RX ADMIN — HYDROMORPHONE HYDROCHLORIDE 1 MG: 1 INJECTION, SOLUTION INTRAMUSCULAR; INTRAVENOUS; SUBCUTANEOUS at 16:21

## 2023-12-02 RX ADMIN — SODIUM CHLORIDE, POTASSIUM CHLORIDE, SODIUM LACTATE AND CALCIUM CHLORIDE: 600; 310; 30; 20 INJECTION, SOLUTION INTRAVENOUS at 16:22

## 2023-12-02 RX ADMIN — SODIUM CHLORIDE, POTASSIUM CHLORIDE, SODIUM LACTATE AND CALCIUM CHLORIDE: 600; 310; 30; 20 INJECTION, SOLUTION INTRAVENOUS at 09:20

## 2023-12-02 RX ADMIN — OXYCODONE AND ACETAMINOPHEN 2 TABLET: 5; 325 TABLET ORAL at 09:18

## 2023-12-02 RX ADMIN — OXYCODONE AND ACETAMINOPHEN 2 TABLET: 5; 325 TABLET ORAL at 19:25

## 2023-12-02 RX ADMIN — ACETAMINOPHEN 650 MG: 325 TABLET ORAL at 04:57

## 2023-12-02 RX ADMIN — HYDROMORPHONE HYDROCHLORIDE 1 MG: 1 INJECTION, SOLUTION INTRAMUSCULAR; INTRAVENOUS; SUBCUTANEOUS at 00:24

## 2023-12-02 RX ADMIN — HYDROMORPHONE HYDROCHLORIDE 1 MG: 1 INJECTION, SOLUTION INTRAMUSCULAR; INTRAVENOUS; SUBCUTANEOUS at 10:33

## 2023-12-02 ASSESSMENT — PAIN SCALES - GENERAL
PAINLEVEL_OUTOF10: 7
PAINLEVEL_OUTOF10: 7
PAINLEVEL_OUTOF10: 4
PAINLEVEL_OUTOF10: 7
PAINLEVEL_OUTOF10: 7
PAINLEVEL_OUTOF10: 6
PAINLEVEL_OUTOF10: 7
PAINLEVEL_OUTOF10: 10
PAINLEVEL_OUTOF10: 6
PAINLEVEL_OUTOF10: 6
PAINLEVEL_OUTOF10: 7

## 2023-12-02 ASSESSMENT — PAIN DESCRIPTION - LOCATION
LOCATION: ABDOMEN

## 2023-12-02 NOTE — OP NOTE
[facility]  OPERATIVE REPORT    Name:  Ilene Clark  MR#:  [id number]  :  1985  ACCOUNT #:  [de-identified]  DATE OF SERVICE:  2023    PREOPERATIVE DIAGNOSIS:  Incisional hernia. POSTOPERATIVE DIAGNOSIS:  Incisional hernia. PROCEDURES PERFORMED:  Robotic incisional hernia repair with mesh, bilateral retrorectus myofascial advancement flaps, posterior component separation, extended totally extraperitoneal repair. SURGEON:  Ted Rubin MD    ASSISTANT:  Surgical assistant, [place] [facility]. ANESTHESIA:  General.    COMPLICATIONS:  None. SPECIMENS REMOVED:  None. IMPLANTS:  A 30 x 20 cm Bard soft mesh. DRAINS:  A 19-Chinese Toney drain. ESTIMATED BLOOD LOSS:  Less than 100 mL. FINDINGS:  20-cm long by 6 cm wide incisional hernia repaired primarily and with a 30 x 20 cm Bard soft mesh placed in the retrorectus plane, bilateral retrorectus myofascial advancement flaps were required to advance the rectus muscles back to midline for fascial closure, 19-Chinese Toney drain was placed retrorectus. INDICATIONS FOR OPERATION:  The patient is a 52-year-old male, who has had multiple exploratory laparotomies for testicular cancer and pancreatic pseudocyst and pancreatitis, who has a large incisional hernia in the midline, which is needing repair with mesh in the operating room. PROCEDURE:  The patient was met in the preoperative holding area. H and P was updated. Consent was signed. All risks and benefits were explained to the patient prior to the start of the operation. He was taken back to the operating room. He was lying in a supine position. The abdomen was prepped and draped in standard sterile fashion. Time-out was called. The antibiotics were given, and SCDs were on lower extremities. Saldaña catheter had been inserted. We started the operation. Before starting the operation, we also used an ultrasound and marked out the rectus muscles.   Once we had

## 2023-12-02 NOTE — CARE COORDINATION
Care Management Initial Assessment       RUR: 5% Low   Readmission? No  1st IM letter given? NA  1st  letter given: NA       12/02/23 9642   Service Assessment   Patient Orientation Alert and Oriented;Place;Situation;Self;Person   Cognition Alert   History Provided By Patient   Primary Caregiver Self   Accompanied By/Relationship Spouse   Support Systems Spouse/Significant Other   Patient's Healthcare Decision Maker is: Legal Next of [address] [place] [address]   PCP Verified by CM Yes  (Dr. Franchesca Bettencourt)   Last Visit to PCP Within last 6 months   Prior Functional Level Independent in ADLs/IADLs   Current Functional Level Independent in ADLs/IADLs   Can patient return to prior living arrangement Yes   Ability to make needs known: Good   Family able to assist with home care needs: Yes   Would you like for me to discuss the discharge plan with any other family members/significant others, and if so, who?  Yes  (Upon patient request)   Financial Resources Other (Comment)   Community Resources None   Social/Functional History   Lives With Spouse   Discharge Planning   Type of [address] [place] [address]  Medications No   Patient expects to be discharged to: [address] [place] [address]   [phone]

## 2023-12-03 VITALS
WEIGHT: 200 LBS | DIASTOLIC BLOOD PRESSURE: 91 MMHG | OXYGEN SATURATION: 95 % | SYSTOLIC BLOOD PRESSURE: 151 MMHG | TEMPERATURE: 98.1 F | HEART RATE: 86 BPM | RESPIRATION RATE: 1 BRPM | BODY MASS INDEX: 28 KG/M2 | HEIGHT: 71 IN

## 2023-12-03 PROCEDURE — 99024 POSTOP FOLLOW-UP VISIT: CPT | Performed by: SURGERY

## 2023-12-03 PROCEDURE — 6360000002 HC RX W HCPCS: Performed by: SURGERY

## 2023-12-03 PROCEDURE — 6370000000 HC RX 637 (ALT 250 FOR IP): Performed by: SURGERY

## 2023-12-03 PROCEDURE — 94760 N-INVAS EAR/PLS OXIMETRY 1: CPT

## 2023-12-03 RX ORDER — LANOLIN ALCOHOL/MO/W.PET/CERES
3 CREAM (GRAM) TOPICAL NIGHTLY PRN
Status: DISCONTINUED | OUTPATIENT
Start: 2023-12-03 | End: 2023-12-03 | Stop reason: HOSPADM

## 2023-12-03 RX ADMIN — ACETAMINOPHEN 650 MG: 325 TABLET ORAL at 06:21

## 2023-12-03 RX ADMIN — HYDROMORPHONE HYDROCHLORIDE 1 MG: 1 INJECTION, SOLUTION INTRAMUSCULAR; INTRAVENOUS; SUBCUTANEOUS at 01:11

## 2023-12-03 RX ADMIN — ENOXAPARIN SODIUM 40 MG: 100 INJECTION SUBCUTANEOUS at 08:37

## 2023-12-03 RX ADMIN — OXYCODONE AND ACETAMINOPHEN 2 TABLET: 5; 325 TABLET ORAL at 11:30

## 2023-12-03 RX ADMIN — PANTOPRAZOLE SODIUM 40 MG: 40 TABLET, DELAYED RELEASE ORAL at 06:21

## 2023-12-03 RX ADMIN — OXYCODONE AND ACETAMINOPHEN 2 TABLET: 5; 325 TABLET ORAL at 08:36

## 2023-12-03 RX ADMIN — HYDROMORPHONE HYDROCHLORIDE 1 MG: 1 INJECTION, SOLUTION INTRAMUSCULAR; INTRAVENOUS; SUBCUTANEOUS at 03:02

## 2023-12-03 RX ADMIN — OXYCODONE AND ACETAMINOPHEN 2 TABLET: 5; 325 TABLET ORAL at 15:28

## 2023-12-03 RX ADMIN — Medication 3 MG: at 01:41

## 2023-12-03 ASSESSMENT — PAIN DESCRIPTION - DESCRIPTORS
DESCRIPTORS: GNAWING;ACHING
DESCRIPTORS: ACHING;CRUSHING

## 2023-12-03 ASSESSMENT — PAIN DESCRIPTION - ORIENTATION
ORIENTATION: MID
ORIENTATION: MID

## 2023-12-03 ASSESSMENT — PAIN SCALES - GENERAL
PAINLEVEL_OUTOF10: 5
PAINLEVEL_OUTOF10: 8
PAINLEVEL_OUTOF10: 7
PAINLEVEL_OUTOF10: 8
PAINLEVEL_OUTOF10: 0
PAINLEVEL_OUTOF10: 6
PAINLEVEL_OUTOF10: 7

## 2023-12-03 ASSESSMENT — PAIN - FUNCTIONAL ASSESSMENT
PAIN_FUNCTIONAL_ASSESSMENT: ACTIVITIES ARE NOT PREVENTED
PAIN_FUNCTIONAL_ASSESSMENT: ACTIVITIES ARE NOT PREVENTED

## 2023-12-03 ASSESSMENT — PAIN DESCRIPTION - LOCATION
LOCATION: ABDOMEN

## 2023-12-03 NOTE — PROGRESS NOTES
Mr. Harriet Castro has no complaints today. Ready for discharge. Tm 98.8 Tc 98.1 HR: 86 BP: 151/91 Resp Rate: 20 95% sat on room air. Intake/Output Summary (Last 24 hours) at 12/3/2023 1708  Last data filed at 12/3/2023 1006  Gross per 24 hour   Intake 2108.33 ml   Output 1590 ml   Net 518.33 ml   Exam: Cor: RRR. Lungs: Bilateral breath sounds. Abd: Soft. Non distended. Non tender. No guarding or rebound. Incisions are clean. Drain output serosanguinous. Labs: No results found for this or any previous visit (from the past 12 hour(s)). Mr. Harriet Castro is doing well post-operatively. Will discharge to home. Remove drain prior to discharge. Rx sent to pharmacy. Instructions on chart. Follow up with Dr. Estee Quinonez in ten to fourteen days or earlier if need be.
Hemoglobin 12.9 12.1 - 17.0 g/dL    Hematocrit 38.6 36.6 - 50.3 %    MCV 86.7 80.0 - 99.0 FL    MCH 29.0 26.0 - 34.0 PG    MCHC 33.4 30.0 - 36.5 g/dL    RDW 15.3 (H) 11.5 - 14.5 %    Platelets 127 979 - 117 K/uL    MPV 10.7 8.9 - 12.9 FL    Nucleated RBCs 0.0 0  WBC    nRBC 0.00 0.00 - 0.01 K/uL       Assessment     Simon Salazar is a 41 y. o.yr old male s/p robotic ventral hernia repair.       Plan     - Advance to regular diet.   - d/c montalvo  - Decrease IVF  - PRN pain control  - Continue drain and abdominal binder  - DVT proph  - Possible d/c tomorrow if he continues to do well    Rogelio Cottrell MD  12/2/2023  5:31 PM

## 2023-12-06 NOTE — DISCHARGE SUMMARY
Date: 12/02/2023  Value: 86.7        Ref range: 80.0 - 99.0 FL     Status: Final  MCH                                           Date: 12/02/2023  Value: 29.0        Ref range: 26.0 - 34.0 PG     Status: Final  MCHC                                          Date: 12/02/2023  Value: 33.4        Ref range: 30.0 - 36.5 g/dL   Status: Final  RDW                                           Date: 12/02/2023  Value: 15.3 (H)    Ref range: 11.5 - 14.5 %      Status: Final  Platelets                                     Date: 12/02/2023  Value: 219         Ref range: 150 - 400 K/uL     Status: Final  MPV                                           Date: 12/02/2023  Value: 10.7        Ref range: 8.9 - 12.9 FL      Status: Final  Nucleated RBCs                                Date: 12/02/2023  Value: 0.0         Ref range: 0  WBC      Status: Final  nRBC                                          Date: 12/02/2023  Value: 0.00        Ref range: 0.00 - 0.01 K/uL   Status: Final  ------------    Radiology last 7 days:  No results found. [unfilled]    Discharge Medications    Discharge Medication List as of 12/3/2023  5:28 PM    START taking these medications    oxyCODONE-acetaminophen (PERCOCET) 5-325 MG per tablet  Take 1 tablet by mouth every 6 hours as needed for Pain for up to 7 days. Intended supply: 3 days.  Take lowest dose possible to manage pain Max Daily Amount: 4 tablets, Disp-20 tablet, R-0  Normal    ibuprofen (ADVIL;MOTRIN) 600 MG tablet  Take 1 tablet by mouth 3 times daily as needed for Pain, Disp-30 tablet, R-0  Normal          Discharge Medication List as of 12/3/2023  5:28 PM        Discharge Medication List as of 12/3/2023  5:28 PM    CONTINUE these medications which have NOT CHANGED    sodium chloride 0.9 % SOLN 250 mL with vedolizumab 300 MG SOLR 300 mg  Infuse 300 mg intravenously Every 2 months Indications: Patient is not sure how often but thinks it every 2 months and

## 2023-12-14 ENCOUNTER — OFFICE VISIT (OUTPATIENT)
Age: 38
End: 2023-12-14

## 2023-12-14 VITALS
SYSTOLIC BLOOD PRESSURE: 109 MMHG | HEIGHT: 71 IN | WEIGHT: 201.6 LBS | BODY MASS INDEX: 28.22 KG/M2 | TEMPERATURE: 97.6 F | RESPIRATION RATE: 18 BRPM | HEART RATE: 99 BPM | DIASTOLIC BLOOD PRESSURE: 70 MMHG | OXYGEN SATURATION: 97 %

## 2023-12-14 DIAGNOSIS — K43.2 INCISIONAL HERNIA, WITHOUT OBSTRUCTION OR GANGRENE: Primary | ICD-10-CM

## 2023-12-14 PROCEDURE — 99024 POSTOP FOLLOW-UP VISIT: CPT | Performed by: SURGERY

## 2023-12-14 ASSESSMENT — ANXIETY QUESTIONNAIRES
3. WORRYING TOO MUCH ABOUT DIFFERENT THINGS: 0
2. NOT BEING ABLE TO STOP OR CONTROL WORRYING: 0
5. BEING SO RESTLESS THAT IT IS HARD TO SIT STILL: 0
6. BECOMING EASILY ANNOYED OR IRRITABLE: 0
7. FEELING AFRAID AS IF SOMETHING AWFUL MIGHT HAPPEN: 0
GAD7 TOTAL SCORE: 0
IF YOU CHECKED OFF ANY PROBLEMS ON THIS QUESTIONNAIRE, HOW DIFFICULT HAVE THESE PROBLEMS MADE IT FOR YOU TO DO YOUR WORK, TAKE CARE OF THINGS AT HOME, OR GET ALONG WITH OTHER PEOPLE: NOT DIFFICULT AT ALL
4. TROUBLE RELAXING: 0
1. FEELING NERVOUS, ANXIOUS, OR ON EDGE: 0

## 2023-12-14 ASSESSMENT — PATIENT HEALTH QUESTIONNAIRE - PHQ9
1. LITTLE INTEREST OR PLEASURE IN DOING THINGS: 0
2. FEELING DOWN, DEPRESSED OR HOPELESS: 0
SUM OF ALL RESPONSES TO PHQ9 QUESTIONS 1 & 2: 0
SUM OF ALL RESPONSES TO PHQ QUESTIONS 1-9: 0

## 2023-12-18 ENCOUNTER — HOSPITAL ENCOUNTER (OUTPATIENT)
Facility: HOSPITAL | Age: 38
Setting detail: INFUSION SERIES
Discharge: HOME | End: 2023-12-18
Payer: COMMERCIAL

## 2023-12-18 VITALS
RESPIRATION RATE: 16 BRPM | WEIGHT: 204.8 LBS | OXYGEN SATURATION: 100 % | SYSTOLIC BLOOD PRESSURE: 128 MMHG | HEART RATE: 91 BPM | DIASTOLIC BLOOD PRESSURE: 78 MMHG | TEMPERATURE: 98 F | BODY MASS INDEX: 28.56 KG/M2

## 2023-12-18 DIAGNOSIS — K51.919 ULCERATIVE COLITIS WITH COMPLICATION, UNSPECIFIED LOCATION (HCC): Primary | ICD-10-CM

## 2023-12-18 PROCEDURE — 6360000002 HC RX W HCPCS

## 2023-12-18 PROCEDURE — 2580000003 HC RX 258

## 2023-12-18 PROCEDURE — 96365 THER/PROPH/DIAG IV INF INIT: CPT

## 2023-12-18 PROCEDURE — A4216 STERILE WATER/SALINE, 10 ML: HCPCS

## 2023-12-18 RX ORDER — EPINEPHRINE 1 MG/ML
0.3 INJECTION, SOLUTION INTRAMUSCULAR; SUBCUTANEOUS PRN
OUTPATIENT
Start: 2024-01-01

## 2023-12-18 RX ORDER — ACETAMINOPHEN 325 MG/1
650 TABLET ORAL
OUTPATIENT
Start: 2024-01-01

## 2023-12-18 RX ORDER — SODIUM CHLORIDE 0.9 % (FLUSH) 0.9 %
5-40 SYRINGE (ML) INJECTION PRN
Status: DISCONTINUED | OUTPATIENT
Start: 2023-12-18 | End: 2023-12-19 | Stop reason: HOSPADM

## 2023-12-18 RX ORDER — ONDANSETRON 2 MG/ML
8 INJECTION INTRAMUSCULAR; INTRAVENOUS
OUTPATIENT
Start: 2024-01-01

## 2023-12-18 RX ORDER — SODIUM CHLORIDE 9 MG/ML
5-250 INJECTION, SOLUTION INTRAVENOUS PRN
OUTPATIENT
Start: 2024-01-01

## 2023-12-18 RX ORDER — HEPARIN 100 UNIT/ML
500 SYRINGE INTRAVENOUS PRN
OUTPATIENT
Start: 2024-01-01

## 2023-12-18 RX ORDER — DIPHENHYDRAMINE HYDROCHLORIDE 50 MG/ML
50 INJECTION INTRAMUSCULAR; INTRAVENOUS
OUTPATIENT
Start: 2024-01-01

## 2023-12-18 RX ORDER — ALBUTEROL SULFATE 90 UG/1
4 AEROSOL, METERED RESPIRATORY (INHALATION) PRN
OUTPATIENT
Start: 2024-01-01

## 2023-12-18 RX ORDER — SODIUM CHLORIDE 9 MG/ML
INJECTION, SOLUTION INTRAVENOUS CONTINUOUS
OUTPATIENT
Start: 2024-01-01

## 2023-12-18 RX ORDER — SODIUM CHLORIDE 0.9 % (FLUSH) 0.9 %
5-40 SYRINGE (ML) INJECTION PRN
OUTPATIENT
Start: 2024-01-01

## 2023-12-18 RX ORDER — SODIUM CHLORIDE 9 MG/ML
5-250 INJECTION, SOLUTION INTRAVENOUS PRN
Status: DISCONTINUED | OUTPATIENT
Start: 2023-12-18 | End: 2023-12-19 | Stop reason: HOSPADM

## 2023-12-18 RX ADMIN — SODIUM CHLORIDE 10 ML: 9 INJECTION, SOLUTION INTRAMUSCULAR; INTRAVENOUS; SUBCUTANEOUS at 10:03

## 2023-12-18 RX ADMIN — SODIUM CHLORIDE 25 ML/HR: 9 INJECTION, SOLUTION INTRAVENOUS at 10:25

## 2023-12-18 RX ADMIN — VEDOLIZUMAB 300 MG: 300 INJECTION, POWDER, LYOPHILIZED, FOR SOLUTION INTRAVENOUS at 10:25

## 2024-04-08 ENCOUNTER — HOSPITAL ENCOUNTER (OUTPATIENT)
Facility: HOSPITAL | Age: 39
Setting detail: INFUSION SERIES
End: 2024-04-08

## 2024-05-22 ENCOUNTER — TRANSCRIBE ORDERS (OUTPATIENT)
Facility: HOSPITAL | Age: 39
End: 2024-05-22

## 2024-05-22 DIAGNOSIS — T81.89XA LYMPHOCELE AFTER SURGICAL PROCEDURE: Primary | ICD-10-CM

## 2024-05-22 DIAGNOSIS — I89.8 LYMPHOCELE AFTER SURGICAL PROCEDURE: Primary | ICD-10-CM

## 2024-05-24 ENCOUNTER — HOSPITAL ENCOUNTER (OUTPATIENT)
Facility: HOSPITAL | Age: 39
Discharge: HOME | End: 2024-05-24
Payer: COMMERCIAL

## 2024-05-24 DIAGNOSIS — I89.8 LYMPHOCELE AFTER SURGICAL PROCEDURE: ICD-10-CM

## 2024-05-24 DIAGNOSIS — T81.89XA LYMPHOCELE AFTER SURGICAL PROCEDURE: ICD-10-CM

## 2024-05-24 PROCEDURE — 74177 CT ABD & PELVIS W/CONTRAST: CPT

## 2024-05-24 PROCEDURE — 6360000004 HC RX CONTRAST MEDICATION: Performed by: STUDENT IN AN ORGANIZED HEALTH CARE EDUCATION/TRAINING PROGRAM

## 2024-05-24 RX ADMIN — IOPAMIDOL 100 ML: 755 INJECTION, SOLUTION INTRAVENOUS at 10:00

## 2024-06-03 ENCOUNTER — HOSPITAL ENCOUNTER (OUTPATIENT)
Facility: HOSPITAL | Age: 39
Setting detail: INFUSION SERIES
End: 2024-06-03

## 2024-08-06 ENCOUNTER — TRANSCRIBE ORDERS (OUTPATIENT)
Facility: HOSPITAL | Age: 39
End: 2024-08-06

## 2024-08-06 DIAGNOSIS — C79.89: Primary | ICD-10-CM

## 2024-08-15 ENCOUNTER — HOSPITAL ENCOUNTER (OUTPATIENT)
Facility: HOSPITAL | Age: 39
End: 2024-08-15
Payer: COMMERCIAL

## 2024-08-15 ENCOUNTER — HOSPITAL ENCOUNTER (OUTPATIENT)
Facility: HOSPITAL | Age: 39
Discharge: HOME | End: 2024-08-15
Payer: COMMERCIAL

## 2024-08-15 DIAGNOSIS — C79.89: ICD-10-CM

## 2024-08-15 PROCEDURE — 6360000004 HC RX CONTRAST MEDICATION: Performed by: INTERNAL MEDICINE

## 2024-08-15 PROCEDURE — 2500000003 HC RX 250 WO HCPCS: Performed by: STUDENT IN AN ORGANIZED HEALTH CARE EDUCATION/TRAINING PROGRAM

## 2024-08-15 PROCEDURE — 74177 CT ABD & PELVIS W/CONTRAST: CPT

## 2024-08-15 RX ADMIN — IOPAMIDOL 100 ML: 755 INJECTION, SOLUTION INTRAVENOUS at 11:13

## 2024-08-15 RX ADMIN — BARIUM SULFATE 900 ML: 20 SUSPENSION ORAL at 11:13

## 2024-09-30 ENCOUNTER — HOSPITAL ENCOUNTER (EMERGENCY)
Facility: HOSPITAL | Age: 39
Discharge: HOME | End: 2024-09-30
Attending: EMERGENCY MEDICINE
Payer: COMMERCIAL

## 2024-09-30 ENCOUNTER — APPOINTMENT (OUTPATIENT)
Facility: HOSPITAL | Age: 39
End: 2024-09-30
Payer: COMMERCIAL

## 2024-09-30 VITALS
WEIGHT: 206.13 LBS | HEART RATE: 96 BPM | HEIGHT: 71 IN | RESPIRATION RATE: 18 BRPM | DIASTOLIC BLOOD PRESSURE: 80 MMHG | SYSTOLIC BLOOD PRESSURE: 128 MMHG | OXYGEN SATURATION: 94 % | BODY MASS INDEX: 28.86 KG/M2 | TEMPERATURE: 100.5 F

## 2024-09-30 DIAGNOSIS — J18.9 PNEUMONIA OF LEFT UPPER LOBE DUE TO INFECTIOUS ORGANISM: Primary | ICD-10-CM

## 2024-09-30 LAB
ALBUMIN SERPL-MCNC: 3 G/DL (ref 3.5–5)
ALBUMIN/GLOB SERPL: 0.5 (ref 1.1–2.2)
ALP SERPL-CCNC: 98 U/L (ref 45–117)
ALT SERPL-CCNC: 60 U/L (ref 12–78)
ANION GAP SERPL CALC-SCNC: 7 MMOL/L (ref 2–12)
AST SERPL-CCNC: 38 U/L (ref 15–37)
BASOPHILS # BLD: 0.1 K/UL (ref 0–0.1)
BASOPHILS NFR BLD: 1 % (ref 0–1)
BILIRUB SERPL-MCNC: 0.6 MG/DL (ref 0.2–1)
BUN SERPL-MCNC: 11 MG/DL (ref 6–20)
BUN/CREAT SERPL: 9 (ref 12–20)
CALCIUM SERPL-MCNC: 9.8 MG/DL (ref 8.5–10.1)
CHLORIDE SERPL-SCNC: 101 MMOL/L (ref 97–108)
CO2 SERPL-SCNC: 24 MMOL/L (ref 21–32)
COMMENT:: NORMAL
CREAT SERPL-MCNC: 1.23 MG/DL (ref 0.7–1.3)
DIFFERENTIAL METHOD BLD: ABNORMAL
EOSINOPHIL # BLD: 0.1 K/UL (ref 0–0.4)
EOSINOPHIL NFR BLD: 0 % (ref 0–7)
ERYTHROCYTE [DISTWIDTH] IN BLOOD BY AUTOMATED COUNT: 14.4 % (ref 11.5–14.5)
FLUAV RNA SPEC QL NAA+PROBE: NOT DETECTED
FLUBV RNA SPEC QL NAA+PROBE: NOT DETECTED
GLOBULIN SER CALC-MCNC: 5.7 G/DL (ref 2–4)
GLUCOSE SERPL-MCNC: 115 MG/DL (ref 65–100)
HCT VFR BLD AUTO: 37.5 % (ref 36.6–50.3)
HGB BLD-MCNC: 12.8 G/DL (ref 12.1–17)
IMM GRANULOCYTES # BLD AUTO: 0.1 K/UL (ref 0–0.04)
IMM GRANULOCYTES NFR BLD AUTO: 1 % (ref 0–0.5)
LACTATE SERPL-SCNC: 0.9 MMOL/L (ref 0.4–2)
LIPASE SERPL-CCNC: 32 U/L (ref 13–75)
LYMPHOCYTES # BLD: 1.1 K/UL (ref 0.8–3.5)
LYMPHOCYTES NFR BLD: 10 % (ref 12–49)
MAGNESIUM SERPL-MCNC: 2.2 MG/DL (ref 1.6–2.4)
MCH RBC QN AUTO: 29.5 PG (ref 26–34)
MCHC RBC AUTO-ENTMCNC: 34.1 G/DL (ref 30–36.5)
MCV RBC AUTO: 86.4 FL (ref 80–99)
MONOCYTES # BLD: 0.9 K/UL (ref 0–1)
MONOCYTES NFR BLD: 8 % (ref 5–13)
NEUTS SEG # BLD: 9.1 K/UL (ref 1.8–8)
NEUTS SEG NFR BLD: 80 % (ref 32–75)
NRBC # BLD: 0 K/UL (ref 0–0.01)
NRBC BLD-RTO: 0 PER 100 WBC
PLATELET # BLD AUTO: 289 K/UL (ref 150–400)
PMV BLD AUTO: 10.9 FL (ref 8.9–12.9)
POTASSIUM SERPL-SCNC: 3.6 MMOL/L (ref 3.5–5.1)
PROCALCITONIN SERPL-MCNC: 2.83 NG/ML
PROT SERPL-MCNC: 8.7 G/DL (ref 6.4–8.2)
RBC # BLD AUTO: 4.34 M/UL (ref 4.1–5.7)
SARS-COV-2 RNA RESP QL NAA+PROBE: NOT DETECTED
SODIUM SERPL-SCNC: 132 MMOL/L (ref 136–145)
SOURCE: NORMAL
SPECIMEN HOLD: NORMAL
TROPONIN I SERPL HS-MCNC: 5 NG/L (ref 0–76)
WBC # BLD AUTO: 11.4 K/UL (ref 4.1–11.1)

## 2024-09-30 PROCEDURE — 71046 X-RAY EXAM CHEST 2 VIEWS: CPT

## 2024-09-30 PROCEDURE — 6360000002 HC RX W HCPCS: Performed by: EMERGENCY MEDICINE

## 2024-09-30 PROCEDURE — 87636 SARSCOV2 & INF A&B AMP PRB: CPT

## 2024-09-30 PROCEDURE — 99284 EMERGENCY DEPT VISIT MOD MDM: CPT

## 2024-09-30 PROCEDURE — 84484 ASSAY OF TROPONIN QUANT: CPT

## 2024-09-30 PROCEDURE — 80053 COMPREHEN METABOLIC PANEL: CPT

## 2024-09-30 PROCEDURE — 36415 COLL VENOUS BLD VENIPUNCTURE: CPT

## 2024-09-30 PROCEDURE — 83735 ASSAY OF MAGNESIUM: CPT

## 2024-09-30 PROCEDURE — 96361 HYDRATE IV INFUSION ADD-ON: CPT

## 2024-09-30 PROCEDURE — 2580000003 HC RX 258: Performed by: EMERGENCY MEDICINE

## 2024-09-30 PROCEDURE — 96374 THER/PROPH/DIAG INJ IV PUSH: CPT

## 2024-09-30 PROCEDURE — 87040 BLOOD CULTURE FOR BACTERIA: CPT

## 2024-09-30 PROCEDURE — 83690 ASSAY OF LIPASE: CPT

## 2024-09-30 PROCEDURE — 85025 COMPLETE CBC W/AUTO DIFF WBC: CPT

## 2024-09-30 PROCEDURE — 84145 PROCALCITONIN (PCT): CPT

## 2024-09-30 PROCEDURE — 83605 ASSAY OF LACTIC ACID: CPT

## 2024-09-30 PROCEDURE — 6370000000 HC RX 637 (ALT 250 FOR IP): Performed by: EMERGENCY MEDICINE

## 2024-09-30 RX ORDER — AZITHROMYCIN 250 MG/1
500 TABLET, FILM COATED ORAL
Status: COMPLETED | OUTPATIENT
Start: 2024-09-30 | End: 2024-09-30

## 2024-09-30 RX ORDER — 0.9 % SODIUM CHLORIDE 0.9 %
1000 INTRAVENOUS SOLUTION INTRAVENOUS ONCE
Status: COMPLETED | OUTPATIENT
Start: 2024-09-30 | End: 2024-09-30

## 2024-09-30 RX ORDER — AZITHROMYCIN 250 MG/1
250 TABLET, FILM COATED ORAL DAILY
Qty: 4 TABLET | Refills: 0 | Status: SHIPPED | OUTPATIENT
Start: 2024-10-01 | End: 2024-10-05

## 2024-09-30 RX ORDER — CEFDINIR 300 MG/1
300 CAPSULE ORAL 2 TIMES DAILY
Qty: 14 CAPSULE | Refills: 0 | Status: SHIPPED | OUTPATIENT
Start: 2024-10-01 | End: 2024-10-08

## 2024-09-30 RX ORDER — IBUPROFEN 600 MG/1
600 TABLET, FILM COATED ORAL
Status: COMPLETED | OUTPATIENT
Start: 2024-09-30 | End: 2024-09-30

## 2024-09-30 RX ADMIN — SODIUM CHLORIDE 1000 ML: 9 INJECTION, SOLUTION INTRAVENOUS at 16:39

## 2024-09-30 RX ADMIN — IBUPROFEN 600 MG: 600 TABLET, FILM COATED ORAL at 16:38

## 2024-09-30 RX ADMIN — AZITHROMYCIN DIHYDRATE 500 MG: 250 TABLET ORAL at 16:55

## 2024-09-30 RX ADMIN — WATER 1000 MG: 1 INJECTION INTRAMUSCULAR; INTRAVENOUS; SUBCUTANEOUS at 16:54

## 2024-09-30 ASSESSMENT — ENCOUNTER SYMPTOMS
ABDOMINAL PAIN: 0
SORE THROAT: 0
COUGH: 1
BACK PAIN: 0

## 2024-09-30 ASSESSMENT — PAIN DESCRIPTION - ONSET: ONSET: PROGRESSIVE

## 2024-09-30 ASSESSMENT — PAIN DESCRIPTION - DESCRIPTORS
DESCRIPTORS: ACHING
DESCRIPTORS: ACHING

## 2024-09-30 ASSESSMENT — PAIN DESCRIPTION - LOCATION
LOCATION: GENERALIZED
LOCATION: GENERALIZED

## 2024-09-30 ASSESSMENT — PAIN - FUNCTIONAL ASSESSMENT
PAIN_FUNCTIONAL_ASSESSMENT: PREVENTS OR INTERFERES SOME ACTIVE ACTIVITIES AND ADLS
PAIN_FUNCTIONAL_ASSESSMENT: ACTIVITIES ARE NOT PREVENTED

## 2024-09-30 ASSESSMENT — PAIN DESCRIPTION - FREQUENCY: FREQUENCY: CONTINUOUS

## 2024-09-30 ASSESSMENT — PAIN DESCRIPTION - PAIN TYPE: TYPE: ACUTE PAIN

## 2024-09-30 ASSESSMENT — PAIN SCALES - GENERAL
PAINLEVEL_OUTOF10: 9
PAINLEVEL_OUTOF10: 6

## 2024-09-30 ASSESSMENT — PAIN DESCRIPTION - ORIENTATION: ORIENTATION: RIGHT;LEFT

## 2024-09-30 NOTE — ED PROVIDER NOTES
[facility] EMERGENCY DEP  EMERGENCY DEPARTMENT ENCOUNTER      Pt Name: Simon Salazar  MRN: [id number]  Birthdate 1985  Date of evaluation: 9/30/2024  Provider: Gricel Cardona MD    CHIEF COMPLAINT       Chief Complaint   Patient presents with    Fever         HISTORY OF PRESENT ILLNESS    Simon Salazar is a 38 yo M with fevers and cough for the past 6 days.  [facility] and [facility] and Flu swabs that were negative.  His fever was up to 104.7 today.  He took 1000mg acetaminophen about 1 hour ago and then came to the ED.  His cough has been non productive but he has felt short of breath.           Additional history from independent historians:     Review of External Medical Records:     Nursing Notes were reviewed.    REVIEW OF SYSTEMS       Review of Systems   Constitutional:  Positive for appetite change and fever.   HENT:  Negative for sore throat.    Eyes:  Negative for visual disturbance.   Respiratory:  Positive for cough.    Cardiovascular:  Negative for chest pain.   Gastrointestinal:  Negative for abdominal pain.   Genitourinary:  Negative for dysuria.   Musculoskeletal:  Positive for myalgias. Negative for back pain.   Skin:  Negative for rash.   Neurological:  Negative for headaches.       Except as noted above the remainder of the review of systems was reviewed and negative.       PAST MEDICAL HISTORY     Past Medical History:   Diagnosis Date    Acid indigestion     Autoimmune disease (HCC)     Cancer (HCC)     Testicular     Stool color black     ulceratie proctitis          SURGICAL HISTORY       Past Surgical History:   Procedure Laterality Date    COLONOSCOPY      MANY    CT RETROPERITONEAL PERC DRAIN  04/26/2023    CT RETROPERITONEAL PERC DRAIN 4/26/2023 [facility] RAD CT    FACIAL FRACTURE SURGERY Right     ORBITAL AND UPPER JAW    HERNIA REPAIR N/A 12/1/2023    ROBOTIC INCISIONAL HERNIA REPAIR WITH MESH, POSTERIOR COMPONENT SEPERATION, EXTENDED TOTALLY EXTRAPERITONEAL REPAIR

## 2024-09-30 NOTE — ED TRIAGE NOTES
Pt arrived ambulatory to the ER with CC fever 104.7 at home, +cough, generalized body aches, diaphoretic, flushed since 9/24. Tylenol Extra Strength and Mucinex taken at 1450 today. +chest pain, +SOB    Denies N/V/D.

## 2024-10-05 LAB
BACTERIA SPEC CULT: NORMAL
BACTERIA SPEC CULT: NORMAL
SERVICE CMNT-IMP: NORMAL
SERVICE CMNT-IMP: NORMAL

## 2024-12-11 ENCOUNTER — TRANSCRIBE ORDERS (OUTPATIENT)
Dept: SCHEDULING | Age: 39
End: 2024-12-11

## 2024-12-11 DIAGNOSIS — C62.90 MALIGNANT NEOPLASM OF TESTICLE, UNSPECIFIED LATERALITY, UNSPECIFIED WHETHER DESCENDED OR UNDESCENDED (HCC): Primary | ICD-10-CM

## 2024-12-24 ENCOUNTER — HOSPITAL ENCOUNTER (OUTPATIENT)
Facility: HOSPITAL | Age: 39
Discharge: HOME | End: 2024-12-27
Payer: COMMERCIAL

## 2024-12-24 DIAGNOSIS — C62.90 MALIGNANT NEOPLASM OF TESTICLE, UNSPECIFIED LATERALITY, UNSPECIFIED WHETHER DESCENDED OR UNDESCENDED (HCC): ICD-10-CM

## 2024-12-24 PROCEDURE — 6360000004 HC RX CONTRAST MEDICATION: Performed by: RADIOLOGY

## 2024-12-24 PROCEDURE — 71260 CT THORAX DX C+: CPT

## 2024-12-24 PROCEDURE — 74177 CT ABD & PELVIS W/CONTRAST: CPT

## 2024-12-24 RX ORDER — IOPAMIDOL 755 MG/ML
100 INJECTION, SOLUTION INTRAVASCULAR
Status: COMPLETED | OUTPATIENT
Start: 2024-12-24 | End: 2024-12-24

## 2024-12-24 RX ADMIN — IOPAMIDOL 100 ML: 755 INJECTION, SOLUTION INTRAVENOUS at 16:11

## 2025-07-03 ENCOUNTER — HOSPITAL ENCOUNTER (OUTPATIENT)
Facility: HOSPITAL | Age: 40
Discharge: HOME | End: 2025-07-03
Payer: COMMERCIAL

## 2025-07-03 DIAGNOSIS — I32 PERICARDITIS SECONDARY TO TUMOR METASTATIC TO PERICARDIUM (HCC): ICD-10-CM

## 2025-07-03 DIAGNOSIS — C79.89 PERICARDITIS SECONDARY TO TUMOR METASTATIC TO PERICARDIUM (HCC): ICD-10-CM

## 2025-07-03 PROCEDURE — 6360000004 HC RX CONTRAST MEDICATION: Performed by: STUDENT IN AN ORGANIZED HEALTH CARE EDUCATION/TRAINING PROGRAM

## 2025-07-03 PROCEDURE — 74177 CT ABD & PELVIS W/CONTRAST: CPT

## 2025-07-03 RX ORDER — IOPAMIDOL 755 MG/ML
100 INJECTION, SOLUTION INTRAVASCULAR
Status: COMPLETED | OUTPATIENT
Start: 2025-07-03 | End: 2025-07-03

## 2025-07-03 RX ADMIN — IOPAMIDOL 100 ML: 755 INJECTION, SOLUTION INTRAVENOUS at 10:18

## (undated) DEVICE — TROCAR ENDOSCP L100MM DIA5MM BLDELSS STBL SL THRD OPT VW

## (undated) DEVICE — GENERAL LAPAROSCOPY - SMH: Brand: MEDLINE INDUSTRIES, INC.

## (undated) DEVICE — GLOVE ORANGE PI 7 1/2   MSG9075

## (undated) DEVICE — SOLUTION ANTIFOG VIS SYS CLEARIFY LAPSCP

## (undated) DEVICE — Device

## (undated) DEVICE — GARMENT,MEDLINE,DVT,INT,CALF,MED, GEN2: Brand: MEDLINE

## (undated) DEVICE — LIQUIBAND RAPID ADHESIVE 36/CS 0.8ML: Brand: MEDLINE

## (undated) DEVICE — SEALER ONE-STEP 37CM LIGASURE MARYLAND XP

## (undated) DEVICE — SUTURE DEV SZ 3-0 V-LOC 90 L12IN TO L18IN CV-23 VLT VLOCM0844

## (undated) DEVICE — 1LYRTR 16FR10ML 100%SILI SNAP: Brand: MEDLINE INDUSTRIES, INC.

## (undated) DEVICE — TIP COVER ACCESSORY

## (undated) DEVICE — HYPODERMIC SAFETY NEEDLE: Brand: MAGELLAN

## (undated) DEVICE — SOLUTION IRRIG 1000ML 0.9% SOD CHL USP POUR PLAS BTL

## (undated) DEVICE — DRAIN SURG 19FR 100% SIL RADPQ RND CHN FULL FLUT

## (undated) DEVICE — PACK,BASIC,SIRUS,V: Brand: MEDLINE

## (undated) DEVICE — OBTURATOR ROBOTIC DIA8MM BLDELSS ENDOSCP DISP DA VINCI SI

## (undated) DEVICE — RESERVOIR,SUCTION,100CC,SILICONE: Brand: MEDLINE

## (undated) DEVICE — SUTURE MCRYL SZ 4-0 L27IN ABSRB UD L19MM PS-2 1/2 CIR PRIM Y426H

## (undated) DEVICE — GLOVE SURG SZ 8 L12IN FNGR THK79MIL GRN LTX FREE

## (undated) DEVICE — SUTURE ETHLN SZ 3-0 L18IN NONABSORBABLE BLK L24MM PS-1 3/8 1663G

## (undated) DEVICE — NEEDLE ASPIR 22GA CHN 2.4MM SHTH DIA1.65MM CO CHROM ENDOSCP

## (undated) DEVICE — ARM DRAPE

## (undated) DEVICE — TUBING HYDR IRR --